# Patient Record
Sex: FEMALE | Race: WHITE | NOT HISPANIC OR LATINO | Employment: OTHER | URBAN - METROPOLITAN AREA
[De-identification: names, ages, dates, MRNs, and addresses within clinical notes are randomized per-mention and may not be internally consistent; named-entity substitution may affect disease eponyms.]

---

## 2017-12-07 ENCOUNTER — APPOINTMENT (OUTPATIENT)
Dept: LAB | Facility: HOSPITAL | Age: 77
End: 2017-12-07

## 2017-12-07 ENCOUNTER — TRANSCRIBE ORDERS (OUTPATIENT)
Dept: LAB | Facility: HOSPITAL | Age: 77
End: 2017-12-07

## 2017-12-07 DIAGNOSIS — A31.2 DISSEMINATED MYCOBACTERIUM AVIUM-INTRACELLULARE COMPLEX (HCC): Primary | ICD-10-CM

## 2017-12-07 LAB
ALBUMIN SERPL-MCNC: 4 G/DL (ref 3.2–4.8)
ALBUMIN/GLOB SERPL: 1.6 G/DL (ref 1.5–2.5)
ALP SERPL-CCNC: 157 U/L (ref 25–100)
ALT SERPL W P-5'-P-CCNC: 180 U/L (ref 7–40)
ANION GAP SERPL CALCULATED.3IONS-SCNC: 2 MMOL/L (ref 3–11)
AST SERPL-CCNC: 159 U/L (ref 0–33)
BASOPHILS # BLD AUTO: 0.06 10*3/MM3 (ref 0–0.2)
BASOPHILS NFR BLD AUTO: 1.2 % (ref 0–1)
BILIRUB SERPL-MCNC: 0.8 MG/DL (ref 0.3–1.2)
BUN BLD-MCNC: 18 MG/DL (ref 9–23)
BUN/CREAT SERPL: 20 (ref 7–25)
CALCIUM SPEC-SCNC: 9.6 MG/DL (ref 8.7–10.4)
CHLORIDE SERPL-SCNC: 102 MMOL/L (ref 99–109)
CO2 SERPL-SCNC: 32 MMOL/L (ref 20–31)
CREAT BLD-MCNC: 0.9 MG/DL (ref 0.6–1.3)
DEPRECATED RDW RBC AUTO: 48.2 FL (ref 37–54)
EOSINOPHIL # BLD AUTO: 0.22 10*3/MM3 (ref 0–0.3)
EOSINOPHIL NFR BLD AUTO: 4.5 % (ref 0–3)
ERYTHROCYTE [DISTWIDTH] IN BLOOD BY AUTOMATED COUNT: 13.3 % (ref 11.3–14.5)
GFR SERPL CREATININE-BSD FRML MDRD: 61 ML/MIN/1.73
GLOBULIN UR ELPH-MCNC: 2.5 GM/DL
GLUCOSE BLD-MCNC: 78 MG/DL (ref 70–100)
HCT VFR BLD AUTO: 39.2 % (ref 34.5–44)
HGB BLD-MCNC: 12.8 G/DL (ref 11.5–15.5)
IMM GRANULOCYTES # BLD: 0.01 10*3/MM3 (ref 0–0.03)
IMM GRANULOCYTES NFR BLD: 0.2 % (ref 0–0.6)
LYMPHOCYTES # BLD AUTO: 0.89 10*3/MM3 (ref 0.6–4.8)
LYMPHOCYTES NFR BLD AUTO: 18 % (ref 24–44)
MCH RBC QN AUTO: 32.2 PG (ref 27–31)
MCHC RBC AUTO-ENTMCNC: 32.7 G/DL (ref 32–36)
MCV RBC AUTO: 98.7 FL (ref 80–99)
MONOCYTES # BLD AUTO: 0.66 10*3/MM3 (ref 0–1)
MONOCYTES NFR BLD AUTO: 13.4 % (ref 0–12)
NEUTROPHILS # BLD AUTO: 3.1 10*3/MM3 (ref 1.5–8.3)
NEUTROPHILS NFR BLD AUTO: 62.7 % (ref 41–71)
PLATELET # BLD AUTO: 154 10*3/MM3 (ref 150–450)
PMV BLD AUTO: 9.5 FL (ref 6–12)
POTASSIUM BLD-SCNC: 4.6 MMOL/L (ref 3.5–5.5)
PROT SERPL-MCNC: 6.5 G/DL (ref 5.7–8.2)
RBC # BLD AUTO: 3.97 10*6/MM3 (ref 3.89–5.14)
SODIUM BLD-SCNC: 136 MMOL/L (ref 132–146)
WBC NRBC COR # BLD: 4.94 10*3/MM3 (ref 3.5–10.8)

## 2017-12-07 PROCEDURE — 85025 COMPLETE CBC W/AUTO DIFF WBC: CPT | Performed by: INTERNAL MEDICINE

## 2017-12-07 PROCEDURE — 80053 COMPREHEN METABOLIC PANEL: CPT | Performed by: INTERNAL MEDICINE

## 2017-12-07 PROCEDURE — 36415 COLL VENOUS BLD VENIPUNCTURE: CPT | Performed by: INTERNAL MEDICINE

## 2017-12-14 ENCOUNTER — TRANSCRIBE ORDERS (OUTPATIENT)
Dept: LAB | Facility: HOSPITAL | Age: 77
End: 2017-12-14

## 2017-12-14 ENCOUNTER — APPOINTMENT (OUTPATIENT)
Dept: LAB | Facility: HOSPITAL | Age: 77
End: 2017-12-14

## 2017-12-14 DIAGNOSIS — A31.2 DISSEMINATED MYCOBACTERIUM AVIUM-INTRACELLULARE COMPLEX (HCC): Primary | ICD-10-CM

## 2017-12-14 LAB
ALBUMIN SERPL-MCNC: 4 G/DL (ref 3.2–4.8)
ALBUMIN/GLOB SERPL: 1.7 G/DL (ref 1.5–2.5)
ALP SERPL-CCNC: 131 U/L (ref 25–100)
ALT SERPL W P-5'-P-CCNC: 58 U/L (ref 7–40)
ANION GAP SERPL CALCULATED.3IONS-SCNC: 3 MMOL/L (ref 3–11)
AST SERPL-CCNC: 28 U/L (ref 0–33)
BILIRUB SERPL-MCNC: 0.3 MG/DL (ref 0.3–1.2)
BUN BLD-MCNC: 20 MG/DL (ref 9–23)
BUN/CREAT SERPL: 20 (ref 7–25)
CALCIUM SPEC-SCNC: 8.9 MG/DL (ref 8.7–10.4)
CHLORIDE SERPL-SCNC: 101 MMOL/L (ref 99–109)
CO2 SERPL-SCNC: 29 MMOL/L (ref 20–31)
CREAT BLD-MCNC: 1 MG/DL (ref 0.6–1.3)
GFR SERPL CREATININE-BSD FRML MDRD: 54 ML/MIN/1.73
GLOBULIN UR ELPH-MCNC: 2.3 GM/DL
GLUCOSE BLD-MCNC: 95 MG/DL (ref 70–100)
POTASSIUM BLD-SCNC: 4.6 MMOL/L (ref 3.5–5.5)
PROT SERPL-MCNC: 6.3 G/DL (ref 5.7–8.2)
SODIUM BLD-SCNC: 133 MMOL/L (ref 132–146)

## 2017-12-14 PROCEDURE — 36415 COLL VENOUS BLD VENIPUNCTURE: CPT | Performed by: INTERNAL MEDICINE

## 2017-12-14 PROCEDURE — 80053 COMPREHEN METABOLIC PANEL: CPT | Performed by: INTERNAL MEDICINE

## 2020-01-23 PROBLEM — Z00.00 ENCOUNTER FOR PREVENTIVE HEALTH EXAMINATION: Status: ACTIVE | Noted: 2020-01-23

## 2020-02-11 ENCOUNTER — APPOINTMENT (OUTPATIENT)
Dept: ENDOCRINOLOGY | Facility: CLINIC | Age: 80
End: 2020-02-11

## 2020-08-04 PROCEDURE — U0003 INFECTIOUS AGENT DETECTION BY NUCLEIC ACID (DNA OR RNA); SEVERE ACUTE RESPIRATORY SYNDROME CORONAVIRUS 2 (SARS-COV-2) (CORONAVIRUS DISEASE [COVID-19]), AMPLIFIED PROBE TECHNIQUE, MAKING USE OF HIGH THROUGHPUT TECHNOLOGIES AS DESCRIBED BY CMS-2020-01-R: HCPCS | Performed by: PHYSICIAN ASSISTANT

## 2020-08-07 ENCOUNTER — TELEPHONE (OUTPATIENT)
Dept: URGENT CARE | Facility: CLINIC | Age: 80
End: 2020-08-07

## 2020-10-26 ENCOUNTER — TRANSCRIBE ORDERS (OUTPATIENT)
Dept: ADMINISTRATIVE | Facility: HOSPITAL | Age: 80
End: 2020-10-26

## 2020-10-26 DIAGNOSIS — H57.10 PAIN IN EYE, UNSPECIFIED LATERALITY: ICD-10-CM

## 2020-10-26 DIAGNOSIS — H53.9 UNSPECIFIED VISUAL DISTURBANCE: Primary | ICD-10-CM

## 2020-11-12 ENCOUNTER — HOSPITAL ENCOUNTER (OUTPATIENT)
Dept: MRI IMAGING | Facility: HOSPITAL | Age: 80
Discharge: HOME | End: 2020-11-12
Admitting: INTERNAL MEDICINE

## 2020-11-12 DIAGNOSIS — H53.9 UNSPECIFIED VISUAL DISTURBANCE: ICD-10-CM

## 2020-11-12 DIAGNOSIS — H57.10 PAIN IN EYE, UNSPECIFIED LATERALITY: ICD-10-CM

## 2020-11-12 PROCEDURE — 82565 ASSAY OF CREATININE: CPT

## 2020-11-12 PROCEDURE — A9577 INJ MULTIHANCE: HCPCS | Performed by: INTERNAL MEDICINE

## 2020-11-12 PROCEDURE — 0 GADOBENATE DIMEGLUMINE 529 MG/ML SOLUTION: Performed by: INTERNAL MEDICINE

## 2020-11-12 PROCEDURE — 70553 MRI BRAIN STEM W/O & W/DYE: CPT

## 2020-11-12 RX ADMIN — GADOBENATE DIMEGLUMINE 8 ML: 529 INJECTION, SOLUTION INTRAVENOUS at 11:29

## 2020-11-20 LAB — CREAT BLDA-MCNC: 0.9 MG/DL (ref 0.6–1.3)

## 2020-11-24 DIAGNOSIS — R00.2 PALPITATIONS: Primary | ICD-10-CM

## 2022-08-01 PROBLEM — M81.0 SENILE OSTEOPOROSIS: Status: ACTIVE | Noted: 2022-08-01

## 2022-08-03 ENCOUNTER — INFUSION (OUTPATIENT)
Dept: ONCOLOGY | Facility: HOSPITAL | Age: 82
End: 2022-08-03

## 2022-08-03 VITALS
RESPIRATION RATE: 20 BRPM | TEMPERATURE: 98.2 F | DIASTOLIC BLOOD PRESSURE: 76 MMHG | SYSTOLIC BLOOD PRESSURE: 148 MMHG | HEART RATE: 80 BPM

## 2022-08-03 DIAGNOSIS — M81.0 SENILE OSTEOPOROSIS: Primary | ICD-10-CM

## 2022-08-03 PROCEDURE — 25010000002 DENOSUMAB 60 MG/ML SOLUTION PREFILLED SYRINGE: Performed by: INTERNAL MEDICINE

## 2022-08-03 PROCEDURE — 96372 THER/PROPH/DIAG INJ SC/IM: CPT

## 2022-08-03 RX ORDER — ASCORBIC ACID 100 MG
TABLET,CHEWABLE ORAL
COMMUNITY

## 2022-08-03 RX ORDER — CHOLECALCIFEROL (VITAMIN D3) 1250 MCG
CAPSULE ORAL
COMMUNITY

## 2022-08-03 RX ADMIN — DENOSUMAB 60 MG: 60 INJECTION SUBCUTANEOUS at 15:11

## 2023-02-13 ENCOUNTER — INFUSION (OUTPATIENT)
Dept: ONCOLOGY | Facility: HOSPITAL | Age: 83
End: 2023-02-13
Payer: MEDICARE

## 2023-02-13 VITALS
TEMPERATURE: 96.5 F | HEART RATE: 72 BPM | DIASTOLIC BLOOD PRESSURE: 69 MMHG | SYSTOLIC BLOOD PRESSURE: 141 MMHG | RESPIRATION RATE: 16 BRPM

## 2023-02-13 DIAGNOSIS — M81.0 SENILE OSTEOPOROSIS: Primary | ICD-10-CM

## 2023-02-13 PROCEDURE — 25010000002 DENOSUMAB 60 MG/ML SOLUTION PREFILLED SYRINGE: Performed by: INTERNAL MEDICINE

## 2023-02-13 PROCEDURE — 96372 THER/PROPH/DIAG INJ SC/IM: CPT

## 2023-02-13 RX ADMIN — DENOSUMAB 60 MG: 60 INJECTION SUBCUTANEOUS at 13:25

## 2023-06-16 ENCOUNTER — APPOINTMENT (OUTPATIENT)
Dept: GENERAL RADIOLOGY | Facility: HOSPITAL | Age: 83
End: 2023-06-16
Payer: MEDICARE

## 2023-06-16 ENCOUNTER — HOSPITAL ENCOUNTER (OUTPATIENT)
Facility: HOSPITAL | Age: 83
Setting detail: OBSERVATION
Discharge: HOME | End: 2023-06-18
Attending: EMERGENCY MEDICINE | Admitting: INTERNAL MEDICINE
Payer: MEDICARE

## 2023-06-16 DIAGNOSIS — I48.91 NEW ONSET ATRIAL FIBRILLATION: Primary | ICD-10-CM

## 2023-06-16 DIAGNOSIS — R06.02 SHORTNESS OF BREATH: ICD-10-CM

## 2023-06-16 DIAGNOSIS — I95.9 TRANSIENT HYPOTENSION: ICD-10-CM

## 2023-06-16 DIAGNOSIS — J98.4 CHRONIC LUNG DISEASE: ICD-10-CM

## 2023-06-16 LAB
ALBUMIN SERPL-MCNC: 4 G/DL (ref 3.5–5.2)
ALBUMIN/GLOB SERPL: 1.4 G/DL
ALP SERPL-CCNC: 81 U/L (ref 39–117)
ALT SERPL W P-5'-P-CCNC: 7 U/L (ref 1–33)
ANION GAP SERPL CALCULATED.3IONS-SCNC: 11 MMOL/L (ref 5–15)
AST SERPL-CCNC: 16 U/L (ref 1–32)
BASOPHILS # BLD AUTO: 0.05 10*3/MM3 (ref 0–0.2)
BASOPHILS NFR BLD AUTO: 0.6 % (ref 0–1.5)
BILIRUB SERPL-MCNC: 0.3 MG/DL (ref 0–1.2)
BUN SERPL-MCNC: 12 MG/DL (ref 8–23)
BUN/CREAT SERPL: 20 (ref 7–25)
CALCIUM SPEC-SCNC: 9.5 MG/DL (ref 8.6–10.5)
CHLORIDE SERPL-SCNC: 95 MMOL/L (ref 98–107)
CO2 SERPL-SCNC: 28 MMOL/L (ref 22–29)
CREAT SERPL-MCNC: 0.6 MG/DL (ref 0.57–1)
DEPRECATED RDW RBC AUTO: 46.5 FL (ref 37–54)
EGFRCR SERPLBLD CKD-EPI 2021: 89.2 ML/MIN/1.73
EOSINOPHIL # BLD AUTO: 0.1 10*3/MM3 (ref 0–0.4)
EOSINOPHIL NFR BLD AUTO: 1.1 % (ref 0.3–6.2)
ERYTHROCYTE [DISTWIDTH] IN BLOOD BY AUTOMATED COUNT: 12.6 % (ref 12.3–15.4)
GLOBULIN UR ELPH-MCNC: 2.8 GM/DL
GLUCOSE SERPL-MCNC: 120 MG/DL (ref 65–99)
HCT VFR BLD AUTO: 38.7 % (ref 34–46.6)
HGB BLD-MCNC: 12.4 G/DL (ref 12–15.9)
HOLD SPECIMEN: NORMAL
HOLD SPECIMEN: NORMAL
IMM GRANULOCYTES # BLD AUTO: 0.02 10*3/MM3 (ref 0–0.05)
IMM GRANULOCYTES NFR BLD AUTO: 0.2 % (ref 0–0.5)
LYMPHOCYTES # BLD AUTO: 1.52 10*3/MM3 (ref 0.7–3.1)
LYMPHOCYTES NFR BLD AUTO: 17 % (ref 19.6–45.3)
MAGNESIUM SERPL-MCNC: 2 MG/DL (ref 1.6–2.4)
MCH RBC QN AUTO: 32 PG (ref 26.6–33)
MCHC RBC AUTO-ENTMCNC: 32 G/DL (ref 31.5–35.7)
MCV RBC AUTO: 99.7 FL (ref 79–97)
MONOCYTES # BLD AUTO: 0.81 10*3/MM3 (ref 0.1–0.9)
MONOCYTES NFR BLD AUTO: 9.1 % (ref 5–12)
NEUTROPHILS NFR BLD AUTO: 6.44 10*3/MM3 (ref 1.7–7)
NEUTROPHILS NFR BLD AUTO: 72 % (ref 42.7–76)
NRBC BLD AUTO-RTO: 0 /100 WBC (ref 0–0.2)
NT-PROBNP SERPL-MCNC: 1832 PG/ML (ref 0–1800)
PLATELET # BLD AUTO: 213 10*3/MM3 (ref 140–450)
PMV BLD AUTO: 9.6 FL (ref 6–12)
POTASSIUM SERPL-SCNC: 4.5 MMOL/L (ref 3.5–5.2)
PROT SERPL-MCNC: 6.8 G/DL (ref 6–8.5)
RBC # BLD AUTO: 3.88 10*6/MM3 (ref 3.77–5.28)
RBC MORPH BLD: NORMAL
SMALL PLATELETS BLD QL SMEAR: ADEQUATE
SODIUM SERPL-SCNC: 134 MMOL/L (ref 136–145)
TROPONIN T SERPL HS-MCNC: 10 NG/L
TSH SERPL DL<=0.05 MIU/L-ACNC: 0.56 UIU/ML (ref 0.27–4.2)
WBC MORPH BLD: NORMAL
WBC NRBC COR # BLD: 8.94 10*3/MM3 (ref 3.4–10.8)
WHOLE BLOOD HOLD COAG: NORMAL
WHOLE BLOOD HOLD SPECIMEN: NORMAL

## 2023-06-16 PROCEDURE — 85007 BL SMEAR W/DIFF WBC COUNT: CPT

## 2023-06-16 PROCEDURE — 96365 THER/PROPH/DIAG IV INF INIT: CPT

## 2023-06-16 PROCEDURE — 93005 ELECTROCARDIOGRAM TRACING: CPT

## 2023-06-16 PROCEDURE — 85025 COMPLETE CBC W/AUTO DIFF WBC: CPT

## 2023-06-16 PROCEDURE — 96372 THER/PROPH/DIAG INJ SC/IM: CPT

## 2023-06-16 PROCEDURE — 71045 X-RAY EXAM CHEST 1 VIEW: CPT

## 2023-06-16 PROCEDURE — 84439 ASSAY OF FREE THYROXINE: CPT | Performed by: INTERNAL MEDICINE

## 2023-06-16 PROCEDURE — 99284 EMERGENCY DEPT VISIT MOD MDM: CPT

## 2023-06-16 PROCEDURE — 93005 ELECTROCARDIOGRAM TRACING: CPT | Performed by: EMERGENCY MEDICINE

## 2023-06-16 PROCEDURE — 25010000002 ENOXAPARIN PER 10 MG: Performed by: EMERGENCY MEDICINE

## 2023-06-16 PROCEDURE — 80053 COMPREHEN METABOLIC PANEL: CPT

## 2023-06-16 PROCEDURE — 84484 ASSAY OF TROPONIN QUANT: CPT

## 2023-06-16 PROCEDURE — 83880 ASSAY OF NATRIURETIC PEPTIDE: CPT

## 2023-06-16 PROCEDURE — 84145 PROCALCITONIN (PCT): CPT | Performed by: INTERNAL MEDICINE

## 2023-06-16 PROCEDURE — 84443 ASSAY THYROID STIM HORMONE: CPT

## 2023-06-16 PROCEDURE — 83735 ASSAY OF MAGNESIUM: CPT

## 2023-06-16 RX ORDER — SODIUM CHLORIDE 0.9 % (FLUSH) 0.9 %
10 SYRINGE (ML) INJECTION AS NEEDED
Status: DISCONTINUED | OUTPATIENT
Start: 2023-06-16 | End: 2023-06-18 | Stop reason: HOSPADM

## 2023-06-16 RX ORDER — ENOXAPARIN SODIUM 100 MG/ML
1 INJECTION SUBCUTANEOUS ONCE
Status: COMPLETED | OUTPATIENT
Start: 2023-06-16 | End: 2023-06-16

## 2023-06-16 RX ADMIN — ENOXAPARIN SODIUM 40 MG: 100 INJECTION SUBCUTANEOUS at 22:42

## 2023-06-16 RX ADMIN — DILTIAZEM HYDROCHLORIDE 5 MG/HR: 5 INJECTION INTRAVENOUS at 22:43

## 2023-06-16 RX ADMIN — SODIUM CHLORIDE 1000 ML: 9 INJECTION, SOLUTION INTRAVENOUS at 23:25

## 2023-06-16 NOTE — Clinical Note
Level of Care: Telemetry [5]   Diagnosis: New onset atrial fibrillation [[id number]]   Admitting Physician: GERBER SANTIAGO [[id number]]   Attending Physician: GERBER SANTIAGO [[id number]]

## 2023-06-17 ENCOUNTER — APPOINTMENT (OUTPATIENT)
Dept: CT IMAGING | Facility: HOSPITAL | Age: 83
End: 2023-06-17
Payer: MEDICARE

## 2023-06-17 ENCOUNTER — APPOINTMENT (OUTPATIENT)
Dept: CARDIOLOGY | Facility: HOSPITAL | Age: 83
End: 2023-06-17
Payer: MEDICARE

## 2023-06-17 PROBLEM — E06.3 HASHIMOTO'S THYROIDITIS: Status: ACTIVE | Noted: 2021-06-09

## 2023-06-17 PROBLEM — H40.1133 PRIMARY OPEN ANGLE GLAUCOMA OF BOTH EYES, SEVERE STAGE: Status: ACTIVE | Noted: 2021-04-10

## 2023-06-17 PROBLEM — I48.91 NEW ONSET ATRIAL FIBRILLATION: Status: ACTIVE | Noted: 2023-06-17

## 2023-06-17 PROBLEM — R64 CACHEXIA: Status: ACTIVE | Noted: 2023-06-17

## 2023-06-17 PROBLEM — Z86.79 H/O ORTHOSTATIC HYPOTENSION: Status: ACTIVE | Noted: 2017-02-06

## 2023-06-17 PROBLEM — J96.01 ACUTE RESPIRATORY FAILURE WITH HYPOXIA: Status: ACTIVE | Noted: 2023-06-17

## 2023-06-17 PROBLEM — A31.0 PULMONARY INFECTION DUE TO MYCOBACTERIUM AVIUM COMPLEX: Status: ACTIVE | Noted: 2018-06-01

## 2023-06-17 PROBLEM — R91.8 MULTIPLE NODULES OF LUNG: Status: ACTIVE | Noted: 2019-01-16

## 2023-06-17 PROBLEM — F41.8 MIXED ANXIETY AND DEPRESSIVE DISORDER: Status: ACTIVE | Noted: 2020-11-25

## 2023-06-17 LAB
GEN 5 2HR TROPONIN T REFLEX: 10 NG/L
HOLD SPECIMEN: NORMAL
PREALB SERPL-MCNC: 10.6 MG/DL (ref 20–40)
PROCALCITONIN SERPL-MCNC: 0.05 NG/ML (ref 0–0.25)
QT INTERVAL: 270 MS
QT INTERVAL: 396 MS
QTC INTERVAL: 376 MS
QTC INTERVAL: 448 MS
T4 FREE SERPL-MCNC: 1.62 NG/DL (ref 0.93–1.7)
TROPONIN T DELTA: -6 NG/L
TROPONIN T SERPL HS-MCNC: 16 NG/L

## 2023-06-17 PROCEDURE — 25510000001 IOPAMIDOL PER 1 ML: Performed by: EMERGENCY MEDICINE

## 2023-06-17 PROCEDURE — 99204 OFFICE O/P NEW MOD 45 MIN: CPT | Performed by: INTERNAL MEDICINE

## 2023-06-17 PROCEDURE — 94799 UNLISTED PULMONARY SVC/PX: CPT

## 2023-06-17 PROCEDURE — G0378 HOSPITAL OBSERVATION PER HR: HCPCS

## 2023-06-17 PROCEDURE — 84484 ASSAY OF TROPONIN QUANT: CPT | Performed by: INTERNAL MEDICINE

## 2023-06-17 PROCEDURE — 94669 MECHANICAL CHEST WALL OSCILL: CPT

## 2023-06-17 PROCEDURE — 93306 TTE W/DOPPLER COMPLETE: CPT

## 2023-06-17 PROCEDURE — 71275 CT ANGIOGRAPHY CHEST: CPT

## 2023-06-17 PROCEDURE — 84134 ASSAY OF PREALBUMIN: CPT | Performed by: INTERNAL MEDICINE

## 2023-06-17 PROCEDURE — 93005 ELECTROCARDIOGRAM TRACING: CPT | Performed by: INTERNAL MEDICINE

## 2023-06-17 PROCEDURE — 97165 OT EVAL LOW COMPLEX 30 MIN: CPT

## 2023-06-17 PROCEDURE — 93306 TTE W/DOPPLER COMPLETE: CPT | Performed by: INTERNAL MEDICINE

## 2023-06-17 PROCEDURE — 94761 N-INVAS EAR/PLS OXIMETRY MLT: CPT

## 2023-06-17 PROCEDURE — 93010 ELECTROCARDIOGRAM REPORT: CPT | Performed by: INTERNAL MEDICINE

## 2023-06-17 PROCEDURE — 94640 AIRWAY INHALATION TREATMENT: CPT

## 2023-06-17 RX ORDER — BISACODYL 10 MG
10 SUPPOSITORY, RECTAL RECTAL DAILY PRN
Status: DISCONTINUED | OUTPATIENT
Start: 2023-06-17 | End: 2023-06-18 | Stop reason: HOSPADM

## 2023-06-17 RX ORDER — FLUDROCORTISONE ACETATE 0.1 MG/1
0.1 TABLET ORAL DAILY
Status: DISCONTINUED | OUTPATIENT
Start: 2023-06-17 | End: 2023-06-17

## 2023-06-17 RX ORDER — MIRTAZAPINE 15 MG/1
15 TABLET, FILM COATED ORAL NIGHTLY
Status: DISCONTINUED | OUTPATIENT
Start: 2023-06-17 | End: 2023-06-18 | Stop reason: HOSPADM

## 2023-06-17 RX ORDER — GALANTAMINE HYDROBROMIDE 4 MG/1
8 TABLET, FILM COATED ORAL 2 TIMES DAILY WITH MEALS
Status: DISCONTINUED | OUTPATIENT
Start: 2023-06-17 | End: 2023-06-18 | Stop reason: HOSPADM

## 2023-06-17 RX ORDER — AMOXICILLIN 250 MG
2 CAPSULE ORAL 2 TIMES DAILY
Status: DISCONTINUED | OUTPATIENT
Start: 2023-06-17 | End: 2023-06-18 | Stop reason: HOSPADM

## 2023-06-17 RX ORDER — LEVOTHYROXINE SODIUM 0.07 MG/1
75 TABLET ORAL DAILY
Status: DISCONTINUED | OUTPATIENT
Start: 2023-06-17 | End: 2023-06-18 | Stop reason: HOSPADM

## 2023-06-17 RX ORDER — BRIMONIDINE TARTRATE 2 MG/ML
1 SOLUTION/ DROPS OPHTHALMIC 2 TIMES DAILY
Status: DISCONTINUED | OUTPATIENT
Start: 2023-06-17 | End: 2023-06-18 | Stop reason: HOSPADM

## 2023-06-17 RX ORDER — FLUDROCORTISONE ACETATE 0.1 MG/1
100 TABLET ORAL DAILY
Status: DISCONTINUED | OUTPATIENT
Start: 2023-06-17 | End: 2023-06-18 | Stop reason: HOSPADM

## 2023-06-17 RX ORDER — TIMOLOL MALEATE 5 MG/ML
1 SOLUTION/ DROPS OPHTHALMIC EVERY 12 HOURS SCHEDULED
Status: DISCONTINUED | OUTPATIENT
Start: 2023-06-17 | End: 2023-06-18 | Stop reason: HOSPADM

## 2023-06-17 RX ORDER — GALANTAMINE HYDROBROMIDE 4 MG/1
8 TABLET, FILM COATED ORAL 2 TIMES DAILY
COMMUNITY

## 2023-06-17 RX ORDER — GALANTAMINE HYDROBROMIDE 4 MG/1
8 TABLET, FILM COATED ORAL 2 TIMES DAILY
Status: DISCONTINUED | OUTPATIENT
Start: 2023-06-17 | End: 2023-06-17

## 2023-06-17 RX ORDER — MIRTAZAPINE 15 MG/1
15 TABLET, FILM COATED ORAL NIGHTLY
COMMUNITY

## 2023-06-17 RX ORDER — SODIUM CHLORIDE 9 MG/ML
40 INJECTION, SOLUTION INTRAVENOUS AS NEEDED
Status: DISCONTINUED | OUTPATIENT
Start: 2023-06-17 | End: 2023-06-18 | Stop reason: HOSPADM

## 2023-06-17 RX ORDER — SODIUM CHLORIDE 0.9 % (FLUSH) 0.9 %
10 SYRINGE (ML) INJECTION EVERY 12 HOURS SCHEDULED
Status: DISCONTINUED | OUTPATIENT
Start: 2023-06-17 | End: 2023-06-18 | Stop reason: HOSPADM

## 2023-06-17 RX ORDER — PAROXETINE 10 MG/1
10 TABLET, FILM COATED ORAL NIGHTLY
COMMUNITY

## 2023-06-17 RX ORDER — ACETAMINOPHEN 325 MG/1
650 TABLET ORAL EVERY 4 HOURS PRN
Status: DISCONTINUED | OUTPATIENT
Start: 2023-06-17 | End: 2023-06-18 | Stop reason: HOSPADM

## 2023-06-17 RX ORDER — FLUDROCORTISONE ACETATE 0.1 MG/1
0.1 TABLET ORAL DAILY
COMMUNITY

## 2023-06-17 RX ORDER — ACETAMINOPHEN 650 MG/1
650 SUPPOSITORY RECTAL EVERY 4 HOURS PRN
Status: DISCONTINUED | OUTPATIENT
Start: 2023-06-17 | End: 2023-06-18 | Stop reason: HOSPADM

## 2023-06-17 RX ORDER — ASCORBIC ACID 500 MG
500 TABLET ORAL DAILY
Status: DISCONTINUED | OUTPATIENT
Start: 2023-06-17 | End: 2023-06-18 | Stop reason: HOSPADM

## 2023-06-17 RX ORDER — LATANOPROST 50 UG/ML
1 SOLUTION/ DROPS OPHTHALMIC NIGHTLY
Status: DISCONTINUED | OUTPATIENT
Start: 2023-06-17 | End: 2023-06-18 | Stop reason: HOSPADM

## 2023-06-17 RX ORDER — DIAZEPAM 2 MG/1
2 TABLET ORAL NIGHTLY PRN
Status: DISCONTINUED | OUTPATIENT
Start: 2023-06-17 | End: 2023-06-17

## 2023-06-17 RX ORDER — BISACODYL 5 MG/1
5 TABLET, DELAYED RELEASE ORAL DAILY PRN
Status: DISCONTINUED | OUTPATIENT
Start: 2023-06-17 | End: 2023-06-18 | Stop reason: HOSPADM

## 2023-06-17 RX ORDER — ONDANSETRON 2 MG/ML
4 INJECTION INTRAMUSCULAR; INTRAVENOUS EVERY 6 HOURS PRN
Status: DISCONTINUED | OUTPATIENT
Start: 2023-06-17 | End: 2023-06-18 | Stop reason: HOSPADM

## 2023-06-17 RX ORDER — ACETAMINOPHEN 160 MG/5ML
650 SOLUTION ORAL EVERY 4 HOURS PRN
Status: DISCONTINUED | OUTPATIENT
Start: 2023-06-17 | End: 2023-06-18 | Stop reason: HOSPADM

## 2023-06-17 RX ORDER — MIRTAZAPINE 15 MG/1
15 TABLET, FILM COATED ORAL NIGHTLY
Status: DISCONTINUED | OUTPATIENT
Start: 2023-06-17 | End: 2023-06-17

## 2023-06-17 RX ORDER — PAROXETINE 10 MG/1
10 TABLET, FILM COATED ORAL NIGHTLY
Status: DISCONTINUED | OUTPATIENT
Start: 2023-06-17 | End: 2023-06-18 | Stop reason: HOSPADM

## 2023-06-17 RX ORDER — PAROXETINE 10 MG/1
10 TABLET, FILM COATED ORAL DAILY
Status: DISCONTINUED | OUTPATIENT
Start: 2023-06-17 | End: 2023-06-17

## 2023-06-17 RX ORDER — SODIUM CHLORIDE 0.9 % (FLUSH) 0.9 %
10 SYRINGE (ML) INJECTION AS NEEDED
Status: DISCONTINUED | OUTPATIENT
Start: 2023-06-17 | End: 2023-06-18 | Stop reason: HOSPADM

## 2023-06-17 RX ORDER — POLYETHYLENE GLYCOL 3350 17 G/17G
17 POWDER, FOR SOLUTION ORAL DAILY PRN
Status: DISCONTINUED | OUTPATIENT
Start: 2023-06-17 | End: 2023-06-18 | Stop reason: HOSPADM

## 2023-06-17 RX ORDER — FLUTICASONE PROPIONATE 50 MCG
2 SPRAY, SUSPENSION (ML) NASAL DAILY
Status: DISCONTINUED | OUTPATIENT
Start: 2023-06-17 | End: 2023-06-18 | Stop reason: HOSPADM

## 2023-06-17 RX ADMIN — Medication 10 ML: at 01:35

## 2023-06-17 RX ADMIN — SODIUM CHLORIDE, POTASSIUM CHLORIDE, SODIUM LACTATE AND CALCIUM CHLORIDE 500 ML: 600; 310; 30; 20 INJECTION, SOLUTION INTRAVENOUS at 14:04

## 2023-06-17 RX ADMIN — Medication 10 ML: at 21:15

## 2023-06-17 RX ADMIN — SENNOSIDES AND DOCUSATE SODIUM 2 TABLET: 50; 8.6 TABLET ORAL at 09:43

## 2023-06-17 RX ADMIN — IPRATROPIUM BROMIDE 0.5 MG: 0.5 SOLUTION RESPIRATORY (INHALATION) at 12:44

## 2023-06-17 RX ADMIN — BRIMONIDINE TARTRATE 1 DROP: 2 SOLUTION/ DROPS OPHTHALMIC at 21:16

## 2023-06-17 RX ADMIN — IPRATROPIUM BROMIDE 0.5 MG: 0.5 SOLUTION RESPIRATORY (INHALATION) at 09:04

## 2023-06-17 RX ADMIN — IOPAMIDOL 80 ML: 755 INJECTION, SOLUTION INTRAVENOUS at 02:15

## 2023-06-17 RX ADMIN — LATANOPROST 1 DROP: 50 SOLUTION OPHTHALMIC at 21:15

## 2023-06-17 RX ADMIN — FLUDROCORTISONE ACETATE 100 MCG: 0.1 TABLET ORAL at 09:49

## 2023-06-17 RX ADMIN — GALANTAMINE 8 MG: 4 TABLET, FILM COATED ORAL at 09:43

## 2023-06-17 RX ADMIN — BRIMONIDINE TARTRATE 1 DROP: 2 SOLUTION/ DROPS OPHTHALMIC at 09:57

## 2023-06-17 RX ADMIN — GALANTAMINE 8 MG: 4 TABLET, FILM COATED ORAL at 18:02

## 2023-06-17 RX ADMIN — TIMOLOL MALEATE 1 DROP: 5 SOLUTION/ DROPS OPHTHALMIC at 21:15

## 2023-06-17 RX ADMIN — MIRTAZAPINE 15 MG: 15 TABLET, FILM COATED ORAL at 21:15

## 2023-06-17 RX ADMIN — IPRATROPIUM BROMIDE 0.5 MG: 0.5 SOLUTION RESPIRATORY (INHALATION) at 20:25

## 2023-06-17 RX ADMIN — PAROXETINE HYDROCHLORIDE 10 MG: 10 TABLET, FILM COATED ORAL at 21:15

## 2023-06-17 RX ADMIN — OXYCODONE HYDROCHLORIDE AND ACETAMINOPHEN 500 MG: 500 TABLET ORAL at 09:43

## 2023-06-17 RX ADMIN — Medication 10 ML: at 09:56

## 2023-06-17 RX ADMIN — LEVOTHYROXINE SODIUM 75 MCG: 75 TABLET ORAL at 05:21

## 2023-06-17 NOTE — H&P
[facility]  HISTORY AND PHYSICAL    Patient Name: Vita Miller  : 1940  MRN: [id number]  Primary Care Physician: Alfonso Steele MD  Date of admission: 2023      Subjective   Subjective     Chief Complaint:  Palpitations    HPI:  Vita Miller is a 83 y.o. female with past medical history of hypothyroidism, COPD with chronic bronchiectasis, history of chronic MAC with intolerance to treatment plan, history of orthostatic hypotension, hypothyroidism, hyperlipidemia, osteoporosis, bilateral glaucoma presents to the ER today with new onset palpitations and tachycardia starting earlier today.    Patient reports that she has been in her normal state of health up until today.  She states that she has had no cough or shortness of air above her baseline.  She reports that she chronically uses chest physiotherapy vest daily for her bronchiectasis.  Denies any fever or chills.  Denies any dysuria.  Patient reports today she began having palpitations and called her doctor, Dr. Steele.  He evaluated her at home and was concerned for atrial fibrillation versus SVT.  Was brought to the ER for further evaluation and found to be in atrial fibrillation with RVR.  Denies any chest pain or pressure.  Denies any other symptoms at this time.      Review of Systems   Gen- No fevers, chills  CV- No chest pain, reports palpitations  Resp-reports chronic cough, dyspnea  GI- No N/V/D, abd pain      Personal History     Past Medical History:   Diagnosis Date    A-fib     Disease of thyroid gland     Glaucoma     Hypotension              Past Surgical History:   Procedure Laterality Date    HIP SURGERY      PATELLA SURGERY      SHOULDER SURGERY         Family History: family history is not on file.     Social History:  reports that she has never smoked. She does not have any smokeless tobacco history on file.  Social History     Social History Narrative    Not on file        Medications:  Available home medication information reviewed.  (Not in a hospital admission)      Allergies   Allergen Reactions    Sulfa Antibiotics Other (See Comments)     Pt unsure of type of reaction        Objective   Objective     Vital Signs:   Temp:  [97.4 °F (36.3 °C)] 97.4 °F (36.3 °C)  Heart Rate:  [] 91  Resp:  [16] 16  BP: ()/(44-84) 103/61  Flow (L/min):  [2-3] 3       Physical Exam   Constitutional: Awake, alert, nontoxic, cachectic  Eyes: PERRLA, sclerae anicteric, no conjunctival injection  HENT: NCAT, mucous membranes moist  Neck: Supple, no thyromegaly, no lymphadenopathy, trachea midline  Respiratory: Rales in the bases bilaterally, nonlabored respirations   Cardiovascular: Borderline tachycardic, irregularly irregular rhythm, positive murmur, palpable pedal pulses bilaterally  Gastrointestinal: Positive bowel sounds, soft, nontender, nondistended  Musculoskeletal: No bilateral ankle edema, no clubbing or cyanosis to extremities  Psychiatric: Appropriate affect, cooperative  Neurologic: Oriented x 3, strength symmetric in all extremities, Cranial Nerves grossly intact to confrontation, speech clear  Skin: No rashes      Result Review:  I have personally reviewed the results from the time of this admission to 6/17/2023 00:57 EDT and agree with these findings:  [x]  Laboratory list / accordion  []  Microbiology  [x]  Radiology  [x]  EKG/Telemetry   []  Cardiology/Vascular   []  Pathology  []  Old records  []  Other:  Most notable findings include: Chest x-ray with pulmonary nodules, no focal infiltrates, findings of chronic interstitial lung disease/bronchiectasis.  EKG with atrial fibrillation with RVR.        LAB RESULTS:      Lab 06/16/23 2217   WBC 8.94   HEMOGLOBIN 12.4   HEMATOCRIT 38.7   PLATELETS 213   NEUTROS ABS 6.44   IMMATURE GRANS (ABS) 0.02   LYMPHS ABS 1.52   MONOS ABS 0.81   EOS ABS 0.10   MCV 99.7*         Lab 06/16/23 2217   SODIUM 134*   POTASSIUM 4.5    CHLORIDE 95*   CO2 28.0   ANION GAP 11.0   BUN 12   CREATININE 0.60   EGFR 89.2   GLUCOSE 120*   CALCIUM 9.5   MAGNESIUM 2.0   TSH 0.561         Lab 06/16/23  2217   TOTAL PROTEIN 6.8   ALBUMIN 4.0   GLOBULIN 2.8   ALT (SGPT) 7   AST (SGOT) 16   BILIRUBIN 0.3   ALK PHOS 81         Lab 06/16/23  2217   PROBNP 1,832.0*   HSTROP T 10*                     Microbiology Results (last 10 days)       ** No results found for the last 240 hours. **            XR Chest 1 View    Result Date: 6/16/2023  XR CHEST 1 VW Date of Exam: 6/16/2023 10:21 PM EDT Indication: Dysrhythmia triage protocol Comparison: None available. Findings: Chronic changes are present including bronchiectasis and underlying emphysema. There is some suggestion of numerous small nodules. There is no effusion or pneumothorax. Normal heart mediastinal contours.     Impression: Impression: Findings of bronchiectasis and probable underlying emphysema with the appearance of small bilateral pulmonary nodules suggested. Consider CT to further evaluate. 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    New onset atrial fibrillation with RVR  -- Check magnesium  -- Echo for new heart murmur  -- Cardiology consult  --cardizem drip. Reports \"really low heart rate\" at baseline.  Defer rate control medicine to cardiology.     Acute hypoxic respiratory failure  COPD  Chronic bronchiectasis  --Denies increased cough or soa. No fever or chills.  --daily percussion  --flutter valve  --CTA pendng    Pulm nodules  --prior hx of same with biopsy  --CTA  --needs f/u with pulm vs pulm nodule clinic at discharge    Cachexia  --suspect multifactorial due to lung disease, chronic infection, poor overall intake  --nutrition consult  --check prealbumin  -- Continue Remeron  --rule out underlying malignancy, suggest heme/onc referral    Hypothyroidism  -- TSH within normal limits but slightly lower than baseline  -- Check free T4    Orthostatic hypotension  --continue fludrocortisone    Mild memory deficits  --continue galantamine    Anxiety/depression  -- Continue Paxil, as needed benzo    Total time spent: 55  Time spent includes time reviewing chart, face-to-face time, counseling patient/family/caregiver, ordering medications/tests/procedures, communicating with other health care professionals, documenting clinical information in the electronic health record, and coordination of care.      DVT prophylaxis:  lovenox      CODE STATUS: full  Code Status and Medical Interventions:   Ordered at: 06/17/23 0052     Code Status (Patient has no pulse and is not breathing):    CPR (Attempt to Resuscitate)     Medical Interventions (Patient has pulse or is breathing):    Full Support       Expected Discharge   Expected Discharge Date: 6/18/2023; Expected Discharge Time:  "     Kevin Shah DO  06/17/23

## 2023-06-17 NOTE — CASE MANAGEMENT/SOCIAL WORK
"Continued Stay Note  [facility]     Patient Name: Vita Miller  MRN: [id number]  Today's Date: 6/17/2023    Admit Date: 6/16/2023    Plan: Ongoing   Discharge Plan     Row Name 06/17/23 0839       Plan    Plan Ongoing    Patient/Family in Agreement with Plan yes    Plan Comments Received a consult to check affordability of Xarelto vs Eliquis. Both have been run through \"Cover My Meds\" and neither requires a prior authorization. If Ms. Miller has not taken either medication ever, she will qualify for a $10 copay for either medication. CM will continue to follow.    Final Discharge Disposition Code 30 - still a patient               Discharge Codes    No documentation.               Expected Discharge Date and Time     Expected Discharge Date Expected Discharge Time    Jun 18, 2023             Saad Rudolph RN    "

## 2023-06-17 NOTE — PROGRESS NOTES
[facility] Services  ADMISSION FOLLOW-UP NOTE          Patient admitted after midnight, H&P by my partner performed earlier on today's date reviewed.  Interim findings, labs, and charting also reviewed.        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       In summary, this iis a 82 yo female w chronic MAC unable to tolerate treatment, chronic bronchiectasis, celiac disease, orthostatic hypotension on fludrocortisone who presents with paroxysmal Afib up to 150's at home. Borderline BP at baseline limiting available medication options.    -Cards seeing, possible fleicanide candidate if ECHO structurally normal  -CHADVASC=3, doac start per cards  -Continue home meds including fludrocortisone  -Low weight has been investigated by PCP many times - 2/2 chronic MAC, bronchiectasis and celiac. No further w/u at this time.   -Hypothyroid meds also recently changed by PCP so labs do not reflect this, no changes now    Expected Discharge poss 6/18  Expected Discharge Date: 6/18/2023; Expected Discharge Time:      Afsaneh Arias MD  06/17/23

## 2023-06-17 NOTE — PLAN OF CARE
Problem: Adult Inpatient Plan of Care  Goal: Absence of Hospital-Acquired Illness or Injury  Intervention: Identify and Manage Fall Risk  Recent Flowsheet Documentation  Taken 6/17/2023 1600 by Elaine Douglas RN  Safety Promotion/Fall Prevention:   safety round/check completed   toileting scheduled   fall prevention program maintained   activity supervised   assistive device/personal items within reach   clutter free environment maintained  Taken 6/17/2023 1400 by Elaine Douglas RN  Safety Promotion/Fall Prevention:   safety round/check completed   toileting scheduled   fall prevention program maintained   activity supervised   assistive device/personal items within reach   elopement precautions  Taken 6/17/2023 1200 by Elaine Douglas RN  Safety Promotion/Fall Prevention:   safety round/check completed   toileting scheduled   fall prevention program maintained   activity supervised   assistive device/personal items within reach   clutter free environment maintained  Taken 6/17/2023 1000 by Elaine Douglas RN  Safety Promotion/Fall Prevention:   safety round/check completed   toileting scheduled   fall prevention program maintained   activity supervised   assistive device/personal items within reach   clutter free environment maintained  Taken 6/17/2023 0804 by Elaine Douglas RN  Safety Promotion/Fall Prevention:   safety round/check completed   toileting scheduled   room organization consistent   fall prevention program maintained   activity supervised   assistive device/personal items within reach   clutter free environment maintained     Problem: Adult Inpatient Plan of Care  Goal: Absence of Hospital-Acquired Illness or Injury  Intervention: Prevent Skin Injury  Recent Flowsheet Documentation  Taken 6/17/2023 1600 by Elaine Douglas RN  Body Position: position changed independently  Taken 6/17/2023 1400 by Elaine Douglas RN  Body Position: position changed independently  Taken 6/17/2023 1200 by Elaine Douglas RN  Body Position: position  changed independently  Taken 6/17/2023 1000 by Elaine Douglas RN  Body Position: position changed independently  Taken 6/17/2023 0804 by Elaine Douglas RN  Body Position: position changed independently  Skin Protection:   adhesive use limited   incontinence pads utilized   pulse oximeter probe site changed   skin sealant/moisture barrier applied   skin-to-device areas padded   transparent dressing maintained   tubing/devices free from skin contact     Problem: Adult Inpatient Plan of Care  Goal: Absence of Hospital-Acquired Illness or Injury  Intervention: Prevent and Manage VTE (Venous Thromboembolism) Risk  Recent Flowsheet Documentation  Taken 6/17/2023 1600 by Elaine Douglas RN  Activity Management: activity encouraged  Taken 6/17/2023 1400 by Elaine Douglas RN  Activity Management: activity encouraged  Taken 6/17/2023 1200 by Elaine Douglas RN  Activity Management: activity encouraged  Taken 6/17/2023 1000 by Elaine Douglas RN  Activity Management: activity encouraged  Taken 6/17/2023 0804 by Elaine Douglas RN  Activity Management: activity encouraged  VTE Prevention/Management:   bilateral   sequential compression devices on     Problem: COPD (Chronic Obstructive Pulmonary Disease) Comorbidity  Goal: Maintenance of COPD Symptom Control  Intervention: Maintain COPD-Symptom Control  Recent Flowsheet Documentation  Taken 6/17/2023 0804 by Elaine Douglas RN  Supportive Measures:   active listening utilized   positive reinforcement provided   relaxation techniques promoted   self-care encouraged   verbalization of feelings encouraged  Medication Review/Management: medications reviewed     Problem: COPD (Chronic Obstructive Pulmonary Disease) Comorbidity  Goal: Maintenance of COPD Symptom Control  Outcome: Ongoing, Progressing  Intervention: Maintain COPD-Symptom Control  Recent Flowsheet Documentation  Taken 6/17/2023 0804 by Elaine Douglas RN  Supportive Measures:   active listening utilized   positive reinforcement provided   relaxation  techniques promoted   self-care encouraged   verbalization of feelings encouraged  Medication Review/Management: medications reviewed     Problem: Adult Inpatient Plan of Care  Goal: Absence of Hospital-Acquired Illness or Injury  Outcome: Ongoing, Progressing  Intervention: Identify and Manage Fall Risk  Recent Flowsheet Documentation  Taken 6/17/2023 1600 by Elaine Douglas RN  Safety Promotion/Fall Prevention:   safety round/check completed   toileting scheduled   fall prevention program maintained   activity supervised   assistive device/personal items within reach   clutter free environment maintained  Taken 6/17/2023 1400 by Elaine Douglas RN  Safety Promotion/Fall Prevention:   safety round/check completed   toileting scheduled   fall prevention program maintained   activity supervised   assistive device/personal items within reach   elopement precautions  Taken 6/17/2023 1200 by Elaine Douglas RN  Safety Promotion/Fall Prevention:   safety round/check completed   toileting scheduled   fall prevention program maintained   activity supervised   assistive device/personal items within reach   clutter free environment maintained  Taken 6/17/2023 1000 by Elaine Douglas RN  Safety Promotion/Fall Prevention:   safety round/check completed   toileting scheduled   fall prevention program maintained   activity supervised   assistive device/personal items within reach   clutter free environment maintained  Taken 6/17/2023 0804 by Elaine Douglas RN  Safety Promotion/Fall Prevention:   safety round/check completed   toileting scheduled   room organization consistent   fall prevention program maintained   activity supervised   assistive device/personal items within reach   clutter free environment maintained  Intervention: Prevent Skin Injury  Recent Flowsheet Documentation  Taken 6/17/2023 1600 by Elaine Douglas RN  Body Position: position changed independently  Taken 6/17/2023 1400 by Elaine Douglas RN  Body Position: position changed  independently  Taken 6/17/2023 1200 by Elaine Douglas RN  Body Position: position changed independently  Taken 6/17/2023 1000 by Elaine Douglas RN  Body Position: position changed independently  Taken 6/17/2023 0804 by Elaine Douglas RN  Body Position: position changed independently  Skin Protection:   adhesive use limited   incontinence pads utilized   pulse oximeter probe site changed   skin sealant/moisture barrier applied   skin-to-device areas padded   transparent dressing maintained   tubing/devices free from skin contact  Intervention: Prevent and Manage VTE (Venous Thromboembolism) Risk  Recent Flowsheet Documentation  Taken 6/17/2023 1600 by Elaine Douglas RN  Activity Management: activity encouraged  Taken 6/17/2023 1400 by Elaine Douglas RN  Activity Management: activity encouraged  Taken 6/17/2023 1200 by Elaine Douglas RN  Activity Management: activity encouraged  Taken 6/17/2023 1000 by Elaine Douglas RN  Activity Management: activity encouraged  Taken 6/17/2023 0804 by Elaine Douglas RN  Activity Management: activity encouraged  VTE Prevention/Management:   bilateral   sequential compression devices on  Intervention: Prevent Infection  Recent Flowsheet Documentation  Taken 6/17/2023 1600 by Elaine Douglas RN  Infection Prevention:   environmental surveillance performed   hand hygiene promoted   personal protective equipment utilized   rest/sleep promoted  Taken 6/17/2023 1400 by Elaine Douglas RN  Infection Prevention:   environmental surveillance performed   hand hygiene promoted   personal protective equipment utilized   rest/sleep promoted   visitors restricted/screened  Taken 6/17/2023 1200 by Elaine Douglas RN  Infection Prevention:   environmental surveillance performed   hand hygiene promoted   personal protective equipment utilized   rest/sleep promoted   single patient room provided  Taken 6/17/2023 1000 by Elaine Douglas RN  Infection Prevention:   environmental surveillance performed   personal protective equipment utilized    hand hygiene promoted   rest/sleep promoted   single patient room provided  Taken 6/17/2023 0804 by Elaine Douglas RN  Infection Prevention:   environmental surveillance performed   hand hygiene promoted   personal protective equipment utilized   rest/sleep promoted   single patient room provided   Goal Outcome Evaluation:

## 2023-06-17 NOTE — ED PROVIDER NOTES
Subjective   History of Present Illness  Patient is an 83-year-old female presenting to the emergency department with her primary care physician secondary to irregular heart rate and palpitations.  Primary care physician advises that she has chronic lung disease and feels that she has new onset atrial fibrillation versus an SVT.  Patient is not currently on anticoagulation.  Patient denies any chest pain.  Patient does have chronic lung disease.  Patient has recently had some changes in medications and dosing by primary care after annual physical exam.    History provided by:  Patient, medical records and caregiver    Review of Systems    Past Medical History:   Diagnosis Date    A-fib     Disease of thyroid gland     Glaucoma     Hypotension        Allergies   Allergen Reactions    Sulfa Antibiotics Other (See Comments)     Pt unsure of type of reaction        Past Surgical History:   Procedure Laterality Date    HIP SURGERY      PATELLA SURGERY      SHOULDER SURGERY         History reviewed. No pertinent family history.    Social History     Socioeconomic History    Marital status:    Tobacco Use    Smoking status: Never           Objective   Physical Exam  Vitals and nursing note reviewed.   Constitutional:       General: She is not in acute distress.     Appearance: Normal appearance. She is not toxic-appearing.   Cardiovascular:      Rate and Rhythm: Tachycardia present. Rhythm irregularly irregular.      Pulses: Normal pulses.           Radial pulses are 2+ on the right side and 2+ on the left side.   Pulmonary:      Effort: Pulmonary effort is normal.      Breath sounds: Normal breath sounds.   Abdominal:      Palpations: Abdomen is soft.      Tenderness: There is no abdominal tenderness.   Skin:     General: Skin is warm and dry.   Neurological:      Mental Status: She is alert and oriented to person, place, and time.   Psychiatric:         Mood and Affect: Mood normal.         Behavior: Behavior  "normal.       Procedures           ED Course  ED Course as of 06/16/23 2350   Fri Jun 16, 2023 2209 I reviewed the case with Dr. Steele, the patient's primary care physician.  Patient with new onset atrial fibrillation.  He also reports the patient has a significant chronic lung history.  He provided the patient's most recent physical exam and lab work at the bedside. [RS]   2218 [id number] Score for Atrial Fibrillation Stroke Risk - MDCalc  Calculated on Jun 16 2023 10:18 PM  3 points -> Stroke risk was 3.2% per year in >90,000 patients (the [place] Atrial Fibrillation Cohort Study) and 4.6% risk of stroke/TIA/systemic embolism. One recommendation suggests a 0 score for men or 1 score for women (no clinical risk factors) is \"low\" risk and may not require anticoagulation; a 1 score for men or 2 score for women is \"low-moderate\" risk and should consider antiplatelet or anticoagulation; and a score =2 for men or =3 for women is \"moderate-high\" risk and should otherwise be an anticoagulation candidate. [RS]   2225 XR Chest 1 View  Personally reviewed the single view the chest.  On my interpretation there is significant chronic lung disease noted but no focal lobar infiltrate.  See report for radiology for details. [RS]   2350 Patient had episode of hypotension associated with A-fib with slow ventricular response.  We stopped the Cardizem.  Give the patient fluids.  Blood pressure improved.  We will plan admission for further evaluation management.  Hospitalist messaged for admission. [RS]      ED Course User Index  [RS] Chago Glass MD                                           Medical Decision Making  Problems Addressed:  Chronic lung disease: chronic illness or injury  New onset atrial fibrillation: complicated acute illness or injury  Shortness of breath: complicated acute illness or injury  Transient hypotension: complicated acute illness or injury    Amount and/or Complexity of Data Reviewed  Independent " Historian: caregiver  External Data Reviewed: labs and notes.  Radiology:  Decision-making details documented in ED Course.  ECG/medicine tests: ordered.    Risk  Prescription drug management.  Decision regarding hospitalization.        Final diagnoses:   New onset atrial fibrillation   Shortness of breath   Chronic lung disease   Transient hypotension       ED Disposition  ED Disposition       ED Disposition   Decision to Admit    Condition   --    Comment   --               No follow-up provider specified.       Medication List      No changes were made to your prescriptions during this visit.            Chago Glass MD  06/16/23 2278

## 2023-06-17 NOTE — PLAN OF CARE
Problem: Adult Inpatient Plan of Care  Goal: Plan of Care Review  Outcome: Ongoing, Progressing  Flowsheets (Taken 6/17/2023 0459)  Progress: no change     Problem: Adult Inpatient Plan of Care  Goal: Absence of Hospital-Acquired Illness or Injury  Intervention: Identify and Manage Fall Risk  Recent Flowsheet Documentation  Taken 6/17/2023 0400 by Leisa Griffin RN  Safety Promotion/Fall Prevention:   activity supervised   assistive device/personal items within reach   safety round/check completed  Taken 6/17/2023 0200 by Leisa Griffin RN  Safety Promotion/Fall Prevention:   activity supervised   assistive device/personal items within reach   safety round/check completed     Problem: Adult Inpatient Plan of Care  Goal: Absence of Hospital-Acquired Illness or Injury  Intervention: Prevent Skin Injury  Recent Flowsheet Documentation  Taken 6/17/2023 0400 by Leisa Griffin RN  Body Position: position changed independently  Taken 6/17/2023 0200 by Leisa Griffin RN  Body Position: position changed independently     Problem: Adult Inpatient Plan of Care  Goal: Absence of Hospital-Acquired Illness or Injury  Intervention: Prevent and Manage VTE (Venous Thromboembolism) Risk  Recent Flowsheet Documentation  Taken 6/17/2023 0400 by Leisa Griffin RN  Activity Management: activity encouraged  Taken 6/17/2023 0200 by Leisa Griffin RN  Activity Management: activity encouraged     Problem: Adult Inpatient Plan of Care  Goal: Readiness for Transition of Care  Intervention: Mutually Develop Transition Plan  Recent Flowsheet Documentation  Taken 6/17/2023 0119 by Leisa Griffin RN  Equipment Currently Used at Home: none  Taken 6/17/2023 0117 by Leisa Griffin RN  Transportation Anticipated: family or friend will provide  Patient/Family Anticipated Services at Transition:   Patient/Family Anticipates Transition to: home   Goal Outcome Evaluation:           Progress: no change

## 2023-06-17 NOTE — PLAN OF CARE
Goal Outcome Evaluation:  Plan of Care Reviewed With: patient        Progress: improving  Outcome Evaluation: OT eval completed, Pt demonstrates deficits in balance and functional activity tolerance compared to baseline ADL and IADL completion, monitored BP and tolerated functional tasks in standing with SBA no AD, recom IPOT DC home with assist

## 2023-06-17 NOTE — THERAPY EVALUATION
Patient Name: Vita Miller  : 1940    MRN: [id number]                              Today's Date: 2023       Admit Date: 2023    Visit Dx:     ICD-10-CM ICD-9-CM   1. New onset atrial fibrillation  I48.91 427.31   2. Shortness of breath  R06.02 786.05   3. Chronic lung disease  J98.4 518.89   4. 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    Hypothyroidism    Acute respiratory failure with hypoxia    Cachexia     Past Medical History:   Diagnosis Date    A-fib     Disease of thyroid gland     Glaucoma     Hypotension      Past Surgical History:   Procedure Laterality Date    HIP SURGERY      PATELLA SURGERY      SHOULDER SURGERY        General Information       Row Name 23 1103          OT Time and Intention    Document Type evaluation  -KF     Mode of Treatment occupational therapy  -KF       Row Name 23 1103          General Information    Patient Profile Reviewed yes  -KF     Prior Level of Function independent:;transfer;bed mobility;ADL's;all household mobility;driving;mod assist:;home management  without DME, cooks, however has assist with cleaning from hired ; drives but not at night  -KF     Existing Precautions/Restrictions fall;orthostatic hypotension  monitor BP  -KF     Barriers to Rehab medically complex  -KF       Row Name 23 1103          Occupational Profile    Environmental Supports and Barriers (Occupational Profile) [place] shower with traction strips and grab bars in place  -KF       Row Name 23 1103          Living Environment     People in Home alone  -       Row Name 06/17/23 1103          Home Main Entrance    Number of Stairs, Main Entrance none  -       Row Name 06/17/23 1103          Stairs Within Home, Primary    Number of Stairs, Within Home, Primary none  -[facility] Name 06/17/23 1103          Cognition    Orientation Status (Cognition) oriented x 4  -       Row Name 06/17/23 1103          Safety Issues, Functional Mobility    Safety Issues Affecting Function (Mobility) insight into deficits/self-awareness;awareness of need for assistance  -     Impairments Affecting Function (Mobility) balance;endurance/activity tolerance  -               User Key  (r) = Recorded By, (t) = Taken By, (c) = Cosigned By      Initials Name Provider Type    KF Nithya Mcmillan, OT Occupational Therapist                     Mobility/ADL's       Row Name 06/17/23 1107          Bed Mobility    Bed Mobility scooting/bridging;supine-sit;sit-supine  -     Scooting/Bridging [place] (Bed Mobility) independent  -     Supine-Sit [place] (Bed Mobility) independent  -     Sit-Supine [place] (Bed Mobility) independent  -KF     Bed Mobility, Safety Issues other (see comments)  monitor BP  -     Comment, (Bed Mobility) BP upon sitting 94/54 from 97/64 no dizziness  -[facility] Name 06/17/23 1107          Transfers    Transfers sit-stand transfer;toilet transfer;stand-sit transfer  -     Comment, (Transfers) no LOB throughout or dizziness however BP monitored throughout due to initial in standing at 82/53  -[facility] Name 06/17/23 1107          Sit-Stand Transfer    Sit-Stand [place] (Transfers) standby assist  -[facility] Name 06/17/23 1107          Stand-Sit Transfer    Stand-Sit [place] (Transfers) standby assist  -[facility] Name 06/17/23 1107          Toilet Transfer    Type (Toilet Transfer) stand pivot/stand step  -     [place] Level (Toilet Transfer) standby assist  -     Assistive Device  (Toilet Transfer) commode  -KF       Row Name 06/17/23 1107          Functional Mobility    Functional Mobility- Ind. Level standby assist  -KF     Functional Mobility- Device --  no AD  -KF     Functional Mobility-Distance (Feet) 200  -KF     Functional Mobility- Safety Issues step length decreased  -KF     Functional Mobility- Comment no LOB throughout and tolerated well  -KF       Row Name 06/17/23 1107          Activities of Daily Living    BADL Assessment/Intervention toileting;grooming;lower body dressing  -[facility] Name 06/17/23 1107          Toileting Assessment/Training    [place] Level (Toileting) adjust/manage clothing;perform perineal hygiene;standby assist  -KF     Assistive Devices (Toileting) commode  -KF     Position (Toileting) unsupported sitting;unsupported standing  -[facility] Name 06/17/23 1107          Grooming Assessment/Training    [place] Level (Grooming) oral care regimen;hair care, combing/brushing;wash face, hands;supervision  -KF     Position (Grooming) sink side;unsupported standing  -[facility] Name 06/17/23 1107          Lower Body Dressing Assessment/Training    [place] Level (Lower Body Dressing) don;socks;set up  -     Position (Lower Body Dressing) edge of bed sitting  -               User Key  (r) = Recorded By, (t) = Taken By, (c) = Cosigned By      Initials Name Provider Type    KF Nithya Mcmillan, OT Occupational Therapist                   Obj/Interventions       Row Name 06/17/23 1113          Sensory Assessment (Somatosensory)    Sensory Assessment (Somatosensory) UE sensation intact  -[facility] Name 06/17/23 1113          Vision Assessment/Intervention    Visual Impairment/Limitations WFL;peripheral vision impaired left  chronic  -[facility] Name 06/17/23 1113          Range of Motion Comprehensive    General Range of Motion bilateral upper extremity ROM WFL  -[facility] Name 06/17/23 1113          Strength Comprehensive (MMT)     General Manual Muscle Testing (MMT) Assessment upper extremity strength deficits identified  -KF     Comment, General Manual Muscle Testing (MMT) Assessment BUE grossly 4/5  -KF       Row Name 06/17/23 1113          Balance    Balance Assessment sitting static balance;sitting dynamic balance;standing static balance;standing dynamic balance  -KF     Static Sitting Balance independent  -KF     Dynamic Sitting Balance independent  -KF     Position, Sitting Balance unsupported;sitting edge of bed  -KF     Static Standing Balance standby assist  -KF     Dynamic Standing Balance contact guard  -KF     Position/Device Used, Standing Balance unsupported  -KF     Balance Interventions standing;minimal challenge;weight shifting activity;occupation based/functional task;UE activity with balance activity  -KF     Comment, Balance no overall LOB without AD  -KF               User Key  (r) = Recorded By, (t) = Taken By, (c) = Cosigned By      Initials Name Provider Type    KF Nithya Mcmillan, OT Occupational Therapist                   Goals/Plan       Row Name 06/17/23 1125          Transfer Goal 1 (OT)    Activity/Assistive Device (Transfer Goal 1, OT) toilet  -KF     [place] Level/Cues Needed (Transfer Goal 1, OT) supervision required  -KF     Time Frame (Transfer Goal 1, OT) long term goal (LTG);10 days  -KF     Progress/Outcome (Transfer Goal 1, OT) goal ongoing;new goal  -KF       Row Name 06/17/23 1125          Problem Specific Goal 1 (OT)    Problem Specific Goal 1 (OT) Tolerated 10 minutes standing to simulate IADL and ADL tasks in standing with good tolerance and balance throughout  -KF     Time Frame (Problem Specific Goal 1, OT) long term goal (LTG);10 days  -KF     Progress/Outcome (Problem Specific Goal 1, OT) goal ongoing;new goal  -KF       Row Name 06/17/23 1125          Therapy Assessment/Plan (OT)    Planned Therapy Interventions (OT) activity tolerance training;adaptive equipment training;BADL  retraining;strengthening exercise;occupation/activity based interventions;transfer/mobility retraining;functional balance retraining;IADL retraining;ROM/therapeutic exercise;patient/caregiver education/training  -KF               User Key  (r) = Recorded By, (t) = Taken By, (c) = Cosigned By      Initials Name Provider Type    KF Nithya Mcmillan, OT Occupational Therapist                   Clinical Impression       Row Name 06/17/23 1116          Pain Assessment    Pretreatment Pain Rating 0/10 - no pain  -KF     Posttreatment Pain Rating 0/10 - no pain  -KF     Pain Location generalized  -KF     Pre/Posttreatment Pain Comment tolerated  -KF     Pain Intervention(s) Ambulation/increased activity;Repositioned  -KF       Row Name 06/17/23 1116          Plan of Care Review    Plan of Care Reviewed With patient  -KF     Progress improving  -KF     Outcome Evaluation OT eval completed, Pt demonstrates deficits in balance and functional activity tolerance compared to baseline ADL and IADL completion, monitored BP and tolerated functional tasks in standing with SBA no AD, recom IPOT DC home with assist  -[facility] Name 06/17/23 1116          Therapy Assessment/Plan (OT)    Rehab Potential (OT) good, to achieve stated therapy goals  -KF     Criteria for Skilled Therapeutic Interventions Met (OT) yes;meets criteria;skilled treatment is necessary  -KF     Therapy Frequency (OT) daily  -KF       Row Name 06/17/23 1116          Therapy Plan Review/Discharge Plan (OT)    Equipment Needs Upon Discharge (OT) --  TBD  -KF     Anticipated Discharge Disposition (OT) home with assist  -KF       Row Name 06/17/23 1116          Vital Signs    Pre Systolic BP Rehab 97  -KF     Pre Treatment Diastolic BP 64  -KF     Intra Systolic BP Rehab 94  82/53 initially in standing then improved with activity  -KF     Intra Treatment Diastolic BP 54  -KF     Post Systolic BP Rehab 99  -KF     Post Treatment Diastolic BP 65  -KF     O2 Delivery  Pre Treatment room air  -KF     Pre Patient Position Supine  -KF     Intra Patient Position Standing  -KF     Post Patient Position Supine  -KF       Row Name 06/17/23 1116          Positioning and Restraints    Pre-Treatment Position in bed  -KF     Post Treatment Position bed  -KF     In Bed notified nsg;supine;fowlers;call light within reach;encouraged to call for assist;exit alarm on;legs elevated  -KF               User Key  (r) = Recorded By, (t) = Taken By, (c) = Cosigned By      Initials Name Provider Type    Nithya Bonner OT Occupational Therapist                   Outcome Measures       Row Name 06/17/23 1127          How much help from another is currently needed...    Putting on and taking off regular lower body clothing? 4  -KF     Bathing (including washing, rinsing, and drying) 3  -KF     Toileting (which includes using toilet bed pan or urinal) 3  -KF     Putting on and taking off regular upper body clothing 4  -KF     Taking care of personal grooming (such as brushing teeth) 4  -KF     Eating meals 4  -KF     AM-PAC 6 Clicks Score (OT) 22  -KF       Row Name 06/17/23 1127          Functional Assessment    Outcome Measure Options AM-PAC 6 Clicks Daily Activity (OT)  -KF               User Key  (r) = Recorded By, (t) = Taken By, (c) = Cosigned By      Initials Name Provider Type    Nithya Bonner OT Occupational Therapist                    Occupational Therapy Education       Title: PT OT SLP Therapies (Done)       Topic: Occupational Therapy (Done)       Point: ADL training (Done)       Description:   Instruct learner(s) on proper safety adaptation and remediation techniques during self care or transfers.   Instruct in proper use of assistive devices.                  Learning Progress Summary             Patient Acceptance, E,TB,D, NR,VU by KEVIN at 6/17/2023 1128                         Point: Home exercise program (Done)       Description:   Instruct learner(s) on appropriate  technique for monitoring, assisting and/or progressing therapeutic exercises/activities.                  Learning Progress Summary             Patient Acceptance, E,TB,D, NR,VU by  at 6/17/2023 1128                         Point: Precautions (Done)       Description:   Instruct learner(s) on prescribed precautions during self-care and functional transfers.                  Learning Progress Summary             Patient Acceptance, E,TB,D, NR,VU by  at 6/17/2023 1128                         Point: Body mechanics (Done)       Description:   Instruct learner(s) on proper positioning and spine alignment during self-care, functional mobility activities and/or exercises.                  Learning Progress Summary             Patient Acceptance, E,TB,D, NR,VU by  at 6/17/2023 1128                                         User Key       Initials Effective Dates Name Provider Type Discipline     06/16/21 -  Nithya Mcmillan OT Occupational Therapist OT                  OT Recommendation and Plan  Planned Therapy Interventions (OT): activity tolerance training, adaptive equipment training, BADL retraining, strengthening exercise, occupation/activity based interventions, transfer/mobility retraining, functional balance retraining, IADL retraining, ROM/therapeutic exercise, patient/caregiver education/training  Therapy Frequency (OT): daily  Plan of Care Review  Plan of Care Reviewed With: patient  Progress: improving  Outcome Evaluation: OT eval completed, Pt demonstrates deficits in balance and functional activity tolerance compared to baseline ADL and IADL completion, monitored BP and tolerated functional tasks in standing with SBA no AD, recom IPOT DC home with assist     Time Calculation:    Time Calculation- OT       Row Name 06/17/23 1000             Time Calculation- OT    OT Start Time 1000  -KF      OT Received On 06/17/23  -      OT Goal Re-Cert Due Date 06/27/23  -         Untimed Charges    OT  Eval/Re-eval Minutes 48  -KF         Total Minutes    Untimed Charges Total Minutes 48  -KF       Total Minutes 48  -KF                User Key  (r) = Recorded By, (t) = Taken By, (c) = Cosigned By      Initials Name Provider Type    Nithya Bonner OT Occupational Therapist                  Therapy Charges for Today       Code Description Service Date Service Provider Modifiers Qty    [id number] HC OT EVAL LOW COMPLEXITY 4 6/17/2023 Nithya Mcmillan OT GO 1                 Nithya Mcmillan OT  6/17/2023

## 2023-06-17 NOTE — CONSULTS
Vita Miller  [id number]  1940   LOS: 0 days   Patient Care Team:  Alfonso Steele MD as PCP - General (Internal Medicine)    ID: 83-year-old  white female from [place], [place] admitted from [facility] ED.    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  Remote operations:  A.  Hip surgery  B.  Patella surgery  C.  Shoulder surgery    Allergies   Allergen Reactions    Sulfa Antibiotics Other (See Comments)     Pt unsure of type of reaction      Medications Prior to Admission   Medication Sig Dispense Refill Last Dose    Ascorbic Acid (Vitamin C) 100 MG chewable tablet Vitamin C   6/16/2023    brimonidine-timolol (COMBIGAN) 0.2-0.5 % ophthalmic solution Every 12 (Twelve) Hours.   6/16/2023    Cholecalciferol (Vitamin D3) 1.25 MG (37824 UT) capsule Vitamin D3   6/16/2023    levothyroxine (SYNTHROID, LEVOTHROID) 75 MCG tablet Take 1 tablet by mouth Daily.   6/16/2023    fludrocortisone 0.1 MG tablet Take 1 tablet by mouth Daily.       galantamine (RAZADYNE) 4 MG tablet Take 2 tablets by mouth 2 (Two) Times a Day.       mirtazapine (REMERON) 15 MG tablet Take 1 tablet by mouth Every Night.       PARoxetine (PAXIL) 10 MG tablet Take 1 tablet by mouth Every Night.        Scheduled Meds:ascorbic acid, 500 mg, Oral, Daily  brimonidine, 1 drop, Both Eyes, BID   And  timolol, 1 drop, Both Eyes, Q12H  fludrocortisone, 100 mcg, Oral, Daily  fluticasone, 2 spray, Each Nare, Daily  galantamine, 8 mg, Oral, BID With Meals  ipratropium, 0.5 mg, Nebulization, 4x Daily -  RT  latanoprost, 1 drop, Both Eyes, Nightly  levothyroxine, 75 mcg, Oral, Daily  mirtazapine, 15 mg, Oral, Nightly  PARoxetine, 10 mg, Oral, Nightly  senna-docusate sodium, 2 tablet, Oral, BID  sodium chloride, 10 mL, Intravenous, Q12H      Continuous Infusions:dilTIAZem, 5-15 mg/hr, Last Rate: Stopped (06/16/23 5693)      PRN Meds:.  acetaminophen **OR** acetaminophen **OR** acetaminophen    senna-docusate sodium **AND** polyethylene glycol **AND** bisacodyl **AND** bisacodyl    ondansetron    sodium chloride    sodium chloride    sodium chloride       History of Present Illness:      This older middle-aged female was admitted for evaluation and treatment of apparent onset of atrial fibrillation with rapid ventricular spots yesterday.  She denies prior knowledge of cardiac illness.  She has significant pulmonary dysfunction and disability for several years.  She denies any recent exacerbation of her chronic exertional dyspnea and fatigue as well as intermittent sputum production.  There has specifically been no episodes of fever, chills, pleurisy, marked purulent sputum production, hemoptysis, anginal type chest discomfort, orthopnea, or PND.  There is no prior history of myocardial infarction or stroke.  There is no prior history of TIA or claudication.  Since admission to hospital IV diltiazem the patient has spontaneously cardioverted and is scheduled to undergo echocardiogram.  She strongly desires to be discharged home if at all possible.  Currently asymptomatic.    Cardiac risk factors: advanced age (older than 55 for men, 65 for women), dyslipidemia, and sedentary lifestyle.    Social History     Socioeconomic History    Marital status:    Tobacco Use    Smoking status: Never   Vaping Use    Vaping Use: Never used   Substance and Sexual Activity    Alcohol use: Never    Drug use: Never    Sexual activity: Defer     History reviewed. No pertinent family history.    Review of Systems  10 point review of  "systems was completed, positives outlined in the HPI, and otherwise all other systems are negative.      Objective:       Physical Exam  /74 (BP Location: Left arm, Patient Position: Lying)   Pulse 99   Temp 97.9 °F (36.6 °C) (Oral)   Resp 17   Ht 172.7 cm (68\")   Wt 46.4 kg (102 lb 3.2 oz)   SpO2 99%   BMI 15.54 kg/m²       06/17/23  0115 06/17/23  0300   Weight: 46.7 kg (103 lb) 46.4 kg (102 lb 3.2 oz)     Body mass index is 15.54 kg/m².    Intake/Output Summary (Last 24 hours) at 6/17/2023 1046  Last data filed at 6/17/2023 0802  Gross per 24 hour   Intake 240 ml   Output --   Net 240 ml       General Appearance:  Alert, cooperative, no distress, appears stated age   Head:  Normocephalic, without obvious abnormality, atraumatic   Eyes:  PERRL, conjunctivae/corneas clear, EOM's intact, fundi benign, both eyes   Throat: Lips, mucosa, and tongue normal; teeth and gums normal   Neck: Supple, symmetrical, trachea midline, no adenopathy, thyroid: not enlarged, symmetric, no tenderness/mass/nodules, no carotid bruit or JVD   Lungs:   Diffuse diminished breath sounds with scattered rhonchi, crackles, and wheezes bilaterally, respirations unlabored   Heart:  Regular rate and rhythm, S1, S2 normal, grade 1/6 systolic murmur, no rub or gallop   Abdomen:   Soft, nontender, no masses, no organomegaly, bowel sounds audible x4   Extremities: No edema, normal range of motion   Pulses: 2+ and symmetric   Skin: Skin color, texture, turgor normal, no rashes or lesions   Neurologic: Normal; no focal changes noted and gait not tested     Cardiographics:    EKG:    Atrial fibrillation  Cannot rule out Anterior infarct , age undetermined  Abnormal ECG  When compared with ECG of 16-JUN-2023 22:10, (Unconfirmed)  Vent. rate has decreased BY  40 BPM  Questionable change in QRS axis  T wave inversion no longer evident in Inferior leads    Imaging:    Chest x-ray:    Findings:    Chronic changes are present including " bronchiectasis and underlying emphysema. There is some suggestion of numerous small nodules. There is no effusion or pneumothorax. Normal heart mediastinal contours.     IMPRESSION:    Findings of bronchiectasis and probable underlying emphysema with the appearance of small bilateral pulmonary nodules suggested. Consider CT to further evaluate.    CHEST CTA:    FINDINGS:     Lungs: Innumerable pulmonary nodules scattered throughout bilateral lungs as well as peripheral tree-in-bud-like opacities. Largest nodule is located within the upper portion of the left lower lobe and measures approximately 0.8 x 0.8 cm.Biapical   scarring. Bilateral scattered bronchiectasis. Scattered mucous plugging predominantly within the left lower lobe.     Pleura: No evidence of acute abnormality.      Mediastinum and Maria Guadalupe: No evidence of acute abnormality.     Pulmonary Arteries: No filling defects identified..      Cardiovascular: Normal.      Upper Abdomen: Normal.      Chest Wall: Normal.      Musculoskeletal: No evidence of acute abnormality.         IMPRESSIONS:     1.  No evidence of pulmonary embolus.    2.  Bilateral pulmonary nodules. Correlate with prior imaging and refer to Fleischner guidelines.  3.  Bilateral centrilobular opacities, bronchiectasis, mucous plugging. Chronicity is favored but superimposed acute infection is not excluded.    Lab Review   Results from last 7 days   Lab Units 06/16/23  2217   SODIUM mmol/L 134*   POTASSIUM mmol/L 4.5   CHLORIDE mmol/L 95*   CO2 mmol/L 28.0   BUN mg/dL 12   CREATININE mg/dL 0.60   GLUCOSE mg/dL 120*   CALCIUM mg/dL 9.5     Results from last 7 days   Lab Units 06/16/23  2217   WBC 10*3/mm3 8.94   HEMOGLOBIN g/dL 12.4   HEMATOCRIT % 38.7   PLATELETS 10*3/mm3 213     Results from last 7 days   Lab Units 06/17/23  0500 06/17/23  0235 06/16/23  2217   HSTROP T ng/L 10* 16* 10*     NO URINALYSIS  proBNP: 1832.0  ALBUMIN: 4.0  LFTs: WNL  TSH: WNL  FT4: WNL  PROCALCITONIN: 0.05  NO  GWEN.     Assessment:      Atrial fibrillation moderately symptomatic has resolved.  I suspect the patient may have underlying sick sinus syndrome.  No electrocardiogram has been performed and her [place] interval on telemetry appears to be acceptable.  Marginal systolic blood pressure and suboptimal candidate for diltiazem CD as well as beta-blocker drug therapy and certainly would defer amiodarone.  If no marked structural abnormality on echocardiogram would consider trial of flecainide 50 mg BID to suppress episodes of symptomatic atrial fibrillation and follow-up as outpatient with [facility] to obtain 2-week event monitor.     Plan:     1.  Review echocardiogram when performed if no significant segmental left ventricular wall motion abnormality would initiate flecainide 50 mg BID as well as apixaban 2.5 mg BID  2.  Defer MPS/LHC  3.  [facility] follow-up 1-2 weeks with 2-week event monitor and subsequent [facility] follow-up  4.  Electrocardiogram

## 2023-06-18 VITALS
HEIGHT: 68 IN | SYSTOLIC BLOOD PRESSURE: 149 MMHG | RESPIRATION RATE: 16 BRPM | WEIGHT: 102.2 LBS | DIASTOLIC BLOOD PRESSURE: 86 MMHG | OXYGEN SATURATION: 94 % | HEART RATE: 77 BPM | BODY MASS INDEX: 15.49 KG/M2 | TEMPERATURE: 97.4 F

## 2023-06-18 PROBLEM — E43 SEVERE MALNUTRITION: Status: ACTIVE | Noted: 2023-06-18

## 2023-06-18 LAB
ANION GAP SERPL CALCULATED.3IONS-SCNC: 7 MMOL/L (ref 5–15)
BASOPHILS # BLD AUTO: 0.04 10*3/MM3 (ref 0–0.2)
BASOPHILS NFR BLD AUTO: 0.7 % (ref 0–1.5)
BH CV ECHO MEAS - AI P1/2T: 581.1 MSEC
BH CV ECHO MEAS - AO MAX PG: 6.4 MMHG
BH CV ECHO MEAS - AO MEAN PG: 4 MMHG
BH CV ECHO MEAS - AO ROOT DIAM: 2.4 CM
BH CV ECHO MEAS - AO V2 MAX: 126 CM/SEC
BH CV ECHO MEAS - AO V2 VTI: 28.8 CM
BH CV ECHO MEAS - AVA(I,D): 2.29 CM2
BH CV ECHO MEAS - EDV(CUBED): 54.9 ML
BH CV ECHO MEAS - EDV(MOD-SP2): 45.5 ML
BH CV ECHO MEAS - EDV(MOD-SP4): 40.8 ML
BH CV ECHO MEAS - EF(MOD-BP): 62.6 %
BH CV ECHO MEAS - EF(MOD-SP2): 65.1 %
BH CV ECHO MEAS - EF(MOD-SP4): 62 %
BH CV ECHO MEAS - ESV(CUBED): 12.2 ML
BH CV ECHO MEAS - ESV(MOD-SP2): 15.9 ML
BH CV ECHO MEAS - ESV(MOD-SP4): 15.5 ML
BH CV ECHO MEAS - FS: 39.5 %
BH CV ECHO MEAS - IVS/LVPW: 1 CM
BH CV ECHO MEAS - IVSD: 0.9 CM
BH CV ECHO MEAS - LA DIMENSION: 2.2 CM
BH CV ECHO MEAS - LAT PEAK E' VEL: 7 CM/SEC
BH CV ECHO MEAS - LV MASS(C)D: 101.1 GRAMS
BH CV ECHO MEAS - LV MAX PG: 3.4 MMHG
BH CV ECHO MEAS - LV MEAN PG: 2 MMHG
BH CV ECHO MEAS - LV V1 MAX: 91.6 CM/SEC
BH CV ECHO MEAS - LV V1 VTI: 21 CM
BH CV ECHO MEAS - LVIDD: 3.8 CM
BH CV ECHO MEAS - LVIDS: 2.3 CM
BH CV ECHO MEAS - LVOT AREA: 3.1 CM2
BH CV ECHO MEAS - LVOT DIAM: 2 CM
BH CV ECHO MEAS - LVPWD: 0.9 CM
BH CV ECHO MEAS - MED PEAK E' VEL: 7.6 CM/SEC
BH CV ECHO MEAS - MV A MAX VEL: 79.1 CM/SEC
BH CV ECHO MEAS - MV DEC SLOPE: 378 CM/SEC2
BH CV ECHO MEAS - MV DEC TIME: 0.28 MSEC
BH CV ECHO MEAS - MV E MAX VEL: 84.9 CM/SEC
BH CV ECHO MEAS - MV E/A: 1.07
BH CV ECHO MEAS - MV MAX PG: 5 MMHG
BH CV ECHO MEAS - MV MEAN PG: 2 MMHG
BH CV ECHO MEAS - MV P1/2T: 82.9 MSEC
BH CV ECHO MEAS - MV V2 VTI: 39.4 CM
BH CV ECHO MEAS - MVA(P1/2T): 2.7 CM2
BH CV ECHO MEAS - MVA(VTI): 1.67 CM2
BH CV ECHO MEAS - PA ACC TIME: 0.14 SEC
BH CV ECHO MEAS - RAP SYSTOLE: 3 MMHG
BH CV ECHO MEAS - RVSP: 19.6 MMHG
BH CV ECHO MEAS - SV(LVOT): 66 ML
BH CV ECHO MEAS - SV(MOD-SP2): 29.6 ML
BH CV ECHO MEAS - SV(MOD-SP4): 25.3 ML
BH CV ECHO MEAS - TAPSE (>1.6): 1.94 CM
BH CV ECHO MEAS - TR MAX PG: 16.6 MMHG
BH CV ECHO MEAS - TR MAX VEL: 204 CM/SEC
BH CV ECHO MEASUREMENTS AVERAGE E/E' RATIO: 11.63
BH CV XLRA - RV BASE: 4.1 CM
BH CV XLRA - RV LENGTH: 6.1 CM
BH CV XLRA - RV MID: 3.5 CM
BH CV XLRA - TDI S': 14.4 CM/SEC
BUN SERPL-MCNC: 16 MG/DL (ref 8–23)
BUN/CREAT SERPL: 21.9 (ref 7–25)
CALCIUM SPEC-SCNC: 8.8 MG/DL (ref 8.6–10.5)
CHLORIDE SERPL-SCNC: 99 MMOL/L (ref 98–107)
CO2 SERPL-SCNC: 31 MMOL/L (ref 22–29)
CREAT SERPL-MCNC: 0.73 MG/DL (ref 0.57–1)
DEPRECATED RDW RBC AUTO: 46.3 FL (ref 37–54)
EGFRCR SERPLBLD CKD-EPI 2021: 81.7 ML/MIN/1.73
EOSINOPHIL # BLD AUTO: 0.16 10*3/MM3 (ref 0–0.4)
EOSINOPHIL NFR BLD AUTO: 2.8 % (ref 0.3–6.2)
ERYTHROCYTE [DISTWIDTH] IN BLOOD BY AUTOMATED COUNT: 12.5 % (ref 12.3–15.4)
GLUCOSE SERPL-MCNC: 79 MG/DL (ref 65–99)
HCT VFR BLD AUTO: 33 % (ref 34–46.6)
HGB BLD-MCNC: 10.9 G/DL (ref 12–15.9)
IMM GRANULOCYTES # BLD AUTO: 0.01 10*3/MM3 (ref 0–0.05)
IMM GRANULOCYTES NFR BLD AUTO: 0.2 % (ref 0–0.5)
LV EF 2D ECHO EST: 60 %
LYMPHOCYTES # BLD AUTO: 1.67 10*3/MM3 (ref 0.7–3.1)
LYMPHOCYTES NFR BLD AUTO: 28.8 % (ref 19.6–45.3)
MCH RBC QN AUTO: 32.9 PG (ref 26.6–33)
MCHC RBC AUTO-ENTMCNC: 33 G/DL (ref 31.5–35.7)
MCV RBC AUTO: 99.7 FL (ref 79–97)
MONOCYTES # BLD AUTO: 0.55 10*3/MM3 (ref 0.1–0.9)
MONOCYTES NFR BLD AUTO: 9.5 % (ref 5–12)
NEUTROPHILS NFR BLD AUTO: 3.37 10*3/MM3 (ref 1.7–7)
NEUTROPHILS NFR BLD AUTO: 58 % (ref 42.7–76)
NRBC BLD AUTO-RTO: 0 /100 WBC (ref 0–0.2)
PLATELET # BLD AUTO: 156 10*3/MM3 (ref 140–450)
PMV BLD AUTO: 9.5 FL (ref 6–12)
POTASSIUM SERPL-SCNC: 3.9 MMOL/L (ref 3.5–5.2)
QT INTERVAL: 418 MS
QTC INTERVAL: 431 MS
RBC # BLD AUTO: 3.31 10*6/MM3 (ref 3.77–5.28)
SODIUM SERPL-SCNC: 137 MMOL/L (ref 136–145)
WBC NRBC COR # BLD: 5.8 10*3/MM3 (ref 3.4–10.8)

## 2023-06-18 PROCEDURE — G0378 HOSPITAL OBSERVATION PER HR: HCPCS

## 2023-06-18 PROCEDURE — 85025 COMPLETE CBC W/AUTO DIFF WBC: CPT | Performed by: INTERNAL MEDICINE

## 2023-06-18 PROCEDURE — 94761 N-INVAS EAR/PLS OXIMETRY MLT: CPT

## 2023-06-18 PROCEDURE — 80048 BASIC METABOLIC PNL TOTAL CA: CPT | Performed by: INTERNAL MEDICINE

## 2023-06-18 PROCEDURE — 99232 SBSQ HOSP IP/OBS MODERATE 35: CPT | Performed by: INTERNAL MEDICINE

## 2023-06-18 PROCEDURE — 94664 DEMO&/EVAL PT USE INHALER: CPT

## 2023-06-18 PROCEDURE — 94799 UNLISTED PULMONARY SVC/PX: CPT

## 2023-06-18 PROCEDURE — 94669 MECHANICAL CHEST WALL OSCILL: CPT

## 2023-06-18 RX ORDER — FLECAINIDE ACETATE 50 MG/1
50 TABLET ORAL EVERY 12 HOURS SCHEDULED
Status: DISCONTINUED | OUTPATIENT
Start: 2023-06-18 | End: 2023-06-18 | Stop reason: HOSPADM

## 2023-06-18 RX ORDER — FLECAINIDE ACETATE 50 MG/1
50 TABLET ORAL EVERY 12 HOURS SCHEDULED
Qty: 60 TABLET | Refills: 0 | Status: SHIPPED | OUTPATIENT
Start: 2023-06-18 | End: 2023-07-18

## 2023-06-18 RX ADMIN — Medication 10 ML: at 09:23

## 2023-06-18 RX ADMIN — BRIMONIDINE TARTRATE 1 DROP: 2 SOLUTION/ DROPS OPHTHALMIC at 09:21

## 2023-06-18 RX ADMIN — IPRATROPIUM BROMIDE 0.5 MG: 0.5 SOLUTION RESPIRATORY (INHALATION) at 11:34

## 2023-06-18 RX ADMIN — IPRATROPIUM BROMIDE 0.5 MG: 0.5 SOLUTION RESPIRATORY (INHALATION) at 08:16

## 2023-06-18 RX ADMIN — GALANTAMINE 8 MG: 4 TABLET, FILM COATED ORAL at 09:20

## 2023-06-18 RX ADMIN — FLUDROCORTISONE ACETATE 100 MCG: 0.1 TABLET ORAL at 09:19

## 2023-06-18 RX ADMIN — LEVOTHYROXINE SODIUM 75 MCG: 75 TABLET ORAL at 06:24

## 2023-06-18 RX ADMIN — APIXABAN 2.5 MG: 2.5 TABLET, FILM COATED ORAL at 09:20

## 2023-06-18 RX ADMIN — OXYCODONE HYDROCHLORIDE AND ACETAMINOPHEN 500 MG: 500 TABLET ORAL at 09:20

## 2023-06-18 RX ADMIN — FLUTICASONE PROPIONATE 2 SPRAY: 50 SPRAY, METERED NASAL at 09:19

## 2023-06-18 RX ADMIN — FLECAINIDE ACETATE 50 MG: 50 TABLET ORAL at 09:20

## 2023-06-18 NOTE — DISCHARGE SUMMARY
[facility] Medicine Services  DISCHARGE SUMMARY    Patient Name: Vita Miller  : 1940  MRN: [id number]    Date of Admission: 2023 10:00 PM  Date of Discharge:    Primary Care Physician: Alfonso Steele MD    Consults       Date and Time Order Name Status Description    2023 12:17 AM Inpatient Cardiology Consult Completed             Hospital Course     Presenting Problem: new onset Afib    Active Hospital Problems    Diagnosis  POA    **New onset atrial fibrillation [I48.91]  Yes    Severe Malnutrition (HCC) [E43]  Yes    Acute respiratory failure with hypoxia [J96.01]  Unknown    Cachexia [R64]  Unknown    Senile osteoporosis [M81.0]  Yes    Hashimoto's thyroiditis [E06.3]  Yes    Primary open angle glaucoma of both eyes, severe stage [H40.1133]  Yes    Mixed anxiety and depressive disorder [F41.8]  Yes    Multiple nodules of lung [R91.8]  Yes    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          Hospital Course:  Vita Miller is a 83 y.o. female w chronic MAC unable to tolerate treatment, chronic bronchiectasis, celiac disease, orthostatic hypotension on fludrocortisone who presents with paroxysmal Afib up to 150's at home. Borderline BP and HR at baseline limiting available medication options.   Converted with minimal medication intervention. ECHO within normal limits. Decision by cardiology to start flecainide for Afib prevention + apixaban for stroke prophylaxis. Patient counseled on both medications. Will f/u in Cardiology Afib clinic in 1 week with repeat ECG and event monitor placement - provided information for clinic given  hours at  discharge.    Discharge Follow Up Recommendations for outpatient labs/diagnostics:  F/u cardiology Afib clinic 1 week w repeat ECG    Day of Discharge     HPI:   Feels well, anxious for home if able    Review of Systems  Gen- No fevers, chills  CV- No chest pain, palpitations  Resp- No cough, dyspnea  GI- No N/V/D, abd pain      Vital Signs:   Temp:  [97.1 °F (36.2 °C)-98.4 °F (36.9 °C)] 97.4 °F (36.3 °C)  Heart Rate:  [53-85] 76  Resp:  [16] 16  BP: (110-163)/(62-86) 149/86  Flow (L/min):  [2] 2      Physical Exam:  Constitutional: Very thin female sitting up in bed, well appearing  HENT: NCAT, mucous membranes moist  Respiratory: Clear to auscultation bilaterally, respiratory effort normal   Cardiovascular: RRR, no murmurs, rubs, or gallops  Gastrointestinal: Soft, nontender, nondistended  Musculoskeletal: Muscle tone decreased throughout, no joint effusions appreciated  Psychiatric: Appropriate affect, cooperative  Neurologic: Alert and oriented, facial movements symmetric and spontaneous movement of all 4 extremities grossly equal bilaterally, speech clear  Skin: No rashes    Pertinent  and/or Most Recent Results     LAB RESULTS:      Lab 06/18/23  0417 06/16/23 2217   WBC 5.80 8.94   HEMOGLOBIN 10.9* 12.4   HEMATOCRIT 33.0* 38.7   PLATELETS 156 213   NEUTROS ABS 3.37 6.44   IMMATURE GRANS (ABS) 0.01 0.02   LYMPHS ABS 1.67 1.52   MONOS ABS 0.55 0.81   EOS ABS 0.16 0.10   MCV 99.7* 99.7*   PROCALCITONIN  --  0.05         Lab 06/18/23  0417 06/16/23 2217   SODIUM 137 134*   POTASSIUM 3.9 4.5   CHLORIDE 99 95*   CO2 31.0* 28.0   ANION GAP 7.0 11.0   BUN 16 12   CREATININE 0.73 0.60   EGFR 81.7 89.2   GLUCOSE 79 120*   CALCIUM 8.8 9.5   MAGNESIUM  --  2.0   TSH  --  0.561         Lab 06/16/23 2217   TOTAL PROTEIN 6.8   ALBUMIN 4.0   GLOBULIN 2.8   ALT (SGPT) 7   AST (SGOT) 16   BILIRUBIN 0.3   ALK PHOS 81         Lab 06/17/23  0500 06/17/23  0235 06/16/23  6027   PROBNP  --   --  1,832.0*   HSTROP T 10* 16* 10*                  Brief Urine Lab Results       None          Microbiology Results (last 10 days)       ** No results found for the last 240 hours. **            Adult Transthoracic Echo Complete W/ Cont if Necessary Per Protocol    Result Date: 6/18/2023    Left ventricular systolic function is normal. Estimated left ventricular EF = 60%   Left ventricular diastolic function was normal.   Mild aortic insufficiency.   Trace to mild mitral and tricuspid regurgitation.   Calculated right ventricular systolic pressure from tricuspid regurgitation is 20 mmHg.   There is a trivial pericardial effusion.     CT Angiogram Chest    Result Date: 6/17/2023  EXAMINATION:  CTA CHEST WITH IV CONTRAST, CT PULMONARY ANGIOGRAM DATE OF EXAM: 6/17/2023 2:07 AM HISTORY: Hypoxia; history of pulmonary nodules TECHNIQUE: CTA examination of the chest was performed following the administration of IV contrast. Sagittal, coronal and 3-D reformatted images were provided. CT dose lowering techniques were used, to include: automated exposure control, adjustment for patient size, and or use of iterative reconstruction. COMPARISON: None. FINDINGS: Lungs: Innumerable pulmonary nodules scattered throughout bilateral lungs as well as peripheral tree-in-bud-like opacities. Largest nodule is located within the upper portion of the left lower lobe and measures approximately 0.8 x 0.8 cm.Biapical scarring. Bilateral scattered bronchiectasis. Scattered mucous plugging predominantly within the left lower lobe. Pleura: No evidence of acute abnormality. Mediastinum and Maria Guadalupe: No evidence of acute abnormality. Pulmonary Arteries: No filling defects identified.. Cardiovascular: Normal. Upper Abdomen: Normal. Chest Wall: Normal. Musculoskeletal: No evidence of acute abnormality.     1.  No evidence of pulmonary embolus.  2.  Bilateral pulmonary nodules. Correlate with prior imaging and refer to Fleischner guidelines. 3.  Bilateral centrilobular opacities,  bronchiectasis, mucous plugging. Chronicity is favored but superimposed acute infection is not excluded. Electronically signed by:  Bran Rogers D.O.  6/17/2023 12:45 AM Mountain Time    XR Chest 1 View    Result Date: 6/16/2023  XR CHEST 1 VW Date of Exam: 6/16/2023 10:21 PM EDT Indication: Dysrhythmia triage protocol Comparison: None available. Findings: Chronic changes are present including bronchiectasis and underlying emphysema. There is some suggestion of numerous small nodules. There is no effusion or pneumothorax. Normal heart mediastinal contours.     Impression: Findings of bronchiectasis and probable underlying emphysema with the appearance of small bilateral pulmonary nodules suggested. Consider CT to further evaluate. Electronically Signed: Miguel Angel Pham  6/16/2023 11:10 PM EDT  Workstation ID: [id number]             Results for orders placed during the hospital encounter of 06/16/23    Adult Transthoracic Echo Complete W/ Cont if Necessary Per Protocol    Interpretation Summary    Left ventricular systolic function is normal. Estimated left ventricular EF = 60%    Left ventricular diastolic function was normal.    Mild aortic insufficiency.    Trace to mild mitral and tricuspid regurgitation.    Calculated right ventricular systolic pressure from tricuspid regurgitation is 20 mmHg.    There is a trivial pericardial effusion.      Plan for Follow-up of Pending Labs/Results: none    Discharge Details        Discharge Medications        New Medications        Instructions Start Date   apixaban 2.5 MG tablet tablet  Commonly known as: ELIQUIS   2.5 mg, Oral, Every 12 Hours Scheduled      flecainide 50 MG tablet  Commonly known as: TAMBOCOR   50 mg, Oral, Every 12 Hours Scheduled             Continue These Medications        Instructions Start Date   brimonidine-timolol 0.2-0.5 % ophthalmic solution  Commonly known as: COMBIGAN   Every 12 Hours      fludrocortisone 0.1 MG tablet   0.1 mg, Oral,  Daily      galantamine 4 MG tablet  Commonly known as: RAZADYNE   8 mg, Oral, 2 Times Daily      levothyroxine 75 MCG tablet  Commonly known as: SYNTHROID, LEVOTHROID   75 mcg, Oral, Daily      mirtazapine 15 MG tablet  Commonly known as: REMERON   15 mg, Oral, Nightly      PARoxetine 10 MG tablet  Commonly known as: PAXIL   10 mg, Oral, Nightly      Vitamin C 100 MG chewable tablet   Vitamin C      Vitamin D3 1.25 MG (92074 UT) capsule   Vitamin D3               Allergies   Allergen Reactions    Sulfa Antibiotics Other (See Comments)     Pt unsure of type of reaction          Discharge Disposition:  Home or Self Care    Diet:  Hospital:  Diet Order   Procedures    Diet: Gastrointestinal Diets; Gluten-Sensitive; Texture: Regular Texture (IDDSI 7); Fluid Consistency: Thin (IDDSI 0)       Activity:      Restrictions or Other Recommendations:  N/a       CODE STATUS:    Code Status and Medical Interventions:   Ordered at: 06/17/23 0052     Code Status (Patient has no pulse and is not breathing):    CPR (Attempt to Resuscitate)     Medical Interventions (Patient has pulse or is breathing):    Full Support       Future Appointments   Date Time Provider Department Center   8/14/2023  1:30 PM  [facility]  [facility]       Additional Instructions for the Follow-ups that You Need to Schedule       Discharge Follow-up with Specified Provider: Afib clinic end of week w repeat ECG - provide patient w # to call if unable to schedule   As directed      To: Afib clinic end of week w repeat ECG - provide patient w # to call if unable to schedule                       Afsaneh Arias MD  06/18/23      Time Spent on Discharge:  I spent 25 minutes on this discharge activity which included: face-to-face encounter with the patient, reviewing the data in the system, coordination of the care with the nursing staff as well as consultants, documentation, and entering orders.

## 2023-06-18 NOTE — PROGRESS NOTES
Vita BALLESTEROS Angela  [id number]  1940   LOS: 0 days   Patient Care Team:  Alfonso Steele MD as PCP - General (Internal Medicine)    Chief Complaint:  PAF    Subjective     Comfortable this morning and asymptomatic.  She ate the majority of her breakfast.  She denies increased tachypnea/dyspnea, anginal type chest discomfort, cough, sputum production, pleurisy, or hemoptysis.  No headache or focal motor-sensory changes.  Eager for discharge home.  She is the window of Dr. Gaurav Miller, a well-known cardiologist in [facility].    Objective     Vital Sign Min/Max for last 24 hours  Temp  Min: 97.1 °F (36.2 °C)  Max: 98.4 °F (36.9 °C)   BP  Min: 89/54  Max: 163/74   Pulse  Min: 53  Max: 118   Resp  Min: 16  Max: 18   SpO2  Min: 90 %  Max: 99 %               06/17/23  0115 06/17/23  0300   Weight: 46.7 kg (103 lb) 46.4 kg (102 lb 3.2 oz)         Intake/Output Summary (Last 24 hours) at 6/18/2023 0844  Last data filed at 6/18/2023 0700  Gross per 24 hour   Intake 860 ml   Output 0 ml   Net 860 ml       Physical Exam:     General Appearance:    Alert, cooperative, in no acute distress   Lungs:   Scattered rhonchi and wheezes without crackles or dullness,respirations regular, even and unlabored continued room air oximetry 96%)    Heart:    Regular and normal rate, normal S1 and S2, grade 1/6 systolic murmur, no gallop, no rub, no click   Abdomen:  Extremities:   Soft, nontender, bowel sounds audible x4    No edema, normal range of motion   Pulses:   Pulses palpable and equal bilaterally      Results Review:   Results from last 7 days   Lab Units 06/18/23  0417 06/16/23 2217   SODIUM mmol/L 137 134*   POTASSIUM mmol/L 3.9 4.5   CHLORIDE mmol/L 99 95*   CO2 mmol/L 31.0* 28.0   BUN mg/dL 16 12   CREATININE mg/dL 0.73 0.60   GLUCOSE mg/dL 79 120*   CALCIUM mg/dL 8.8 9.5     Results from last 7 days   Lab Units 06/18/23  0417 06/16/23 2217   WBC 10*3/mm3 5.80 8.94   HEMOGLOBIN g/dL 10.9* 12.4   HEMATOCRIT % 33.0*  38.7   PLATELETS 10*3/mm3 156 213     Results from last 7 days   Lab Units 06/17/23  0500 06/17/23  0235 06/16/23  2217   HSTROP T ng/L 10* 16* 10*     NO NEW CXR.    EKG:    Normal sinus rhythm  Possible Left atrial enlargement  Minimal voltage criteria for LVH, may be normal variant ( Sokolow-Kwon )  Nonspecific ST abnormality  Abnormal ECG  When compared with ECG of 16-JUN-2023 23:49, (Unconfirmed)  Sinus rhythm has replaced Atrial fibrillation    ECHO:    Left ventricular systolic function is normal. Estimated left ventricular EF = 60%   Left ventricular diastolic function was normal.   Mild aortic insufficiency.   Trace to mild mitral and tricuspid regurgitation.   Calculated right ventricular systolic pressure from tricuspid regurgitation is 20 mmHg.   There is a trivial pericardial effusion.    Medication Review: REVIEWED; NO DRIPS.    Assessment & Plan     Persistent sinus rhythm.  No recurrent atrial tachyarrhythmias.  Postconversion electrocardiogram acceptable as is an echocardiogram.  Would allow home on the following discharge cardiac medications and recommended follow-up:    Flecainide 50 mg BID  Apixaban 2.5 mg BID  Fludrocortisone 100 mcg daily  [facility] follow-up with electrocardiogram later this week and 2-week event monitor placement with [facility] follow-up as needed.      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

## 2023-06-18 NOTE — PLAN OF CARE
Problem: Adult Inpatient Plan of Care  Goal: Plan of Care Review  Outcome: Ongoing, Progressing  Flowsheets (Taken 6/18/2023 0625)  Progress: no change  Plan of Care Reviewed With: patient  Goal: Patient-Specific Goal (Individualized)  Outcome: Ongoing, Progressing  Goal: Absence of Hospital-Acquired Illness or Injury  Outcome: Ongoing, Progressing  Intervention: Identify and Manage Fall Risk  Recent Flowsheet Documentation  Taken 6/18/2023 0400 by Rita Tompkins RN  Safety Promotion/Fall Prevention:   activity supervised   assistive device/personal items within reach   clutter free environment maintained   fall prevention program maintained   lighting adjusted   nonskid shoes/slippers when out of bed   safety round/check completed  Taken 6/18/2023 0215 by Rita Tompkins RN  Safety Promotion/Fall Prevention:   safety round/check completed   activity supervised   assistive device/personal items within reach   clutter free environment maintained   fall prevention program maintained   nonskid shoes/slippers when out of bed  Taken 6/18/2023 0015 by Rita Tompkins RN  Safety Promotion/Fall Prevention:   clutter free environment maintained   fall prevention program maintained   mobility aid in reach   nonskid shoes/slippers when out of bed   safety round/check completed  Taken 6/17/2023 2210 by Rita Tompkins RN  Safety Promotion/Fall Prevention:   activity supervised   assistive device/personal items within reach   clutter free environment maintained   fall prevention program maintained   mobility aid in reach   nonskid shoes/slippers when out of bed   safety round/check completed  Taken 6/17/2023 1945 by Rita Tompkins RN  Safety Promotion/Fall Prevention:   activity supervised   clutter free environment maintained   assistive device/personal items within reach   fall prevention program maintained   lighting adjusted   nonskid shoes/slippers when out of bed   safety round/check completed    toileting scheduled  Intervention: Prevent Skin Injury  Recent Flowsheet Documentation  Taken 6/18/2023 0400 by Rita Tompkins RN  Body Position:   position changed independently   weight shifting  Taken 6/18/2023 0215 by Rita Tompkins RN  Body Position:   position changed independently   weight shifting  Taken 6/18/2023 0015 by Rita Tompkins RN  Body Position:   position changed independently   weight shifting  Taken 6/17/2023 2210 by Rita Tompkins RN  Body Position:   position changed independently   weight shifting  Taken 6/17/2023 1945 by Rita Tompkins RN  Body Position:   position changed independently   weight shifting  Intervention: Prevent and Manage VTE (Venous Thromboembolism) Risk  Recent Flowsheet Documentation  Taken 6/18/2023 0015 by Rita Tompkins RN  Activity Management: activity encouraged  Taken 6/17/2023 2210 by Rita Tompkins RN  Activity Management: activity encouraged  Taken 6/17/2023 1945 by Rita Tompkins RN  Activity Management: activity encouraged  Intervention: Prevent Infection  Recent Flowsheet Documentation  Taken 6/18/2023 0015 by Rita Tompkins RN  Infection Prevention:   environmental surveillance performed   equipment surfaces disinfected   hand hygiene promoted   personal protective equipment utilized   rest/sleep promoted   single patient room provided  Taken 6/17/2023 2300 by Rita Tompkins RN  Infection Prevention:   environmental surveillance performed   equipment surfaces disinfected   hand hygiene promoted   personal protective equipment utilized   rest/sleep promoted   single patient room provided  Goal: Optimal Comfort and Wellbeing  Outcome: Ongoing, Progressing  Intervention: Provide Person-Centered Care  Recent Flowsheet Documentation  Taken 6/17/2023 1945 by Rita Tompkins RN  Trust Relationship/Rapport:   care explained   empathic listening provided   choices provided   emotional support provided  Goal: Readiness for  Transition of Care  Outcome: Ongoing, Progressing   Goal Outcome Evaluation:  Plan of Care Reviewed With: patient        Progress: no change

## 2023-06-18 NOTE — PROGRESS NOTES
Malnutrition Severity Assessment    Patient Name:  Vita Miller  YOB: 1940  MRN: [id number]  Admit Date:  6/16/2023    Patient meets criteria for : Severe Malnutrition (Pt currently meets criteria for Severe Malnutrition in the context of Chronic Illness based on severe muscle wasting and severe fat loss.)    Malnutrition Severity Assessment  Malnutrition Type: Chronic Disease - Related Malnutrition  Malnutrition Type (last 8 hours)       Malnutrition Severity Assessment       Row Name 06/18/23 1139       Malnutrition Severity Assessment    Malnutrition Type Chronic Disease - Related Malnutrition      Row Name 06/18/23 1139       Muscle Loss    Loss of Muscle Mass Findings Severe    [place] Region Moderate - slight depression    Clavicle Bone Region Severe - protruding prominent bone    Acromion Bone Region Severe - squared shoulders, bones, and acromion process protrusion prominent    Patellar Region Severe - prominent bone, square looking, very little muscle definition    Anterior Thigh Region Severe - line/depression along thigh, obviously thin    Posterior Calf Region Severe - thin with very little definition/firmness      Row Name 06/18/23 1139       Fat Loss    Subcutaneous Fat Loss Findings Severe    Orbital Region  Severe - pronounced hollowness/depression, dark circles, loose saggy skin    Upper Arm Region Severe - mostly skin, very little space between folds, fingers touch      Row Name 06/18/23 1139       Criteria Met (Must meet criteria for severity in at least 2 of these categories: M Wasting, Fat Loss, Fluid, Secondary Signs, Wt. Status, Intake)    Patient meets criteria for  Severe Malnutrition  Pt currently meets criteria for Severe Malnutrition in the context of Chronic Illness based on severe muscle wasting and severe fat loss.                    Electronically signed by:  Winter Smith RD  06/18/23 11:41 EDT

## 2023-06-18 NOTE — CONSULTS
"                    Clinical Nutrition       Patient Name: Vita Miller  YOB: 1940  MRN: [id number]  Date of Encounter: 06/18/23 11:21 EDT  Admission date: 6/16/2023        Pt currently meets criteria for severe malnutrition in the context of chronic illness based on severe muscle wasting and severe fat loss. Detailed MSA note available in EMR.      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 ~103 lbs UBW for a long time now  Reviewed EMR wt hx: ~100 lbs since 8/2020. 125 lbs back in 2013.    Weight change: Weight maintenance x 3 years     Reported/Observed/Food/Nutrition Related - Comments   Patient reports she has had no recent changes to appetite, PO intake, or weight lately. Reports UBW slightly fluctuates and has been generally ~103 lbs for a long time now. Weighed 140 lbs before being dx with MAC. RD asked patient if " MD ever expresses concern about her weight and encourages her to take in more nutrition for weight gain. Patient reports no concerns are expressed d/t weight loss being expected with MAC. RD encouraged patient to continue what she is doing at home in order to maintain her weight. Recommended considering ONS if weight gain desired in future.    Pt reports her baseline PO intake consists of the following: coffee + cereal or eggs or bread or pancake at breakfast; cheese, chips (for salt intake), and either a salad or sandwich at lunch; meat/fish + 2 vegetables or a salad at dinner. Drinks coffee,   Sprite, unsweetened tea, and water. Does not drink any ONS at home. Reports in addition to her gluten-free diet she avoids all dairy products - reports she had an allergy tests years ago and dairy was one of the foods it reported she was allergic to. When asked about allergic reactions to dairy, patient unsure. Reports when she removed dairy from her diet her GI issues improved. RD asked if she began her gluten-free diet at the same time as removing dairy from her diet - patient reports yes. RD wondering if dairy protein allergy exists and if GI issues were solely a result of gluten. RD unable to offer any ONS as those available here at [facility] all contain dairy protein. Encouraged patient to look for non-dairy protein ONS that typically contain pea protein as an alternative (if interested in future).    Denies difficulty chewing/swallowing. Unsure how long she has been taking Remeron and what the reason is for taking it - reports she takes it nightly. Hopeful for discharge home today.      Nutrition Focused Physical Exam     Date:   Patient meets criteria for malnutrition diagnosis, see MSA note.      Current Nutrition Prescription     Diet: Gastrointestinal Diets; Gluten-Sensitive; Texture: Regular Texture (IDDSI 7); Fluid Consistency: Thin (IDDSI 0)    Average Intake from [employer]% x 3 meals    Nutrition Diagnosis      Problem Malnutrition (severe in the context of chronic illness)   Etiology Chronic MAC, COPD   Signs/Symptoms Severe fat loss, severe muscle wasting   Status:    Actions     Follow treatment progress, Care plan reviewed, Encourage intake    Monitor Per Protocol      Winter Smith RD  Time Spent: 45 min

## 2023-06-22 ENCOUNTER — TELEPHONE (OUTPATIENT)
Dept: CARDIOLOGY | Facility: HOSPITAL | Age: 83
End: 2023-06-22

## 2023-06-22 NOTE — TELEPHONE ENCOUNTER
"    Caller: Vita Miller \"Gencaridad\"     Relationship: [unfilled]     Best call back number: [phone]    What is your medical concern? PATIENT CALLED IN WANTING TO SPEAK TO KADI SHRESTHA ABOUT AFIB MONITOR THAT WAS PLACED. PATIENT STATES SHE'S HAVING TROUBLE WITH THE BLACK THING THAT WAS PUT ON TOP. WOULD LIKE TO SPEAK TO KADI ABOUT IT. PLEASE CALL BACK TO ADVISE, THANK YOU.        "

## 2023-07-14 ENCOUNTER — HOSPITAL ENCOUNTER (INPATIENT)
Facility: HOSPITAL | Age: 83
LOS: 17 days | Discharge: NURSING, REHAB OR LONG-TERM CARE | DRG: 190 | End: 2023-08-01
Attending: EMERGENCY MEDICINE | Admitting: INTERNAL MEDICINE
Payer: MEDICARE

## 2023-07-14 ENCOUNTER — APPOINTMENT (OUTPATIENT)
Dept: GENERAL RADIOLOGY | Facility: HOSPITAL | Age: 83
DRG: 190 | End: 2023-07-14
Payer: MEDICARE

## 2023-07-14 DIAGNOSIS — J18.9 PNEUMONIA OF BOTH UPPER LOBES DUE TO INFECTIOUS ORGANISM: ICD-10-CM

## 2023-07-14 DIAGNOSIS — M81.0 SENILE OSTEOPOROSIS: ICD-10-CM

## 2023-07-14 DIAGNOSIS — J18.9 PNEUMONIA OF BOTH LUNGS DUE TO INFECTIOUS ORGANISM, UNSPECIFIED PART OF LUNG: ICD-10-CM

## 2023-07-14 DIAGNOSIS — I48.91 ATRIAL FIBRILLATION WITH RVR: Primary | ICD-10-CM

## 2023-07-14 DIAGNOSIS — R91.8 MULTIPLE NODULES OF LUNG: ICD-10-CM

## 2023-07-14 DIAGNOSIS — A31.0 PULMONARY INFECTION DUE TO MYCOBACTERIUM AVIUM COMPLEX: ICD-10-CM

## 2023-07-14 DIAGNOSIS — J96.01 ACUTE RESPIRATORY FAILURE WITH HYPOXIA: ICD-10-CM

## 2023-07-14 DIAGNOSIS — H40.1133 PRIMARY OPEN ANGLE GLAUCOMA OF BOTH EYES, SEVERE STAGE: ICD-10-CM

## 2023-07-14 DIAGNOSIS — J47.9 ACQUIRED BRONCHIECTASIS: ICD-10-CM

## 2023-07-14 DIAGNOSIS — E43 SEVERE MALNUTRITION: ICD-10-CM

## 2023-07-14 DIAGNOSIS — Z86.79 H/O ORTHOSTATIC HYPOTENSION: ICD-10-CM

## 2023-07-14 DIAGNOSIS — J44.1 COPD WITH ACUTE EXACERBATION: ICD-10-CM

## 2023-07-14 LAB
ALBUMIN SERPL-MCNC: 3.5 G/DL (ref 3.5–5.2)
ALBUMIN/GLOB SERPL: 1.1 G/DL
ALP SERPL-CCNC: 90 U/L (ref 39–117)
ALT SERPL W P-5'-P-CCNC: 24 U/L (ref 1–33)
ANION GAP SERPL CALCULATED.3IONS-SCNC: 12 MMOL/L (ref 5–15)
AST SERPL-CCNC: 23 U/L (ref 1–32)
BASOPHILS # BLD AUTO: 0.06 10*3/MM3 (ref 0–0.2)
BASOPHILS NFR BLD AUTO: 0.5 % (ref 0–1.5)
BILIRUB SERPL-MCNC: 0.4 MG/DL (ref 0–1.2)
BUN SERPL-MCNC: 19 MG/DL (ref 8–23)
BUN/CREAT SERPL: 24.1 (ref 7–25)
CALCIUM SPEC-SCNC: 9.1 MG/DL (ref 8.6–10.5)
CHLORIDE SERPL-SCNC: 87 MMOL/L (ref 98–107)
CO2 SERPL-SCNC: 30 MMOL/L (ref 22–29)
CREAT SERPL-MCNC: 0.79 MG/DL (ref 0.57–1)
DEPRECATED RDW RBC AUTO: 45.6 FL (ref 37–54)
EGFRCR SERPLBLD CKD-EPI 2021: 74.3 ML/MIN/1.73
EOSINOPHIL # BLD AUTO: 0.2 10*3/MM3 (ref 0–0.4)
EOSINOPHIL NFR BLD AUTO: 1.5 % (ref 0.3–6.2)
ERYTHROCYTE [DISTWIDTH] IN BLOOD BY AUTOMATED COUNT: 12.7 % (ref 12.3–15.4)
GLOBULIN UR ELPH-MCNC: 3.3 GM/DL
GLUCOSE SERPL-MCNC: 125 MG/DL (ref 65–99)
HCT VFR BLD AUTO: 43 % (ref 34–46.6)
HGB BLD-MCNC: 14.3 G/DL (ref 12–15.9)
HOLD SPECIMEN: NORMAL
HOLD SPECIMEN: NORMAL
IMM GRANULOCYTES # BLD AUTO: 0.09 10*3/MM3 (ref 0–0.05)
IMM GRANULOCYTES NFR BLD AUTO: 0.7 % (ref 0–0.5)
LYMPHOCYTES # BLD AUTO: 0.95 10*3/MM3 (ref 0.7–3.1)
LYMPHOCYTES NFR BLD AUTO: 7.3 % (ref 19.6–45.3)
MAGNESIUM SERPL-MCNC: 1.9 MG/DL (ref 1.6–2.4)
MCH RBC QN AUTO: 32.5 PG (ref 26.6–33)
MCHC RBC AUTO-ENTMCNC: 33.3 G/DL (ref 31.5–35.7)
MCV RBC AUTO: 97.7 FL (ref 79–97)
MONOCYTES # BLD AUTO: 0.99 10*3/MM3 (ref 0.1–0.9)
MONOCYTES NFR BLD AUTO: 7.6 % (ref 5–12)
NEUTROPHILS NFR BLD AUTO: 10.73 10*3/MM3 (ref 1.7–7)
NEUTROPHILS NFR BLD AUTO: 82.4 % (ref 42.7–76)
NRBC BLD AUTO-RTO: 0 /100 WBC (ref 0–0.2)
NT-PROBNP SERPL-MCNC: 6301 PG/ML (ref 0–1800)
PLATELET # BLD AUTO: 353 10*3/MM3 (ref 140–450)
PMV BLD AUTO: 8.5 FL (ref 6–12)
POTASSIUM SERPL-SCNC: 4.5 MMOL/L (ref 3.5–5.2)
PROT SERPL-MCNC: 6.8 G/DL (ref 6–8.5)
QT INTERVAL: 310 MS
QTC INTERVAL: 423 MS
RBC # BLD AUTO: 4.4 10*6/MM3 (ref 3.77–5.28)
SODIUM SERPL-SCNC: 129 MMOL/L (ref 136–145)
TROPONIN T SERPL HS-MCNC: 16 NG/L
TSH SERPL DL<=0.05 MIU/L-ACNC: 13.16 UIU/ML (ref 0.27–4.2)
WBC NRBC COR # BLD: 13.02 10*3/MM3 (ref 3.4–10.8)
WHOLE BLOOD HOLD COAG: NORMAL
WHOLE BLOOD HOLD SPECIMEN: NORMAL

## 2023-07-14 PROCEDURE — 84484 ASSAY OF TROPONIN QUANT: CPT | Performed by: EMERGENCY MEDICINE

## 2023-07-14 PROCEDURE — 85025 COMPLETE CBC W/AUTO DIFF WBC: CPT | Performed by: EMERGENCY MEDICINE

## 2023-07-14 PROCEDURE — 83880 ASSAY OF NATRIURETIC PEPTIDE: CPT | Performed by: EMERGENCY MEDICINE

## 2023-07-14 PROCEDURE — 85652 RBC SED RATE AUTOMATED: CPT | Performed by: INTERNAL MEDICINE

## 2023-07-14 PROCEDURE — 84443 ASSAY THYROID STIM HORMONE: CPT | Performed by: EMERGENCY MEDICINE

## 2023-07-14 PROCEDURE — 84145 PROCALCITONIN (PCT): CPT | Performed by: INTERNAL MEDICINE

## 2023-07-14 PROCEDURE — 86140 C-REACTIVE PROTEIN: CPT | Performed by: INTERNAL MEDICINE

## 2023-07-14 PROCEDURE — 99285 EMERGENCY DEPT VISIT HI MDM: CPT

## 2023-07-14 PROCEDURE — 82533 TOTAL CORTISOL: CPT | Performed by: INTERNAL MEDICINE

## 2023-07-14 PROCEDURE — 83735 ASSAY OF MAGNESIUM: CPT | Performed by: EMERGENCY MEDICINE

## 2023-07-14 PROCEDURE — 83930 ASSAY OF BLOOD OSMOLALITY: CPT | Performed by: INTERNAL MEDICINE

## 2023-07-14 PROCEDURE — 93005 ELECTROCARDIOGRAM TRACING: CPT

## 2023-07-14 PROCEDURE — 71045 X-RAY EXAM CHEST 1 VIEW: CPT

## 2023-07-14 PROCEDURE — 80053 COMPREHEN METABOLIC PANEL: CPT | Performed by: EMERGENCY MEDICINE

## 2023-07-14 PROCEDURE — 36415 COLL VENOUS BLD VENIPUNCTURE: CPT

## 2023-07-14 PROCEDURE — 93005 ELECTROCARDIOGRAM TRACING: CPT | Performed by: EMERGENCY MEDICINE

## 2023-07-14 RX ORDER — SODIUM CHLORIDE 0.9 % (FLUSH) 0.9 %
10 SYRINGE (ML) INJECTION AS NEEDED
Status: DISCONTINUED | OUTPATIENT
Start: 2023-07-14 | End: 2023-07-29

## 2023-07-14 RX ORDER — ESMOLOL HYDROCHLORIDE 10 MG/ML
50-300 INJECTION, SOLUTION INTRAVENOUS
Status: DISCONTINUED | OUTPATIENT
Start: 2023-07-14 | End: 2023-07-16

## 2023-07-14 NOTE — Clinical Note
Level of Care: Telemetry [5]   Diagnosis: Acute respiratory failure with hypoxia [327072]   Admitting Physician: GERBER SANTIAGO [[id number]]   Attending Physician: GERBER SANTIAGO [[id number]]   Certification: I Certify That Inpatient Hospital Services Are Medically Necessary For Greater Than 2 Midnights

## 2023-07-15 ENCOUNTER — APPOINTMENT (OUTPATIENT)
Dept: CT IMAGING | Facility: HOSPITAL | Age: 83
DRG: 190 | End: 2023-07-15
Payer: MEDICARE

## 2023-07-15 PROBLEM — J18.9 PNEUMONIA OF BOTH LUNGS DUE TO INFECTIOUS ORGANISM: Status: ACTIVE | Noted: 2023-07-15

## 2023-07-15 PROBLEM — E87.1 HYPONATREMIA: Status: ACTIVE | Noted: 2023-07-15

## 2023-07-15 LAB
B PARAPERT DNA SPEC QL NAA+PROBE: NOT DETECTED
B PERT DNA SPEC QL NAA+PROBE: NOT DETECTED
C PNEUM DNA NPH QL NAA+NON-PROBE: NOT DETECTED
CORTIS AM PEAK SERPL-MCNC: 28.76 MCG/DL
CRP SERPL-MCNC: 14.66 MG/DL (ref 0–0.5)
D-LACTATE SERPL-SCNC: 1 MMOL/L (ref 0.5–2)
ERYTHROCYTE [SEDIMENTATION RATE] IN BLOOD: 99 MM/HR (ref 0–30)
FLUAV SUBTYP SPEC NAA+PROBE: NOT DETECTED
FLUBV RNA ISLT QL NAA+PROBE: NOT DETECTED
HADV DNA SPEC NAA+PROBE: NOT DETECTED
HCOV 229E RNA SPEC QL NAA+PROBE: NOT DETECTED
HCOV HKU1 RNA SPEC QL NAA+PROBE: NOT DETECTED
HCOV NL63 RNA SPEC QL NAA+PROBE: NOT DETECTED
HCOV OC43 RNA SPEC QL NAA+PROBE: NOT DETECTED
HMPV RNA NPH QL NAA+NON-PROBE: NOT DETECTED
HOLD SPECIMEN: NORMAL
HPIV1 RNA ISLT QL NAA+PROBE: NOT DETECTED
HPIV2 RNA SPEC QL NAA+PROBE: NOT DETECTED
HPIV3 RNA NPH QL NAA+PROBE: NOT DETECTED
HPIV4 P GENE NPH QL NAA+PROBE: NOT DETECTED
L PNEUMO1 AG UR QL IA: NEGATIVE
M PNEUMO IGG SER IA-ACNC: NOT DETECTED
MRSA DNA SPEC QL NAA+PROBE: NEGATIVE
OSMOLALITY SERPL: 272 MOSM/KG (ref 275–295)
OSMOLALITY UR: 354 MOSM/KG (ref 300–1100)
PROCALCITONIN SERPL-MCNC: 0.13 NG/ML (ref 0–0.25)
RHINOVIRUS RNA SPEC NAA+PROBE: NOT DETECTED
RSV RNA NPH QL NAA+NON-PROBE: NOT DETECTED
S PNEUM AG SPEC QL LA: NEGATIVE
SARS-COV-2 RNA NPH QL NAA+NON-PROBE: NOT DETECTED
SODIUM UR-SCNC: 30 MMOL/L

## 2023-07-15 PROCEDURE — 0202U NFCT DS 22 TRGT SARS-COV-2: CPT | Performed by: INTERNAL MEDICINE

## 2023-07-15 PROCEDURE — 86160 COMPLEMENT ANTIGEN: CPT | Performed by: INTERNAL MEDICINE

## 2023-07-15 PROCEDURE — 25010000002 FUROSEMIDE PER 20 MG: Performed by: INTERNAL MEDICINE

## 2023-07-15 PROCEDURE — P9047 ALBUMIN (HUMAN), 25%, 50ML: HCPCS | Performed by: PHYSICIAN ASSISTANT

## 2023-07-15 PROCEDURE — 94761 N-INVAS EAR/PLS OXIMETRY MLT: CPT

## 2023-07-15 PROCEDURE — 93005 ELECTROCARDIOGRAM TRACING: CPT | Performed by: PHYSICIAN ASSISTANT

## 2023-07-15 PROCEDURE — 25010000002 FUROSEMIDE PER 20 MG: Performed by: STUDENT IN AN ORGANIZED HEALTH CARE EDUCATION/TRAINING PROGRAM

## 2023-07-15 PROCEDURE — 25010000002 ESMOLOL 2500 MG/250ML SOLUTION: Performed by: EMERGENCY MEDICINE

## 2023-07-15 PROCEDURE — 87641 MR-STAPH DNA AMP PROBE: CPT | Performed by: INTERNAL MEDICINE

## 2023-07-15 PROCEDURE — 86235 NUCLEAR ANTIGEN ANTIBODY: CPT | Performed by: INTERNAL MEDICINE

## 2023-07-15 PROCEDURE — 93010 ELECTROCARDIOGRAM REPORT: CPT | Performed by: STUDENT IN AN ORGANIZED HEALTH CARE EDUCATION/TRAINING PROGRAM

## 2023-07-15 PROCEDURE — 86037 ANCA TITER EACH ANTIBODY: CPT | Performed by: INTERNAL MEDICINE

## 2023-07-15 PROCEDURE — 99221 1ST HOSP IP/OBS SF/LOW 40: CPT | Performed by: INTERNAL MEDICINE

## 2023-07-15 PROCEDURE — 25010000002 PIPERACILLIN SOD-TAZOBACTAM PER 1 G: Performed by: EMERGENCY MEDICINE

## 2023-07-15 PROCEDURE — 84300 ASSAY OF URINE SODIUM: CPT | Performed by: INTERNAL MEDICINE

## 2023-07-15 PROCEDURE — 25010000002 PIPERACILLIN SOD-TAZOBACTAM PER 1 G: Performed by: INTERNAL MEDICINE

## 2023-07-15 PROCEDURE — 25010000002 METHYLPREDNISOLONE PER 125 MG: Performed by: INTERNAL MEDICINE

## 2023-07-15 PROCEDURE — 94799 UNLISTED PULMONARY SVC/PX: CPT

## 2023-07-15 PROCEDURE — 94664 DEMO&/EVAL PT USE INHALER: CPT

## 2023-07-15 PROCEDURE — 97116 GAIT TRAINING THERAPY: CPT

## 2023-07-15 PROCEDURE — 25010000002 ALBUMIN HUMAN 25% PER 50 ML: Performed by: PHYSICIAN ASSISTANT

## 2023-07-15 PROCEDURE — 87040 BLOOD CULTURE FOR BACTERIA: CPT | Performed by: INTERNAL MEDICINE

## 2023-07-15 PROCEDURE — 94640 AIRWAY INHALATION TREATMENT: CPT

## 2023-07-15 PROCEDURE — 25010000002 VANCOMYCIN PER 500 MG: Performed by: INTERNAL MEDICINE

## 2023-07-15 PROCEDURE — 25010000002 AZITHROMYCIN PER 500 MG: Performed by: INTERNAL MEDICINE

## 2023-07-15 PROCEDURE — 99223 1ST HOSP IP/OBS HIGH 75: CPT | Performed by: INTERNAL MEDICINE

## 2023-07-15 PROCEDURE — 83605 ASSAY OF LACTIC ACID: CPT | Performed by: EMERGENCY MEDICINE

## 2023-07-15 PROCEDURE — 71275 CT ANGIOGRAPHY CHEST: CPT

## 2023-07-15 PROCEDURE — 83516 IMMUNOASSAY NONANTIBODY: CPT | Performed by: INTERNAL MEDICINE

## 2023-07-15 PROCEDURE — 99222 1ST HOSP IP/OBS MODERATE 55: CPT | Performed by: INTERNAL MEDICINE

## 2023-07-15 PROCEDURE — 84588 ASSAY OF VASOPRESSIN: CPT | Performed by: INTERNAL MEDICINE

## 2023-07-15 PROCEDURE — 86038 ANTINUCLEAR ANTIBODIES: CPT | Performed by: INTERNAL MEDICINE

## 2023-07-15 PROCEDURE — 86225 DNA ANTIBODY NATIVE: CPT | Performed by: INTERNAL MEDICINE

## 2023-07-15 PROCEDURE — 97162 PT EVAL MOD COMPLEX 30 MIN: CPT

## 2023-07-15 PROCEDURE — 83935 ASSAY OF URINE OSMOLALITY: CPT | Performed by: INTERNAL MEDICINE

## 2023-07-15 PROCEDURE — 87449 NOS EACH ORGANISM AG IA: CPT | Performed by: INTERNAL MEDICINE

## 2023-07-15 PROCEDURE — 25510000001 IOPAMIDOL PER 1 ML: Performed by: EMERGENCY MEDICINE

## 2023-07-15 PROCEDURE — 97110 THERAPEUTIC EXERCISES: CPT

## 2023-07-15 PROCEDURE — 97530 THERAPEUTIC ACTIVITIES: CPT

## 2023-07-15 RX ORDER — ACETAMINOPHEN 325 MG/1
650 TABLET ORAL EVERY 4 HOURS PRN
Status: DISCONTINUED | OUTPATIENT
Start: 2023-07-15 | End: 2023-08-01 | Stop reason: HOSPADM

## 2023-07-15 RX ORDER — AMOXICILLIN 250 MG
2 CAPSULE ORAL 2 TIMES DAILY
Status: DISCONTINUED | OUTPATIENT
Start: 2023-07-15 | End: 2023-08-01 | Stop reason: HOSPADM

## 2023-07-15 RX ORDER — FLECAINIDE ACETATE 50 MG/1
50 TABLET ORAL EVERY 12 HOURS SCHEDULED
Status: DISCONTINUED | OUTPATIENT
Start: 2023-07-15 | End: 2023-07-15

## 2023-07-15 RX ORDER — SODIUM CHLORIDE 0.9 % (FLUSH) 0.9 %
10 SYRINGE (ML) INJECTION AS NEEDED
Status: DISCONTINUED | OUTPATIENT
Start: 2023-07-15 | End: 2023-07-29

## 2023-07-15 RX ORDER — BRIMONIDINE TARTRATE 2 MG/ML
1 SOLUTION/ DROPS OPHTHALMIC EVERY 12 HOURS SCHEDULED
Status: DISCONTINUED | OUTPATIENT
Start: 2023-07-15 | End: 2023-08-01 | Stop reason: HOSPADM

## 2023-07-15 RX ORDER — ASCORBIC ACID 500 MG
250 TABLET ORAL DAILY
Status: DISCONTINUED | OUTPATIENT
Start: 2023-07-15 | End: 2023-08-01 | Stop reason: HOSPADM

## 2023-07-15 RX ORDER — MIRTAZAPINE 15 MG/1
15 TABLET, FILM COATED ORAL NIGHTLY
Status: DISCONTINUED | OUTPATIENT
Start: 2023-07-15 | End: 2023-08-01 | Stop reason: HOSPADM

## 2023-07-15 RX ORDER — VANCOMYCIN HYDROCHLORIDE 1 G/200ML
20 INJECTION, SOLUTION INTRAVENOUS
Status: DISCONTINUED | OUTPATIENT
Start: 2023-07-16 | End: 2023-07-15

## 2023-07-15 RX ORDER — PAROXETINE 10 MG/1
10 TABLET, FILM COATED ORAL NIGHTLY
Status: DISCONTINUED | OUTPATIENT
Start: 2023-07-15 | End: 2023-08-01 | Stop reason: HOSPADM

## 2023-07-15 RX ORDER — CHOLECALCIFEROL (VITAMIN D3) 125 MCG
5 CAPSULE ORAL NIGHTLY PRN
Status: DISCONTINUED | OUTPATIENT
Start: 2023-07-15 | End: 2023-07-30

## 2023-07-15 RX ORDER — ACETAMINOPHEN 160 MG/5ML
650 SOLUTION ORAL EVERY 4 HOURS PRN
Status: DISCONTINUED | OUTPATIENT
Start: 2023-07-15 | End: 2023-08-01 | Stop reason: HOSPADM

## 2023-07-15 RX ORDER — POLYETHYLENE GLYCOL 3350 17 G/17G
17 POWDER, FOR SOLUTION ORAL DAILY PRN
Status: DISCONTINUED | OUTPATIENT
Start: 2023-07-15 | End: 2023-08-01 | Stop reason: HOSPADM

## 2023-07-15 RX ORDER — TIMOLOL MALEATE 5 MG/ML
1 SOLUTION/ DROPS OPHTHALMIC EVERY 12 HOURS SCHEDULED
Status: DISCONTINUED | OUTPATIENT
Start: 2023-07-15 | End: 2023-08-01 | Stop reason: HOSPADM

## 2023-07-15 RX ORDER — ONDANSETRON 2 MG/ML
4 INJECTION INTRAMUSCULAR; INTRAVENOUS EVERY 6 HOURS PRN
Status: DISCONTINUED | OUTPATIENT
Start: 2023-07-15 | End: 2023-08-01 | Stop reason: HOSPADM

## 2023-07-15 RX ORDER — GALANTAMINE HYDROBROMIDE 4 MG/1
8 TABLET, FILM COATED ORAL 2 TIMES DAILY WITH MEALS
Status: DISCONTINUED | OUTPATIENT
Start: 2023-07-15 | End: 2023-08-01 | Stop reason: HOSPADM

## 2023-07-15 RX ORDER — SODIUM CHLORIDE 0.9 % (FLUSH) 0.9 %
10 SYRINGE (ML) INJECTION EVERY 12 HOURS SCHEDULED
Status: DISCONTINUED | OUTPATIENT
Start: 2023-07-15 | End: 2023-07-29

## 2023-07-15 RX ORDER — GUAIFENESIN 600 MG/1
600 TABLET, EXTENDED RELEASE ORAL EVERY 12 HOURS SCHEDULED
Status: DISPENSED | OUTPATIENT
Start: 2023-07-15 | End: 2023-07-20

## 2023-07-15 RX ORDER — BISACODYL 5 MG/1
5 TABLET, DELAYED RELEASE ORAL DAILY PRN
Status: DISCONTINUED | OUTPATIENT
Start: 2023-07-15 | End: 2023-08-01 | Stop reason: HOSPADM

## 2023-07-15 RX ORDER — FUROSEMIDE 10 MG/ML
40 INJECTION INTRAMUSCULAR; INTRAVENOUS EVERY 12 HOURS
Status: DISCONTINUED | OUTPATIENT
Start: 2023-07-15 | End: 2023-07-16

## 2023-07-15 RX ORDER — METOPROLOL TARTRATE 5 MG/5ML
2.5 INJECTION INTRAVENOUS
Status: DISCONTINUED | OUTPATIENT
Start: 2023-07-15 | End: 2023-08-01 | Stop reason: HOSPADM

## 2023-07-15 RX ORDER — BISOPROLOL FUMARATE 5 MG/1
2.5 TABLET, FILM COATED ORAL
Status: DISCONTINUED | OUTPATIENT
Start: 2023-07-15 | End: 2023-07-16

## 2023-07-15 RX ORDER — PANTOPRAZOLE SODIUM 40 MG/1
40 TABLET, DELAYED RELEASE ORAL
Status: DISCONTINUED | OUTPATIENT
Start: 2023-07-15 | End: 2023-08-01 | Stop reason: HOSPADM

## 2023-07-15 RX ORDER — IPRATROPIUM BROMIDE AND ALBUTEROL SULFATE 2.5; .5 MG/3ML; MG/3ML
3 SOLUTION RESPIRATORY (INHALATION)
Status: DISCONTINUED | OUTPATIENT
Start: 2023-07-15 | End: 2023-08-01 | Stop reason: HOSPADM

## 2023-07-15 RX ORDER — VANCOMYCIN HYDROCHLORIDE 1 G/200ML
20 INJECTION, SOLUTION INTRAVENOUS ONCE
Status: COMPLETED | OUTPATIENT
Start: 2023-07-15 | End: 2023-07-15

## 2023-07-15 RX ORDER — LEVOTHYROXINE SODIUM 0.07 MG/1
75 TABLET ORAL DAILY
Status: DISCONTINUED | OUTPATIENT
Start: 2023-07-15 | End: 2023-07-15

## 2023-07-15 RX ORDER — FUROSEMIDE 10 MG/ML
40 INJECTION INTRAMUSCULAR; INTRAVENOUS ONCE
Status: COMPLETED | OUTPATIENT
Start: 2023-07-15 | End: 2023-07-15

## 2023-07-15 RX ORDER — METHYLPREDNISOLONE SODIUM SUCCINATE 125 MG/2ML
60 INJECTION, POWDER, LYOPHILIZED, FOR SOLUTION INTRAMUSCULAR; INTRAVENOUS EVERY 12 HOURS
Status: DISCONTINUED | OUTPATIENT
Start: 2023-07-15 | End: 2023-07-15

## 2023-07-15 RX ORDER — SODIUM CHLORIDE 9 MG/ML
40 INJECTION, SOLUTION INTRAVENOUS AS NEEDED
Status: DISCONTINUED | OUTPATIENT
Start: 2023-07-15 | End: 2023-07-29

## 2023-07-15 RX ORDER — METHYLPREDNISOLONE SODIUM SUCCINATE 125 MG/2ML
125 INJECTION, POWDER, LYOPHILIZED, FOR SOLUTION INTRAMUSCULAR; INTRAVENOUS ONCE
Status: COMPLETED | OUTPATIENT
Start: 2023-07-15 | End: 2023-07-15

## 2023-07-15 RX ORDER — LEVOTHYROXINE SODIUM 88 UG/1
88 TABLET ORAL DAILY
Status: DISCONTINUED | OUTPATIENT
Start: 2023-07-16 | End: 2023-08-01 | Stop reason: HOSPADM

## 2023-07-15 RX ORDER — BISACODYL 10 MG
10 SUPPOSITORY, RECTAL RECTAL DAILY PRN
Status: DISCONTINUED | OUTPATIENT
Start: 2023-07-15 | End: 2023-08-01 | Stop reason: HOSPADM

## 2023-07-15 RX ORDER — FLUDROCORTISONE ACETATE 0.1 MG/1
0.1 TABLET ORAL DAILY
Status: DISCONTINUED | OUTPATIENT
Start: 2023-07-15 | End: 2023-07-21

## 2023-07-15 RX ORDER — ACETAMINOPHEN 650 MG/1
650 SUPPOSITORY RECTAL EVERY 4 HOURS PRN
Status: DISCONTINUED | OUTPATIENT
Start: 2023-07-15 | End: 2023-08-01 | Stop reason: HOSPADM

## 2023-07-15 RX ORDER — ALBUMIN (HUMAN) 12.5 G/50ML
25 SOLUTION INTRAVENOUS ONCE
Status: COMPLETED | OUTPATIENT
Start: 2023-07-15 | End: 2023-07-15

## 2023-07-15 RX ADMIN — VANCOMYCIN HYDROCHLORIDE 1000 MG: 1 INJECTION, SOLUTION INTRAVENOUS at 07:08

## 2023-07-15 RX ADMIN — METHYLPREDNISOLONE SODIUM SUCCINATE 125 MG: 125 INJECTION, POWDER, FOR SOLUTION INTRAMUSCULAR; INTRAVENOUS at 04:01

## 2023-07-15 RX ADMIN — TIMOLOL MALEATE 1 DROP: 5 SOLUTION/ DROPS OPHTHALMIC at 08:07

## 2023-07-15 RX ADMIN — Medication 250 MG: at 08:01

## 2023-07-15 RX ADMIN — GALANTAMINE 8 MG: 4 TABLET, FILM COATED ORAL at 08:07

## 2023-07-15 RX ADMIN — GALANTAMINE 8 MG: 4 TABLET, FILM COATED ORAL at 17:13

## 2023-07-15 RX ADMIN — PANTOPRAZOLE SODIUM 40 MG: 40 TABLET, DELAYED RELEASE ORAL at 05:55

## 2023-07-15 RX ADMIN — ESMOLOL HYDROCHLORIDE IN SODIUM CHLORIDE 50 MCG/KG/MIN: 10 INJECTION INTRAVENOUS at 00:25

## 2023-07-15 RX ADMIN — IPRATROPIUM BROMIDE AND ALBUTEROL SULFATE 3 ML: 2.5; .5 SOLUTION RESPIRATORY (INHALATION) at 15:54

## 2023-07-15 RX ADMIN — SODIUM CHLORIDE 250 ML: 9 INJECTION, SOLUTION INTRAVENOUS at 20:23

## 2023-07-15 RX ADMIN — APIXABAN 2.5 MG: 2.5 TABLET, FILM COATED ORAL at 08:01

## 2023-07-15 RX ADMIN — IPRATROPIUM BROMIDE AND ALBUTEROL SULFATE 3 ML: 2.5; .5 SOLUTION RESPIRATORY (INHALATION) at 07:20

## 2023-07-15 RX ADMIN — LEVOTHYROXINE SODIUM 75 MCG: 0.07 TABLET ORAL at 05:56

## 2023-07-15 RX ADMIN — BISOPROLOL FUMARATE 2.5 MG: 5 TABLET, FILM COATED ORAL at 16:13

## 2023-07-15 RX ADMIN — Medication 10 ML: at 20:29

## 2023-07-15 RX ADMIN — GUAIFENESIN 600 MG: 600 TABLET, EXTENDED RELEASE ORAL at 08:01

## 2023-07-15 RX ADMIN — IPRATROPIUM BROMIDE AND ALBUTEROL SULFATE 3 ML: 2.5; .5 SOLUTION RESPIRATORY (INHALATION) at 11:51

## 2023-07-15 RX ADMIN — GUAIFENESIN 600 MG: 600 TABLET, EXTENDED RELEASE ORAL at 20:29

## 2023-07-15 RX ADMIN — PIPERACILLIN SODIUM AND TAZOBACTAM SODIUM 3.38 G: 3; .375 INJECTION, SOLUTION INTRAVENOUS at 04:01

## 2023-07-15 RX ADMIN — BRIMONIDINE TARTRATE 1 DROP: 2 SOLUTION/ DROPS OPHTHALMIC at 20:29

## 2023-07-15 RX ADMIN — PIPERACILLIN SODIUM AND TAZOBACTAM SODIUM 3.38 G: 3; .375 INJECTION, SOLUTION INTRAVENOUS at 10:34

## 2023-07-15 RX ADMIN — ALBUMIN (HUMAN) 25 G: 0.25 INJECTION, SOLUTION INTRAVENOUS at 20:23

## 2023-07-15 RX ADMIN — BRIMONIDINE TARTRATE 1 DROP: 2 SOLUTION/ DROPS OPHTHALMIC at 08:07

## 2023-07-15 RX ADMIN — FUROSEMIDE 40 MG: 10 INJECTION, SOLUTION INTRAMUSCULAR; INTRAVENOUS at 14:18

## 2023-07-15 RX ADMIN — FUROSEMIDE 40 MG: 10 INJECTION, SOLUTION INTRAMUSCULAR; INTRAVENOUS at 04:01

## 2023-07-15 RX ADMIN — FLUDROCORTISONE ACETATE 0.1 MG: 0.1 TABLET ORAL at 08:07

## 2023-07-15 RX ADMIN — IPRATROPIUM BROMIDE AND ALBUTEROL SULFATE 3 ML: 2.5; .5 SOLUTION RESPIRATORY (INHALATION) at 19:36

## 2023-07-15 RX ADMIN — FLECAINIDE ACETATE 50 MG: 50 TABLET ORAL at 08:01

## 2023-07-15 RX ADMIN — AZITHROMYCIN 500 MG: 500 INJECTION, POWDER, LYOPHILIZED, FOR SOLUTION INTRAVENOUS at 05:50

## 2023-07-15 RX ADMIN — IOPAMIDOL 75 ML: 755 INJECTION, SOLUTION INTRAVENOUS at 00:12

## 2023-07-15 RX ADMIN — PAROXETINE HYDROCHLORIDE 10 MG: 10 TABLET, FILM COATED ORAL at 20:29

## 2023-07-15 RX ADMIN — Medication 10 ML: at 08:01

## 2023-07-15 RX ADMIN — MIRTAZAPINE 15 MG: 15 TABLET, FILM COATED ORAL at 20:29

## 2023-07-15 RX ADMIN — TIMOLOL MALEATE 1 DROP: 5 SOLUTION/ DROPS OPHTHALMIC at 20:30

## 2023-07-15 RX ADMIN — PIPERACILLIN SODIUM AND TAZOBACTAM SODIUM 3.38 G: 3; .375 INJECTION, SOLUTION INTRAVENOUS at 17:13

## 2023-07-15 RX ADMIN — APIXABAN 2.5 MG: 2.5 TABLET, FILM COATED ORAL at 20:28

## 2023-07-15 NOTE — CONSULTS
[facility], Inpatient Consult  Date of Hospital Visit: 07/15/23  Encounter Provider: Juanita Arias PA-C    Place of Service: [facility]  Patient Name: Vita Miller  :1940  MRN: [id number]     Primary Care Provider: Alfonso Steele MD    Chief complaint: A-fib      PROBLEM LIST:    Atrial fibrillation of uncertain duration/etiology ([id number] score 4; 4% annual stroke risk) with initial rapid response and subsequent spontaneous cardioversion on IV diltiazem, 2023  Recurrent A-fib 7/15  Chronic pulmonary dysfunction with probable emphysema/bronchiectasis/MAC  Intermittent orthostatic hypotension- on fludrocortisone  Chronic hypothyroidism/replacement therapy  Dyslipidemia  Osteoporosis  Chronic open-angle glaucoma, O.U.  Remote operations:  A.  Hip surgery  B.  Patella surgery  C.  Shoulder surgery    History of Present Illness:  Patient is an 83-year-old history of atrial fibrillation, recently diagnosed a month ago, COPD, history of orthostatic hypotension last seen in the hospital by Dr. Mack about a month ago and then subsequently in the heart valve clinic right after discharge.  Upon her last admission patient was admitted and converted back to normal sinus rhythm after IV diltiazem.  She was initiated on flecainide and Eliquis and had a Holter monitor placed that has not resulted yet.    Patient presented last evening with palpitations and shortness of breath that started while she was eating dinner.  Upon arrival patient was noted to be in A-fib with RVR and started on an esmolol drip.  Patient was also noted to have a proBNP of over 6000 which is more elevated than her last admission and was given 40 of IV Lasix.  Patient also had symptoms of acute respiratory failure with hypoxia in the setting of her chronic bronchiectasis.  Patient tells me that she does not really recall last evening feeling more short of breath that she did notice yesterday  that she had increased lower extremity edema.  Patient now feels back to normal and has no chest discomfort or increased shortness of breath from her baseline.  Patient does tell me she has never been on oxygen at home.    Patient does tell me she has an appointment with electrophysiologist on Friday at the [facility].    I reviewed patient's past medical history, surgical history, family history and social history.    Current Outpatient Medications   Medication Instructions    apixaban (ELIQUIS) 2.5 mg, Oral, Every 12 Hours Scheduled    Ascorbic Acid (Vitamin C) 100 MG chewable tablet Vitamin C    brimonidine-timolol (COMBIGAN) 0.2-0.5 % ophthalmic solution Every 12 Hours    Cholecalciferol (Vitamin D3) 1.25 MG (84568 UT) capsule Vitamin D3    flecainide (TAMBOCOR) 50 mg, Oral, Every 12 Hours Scheduled    fludrocortisone 0.1 mg, Oral, Daily    galantamine (RAZADYNE) 8 mg, Oral, 2 Times Daily    levothyroxine (SYNTHROID, LEVOTHROID) 75 mcg, Oral, Daily    mirtazapine (REMERON) 15 mg, Oral, Nightly    PARoxetine (PAXIL) 10 mg, Oral, Nightly          Scheduled Meds:apixaban, 2.5 mg, Oral, Q12H  ascorbic acid, 250 mg, Oral, Daily  azithromycin, 500 mg, Intravenous, Q24H  brimonidine, 1 drop, Both Eyes, Q12H   And  timolol, 1 drop, Both Eyes, Q12H  flecainide, 50 mg, Oral, Q12H  fludrocortisone, 0.1 mg, Oral, Daily  galantamine, 8 mg, Oral, BID With Meals  guaiFENesin, 600 mg, Oral, Q12H  ipratropium-albuterol, 3 mL, Nebulization, Q4H - RT  levothyroxine, 75 mcg, Oral, Daily  methylPREDNISolone sodium succinate, 60 mg, Intravenous, Q12H  mirtazapine, 15 mg, Oral, Nightly  pantoprazole, 40 mg, Oral, Q AM  PARoxetine, 10 mg, Oral, Nightly  piperacillin-tazobactam, 3.375 g, Intravenous, Q8H  senna-docusate sodium, 2 tablet, Oral, BID  sodium chloride, 10 mL, Intravenous, Q12H  vancomycin, 20 mg/kg, Intravenous, Once  [START ON 7/16/2023] vancomycin, 20 mg/kg, Intravenous, Q24H      Continuous Infusions:esmolol,  " mcg/kg/min, Last Rate: Stopped (07/15/23 0754)  Pharmacy to dose vancomycin,           Review of Systems   Pertinent positives and negatives noted in the HPI       Objective    Objective:     Vitals:    07/15/23 0526 07/15/23 0720 07/15/23 0752 07/15/23 0801   BP:   94/53 111/71   BP Location:   Right arm    Patient Position:   Sitting    Pulse: 109 63  68   Resp: 18 16 16    Temp: 97.9 øF (36.6 øC)      TempSrc: Oral  (S) Axillary    SpO2: 97% 100%     Weight:       Height:           Body mass index is 15.51 kg/mý.  Flowsheet Rows      Flowsheet Row First Filed Value   Admission Height 172.7 cm (68\") Documented at 07/14/2023 2224   Admission Weight 46.3 kg (102 lb) Documented at 07/14/2023 2224            Intake/Output Summary (Last 24 hours) at 7/15/2023 0827  Last data filed at 7/15/2023 0500  Gross per 24 hour   Intake --   Output 100 ml   Net -100 ml       Vitals reviewed.   Constitutional:       Appearance: Not in distress. Frail. Chronically ill-appearing.      Comments: Nasal cannula in place   HENT:    Mouth/Throat:      Pharynx: Oropharynx is clear.   Neck:      Vascular: No carotid bruit or JVD.   Pulmonary:      Effort: Pulmonary effort is normal.      Comments: Crackles in bases as well as diffuse bilateral rhonchi.  Cardiovascular:      Normal rate. Regular rhythm.      Murmurs: There is no murmur.      No rub.   Pulses:     Intact distal pulses.   Edema:     Peripheral edema absent.   Abdominal:      General: There is no distension.      Palpations: Abdomen is soft.      Tenderness: There is no abdominal tenderness.   Musculoskeletal:         General: No deformity. Skin:     General: Skin is warm and dry.   Neurological:      General: No focal deficit present.      Mental Status: Alert and oriented to person, place and time.         Lab Review:                CBC:      Lab 07/14/23  2241   WBC 13.02*   HEMOGLOBIN 14.3   HEMATOCRIT 43.0   PLATELETS 353   NEUTROS ABS 10.73*   IMMATURE GRANS (ABS) " 0.09*   LYMPHS ABS 0.95   MONOS ABS 0.99*   EOS ABS 0.20   MCV 97.7*     CMP:        Lab 07/14/23 2241   SODIUM 129*   POTASSIUM 4.5   CHLORIDE 87*   CO2 30.0*   ANION GAP 12.0   BUN 19   CREATININE 0.79   EGFR 74.3   GLUCOSE 125*   CALCIUM 9.1   MAGNESIUM 1.9   TOTAL PROTEIN 6.8   ALBUMIN 3.5   GLOBULIN 3.3   ALT (SGPT) 24   AST (SGOT) 23   BILIRUBIN 0.4   ALK PHOS 90     Results from last 7 days   Lab Units 07/14/23  2241   MAGNESIUM mg/dL 1.9     No results found for: HGBA1C  Estimated Creatinine Clearance: 39.4 mL/min (by C-G formula based on SCr of 0.79 mg/dL).  No results found for: DDIMER        Lab 07/14/23 2241   PROBNP 6,301.0*   HSTROP T 16*       No results found for: CHOL, CHLPL, TRIG, HDL, LDL, LDLDIRECT      XR Chest 1 View    Result Date: 7/14/2023  1.  Increasing bilateral upper lung opacities, edema versus pneumonia. 2.  Small bilateral pleural effusions. 3.  Redemonstration of airways disease and interstitial and nodular opacities scattered throughout the lungs. Electronically signed by:  Dedra Alcantara M.D.  7/14/2023 9:30 PM Mountain Time    CT Angiogram Chest    Result Date: 7/15/2023  1.  Favor contrast limitation rather than singular small pulmonary embolism in the distal aspect of the left lower lobe posterior subsegmental pulmonary artery. 2.  Constellation of findings in the lungs suggestive of advanced inflammatory/infectious process such as fungal/mycobacterial process, eosinophilic pneumonia, or possibly autoimmune process. Overall appearance has markedly worsened over the past 30 days  to include a small left pleural effusion. Bilateral pulmonary nodules are unchanged and likely related. Recommend pulmonology consultation. Thank you for the referral of this patient. This exam was interpreted by an American Board of Radiology certified radiologist with subspecialty training. If there are any questions regarding this exam please feel free to contact a radiologist directly at   [phone]. Slot: 70 Electronically signed by:  Eh Mc M.D.  2023 11:32 PM Mountain Time      Results for orders placed during the hospital encounter of 23    Adult Transthoracic Echo Complete W/ Cont if Necessary Per Protocol    Interpretation Summary    Left ventricular systolic function is normal. Estimated left ventricular EF = 60%    Left ventricular diastolic function was normal.    Mild aortic insufficiency.    Trace to mild mitral and tricuspid regurgitation.    Calculated right ventricular systolic pressure from tricuspid regurgitation is 20 mmHg.    There is a trivial pericardial effusion.       EK2023 A-fib with RVR heart rate 112 when compared to previous EKG on 2023 patient is no longer in sinus bradycardia.    Result Review:  I have personally reviewed the results from the time of admission and agree with these findings:  [x]  Laboratory  [x]  Radiology  [x]  EKG/Telemetry   []  Pathology  []  Old records  []  Other:           Assessment:   PAF, [id number] = 4, on outpatient flecainide therapy  Acute HFpEF, in the setting of A-fib with RVR  Orthostatic hypotension  Acute respiratory failure with hypoxia, chronic bronchiectasis  Hyponatremia      Plan:   Patient now in normal sinus rhythm, will continue flecainide therapy.  Patient no longer on esmolol drip.  Can give metoprolol to tartrate IV as needed for heart rate greater than 120.  Patient likely going in and out of atrial fibrillation due to her respiratory status.  Continue Eliquis 2.5 mg twice daily for stroke prophylaxis.  Reviewed patient's slightly elevated troponin, no signs of ACS and no need for ischemic evaluation at this time.  Will not order any further diuresis at this time due to hyponatremia, follow closely regarding patient's respiratory status.  Continue Florinef for patient's orthostatic hypotension.  We will continue to follow as needed over the weekend given patient has converted back  to normal sinus rhythm.    Electronically signed by Juanita Arias PA-C, 07/15/23, 9:34 AM EDT.        STAFF CARDIOLOGIST:      SUMMARY:    80 years old with severe lung problem admitted with heart failure with preserved ejection fraction      PERTINENT:    Atrial fibrillation  Severe lung disease  BNP 6000      IMPRESSION:    Heart failure with preserved ejection fraction  Severe lung disease      RECOMMENDATIONS:    We will give low-dose of bisoprolol  We will diurese her and see how she does  We will hold off on flecainide but I do not believe that might be causing her adverse lung problem as this just been started about 2 weeks ago.  Appreciate pulmonologist input        I have seen and examined the patient, reviewed the above note, necessary changes were made and I agree with the final note.   Luis Huggins MD

## 2023-07-15 NOTE — PROGRESS NOTES
Clinical Nutrition     Reason for Visit: Identified at risk by screening criteria, Malnutrition Severity Assessment, Physician consult      Patient Name: Vita Miller  YOB: 1940  MRN: [id number]  Date of Encounter: 07/15/23 18:38 EDT  Admission date: 7/14/2023      Nutrition Assessment   Admission Diagnosis:  Acute respiratory failure with hypoxia [J96.01]      Problem List:    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    PSH:  She  has a past surgical history that includes Patella surgery; Hip surgery; and Shoulder surgery.      Applicable Nutrition Concerns:   Skin:wnl  GI:last bm 7-15      Reported/Observed/Food/Nutrition Related History:     Pt sitting up in chair, on nasal cannula, eating dinner, reports fair appetite, states her UBW is ~ 102lb's, has not lost any weight recently, reports she did used to weigh `~140lb's prior to her diagnosis of MAC, this has been several years ago, she had allergy testing done by her doctor, and she has been told to avoid gluten and dairy, when I asked her to clarify her dairy allergy she said she was lactose intolerant, she is willing to try oral supplements that are lactose free      Labs    Labs Reviewed: Yes     Results from last 7 days   Lab Units 07/14/23  2241   GLUCOSE mg/dL 125*   BUN mg/dL 19   CREATININE mg/dL 0.79   SODIUM mmol/L  "129*   CHLORIDE mmol/L 87*   POTASSIUM mmol/L 4.5   MAGNESIUM mg/dL 1.9   ALT (SGPT) U/L 24       Results from last 7 days   Lab Units 07/14/23  2241   ALBUMIN g/dL 3.5   CRP mg/dL 14.66*           No results found for: HGBA1C      Results from last 7 days   Lab Units 07/14/23  2241   PROBNP pg/mL 6,301.0*         Medications    Medications Reviewed: Yes      Intake/Ouptut 24 hrs (0701 - 0700)   I&O's Reviewed: Yes     Intake & Output (last day)         07/14 0701  07/15 0700 07/15 0701  07/16 0700    P.O.  360    IV Piggyback  50    Total Intake(mL/kg)  410 (8.9)    Urine (mL/kg/hr) 100     Total Output 100     Net -100 +410          Urine Unmeasured Occurrence  1 x    Stool Unmeasured Occurrence  1 x              Anthropometrics     Flowsheet Rows      Flowsheet Row First Filed Value   Admission Height 172.7 cm (68\") Documented at 07/14/2023 2224   Admission Weight 46.3 kg (102 lb) Documented at 07/14/2023 2224            Height: Height: 172.7 cm (68\")  Last Filed Weight: Weight: 46.3 kg (102 lb) (07/14/23 2224)  Method: Weight Method: Stated  BMI: BMI (Calculated): 15.5  BMI classification: Underweight:<18.5kg/m2  IBW:  140lb    UBW: 102lb  Weight change: no per pt     Weight      Weight (kg) Weight (lbs) Weight Method   6/16/2023 43.999 kg  97 lb  Stated    6/17/2023 46.72 kg  103 lb  Bed scale     46.358 kg  102 lb 3.2 oz  Standing scale    6/21/2023 48.762 kg  107 lb 8 oz     7/14/2023 46.267 kg  102 lb  Stated          Nutrition Focused Physical Exam     Date: 715-22    Patient meets criteria for malnutrition diagnosis, see MSA note.    Current Nutrition Prescription     PO: Cardiac Diet  Oral Nutrition Supplement:   Intake: Insufficient data 100% of 1 meal      Nutrition Diagnosis   Date: 7-15-23 Updated:   Problem Malnutrition    Etiology Per Clinical Status: MAC   Signs/Symptoms Severe Muscle wasting and fat loss   Status:     Goal:   General: Nutrition support treatment  PO: Establish PO  EN/PN: " N/A    Nutrition Intervention      Follow treatment progress, Advised available snacks, Interview for preferences, Menu adjusted, Supplement provided    Gluten Free -Lactose modified - Cardiac Diet    Boost Breeze 3x/day    Pt meets criteria for severe chronic malnutrition with severe muscle wasting, fat loss      Monitoring/Evaluation:   Per protocol      Renay Cummings RD  Time Spent: 45min

## 2023-07-15 NOTE — THERAPY EVALUATION
Patient Name: Vita Miller  : 1940    MRN: [id number]                              Today's Date: 7/15/2023       Admit Date: 2023    Visit Dx:     ICD-10-CM ICD-9-CM   1. Atrial fibrillation with RVR  I48.91 427.31   2. Acute respiratory failure with hypoxia  J96.01 518.81   3. 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    Hypothyroidism    Acute respiratory failure with hypoxia    Cachexia    Severe Malnutrition (HCC)    Pneumonia of both lungs due to infectious organism    Hyponatremia     Past Medical History:   Diagnosis Date    A-fib     Disease of thyroid gland     Glaucoma     Hypotension      Past Surgical History:   Procedure Laterality Date    HIP SURGERY      PATELLA SURGERY      SHOULDER SURGERY        General Information       Row Name 07/15/23 1517          Physical Therapy Time and Intention    Document Type evaluation  -DM     Mode of Treatment physical therapy  -DM       Row Name 07/15/23 1517          General Information    Patient Profile Reviewed yes  -DM     Prior Level of Function independent:;all household mobility;gait;transfer;bed mobility;ADL's;driving;cooking;mod assist:;home management;dependent:;cleaning  indep gt w/o AD; Has a walking stick but hasn't used recently;only drives during day; DTR helps w/ HM; Hired help for cleaning  -DM     Existing Precautions/Restrictions other (see comments);oxygen therapy device and L/min;fall  B PNA; HYPOX; AF; pit.edema; malnut; lung nodules;resp hosp [facility] ;home o2  -DM     Barriers to Rehab  previous functional deficit;visual deficit;ineffective coping  Diffic. seeing objects (doesn't drive@night); A/D  -DM       Row Name 07/15/23 1517          Living Environment    People in Home alone  DTR checks on  -DM       Row Name 07/15/23 1517          Home Main Entrance    Number of Stairs, Main Entrance none  -DM       Row Name 07/15/23 1517          Stairs Within Home, Primary    Stairs, Within Home, Primary 1-st.townhome/apt; W.I. show. w/ gr. bar  -DM     Number of Stairs, Within Home, Primary none  -DM       Row Name 07/15/23 1517          Cognition    Orientation Status (Cognition) oriented x 3  -DM       Row Name 07/15/23 1517          Safety Issues, Functional Mobility    Safety Issues Affecting Function (Mobility) awareness of need for assistance;insight into deficits/self-awareness;positioning of assistive device;safety precaution awareness;safety precautions follow-through/compliance;sequencing abilities  -DM     Impairments Affecting Function (Mobility) balance;endurance/activity tolerance;postural/trunk control;shortness of breath;strength;visual/perceptual  -DM               User Key  (r) = Recorded By, (t) = Taken By, (c) = Cosigned By      Initials Name Provider Type    DM Merced Patel, PT Physical Therapist                   Mobility       Row Name 07/15/23 1517          Bed Mobility    Bed Mobility rolling left;scooting/bridging;supine-sit  -DM     Rolling Left [place] (Bed Mobility) modified independence  -DM     Scooting/Bridging [place] (Bed Mobility) modified independence  -DM     Supine-Sit [place] (Bed Mobility) verbal cues;contact guard  CGA for line mgmt & to achieve full upright  -DM     Assistive Device (Bed Mobility) bed rails;head of bed elevated  -DM     Comment, (Bed Mobility) issued ch.al. d/t pt's dtr leaving, as well as respirex, mesh panty/pad (pure wick); pt on diuretic & stating she  needed BSC to void (brought);respirex 1000 CC  -DM       Row Name  07/15/23 1517          Transfers    Comment, (Transfers) decl. AD init;once standing, incont urine on floor; brought BSC closer, & transf onto it (unable to see handle bars, therefore PT assisted to reach for each),then tdsocks repl, legs cleansed, floor cleaned, & voided again intermitt.over 7 min.period; mesh panty/pad placed & pt stood for hygiene (held IV pump for supp.; had LOB post, & began stepping backward, scooting BSC backward; req. min A to regain balance);  -DM       Row Name 07/15/23 1517          Sit-Stand Transfer    Sit-Stand [place] (Transfers) verbal cues;minimum assist (75% patient effort)  CGA w/ UE supp on L bedrail/R EOB; for 2nd gt trial w/ Loaner SPC, req min A to practice holding AD in R hand while pushing up from chair armrest  -DM     Assistive Device (Sit-Stand Transfers) cane, straight  -DM     Comment, (Sit-Stand Transfer) diffic seeing/reaching for B Chair armrests (PT had to assist w/ each trial)  -DM       Row Name 07/15/23 1517          Gait/Stairs (Locomotion)    [place] Level (Gait) verbal cues;minimum assist (75% patient effort)  min A for RUE supp (rested R hand on PT 's forearm & L Hand on IV pump while PT propelled);90 ft w/ 4 stand.rests ( & desat 95% on 2L); 5 min sit rest in chair;trial w/ SPC R hand (L hand on IV pump;Joseph to propel)for 2nd gt x 90 ft w/5 stand.rests  -DM     Assistive Device (Gait) cane, straight  -DM     Distance in Feet (Gait) 180 ( 90 x 2)  -DM     Deviations/Abnormal Patterns (Gait) bilateral deviations;base of support, narrow;leanna decreased;stride length decreased;weight shifting decreased  decr step length  -DM     Bilateral Gait Deviations forward flexed posture;heel strike decreased  -DM     Comment, (Gait/Stairs) Cues for incr step length & ROE, trunk ext/focusing ahead, maintaining midline, & PLB exer; O2 sat recovered to 99% after 5 min. upon 2nd sit rest;RT. in for rx; also did respirex w/ pt  -DM               User  Key  (r) = Recorded By, (t) = Taken By, (c) = Cosigned By      Initials Name Provider Type    DM Merced Patel, PT Physical Therapist                   Obj/Interventions       Row Name 07/15/23 1517          Range of Motion Comprehensive    General Range of Motion lower extremity range of motion deficits identified  -DM     Comment, General Range of Motion B hips mclain. 15-20%  -DM       Row Name 07/15/23 1517          Strength Comprehensive (MMT)    General Manual Muscle Testing (MMT) Assessment lower extremity strength deficits identified  -DM     Comment, General Manual Muscle Testing (MMT) Assessment BLE grossly 3- to 4/5  -DM       Row Name 07/15/23 1517          Motor Skills    Therapeutic Exercise shoulder;hip;knee;ankle  instructed in PLB exer & UE's(OT not seeing today)  -DM       Row Name 07/15/23 1517          Shoulder (Therapeutic Exercise)    Shoulder (Therapeutic Exercise) AROM (active range of motion)  -DM     Shoulder AROM (Therapeutic Exercise) bilateral;flexion;extension;aBduction;aDduction;horizontal aBduction/aDduction;sitting;10 repetitions;other (see comments);scapular elevation  biceps curls x 20; Deep inspirations w/ sh.exer  -DM       Row Name 07/15/23 1517          Hip (Therapeutic Exercise)    Hip (Therapeutic Exercise) AROM (active range of motion);PROM (passive range of motion);isometric exercises  -DM     Hip AROM (Therapeutic Exercise) bilateral;flexion;extension;aBduction;aDduction;external rotation;internal rotation;sitting;10 repetitions;other (see comments)  bridging x 3  -DM     Hip Isometrics (Therapeutic Exercise) bilateral;gluteal sets;supine;10 repetitions;other (see comments)  Abdom sets  -DM       Row Name 07/15/23 1517          Knee (Therapeutic Exercise)    Knee (Therapeutic Exercise) AROM (active range of motion);isometric exercises  -DM     Knee AROM (Therapeutic Exercise) bilateral;flexion;extension;SAQ (short arc quad);LAQ (long arc quad);heel slides;sitting;10  repetitions  -DM     Knee Isometrics (Therapeutic Exercise) bilateral;hamstring sets;quad sets;sitting;10 repetitions  -DM       Row Name 07/15/23 1517          Ankle (Therapeutic Exercise)    Ankle (Therapeutic Exercise) AROM (active range of motion)  -DM     Ankle AROM (Therapeutic Exercise) bilateral;dorsiflexion;plantarflexion;sitting;10 repetitions;other (see comments);2 sets  AC  -DM       Row Name 07/15/23 1517          Balance    Balance Assessment sitting static balance;sitting dynamic balance;standing static balance;standing dynamic balance  -DM     Static Sitting Balance independent  -DM     Dynamic Sitting Balance supervision  -DM     Position, Sitting Balance unsupported;sitting in chair;sitting edge of bed;other (see comments)  BSC  -DM     Static Standing Balance verbal cues;contact guard  -DM     Dynamic Standing Balance verbal cues;minimal assist  -DM     Position/Device Used, Standing Balance supported;cane, straight  -DM     Balance Interventions sitting;standing;static;dynamic;weight shifting activity  -DM               User Key  (r) = Recorded By, (t) = Taken By, (c) = Cosigned By      Initials Name Provider Type    DM Merced Patel, PT Physical Therapist                   Goals/Plan       Row Name 07/15/23 1517          Bed Mobility Goal 1 (PT)    Activity/Assistive Device (Bed Mobility Goal 1, PT) bed mobility activities, all  -DM     Butts Level/Cues Needed (Bed Mobility Goal 1, PT) independent  -DM     Time Frame (Bed Mobility Goal 1, PT) long term goal (LTG);1 week  -DM       Row Name 07/15/23 1517          Transfer Goal 1 (PT)    Activity/Assistive Device (Transfer Goal 1, PT) sit-to-stand/stand-to-sit;bed-to-chair/chair-to-bed;cane, straight  -DM     [place] Level/Cues Needed (Transfer Goal 1, PT) standby assist  -DM     Time Frame (Transfer Goal 1, PT) long term goal (LTG);1 week  -DM       Row Name 07/15/23 1517          Gait Training Goal 1 (PT)    Activity/Assistive  Device (Gait Training Goal 1, PT) gait (walking locomotion);cane, straight  stable VS; o2 SAT > 92%  -DM     [place]ris Level (Gait Training Goal 1, PT) contact guard required  -DM     Distance (Gait Training Goal 1, PT) 250  -DM     Time Frame (Gait Training Goal 1, PT) long term goal (LTG);1 week  -DM       Row Name 07/15/23 1517          Patient Education Goal (PT)    Activity (Patient Education Goal, PT) HEP exer  -DM     [place]ris/Cues/Accuracy (Memory Goal 2, PT) demonstrates adequately;verbalizes understanding  -DM     Time Frame (Patient Education Goal, PT) long term goal (LTG);1 week  -DM       Row Name 07/15/23 1517          Therapy Assessment/Plan (PT)    Planned Therapy Interventions (PT) balance training;bed mobility training;gait training;home exercise program;patient/family education;postural re-education;strengthening;transfer training  -DM               User Key  (r) = Recorded By, (t) = Taken By, (c) = Cosigned By      Initials Name Provider Type    DM Merced Patel, PT Physical Therapist                   Clinical Impression       Row Name 07/15/23 1517          Pain    Pain Intervention(s) Repositioned;Elevated;Rest  -DM     Additional Documentation Pain Scale: FACES Pre/Post-Treatment (Group)  -DM       Row Name 07/15/23 1517          Pain Scale: FACES Pre/Post-Treatment    Pain: FACES Scale, Pretreatment 2-->hurts little bit  -DM     Posttreatment Pain Rating 2-->hurts little bit  -DM     Pain Location - Side/Orientation Bilateral  -DM     Pain Location - foot;ankle  -DM     Pre/Posttreatment Pain Comment swollen, w/ pittl edema  -DM       Row Name 07/15/23 1517          Plan of Care Review    Plan of Care Reviewed With patient;daughter  -DM     Progress improving  -DM     Outcome Evaluation PT eval completed. Presents w/ B PNA/hypox., AF, katie.edema BLE, malnut, decr LE strength/endurance & impaired funct mobil. Incont urine upon standing, & transf onto BSC w/ min A for lengthy  toileting/hygiene session,then amb 90 ft x 2 w/ 5 min sit rest btwn (decl. AD for 1st trial w/ min A & BUE supp w/ hands resting on IV pump, but agreed to SPC for 2nd trial w/ min A to propel IV pump w/ L hand resting on pump). HR to 134, min incr SOB w/ desat to 95% on 2L, c/o B ankle/foot pain d/t katie edema, & mod. fatigue w/ 2nd gt. Recommend SNF d/t pt living alone & fall risk, as well as visual deficits.  -DM       Row Name 07/15/23 1517          Therapy Assessment/Plan (PT)    Patient/Family Therapy Goals Statement (PT) improved funct mobil  -DM     Rehab Potential (PT) good, to achieve stated therapy goals  -DM     Criteria for Skilled Interventions Met (PT) yes;meets criteria;skilled treatment is necessary  -DM     Therapy Frequency (PT) daily  -DM       Row Name 07/15/23 1517          Vital Signs    Pre Systolic BP Rehab 113  -DM     Pre Treatment Diastolic BP 66  -DM     Post Systolic BP Rehab 128  -DM     Post Treatment Diastolic BP 82  -DM     Pretreatment Heart Rate (beats/min) 68  -DM     Intratreatment Heart Rate (beats/min) 134  -DM     Posttreatment Heart Rate (beats/min) 122  -DM     Pre SpO2 (%) 100  -DM     O2 Delivery Pre Treatment nasal cannula  -DM     Intra SpO2 (%) 95  -DM     O2 Delivery Intra Treatment nasal cannula  -DM     Post SpO2 (%) 99  -DM     O2 Delivery Post Treatment nasal cannula  -DM     Pre Patient Position Supine  -DM     Intra Patient Position Standing  -DM     Post Patient Position Sitting  -DM     Rest Breaks  9  -DM       Row Name 07/15/23 1517          Positioning and Restraints    Pre-Treatment Position in bed  -DM     Post Treatment Position chair  -DM     In Chair notified nsg;reclined;call light within reach;encouraged to call for assist;exit alarm on;legs elevated;with other staff  pure wick reconn.;returned to place chair alarm, o2 ext, & disinfect floor when Pt incont urine  -DM               User Key  (r) = Recorded By, (t) = Taken By, (c) = Cosigned By       Initials Name Provider Type    DM Merced Patel, PT Physical Therapist                   Outcome Measures       Row Name 07/15/23 1517          How much help from another person do you currently need...    Turning from your back to your side while in flat bed without using bedrails? 4  -DM     Moving from lying on back to sitting on the side of a flat bed without bedrails? 4  -DM     Moving to and from a bed to a chair (including a wheelchair)? 3  -DM     Standing up from a chair using your arms (e.g., wheelchair, bedside chair)? 3  -DM     Climbing 3-5 steps with a railing? 2  -DM     To walk in hospital room? 3  -DM     AM-PAC 6 Clicks Score (PT) 19  -DM     Highest level of mobility 6 --> Walked 10 steps or more  -DM       Row Name 07/15/23 1517          Functional Assessment    Outcome Measure Options AM-PAC 6 Clicks Basic Mobility (PT)  -DM               User Key  (r) = Recorded By, (t) = Taken By, (c) = Cosigned By      Initials Name Provider Type    DM Merced Patel, PT Physical Therapist                                 Physical Therapy Education       Title: PT OT SLP Therapies (In Progress)       Topic: Physical Therapy (In Progress)       Point: Mobility training (In Progress)       Learning Progress Summary             Patient Eager, E,D, NR by DM at 7/15/2023 1642                         Point: Home exercise program (In Progress)       Learning Progress Summary             Patient Eager, E,D, NR by DM at 7/15/2023 1642                         Point: Body mechanics (In Progress)       Learning Progress Summary             Patient Eager, E,D, NR by DM at 7/15/2023 1642                         Point: Precautions (In Progress)       Learning Progress Summary             Patient Eager, E,D, NR by DM at 7/15/2023 1642                                         User Key       Initials Effective Dates Name Provider Type Discipline    DM 02/03/23 -  Merced Patel, PT Physical Therapist PT                   PT Recommendation and Plan  Planned Therapy Interventions (PT): balance training, bed mobility training, gait training, home exercise program, patient/family education, postural re-education, strengthening, transfer training  Plan of Care Reviewed With: patient, daughter  Progress: improving  Outcome Evaluation: PT eval completed. Presents w/ B PNA/hypox., AF, katie.edema BLE, malnut, decr LE strength/endurance & impaired funct mobil. Incont urine upon standing, & transf onto BSC w/ min A for lengthy toileting/hygiene session,then amb 90 ft x 2 w/ 5 min sit rest btwn (decl. AD for 1st trial w/ min A & BUE supp w/ hands resting on IV pump, but agreed to SPC for 2nd trial w/ min A to propel IV pump w/ L hand resting on pump). HR to 134, min incr SOB w/ desat to 95% on 2L, c/o B ankle/foot pain d/t katie edema, & mod. fatigue w/ 2nd gt. Recommend SNF d/t pt living alone & fall risk, as well as visual deficits.     Time Calculation:    PT Charges       Row Name 07/15/23 1643             Time Calculation    Start Time 1517  -DM      PT Received On 07/15/23  -DM      PT Goal Re-Cert Due Date 07/25/23  -DM         Time Calculation- PT    Total Timed Code Minutes- PT 86 minute(s)  -DM         Timed Charges    28887 - PT Therapeutic Exercise Minutes 15  -DM      07038 - Gait Training Minutes  16  -DM      14118 - PT Therapeutic Activity Minutes 10  -DM         Untimed Charges    PT Eval/Re-eval Minutes 45  -DM         Total Minutes    Timed Charges Total Minutes 41  -DM      Untimed Charges Total Minutes 45  -DM       Total Minutes 86  -DM                User Key  (r) = Recorded By, (t) = Taken By, (c) = Cosigned By      Initials Name Provider Type    DM Merced Patel, PT Physical Therapist                  Therapy Charges for Today       Code Description Service Date Service Provider Modifiers Qty    21878346712 HC PT THER PROC EA 15 MIN 7/15/2023 Merced Patel, PT GP 1    50318786900 HC GAIT TRAINING EA 15 MIN  7/15/2023 Merced Patel, PT GP 1    21167059518 HC PT THERAPEUTIC ACT EA 15 MIN 7/15/2023 Merced Patel, PT GP 1    13685117367 HC PT EVAL MOD COMPLEXITY 3 7/15/2023 Merced Patel, PT GP 1            PT G-Codes  Outcome Measure Options: AM-PAC 6 Clicks Basic Mobility (PT)  AM-PAC 6 Clicks Score (PT): 19  PT Discharge Summary  Anticipated Discharge Disposition (PT): skilled nursing facility    Merced Patel, PT  7/15/2023

## 2023-07-15 NOTE — CONSULTS
Referring Provider: Kevin Shah DO  Reason for Consultation:     Acute on chronic hypoxic respiratory failure  Bilateral groundglass infiltrate  MAC with chronic nodular changes  Bronchiectasis  Atrial fibrillation with a rapid ventricular response  Hypothyroid      Patient Care Team:  Alfonso Steele MD as PCP - General (Internal Medicine)      Subjective .     History of present illness:      83-year-old woman, non-smoker, followed by Dr. Burgos in the [facility] for Mycobacterium AVM complex infection, bronchiectasis.  She does have a trivial history of smoking for 1 or 2 years quitting 60 years ago.  She does not have a clinical history of emphysema.  She has been followed at the [facility] and treated for Mycobacterium AVM complex.  She received Biaxin, Rifater Butrans and nebulized amikacin completing therapy in June 2020.  She had bronchoscopy in January 2019 confirming MAC.  She then had an enlarging nodule in the right lower lobe in September 2020 and needle biopsy confirmed MAC.  She had been intolerant to several treatments I do not have the specifics.  Ultimately patient and family decided no further treatment for her MAC and simply observation.  She does use DuoNeb and a percussion vest for her bronchiectasis.  At a recent follow-up they were also recommending supplemental oxygen.  Breo was discontinued secondary to her open angle glaucoma which has required laser surgery x2.  She was hospitalized here June 16 through June 18 with new onset rapid atrial fibrillation.  She was placed on flecainide and Eliquis.  She was scheduled to see Dr. Martin, [facility] as a new patient.  However, last night she was eating dinner and developed palpitations with associated shortness of air.  She has not had a preceding fever or chest pain.  She called her primary care physician who advised her to come to the hospital.  On arrival to the hospital she was noted to be in rapid atrial  fibrillation.  Saturation did decrease to 81%.  CTA of the chest revealed nodular changes as well as some patchy groundglass infiltrates.  She was placed on esmolol drip with improved heart rate control.  Because of her lung infiltrates she was placed on Zosyn, vancomycin.  There was a question that this might be a vasculitis and she was also started on steroids.  Patient tells me that she has chronic daily sputum production that is more in the afternoons.  Sputum is usually yellow to white in color.  She has not noticed an increase in amount.  She denies hemoptysis.  She denies fever chills night sweats.  She has been gradually losing weight over time.  She has no pleurisy.  They just delivered oxygen to her house yesterday.  She denies nausea vomiting or diarrhea.  She has never had a heart attack.  She has had progressive lower extremity edema since she was discharged in June with the new atrial fibrillation.   She has a known history of open angle glaucoma.  She was seen by an eye physician in [place] and underwent shots to both eyes.  Her left eye shunt is not functioning normally and she is being evaluated by Dr. Bowman at the [facility].    Review of Systems  Review of Systems   Constitutional:  Positive for activity change. Negative for chills and fever.   HENT:  Positive for hearing loss. Negative for congestion.    Eyes:  Positive for visual disturbance.   Respiratory:  Positive for cough and shortness of breath. Negative for wheezing.    Cardiovascular:  Positive for palpitations and leg swelling. Negative for chest pain.   Gastrointestinal:  Negative for abdominal pain, nausea and vomiting.   Endocrine: Negative.    Genitourinary:  Positive for frequency.   Musculoskeletal:  Positive for arthralgias.   Skin:  Negative for rash.   Neurological:  Negative for headaches.   Hematological:  Bruises/bleeds easily.   Psychiatric/Behavioral:          Mild memory impairment     Current  "Medications  apixaban, 2.5 mg, Oral, Q12H  ascorbic acid, 250 mg, Oral, Daily  azithromycin, 500 mg, Intravenous, Q24H  brimonidine, 1 drop, Both Eyes, Q12H   And  timolol, 1 drop, Both Eyes, Q12H  flecainide, 50 mg, Oral, Q12H  fludrocortisone, 0.1 mg, Oral, Daily  galantamine, 8 mg, Oral, BID With Meals  guaiFENesin, 600 mg, Oral, Q12H  ipratropium-albuterol, 3 mL, Nebulization, Q4H - RT  levothyroxine, 75 mcg, Oral, Daily  methylPREDNISolone sodium succinate, 60 mg, Intravenous, Q12H  mirtazapine, 15 mg, Oral, Nightly  pantoprazole, 40 mg, Oral, Q AM  PARoxetine, 10 mg, Oral, Nightly  piperacillin-tazobactam, 3.375 g, Intravenous, Q8H  senna-docusate sodium, 2 tablet, Oral, BID  sodium chloride, 10 mL, Intravenous, Q12H        History  Past Medical History:   Diagnosis Date    A-fib     Disease of thyroid gland     Glaucoma     Hypotension    , Osteoporosis  Neurosensory hearing loss  Chronic interstitial cystitis  Dyslipidemia  Open angle glaucoma  Mycobacterium AVM complex    Cataract extraction  Colonoscopy 2017  Cystoscopy 2020  Laser surgery for glaucoma x2    Past Surgical History:   Procedure Laterality Date    HIP SURGERY      PATELLA SURGERY      SHOULDER SURGERY     , History reviewed. No pertinent family history.,   Social History     Tobacco Use    Smoking status: Never    Smokeless tobacco: Never   Vaping Use    Vaping Use: Never used   Substance Use Topics    Alcohol use: Never    Drug use: Never    and Allergies:  Sulfa antibiotics    Objective     Vital Signs   Blood pressure 113/66, pulse 68, temperature 97.9 øF (36.6 øC), temperature source Oral, resp. rate 18, height 172.7 cm (68\"), weight 46.3 kg (102 lb), SpO2 100 %.    Physical Exam:          General Appearance: Underweight elderly woman sitting up in bed eating lunch in no acute distress                                 HEENT: Temporal wasting.  Pupils are small, conjunctiva pink.  Oral mucosa is moist                                    " Neck: Trachea midline, no palpable thyroid                                   Chest: Symmetric chest expansion.  Fine inspiratory crackles lateral and posterior lower lobes bilaterally.  No wheeze and scattered end expiratory rhonchi with forced expiration                                    Heart: Regular rhythm, S1, S2 auscultated, no murmur, currently in sinus rhythm                             Abdomen: Flat, bowel sounds present, nontender                              Extremities:    1-2+ ankle edema.  [place]berden nodules in the DIP joints particularly of her right hand.  No joint inflammation of her MIP joints                                      Skin: Warm and dry without rash                         Lymphatics:   No cervical adenopathy                       Neurological:   Alert, oriented, speech fluent, general decreased musculature                          Psychiatric: Normal affect    Results Review:  Lab Results (last 24 hours)       Procedure Component Value Units Date/Time    MRSA Screen, PCR (Inpatient) - Swab, Nares [[id number]]  (Normal) Collected: 07/15/23 0310    Specimen: Swab from Nares Updated: 07/15/23 0846     MRSA PCR Negative    Narrative:      The negative predictive value of this diagnostic test is high and should only be used to consider de-escalating anti-MRSA therapy. A positive result may indicate colonization with MRSA and must be correlated clinically.  MRSA Negative    Osmolality, Urine - Urine, Clean Catch [[id number]]  (Normal) Collected: 07/15/23 0341    Specimen: Urine, Clean Catch Updated: 07/15/23 0745     Osmolality, Urine 354 mOsm/kg     Sodium, Urine, Random - Urine, Clean Catch [[id number]] Collected: 07/15/23 0341    Specimen: Urine, Clean Catch Updated: 07/15/23 0743     Sodium, Urine 30 mmol/L     Narrative:      Reference intervals for random urine have not been established.  Clinical usage is dependent upon physician's interpretation in combination with other laboratory  tests.       Blood Culture - Blood, Arm, Left [[id number]] Collected: 07/15/23 0340    Specimen: Blood from Arm, Left Updated: 07/15/23 0728    Blood Culture - Blood, Arm, Right [[id number]] Collected: 07/15/23 0340    Specimen: Blood from Arm, Right Updated: 07/15/23 0728    Sedimentation Rate [[id number]]  (Abnormal) Collected: 07/14/23 2241    Specimen: Blood Updated: 07/15/23 0533     Sed Rate 99 mm/hr     Lactic Acid, Plasma [[id number]]  (Normal) Collected: 07/15/23 0340    Specimen: Blood Updated: 07/15/23 0449     Lactate 1.0 mmol/L     ADH [[id number]] Collected: 07/15/23 0408    Specimen: Blood Updated: 07/15/23 0412    COVID PRE-OP / PRE-PROCEDURE SCREENING ORDER (NO ISOLATION) - Swab, Nasopharynx [[id number]]  (Normal) Collected: 07/15/23 0310    Specimen: Swab from Nasopharynx Updated: 07/15/23 0410    Narrative:      The following orders were created for panel order COVID PRE-OP / PRE-PROCEDURE SCREENING ORDER (NO ISOLATION) - Swab, Nasopharynx.  Procedure                               Abnormality         Status                     ---------                               -----------         ------                     Respiratory Panel PCR w/...[[id number]]  Normal              Final result                 Please view results for these tests on the individual orders.    Respiratory Panel PCR w/COVID-19(SARS-CoV-2) JENNA/KELSI/NORMAN/PAD/COR/MAD/ARSEN In-House, NP Swab in UTM/VTM, 3-4 HR TAT - Swab, Nasopharynx [[id number]]  (Normal) Collected: 07/15/23 0310    Specimen: Swab from Nasopharynx Updated: 07/15/23 0410     ADENOVIRUS, PCR Not Detected     Coronavirus 229E Not Detected     Coronavirus HKU1 Not Detected     Coronavirus NL63 Not Detected     Coronavirus OC43 Not Detected     COVID19 Not Detected     Human Metapneumovirus Not Detected     Human Rhinovirus/Enterovirus Not Detected     Influenza A PCR Not Detected     Influenza B PCR Not Detected     Parainfluenza Virus 1 Not Detected     Parainfluenza Virus 2 Not  "Detected     Parainfluenza Virus 3 Not Detected     Parainfluenza Virus 4 Not Detected     RSV, PCR Not Detected     Bordetella pertussis pcr Not Detected     Bordetella parapertussis PCR Not Detected     Chlamydophila pneumoniae PCR Not Detected     Mycoplasma pneumo by PCR Not Detected    Narrative:      In the setting of a positive respiratory panel with a viral infection PLUS a negative procalcitonin without other underlying concern for bacterial infection, consider observing off antibiotics or discontinuation of antibiotics and continue supportive care. If the respiratory panel is positive for atypical bacterial infection (Bordetella pertussis, Chlamydophila pneumoniae, or Mycoplasma pneumoniae), consider antibiotic de-escalation to target atypical bacterial infection.    Legionella Antigen, Urine - Urine, Urine, Clean Catch [[id number]] Collected: 07/15/23 0341    Specimen: Urine, Clean Catch Updated: 07/15/23 0359    S. Pneumo Ag Urine or CSF - Urine, Urine, Clean Catch [[id number]] Collected: 07/15/23 0341    Specimen: Urine, Clean Catch Updated: 07/15/23 0359    Procalcitonin [[id number]]  (Normal) Collected: 07/14/23 2241    Specimen: Blood Updated: 07/15/23 0307     Procalcitonin 0.13 ng/mL     Narrative:      As a Marker for Sepsis (Non-Neonates):    1. <0.5 ng/mL represents a low risk of severe sepsis and/or septic shock.  2. >2 ng/mL represents a high risk of severe sepsis and/or septic shock.    As a Marker for Lower Respiratory Tract Infections that require antibiotic therapy:    PCT on Admission    Antibiotic Therapy       6-12 Hrs later    >0.5                Strongly Recommended  >0.25 - <0.5        Recommended   0.1 - 0.25          Discouraged              Remeasure/reassess PCT  <0.1                Strongly Discouraged     Remeasure/reassess PCT    As 28 day mortality risk marker: \"Change in Procalcitonin Result\" (>80% or <=80%) if Day 0 (or Day 1) and Day 4 values are available. Refer to " http://www.[facility]-pct-calculator.com    Change in PCT <=80%  A decrease of PCT levels below or equal to 80% defines a positive change in PCT test result representing a higher risk for 28-day all-cause mortality of patients diagnosed with severe sepsis for septic shock.    Change in PCT >80%  A decrease of PCT levels of more than 80% defines a negative change in PCT result representing a lower risk for 28-day all-cause mortality of patients diagnosed with severe sepsis or septic shock.       Osmolality, Serum [[id number]]  (Abnormal) Collected: 07/14/23 2241    Specimen: Blood Updated: 07/15/23 0254     Osmolality 272 mOsm/kg     [place] Draw [[id number]] Collected: 07/14/23 2241    Specimen: Blood Updated: 07/15/23 0246    Narrative:      The following orders were created for panel order [place] Draw.  Procedure                               Abnormality         Status                     ---------                               -----------         ------                     Green Top (Gel)[[id number]]                                  Final result               Lavender Top[[id number]]                                     Final result               Gold Top - SST[[id number]]                                   Final result               Gray Top[[id number]]                                         Final result               Light Blue Top[[id number]]                                   Final result                 Please view results for these tests on the individual orders.    Gray Top [[id number]] Collected: 07/14/23 2241    Specimen: Blood Updated: 07/15/23 0246     Extra Tube Hold for add-ons.     Comment: Auto resulted.       Cortisol - AM [[id number]] Collected: 07/14/23 2241    Specimen: Blood Updated: 07/15/23 0229     Cortisol - AM 28.76 mcg/dL     Narrative:      Cortisol Reference Ranges:    Cortisol 6AM - 10AM Range: 6.02-18.40 mcg/dl  Cortisol 4PM - 8PM Range: 2.68-10.50 mcg/dl        C-reactive Protein [[id number]]   (Abnormal) Collected: 07/14/23 2241    Specimen: Blood Updated: 07/15/23 0209     C-Reactive Protein 14.66 mg/dL     Lavender Top [[id number]] Collected: 07/14/23 2241    Specimen: Blood Updated: 07/14/23 2346     Extra Tube hold for add-on     Comment: Auto resulted       Light Blue Top [[id number]] Collected: 07/14/23 2241    Specimen: Blood Updated: 07/14/23 2346     Extra Tube Hold for add-ons.     Comment: Auto resulted       Green Top (Gel) [[id number]] Collected: 07/14/23 2241    Specimen: Blood Updated: 07/14/23 2346     Extra Tube Hold for add-ons.     Comment: Auto resulted.       Gold Top - SST [[id number]] Collected: 07/14/23 2241    Specimen: Blood Updated: 07/14/23 2346     Extra Tube Hold for add-ons.     Comment: Auto resulted.       Comprehensive Metabolic Panel [[id number]]  (Abnormal) Collected: 07/14/23 2241    Specimen: Blood Updated: 07/14/23 2321     Glucose 125 mg/dL      BUN 19 mg/dL      Creatinine 0.79 mg/dL      Sodium 129 mmol/L      Potassium 4.5 mmol/L      Comment: Slight hemolysis detected by analyzer. Results may be affected.        Chloride 87 mmol/L      CO2 30.0 mmol/L      Calcium 9.1 mg/dL      Total Protein 6.8 g/dL      Albumin 3.5 g/dL      ALT (SGPT) 24 U/L      AST (SGOT) 23 U/L      Alkaline Phosphatase 90 U/L      Total Bilirubin 0.4 mg/dL      Globulin 3.3 gm/dL      Comment: Calculated Result        A/G Ratio 1.1 g/dL      BUN/Creatinine Ratio 24.1     Anion Gap 12.0 mmol/L      eGFR 74.3 mL/min/1.73     Narrative:      GFR Normal >60  Chronic Kidney Disease <60  Kidney Failure <15    The GFR formula is only valid for adults with stable renal function between ages 18 and 70.    Magnesium [[id number]]  (Normal) Collected: 07/14/23 2241    Specimen: Blood Updated: 07/14/23 2321     Magnesium 1.9 mg/dL     TSH [[id number]]  (Abnormal) Collected: 07/14/23 2241    Specimen: Blood Updated: 07/14/23 2321     TSH 13.160 uIU/mL     Narrative:      Due to abnormal TSH results, suggest  ordering Free T4.    Single High Sensitivity Troponin T [[id number]]  (Abnormal) Collected: 07/14/23 2241    Specimen: Blood Updated: 07/14/23 2321     HS Troponin T 16 ng/L     Narrative:      High Sensitive Troponin T Reference Range:  <10.0 ng/L- Negative Female for AMI  <15.0 ng/L- Negative Male for AMI  >=10 - Abnormal Female indicating possible myocardial injury.  >=15 - Abnormal Male indicating possible myocardial injury.   Clinicians would have to utilize clinical acumen, EKG, Troponin, and serial changes to determine if it is an Acute Myocardial Infarction or myocardial injury due to an underlying chronic condition.         BNP [[id number]]  (Abnormal) Collected: 07/14/23 2241    Specimen: Blood Updated: 07/14/23 2321     proBNP 6,301.0 pg/mL     Narrative:      Among patients with dyspnea, NT-proBNP is highly sensitive for the detection of acute congestive heart failure. In addition NT-proBNP of <300 pg/ml effectively rules out acute congestive heart failure with 99% negative predictive value.    Results may be falsely decreased if patient taking Biotin.      CBC & Differential [[id number]]  (Abnormal) Collected: 07/14/23 2241    Specimen: Blood Updated: 07/14/23 2251    Narrative:      The following orders were created for panel order CBC & Differential.  Procedure                               Abnormality         Status                     ---------                               -----------         ------                     CBC Auto Differential[[id number]]        Abnormal            Final result                 Please view results for these tests on the individual orders.    CBC Auto Differential [[id number]]  (Abnormal) Collected: 07/14/23 2241    Specimen: Blood Updated: 07/14/23 2251     WBC 13.02 10*3/mm3      RBC 4.40 10*6/mm3      Hemoglobin 14.3 g/dL      Hematocrit 43.0 %      MCV 97.7 fL      MCH 32.5 pg      MCHC 33.3 g/dL      RDW 12.7 %      RDW-SD 45.6 fl      MPV 8.5 fL      Platelets 353  10*3/mm3      Neutrophil % 82.4 %      Lymphocyte % 7.3 %      Monocyte % 7.6 %      Eosinophil % 1.5 %      Basophil % 0.5 %      Immature Grans % 0.7 %      Neutrophils, Absolute 10.73 10*3/mm3      Lymphocytes, Absolute 0.95 10*3/mm3      Monocytes, Absolute 0.99 10*3/mm3      Eosinophils, Absolute 0.20 10*3/mm3      Basophils, Absolute 0.06 10*3/mm3      Immature Grans, Absolute 0.09 10*3/mm3      nRBC 0.0 /100 WBC           Imaging Results (Last 24 Hours)       Procedure Component Value Units Date/Time    CT Angiogram Chest [[id number]] Collected: 07/14/23 2324     Updated: 07/15/23 0133    Narrative:      EXAMINATION:  CTA CHEST WITH IV CONTRAST, CT PULMONARY ANGIOGRAM    DATE OF EXAM: 7/15/2023 12:08 AM    HISTORY: Suspected pulmonary embolism    TECHNIQUE: CTA examination of the chest was performed following the intravenous administration of iodinated contrast. Sagittal, coronal and 3-D reformatted images were provided. CT dose lowering techniques were used, to include: automated exposure   control, adjustment for patient size, and or use of iterative reconstruction.    COMPARISON: 6/17/2023    FINDINGS:    Lungs: Patchy mosaic prominent groundglass opacities seen symmetrically throughout the bilateral lungs. Airway debris is seen in the bilateral lower lobes. Focal bronchiectasis in the right middle lobe is present. Prominent peribronchial cuffing is seen   in the left lower lobe. Small left pleural effusion is present. 12 mm pulmonary nodule in the left lower lobe seen on series 4 image 50. Other scattered smaller solid nodules are seen throughout the bilateral lungs.    Pleura: As above    Mediastinum and Maria Guadalupe: Normal.    Pulmonary Arteries: Posterior subsegment of the left lower lobe pulmonary artery shows diminished opacification as it courses distally, as seen on series 4 images 64-73. Otherwise, the pulmonary arteries are normal.    Cardiovascular: Normal.  No coronary artery calcifications.    Upper  Abdomen: Normal.     Chest Wall: Normal.     Musculoskeletal: Normal.         Impression:        1.  Favor contrast limitation rather than singular small pulmonary embolism in the distal aspect of the left lower lobe posterior subsegmental pulmonary artery.  2.  Constellation of findings in the lungs suggestive of advanced inflammatory/infectious process such as fungal/mycobacterial process, eosinophilic pneumonia, or possibly autoimmune process. Overall appearance has markedly worsened over the past 30 days   to include a small left pleural effusion. Bilateral pulmonary nodules are unchanged and likely related. Recommend pulmonology consultation.      Thank you for the referral of this patient. This exam was interpreted by an American Board of Radiology certified radiologist with subspecialty training. If there are any questions regarding this exam please feel free to contact a radiologist directly at   [phone].      Slot: 70    Electronically signed by:  Eh Mc M.D.    7/14/2023 11:32 PM Mountain Time    XR Chest 1 View [[id number]] Collected: 07/14/23 2128     Updated: 07/14/23 2331    Narrative:      EXAMINATION: XR CHEST 1 VW    DATE: 7/14/2023 11:00 PM    INDICATION:  Dysrhythmia;    COMPARISON:  June 16, 2023    FINDINGS:      Moderate patchy interstitial appearing opacities throughout both lungs are again demonstrated. There is increasing more confluent opacity in the upper lungs.    Background peribronchial thickening or bronchiectasis again demonstrated    Small bilateral pleural effusions. No pneumothorax.    Cardiomediastinal silhouette  unchanged    No acute osseous abnormality.          Impression:        1.  Increasing bilateral upper lung opacities, edema versus pneumonia.  2.  Small bilateral pleural effusions.  3.  Redemonstration of airways disease and interstitial and nodular opacities scattered throughout the lungs.          Electronically signed by:  Dedra Alcantara M.D.     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 Burgos.  She last was treated for MAC with Biaxin, Rifabutin, nebulized amikacin, completing therapy in June 2020.  She apparently had a lot of difficulty with therapy and despite recurrence in September 2020 on a right lower lobe nodule biopsy decision was made not to pursue treatment but to simply monitor.  She does use a percussion vest and nebulized bronchodilators for her bronchiectasis.  She does not have a significant history of tobacco use or emphysema.  Unfortunately, she developed new onset atrial fibrillation in June requiring brief hospitalization.  She was placed on flecainide, Eliquis.  Echocardiogram revealed an EF of 60%, mild aortic valve insufficiency.  She is now rehospitalized with recurrent atrial fibrillation, rapid ventricular response.  She was symptomatically short of air and hypoxic with the event.  She did not have fever or hemoptysis.  Groundglass opacities were not present on CTA of the chest in June 2023.  Flecainide can be associated with a drug-induced pneumonitis.  Her respiratory PCR panel was negative for any viral or atypical pneumonia.  White count was mildly elevated at 13 with 82% polys but no left shift.  Sed rate is markedly elevated at 99 but procalcitonin was  normal at 0.13.  Clinically she does not appear to have an acute bacterial infection.  In addition her PCR panel is negative making viral infection unlikely. She certainly could have a drug-induced pneumonitis.  Need to entertain connective tissue disorders or vasculitis in the differential.  The abrupt onset of symptoms associated with her rapid atrial fibrillation also supports some atypical pulmonary edema as the cause of her shortness of air but not necessarily the cause of her elevated sedimentation rate. She also has new ankle edema.  Certainly her edema could be from hypoxia.  She has converted back to sinus rhythm.  Her TSH is mildly elevated at 7.  Her Synthroid dose was recently decreased as well.    I recommend stopping her flecainide, or perhaps short interval CT scan after diuretics and if unimproved then stopping flecainide  Rheumatoid factor, lupus panel, ANCA panel  Stop steroids for now secondary to her glaucoma  Continue nebulized bronchodilators  Continue percussion vest  Empiric antibiotics until blood cultures are negative  TSH is mildly elevated and I personally would increase her Synthroid  Increase diuretics and repeat CT scan.  If these groundglass areas have rapidly cleared then they are most likely edema    I discussed the patients findings and my recommendations with Dr. Perry, patient    Letha Delgado MD  07/15/23  12:16 EDT    Time: 50min    Please note that portions of this note were completed with a voice recognition program.

## 2023-07-15 NOTE — H&P
[facility] Medicine Services  HISTORY AND PHYSICAL    Patient Name: Vita Miller  : 1940  MRN: [id number]  Primary Care Physician: Alfonso Steele MD  Date of admission: 2023      Subjective   Subjective     Chief Complaint:  Shortness failure, cough, palpitation    HPI:  Vita Miller is a 83 y.o. female with past medical history of hypothyroidism, COPD with chronic bronchiectasis, history of chronic MAC with unknown intolerance to prior treatment plan, history of orthostatic hypotension, hyperlipidemia, osteoporosis, bilateral glaucoma, mild memory deficit, who was recently admitted to our facility approximately 1 month ago for new onset atrial fibrillation with RVR who returns to the ER tonight with complaints of palpitations and shortness of breath.    History is somewhat limited as patient is slightly confused and does not remember much about her symptoms over the last several days.  According to the ER provider, patient who was eating dinner tonight when she felt like her heart was racing.  She became increasingly short of breath and flushed.  She talked with her primary care physician who advised her to go to the ER.  She does report that she has had some cough and occasionally produces some phlegm.  This is common with her bronchiectasis/underlying lung disease/MAC.  She reports continued increased shortness of breath from her baseline.  Denies any known fever or chills.  She denies any chest pain or pressure.  Denies any other symptoms at this time.      Review of Systems   Gen- No fevers, chills  CV- No chest pain, reports palpitations  Resp-reports cough, dyspnea  GI- No N/V/D, abd pain      Personal History     Past Medical History:   Diagnosis Date    A-fib     Disease of thyroid gland     Glaucoma     Hypotension              Past Surgical History:   Procedure Laterality Date    HIP SURGERY      PATELLA SURGERY      SHOULDER SURGERY         Family  History: family history is not on file.     Social History:  reports that she has never smoked. She has never used smokeless tobacco. She reports that she does not drink alcohol and does not use drugs.  Social History     Social History Narrative    Not on file       Medications:  Available home medication information reviewed.  (Not in a hospital admission)      Allergies   Allergen Reactions    Sulfa Antibiotics Other (See Comments)     Pt unsure of type of reaction        Objective   Objective     Vital Signs:   Temp:  [98.7 øF (37.1 øC)] 98.7 øF (37.1 øC)  Heart Rate:  [105-108] 106  Resp:  [18] 18  BP: (104-144)/(74-98) 104/74  Flow (L/min):  [4] 4       Physical Exam   Constitutional: Awake, slightly somnolent, confused  Eyes: PERRLA, sclerae anicteric, no conjunctival injection  HENT: NCAT, mucous membranes moist  Neck: Supple, no thyromegaly, no lymphadenopathy, trachea midline  Respiratory: Rhonchi/crackles bilaterally, mildly labored respirations   Cardiovascular: Tachycardic, irregularly irregular rhythm, no murmurs, rubs, or gallops, palpable pedal pulses bilaterally  Gastrointestinal: Positive bowel sounds, soft, nontender, nondistended  Musculoskeletal: Slight bilateral ankle edema, no clubbing or cyanosis to extremities  Psychiatric: Appropriate affect, cooperative  Neurologic: Oriented to person and the fact she is in the hospital.  She is confused as to what day it is.  She does not remember much about the last several days., strength symmetric in all extremities, Cranial Nerves grossly intact to confrontation, speech clear  Skin: No rashes      Result Review:  I have personally reviewed the results from the time of this admission to 7/15/2023 02:15 EDT and agree with these findings:  [x]  Laboratory list / accordion  []  Microbiology  [x]  Radiology  [x]  EKG/Telemetry   []  Cardiology/Vascular   []  Pathology  []  Old records  []  Other:  Most notable findings include: WBC 13, proBNP significantly  elevated from baseline at 6000 301, mildly elevated troponin, sodium of 129, chloride 87, TSH slightly elevated at 13, CT with significant worsening infiltrates in comparison to 30 days ago.        LAB RESULTS:      Lab 07/14/23  2241   WBC 13.02*   HEMOGLOBIN 14.3   HEMATOCRIT 43.0   PLATELETS 353   NEUTROS ABS 10.73*   IMMATURE GRANS (ABS) 0.09*   LYMPHS ABS 0.95   MONOS ABS 0.99*   EOS ABS 0.20   MCV 97.7*   CRP 14.66*         Lab 07/14/23  2241   SODIUM 129*   POTASSIUM 4.5   CHLORIDE 87*   CO2 30.0*   ANION GAP 12.0   BUN 19   CREATININE 0.79   EGFR 74.3   GLUCOSE 125*   CALCIUM 9.1   MAGNESIUM 1.9   TSH 13.160*         Lab 07/14/23 2241   TOTAL PROTEIN 6.8   ALBUMIN 3.5   GLOBULIN 3.3   ALT (SGPT) 24   AST (SGOT) 23   BILIRUBIN 0.4   ALK PHOS 90         Lab 07/14/23 2241   PROBNP 6,301.0*   HSTROP T 16*                     Microbiology Results (last 10 days)       ** No results found for the last 240 hours. **            XR Chest 1 View    Result Date: 7/14/2023  EXAMINATION: XR CHEST 1 VW DATE: 7/14/2023 11:00 PM INDICATION:  Dysrhythmia; COMPARISON:  June 16, 2023 FINDINGS: Moderate patchy interstitial appearing opacities throughout both lungs are again demonstrated. There is increasing more confluent opacity in the upper lungs. Background peribronchial thickening or bronchiectasis again demonstrated Small bilateral pleural effusions. No pneumothorax. Cardiomediastinal silhouette  unchanged No acute osseous abnormality.     Impression: 1.  Increasing bilateral upper lung opacities, edema versus pneumonia. 2.  Small bilateral pleural effusions. 3.  Redemonstration of airways disease and interstitial and nodular opacities scattered throughout the lungs. Electronically signed by:  Dedra Alcantara M.D.  7/14/2023 9:30 PM Mountain Time    CT Angiogram Chest    Result Date: 7/15/2023  EXAMINATION:  CTA CHEST WITH IV CONTRAST, CT PULMONARY ANGIOGRAM DATE OF EXAM: 7/15/2023 12:08 AM HISTORY: Suspected pulmonary  embolism TECHNIQUE: CTA examination of the chest was performed following the intravenous administration of iodinated contrast. Sagittal, coronal and 3-D reformatted images were provided. CT dose lowering techniques were used, to include: automated exposure control, adjustment for patient size, and or use of iterative reconstruction. COMPARISON: 6/17/2023 FINDINGS: Lungs: Patchy mosaic prominent groundglass opacities seen symmetrically throughout the bilateral lungs. Airway debris is seen in the bilateral lower lobes. Focal bronchiectasis in the right middle lobe is present. Prominent peribronchial cuffing is seen in the left lower lobe. Small left pleural effusion is present. 12 mm pulmonary nodule in the left lower lobe seen on series 4 image 50. Other scattered smaller solid nodules are seen throughout the bilateral lungs. Pleura: As above Mediastinum and Maria Guadalupe: Normal. Pulmonary Arteries: Posterior subsegment of the left lower lobe pulmonary artery shows diminished opacification as it courses distally, as seen on series 4 images 64-73. Otherwise, the pulmonary arteries are normal. Cardiovascular: Normal.  No coronary artery calcifications. Upper Abdomen: Normal. Chest Wall: Normal. Musculoskeletal: Normal.     Impression: 1.  Favor contrast limitation rather than singular small pulmonary embolism in the distal aspect of the left lower lobe posterior subsegmental pulmonary artery. 2.  Constellation of findings in the lungs suggestive of advanced inflammatory/infectious process such as fungal/mycobacterial process, eosinophilic pneumonia, or possibly autoimmune process. Overall appearance has markedly worsened over the past 30 days  to include a small left pleural effusion. Bilateral pulmonary nodules are unchanged and likely related. Recommend pulmonology consultation. Thank you for the referral of this patient. This exam was interpreted by an American Board of Radiology certified radiologist with subspecialty  training. If there are any questions regarding this exam please feel free to contact a radiologist directly at  [phone]. Slot: [phone] Electronically signed by:  Eh Mc M.D.  7/14/2023 11:32 PM Mountain Time     Results for orders placed during the hospital encounter of 06/16/23    Adult Transthoracic Echo Complete W/ Cont if Necessary Per Protocol    Interpretation Summary    Left ventricular systolic function is normal. Estimated left ventricular EF = 60%    Left ventricular diastolic function was normal.    Mild aortic insufficiency.    Trace to mild mitral and tricuspid regurgitation.    Calculated right ventricular systolic pressure from tricuspid regurgitation is 20 mmHg.    There is a trivial pericardial effusion.      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]  Yes    H/O orthostatic hypotension [Z86.79]  Not Applicable    Acquired bronchiectasis [J47.9]  Yes    Hyperlipidemia [E78.5]  Yes    Hypothyroidism [E03.9]  Yes    COPD with acute exacerbation [J44.1]  Yes       Patient is a 83-year-old female with past medical history of hypothyroidism, COPD with chronic bronchiectasis, history of chronic MAC with unknown intolerance to prior treatment plan, history of orthostatic hypotension, hyperlipidemia, osteoporosis, bilateral glaucoma, mild memory deficit, who was recently admitted to our facility approximately 1  month ago for new onset atrial fibrillation with RVR who returns to the ER tonight with complaints of palpitations and shortness of breath.    Acute respiratory failure with hypoxia  Bilateral pneumonia/infiltrates  History of MAC, intolerant to treatment  COPD with acute exacerbation, chronic bronchiectasis  -- Vancomycin, Zosyn, azithromycin  -- Pulmonology consult  --Appearance of lungs bleed secondary to MAC. Reportedly was unable to tolerate tx, but unclear why. Need to talk with her daughter in am to see what the issue was.  Concerning for possible inflammatory/autoimmune as well.  Less likely fungal infection. Treat with antibiotics given recent admission  -- High-dose steroids, Solu-Medrol 125 given now, continue Solu-Medrol 60 twice daily  -- Duo nebs  -- Flutter valve, mucinex  --cultures, pcr, strep, legionella    Atrial fibrillation with rvr  --esmolol drip  --eliquis  --flecainide  --cards eval  --bnp elevated, give lasix x1    Hyponatremia  --studies    Orthostatic hypotension  --continue fludricortisone    Hypothyroidism  --slightly elevated tsh but unreliable in acute illness  --continue levo    Other chronic conditions: HLD, malnutrition, anxiety/depression, memory impairment  --continue home meds  --nutrition  --pt/ot      Total time spent: 75  Time spent includes time reviewing chart, face-to-face time, counseling patient/family/caregiver, ordering medications/tests/procedures, communicating with other health care professionals, documenting clinical information in the electronic health record, and coordination of care.      DVT prophylaxis:  eliquis      CODE STATUS: full code  Code Status and Medical Interventions:   Ordered at: 07/15/23 0214     Code Status (Patient has no pulse and is not breathing):    CPR (Attempt to Resuscitate)     Medical Interventions (Patient has pulse or is breathing):    Full Support       Expected Discharge   Expected Discharge Date: 7/19/2023; Expected Discharge  Time:      Kevin Shah DO  07/15/23

## 2023-07-15 NOTE — PROGRESS NOTES
[facility] Medicine Services  ADMISSION FOLLOW-UP NOTE          Patient admitted after midnight, H&P by my partner performed earlier on today's date reviewed.  Interim findings, labs, and charting also reviewed.        The [facility] Hospital Problem List has been managed and updated to include any new diagnoses:  Active Hospital Problems    Diagnosis  POA    **Acute respiratory failure with hypoxia [J96.01]  Yes    Pneumonia of both lungs due to infectious organism [J18.9]  Unknown    Hyponatremia [E87.1]  Unknown    Severe Malnutrition (HCC) [E43]  Yes    Atrial fibrillation with RVR [I48.91]  Yes    Cachexia [R64]  Yes    Senile osteoporosis [M81.0]  Yes    Hashimoto's thyroiditis [E06.3]  Yes    Primary open angle glaucoma of both eyes, severe stage [H40.1133]  Yes    Mixed anxiety and depressive disorder [F41.8]  Yes    Multiple nodules of lung [R91.8]  Yes    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         ADDITIONAL PLAN:  - detailed assessment and plan from admission reviewed  - patient seen and examined  84yo with history of hypothyroidism, COPD with chronic bronchiectasis, history of chronic MAC with unknown intolerance to prior treatment plan, history of orthostatic hypotension, hyperlipidemia, osteoporosis, bilateral glaucoma, mild memory deficit, who was recently admitted to our facility approximately 1 month ago for new onset atrial fibrillation with RVR who returns to the ER tonight with complaints of palpitations and shortness of breath.    This patient's problems and plans were partially entered by my partner and updated as appropriate by me 07/15/23.     Acute respiratory failure  with hypoxia  Bilateral pneumonia/infiltrates  History of MAC, intolerant to treatment  COPD with acute exacerbation, chronic bronchiectasis  -- Vancomycin, Zosyn, azithromycin  -- Pulmonology consult  --Stop steroids  --ANCA, RF, ESR, SLE labs pending  --Continue Zosyn, azith for now; stop vanc given negative MRSA screen  -- Duo nebs  -- Flutter valve, mucinex  --cultures, pcr negative, strep, legionella  --Lasix 40mg IV BID for now  --Discussed CT findings with pulm on 7/15; will trial diuresis and repeat CT chest w/o contrast on 7/16     Atrial fibrillation with rvr  --s/p esmolol drip  --eliquis  --flecainide --> risk of pneumonitis with this medication; reached out to cards to discuss possibility of changing this medication  --cards eval  --LVEF 60%, normal diastolic dysfunction on echo from 6/17/2023     Hyponatremia  --Na 129 on admission; suspect related to volume overload     Orthostatic hypotension  --continue fludricortisone     Hypothyroidism  --slightly elevated tsh  --continue synthroid, dose adjusted on 7/16; repeat tsh in 4-6 weeks; careful adjustment given just had episode of RVR     Other chronic conditions: HLD, malnutrition, anxiety/depression, memory impairment  --continue home meds  --nutrition  --pt/ot    Expected Discharge   Expected Discharge Date: 7/19/2023; Expected Discharge Time:      Catherine Perry MD  07/15/23

## 2023-07-15 NOTE — ED PROVIDER NOTES
Subjective   History of Present Illness  Is a 83-year-old female presented to the emergency department with some palpitations and shortness of breath.  The patient is longstanding history of A-fib as well as COPD.  Over the last few weeks has been having some increasing difficulties.  The patient was normally in sinus rhythm, however she has been in A-fib.  Has been following up with her PCP and pulmonologist, however she continues to be in A-fib with rapid ventricular rate.  The patient is currently on novel anticoagulation as well as flecainide.  Patient states that she was eating dinner and she felt like her heart was racing.  She became very flushed and short of breath.  She contacted her primary physician which instructed her to come to the emergency department.  Patient is endorsing some palpitations at this time.  She does feel mildly short of breath.  She denies any fevers or chills.  No headache or change in vision.  No focal weakness.  No chest pain.  No abdominal pain or vomiting    History provided by:  Patient   used: No      Review of Systems   Constitutional:  Negative for chills and fever.   HENT:  Negative for congestion, ear pain and sore throat.    Eyes:  Negative for visual disturbance.   Respiratory:  Positive for shortness of breath.    Cardiovascular:  Positive for palpitations. Negative for chest pain.   Gastrointestinal:  Negative for abdominal pain.   Genitourinary:  Negative for difficulty urinating.   Musculoskeletal:  Negative for arthralgias.   Skin:  Negative for rash.   Neurological:  Negative for dizziness, weakness and numbness.   Psychiatric/Behavioral:  Negative for agitation.      Past Medical History:   Diagnosis Date    A-fib     Disease of thyroid gland     Glaucoma     Hypotension        Allergies   Allergen Reactions    Sulfa Antibiotics Other (See Comments)     Pt unsure of type of reaction        Past Surgical History:   Procedure Laterality Date    HIP  SURGERY      PATELLA SURGERY      SHOULDER SURGERY         History reviewed. No pertinent family history.    Social History     Socioeconomic History    Marital status:    Tobacco Use    Smoking status: Never    Smokeless tobacco: Never   Vaping Use    Vaping Use: Never used   Substance and Sexual Activity    Alcohol use: Never    Drug use: Never    Sexual activity: Defer           Objective   Physical Exam  Vitals and nursing note reviewed.   Constitutional:       General: She is not in acute distress.     Appearance: She is not ill-appearing or toxic-appearing.   HENT:      Mouth/Throat:      Pharynx: No posterior oropharyngeal erythema.   Eyes:      Conjunctiva/sclera: Conjunctivae normal.      Pupils: Pupils are equal, round, and reactive to light.   Cardiovascular:      Rate and Rhythm: Tachycardia present. Rhythm irregular.   Pulmonary:      Effort: Pulmonary effort is normal. No respiratory distress.   Abdominal:      General: Abdomen is flat. There is no distension.      Palpations: There is no mass.      Tenderness: There is no abdominal tenderness. There is no guarding or rebound.   Musculoskeletal:         General: No deformity. Normal range of motion.   Skin:     General: Skin is warm.      Findings: No rash.   Neurological:      General: No focal deficit present.      Mental Status: She is alert and oriented to person, place, and time.      Motor: No weakness.       ECG 12 Lead      Date/Time: 7/14/2023 11:28 PM  Performed by: Willi Plaza MD  Authorized by: Willi Plaza MD   Interpreted by physician  Comparison: compared with previous ECG   Comparison to previous ECG: New atrial fibrillation with rapid ventricular rate  Rhythm: atrial fibrillation  Rate: tachycardic  BPM: 112  QRS axis: normal  ST Segments: ST segments normal  Comments: EKG was directly visualized by myself, interpretations as documented in hospital course.  Atrial fibrillation with rapid ventricular rate              ED Course  ED Course as of 07/15/23 0148   Fri Jul 14, 2023   2224 SpO2(!): 81 %  Patient's repeat vitals, telemetry tracing, and pulse oximetry tracing were directly viewed and interpreted by myself.  Patient tachycardic and hypoxic [JK]   Sat Jul 15, 2023   0145 BP: 144/98 [JK]   0145 Heart Rate: 105 [JK]   0145 Resp: 18 [JK]   0145 TSH(!) [JK]   0145 BNP(!) [JK]   0145 CBC & Differential(!) [JK]   0145 Magnesium [JK]   0145 Single High Sensitivity Troponin T(!) [JK]   0145 Comprehensive Metabolic Panel(!)  Laboratory studies were reviewed and interpreted directly by myself.  TSH was elevated at 13, BNP elevated 6300, CBC shows a mild leukocytosis with a white blood cell count of 13, magnesium normal, troponin marginally elevated at 16, CMP showed some hyponatremia with a sodium of 129 [JK]   0146 XR Chest 1 View [JK]   0146 CT Angiogram Chest  Imaging was directly visualized by myself, per my interpretations, chest x-ray showed some consolidation in the bilateral upper lobes.  CT angiogram of the chest showed no pulmonary emboli, however some significant dilatation possibly inflammatory or infectious. [JK]   0146 Patient apparently has been in atrial fibrillation with rapid ventricular rate.  She was placed on esmolol drip and has been tolerating well.  She is currently maintaining her heart rates under 100.  Patient does have significant findings on CT angiogram concerning for possible pneumonia or inflammatory process.  She will need to be evaluated by pulmonology.  Patient was started on broad-spectrum antibiotics.  Continued on esmolol drip.  Patient admitted to the hospital. [JK]   0147 Based on the patient's presentation, history and diffuse work-up in the emergency department, the patient is deemed appropriate for admission to the hospital for further evaluation and treatment.  This was discussed with the patient at bedside.  They are in agreement with the current medical management.    Admitting  physician: Dr. Shah    Discussion was had with admitting physician regarding the laboratory and imaging findings.  We did discuss current therapeutics in the emergency department and progression of the patient.  Working diagnosis was conveyed to the admitting physician, as well as current status and prognosis for the patient.  They are in agreement with these findings and have accepted admission.    Shared decision making:   After full review of the patient's clinical presentation, review of any work-up including but not limited to laboratory studies and radiology obtained, I had a discussion with the patient.  Treatment options were discussed as well as the risks, benefits and consequences.  I discussed all findings with the patient and family members if available.  During the discussion, treatment goals were understood by all as well as any misconceptions which were addressed with the patient.  Ample time was given for any questions they may have had.  They are in agreement with the treatment plan as well as final disposition. [JK]      ED Course User Index  [JK] Willi Plaza MD                  [id number] Score: 6                          Medical Decision Making  This is a 83-year-old female with a history of A-fib presented to the emergency department with some shortness of breath palpitations.  The patient was hypoxic on initial presentation with oxygen saturation of 80% on room air.  She was also found to be tachycardic.  Initial EKG showed atrial fibrillation with rapid ventricular rate.  Sound like the patient has been struggling with this for the last few weeks.  She is on flecainide as well as Eliquis, however her rates remain high.  Patient was immediately transferred to been placed on continuous telemetry monitoring as well as supplemental oxygen.  IV access was established.  Given her recent history and prolonged nature of A-fib with RVR, I do not believe electrical cardioversion is most  appropriate.  Patient was started on esmolol drip.  Work-up initiated.      Differential diagnosis: A-fib with RVR, pulmonary embolism, ACS, CAD, electrolyte abnormality, pneumonia, CHF      Amount and/or Complexity of Data Reviewed  External Data Reviewed: labs, radiology, ECG and notes.     Details: External laboratories, imaging as well as notes were reviewed personally by myself.    Date of previous record: 7/13/2023    Source of note: Primary care physician    Summary: Patient was seen and evaluated for COPD as well as chronic A-fib.  It appears that she has been in rapid for the last few weeks.  Records reviewed.  Labs: ordered. Decision-making details documented in ED Course.  Radiology: ordered and independent interpretation performed. Decision-making details documented in ED Course.  ECG/medicine tests: ordered and independent interpretation performed. Decision-making details documented in ED Course.    Risk  Prescription drug management.    Critical Care  Total time providing critical care: 40 minutes (I personally spent a total of 40 minutes of critical care time with the patient.  Patient's presentation with unstable vital signs required emergent intervention, including, but not limited to, establishing IV access, continuous pulse oximeter and telemetry monitoring, frequent monitoring and reevaluations, management the patient's airway, cardiovascular system, discussion with other consultants as needed, which bear directly on the management the patient.  This does not include time spent on separately reported billable procedures.)      Final diagnoses:   Atrial fibrillation with RVR   Acute respiratory failure with hypoxia   Pneumonia of both upper lobes due to infectious organism       ED Disposition  ED Disposition       ED Disposition   Decision to Admit    Condition   --    Comment   --               No follow-up provider specified.       Medication List      No changes were made to your prescriptions  during this visit.            Willi Plaza MD  07/15/23 0148

## 2023-07-15 NOTE — PLAN OF CARE
Goal Outcome Evaluation:                   Problem: Adult Inpatient Plan of Care  Goal: Optimal Comfort and Wellbeing  Outcome: Ongoing, Progressing  Intervention: Provide Person-Centered Care  Recent Flowsheet Documentation  Taken 7/15/2023 0800 by Phu Boone RN  Trust Relationship/Rapport:   care explained   choices provided   emotional support provided   empathic listening provided   questions answered   questions encouraged   reassurance provided   thoughts/feelings acknowledged     Problem: COPD (Chronic Obstructive Pulmonary Disease) Comorbidity  Goal: Maintenance of COPD Symptom Control  Outcome: Ongoing, Progressing  Intervention: Maintain COPD-Symptom Control  Recent Flowsheet Documentation  Taken 7/15/2023 1800 by Phu Boone RN  Medication Review/Management: medications reviewed  Taken 7/15/2023 1600 by Phu Boone RN  Medication Review/Management: medications reviewed  Taken 7/15/2023 1400 by Phu Boone RN  Medication Review/Management: medications reviewed  Taken 7/15/2023 1200 by Phu Boone RN  Medication Review/Management: medications reviewed  Taken 7/15/2023 1000 by Phu Boone RN  Medication Review/Management: medications reviewed  Taken 7/15/2023 0800 by Phu Boone RN  Medication Review/Management: medications reviewed     Problem: Skin Injury Risk Increased  Goal: Skin Health and Integrity  Outcome: Ongoing, Progressing  Intervention: Optimize Skin Protection  Recent Flowsheet Documentation  Taken 7/15/2023 1800 by Phu Boone RN  Head of Bed (HOB) Positioning: HOB at 60 degrees  Taken 7/15/2023 1600 by Phu Boone RN  Head of Bed (HOB) Positioning: HOB at 60-90 degrees  Taken 7/15/2023 1400 by Phu Boone RN  Head of Bed (HOB) Positioning: HOB at 60-90 degrees  Taken 7/15/2023 1200 by Phu Boone RN  Pressure Reduction Techniques: frequent weight shift encouraged  Head of Bed (HOB) Positioning: HOB at 60-90 degrees  Pressure Reduction Devices:  pressure-redistributing mattress utilized  Skin Protection:   adhesive use limited   incontinence pads utilized   tubing/devices free from skin contact  Taken 7/15/2023 1100 by Phu Boone RN  Head of Bed (HOB) Positioning: HOB at 30-45 degrees  Taken 7/15/2023 1000 by Phu Boone RN  Pressure Reduction Techniques: frequent weight shift encouraged  Head of Bed (HOB) Positioning: HOB at 60-90 degrees  Pressure Reduction Devices: pressure-redistributing mattress utilized  Skin Protection:   adhesive use limited   incontinence pads utilized   tubing/devices free from skin contact   protective footwear used  Taken 7/15/2023 0800 by Phu Boone RN  Pressure Reduction Techniques: frequent weight shift encouraged  Head of Bed (HOB) Positioning: HOB at 30-45 degrees  Pressure Reduction Devices: pressure-redistributing mattress utilized  Skin Protection:   adhesive use limited   incontinence pads utilized   tubing/devices free from skin contact   pulse oximeter probe site changed

## 2023-07-15 NOTE — PROGRESS NOTES
"Pharmacy Consult-Vancomycin Dosing  Vita Miller is a  83 y.o. female receiving vancomycin therapy.     Indication: pneumonia  Consulting Provider: hospitalist  ID Consult: No    Goal AUC: 400 - 600 mg/L*hr    Current Antimicrobial Therapy  Anti-Infectives (From admission, onward)      Ordered     Dose/Rate Route Frequency Start Stop    07/15/23 0147  piperacillin-tazobactam (ZOSYN) 3.375 g in iso-osmotic dextrose 50 ml (premix)        Ordering Provider: Kevin Shah DO    3.375 g  over 4 Hours Intravenous Every 8 Hours 07/15/23 1000 07/20/23 0959    07/15/23 0147  doxycycline (VIBRAMYCIN) 100 mg in sodium chloride 0.9 % 100 mL IVPB-VTB        Ordering Provider: Kevin Shah DO    100 mg Intravenous Every 12 Hours Scheduled 07/15/23 0300 07/20/23 0559    07/15/23 0143  vancomycin (VANCOCIN) 1000 mg/200 mL dextrose 5% IVPB        Ordering Provider: Willi Plaza MD    20 mg/kg x 46.3 kg  over 90 Minutes Intravenous Once 07/15/23 0159      07/15/23 0143  piperacillin-tazobactam (ZOSYN) 3.375 g in iso-osmotic dextrose 50 ml (premix)        Ordering Provider: Willi Plaza MD    3.375 g Intravenous Once 07/15/23 0159      07/15/23 0147  Pharmacy to dose vancomycin        Ordering Provider: Kevin Shah DO     Does not apply Continuous PRN 07/15/23 0146 07/22/23 0145            Allergies  Allergies as of 07/14/2023 - Reviewed 07/14/2023   Allergen Reaction Noted    Sulfa antibiotics Other (See Comments) 08/04/2020       Labs  Results from last 7 days   Lab Units 07/14/23  2241   BUN mg/dL 19   CREATININE mg/dL 0.79     Results from last 7 days   Lab Units 07/14/23  2241   WBC 10*3/mm3 13.02*       Evaluation of Dosing  Last Dose Received in the ED/Outside Facility:               N/A  Is Patient on Dialysis or Renal Replacement:               No    Ht - 172.7 cm (68\")  Wt - 46.3 kg (102 lb)    Estimated Creatinine Clearance: 39.4 mL/min (by C-G formula based on SCr of 0.79 mg/dL).  Intake & " Output (last 3 days)       None            Microbiology and Radiology  Microbiology Results (last 10 days)       ** No results found for the last 240 hours. **          Reported Vancomycin Levels                 [facility] AUC Calculation:  Current AUC:   -- mg/L*hr    Predicted Steady State AUC on Current Dose: -- mg/L*hr  _________________________________  Predicted Steady State AUC on New Dose:   484 mg/L*hr    Assessment/Plan:  1. Vancomycin 1000 mg IV q 24 hours    2. Vancomycin trough is scheduled 7/17 at 06:00 prior to the 3rd dose. Predicted Steady State AUC at current dose: 484 mg/L*hr.      Rakesh Byrd, PharmD, BCPS  7/15/2023  02:03 EDT

## 2023-07-15 NOTE — PLAN OF CARE
Goal Outcome Evaluation:  Plan of Care Reviewed With: patient, daughter        Progress: improving  Outcome Evaluation: PT eval completed. Presents w/ B PNA/hypox., AF, katie.edema BLE, malnut, decr LE strength/endurance & impaired funct mobil. Incont urine upon standing, & transf onto BSC w/ min A for lengthy toileting/hygiene session,then amb 90 ft x 2 w/ 5 min sit rest btwn (decl. AD for 1st trial w/ min A & BUE supp w/ hands resting on IV pump, but agreed to SPC for 2nd trial w/ min A to propel IV pump w/ L hand resting on pump). HR to 134, min incr SOB w/ desat to 95% on 2L, c/o B ankle/foot pain d/t katie edema, & mod. fatigue w/ 2nd gt. Recommend SNF d/t pt living alone & fall risk, as well as visual deficits.

## 2023-07-16 ENCOUNTER — APPOINTMENT (OUTPATIENT)
Dept: CT IMAGING | Facility: HOSPITAL | Age: 83
DRG: 190 | End: 2023-07-16
Payer: MEDICARE

## 2023-07-16 LAB
ALBUMIN SERPL-MCNC: 3.6 G/DL (ref 3.5–5.2)
ALBUMIN/GLOB SERPL: 1.5 G/DL
ALP SERPL-CCNC: 53 U/L (ref 39–117)
ALT SERPL W P-5'-P-CCNC: 14 U/L (ref 1–33)
ANION GAP SERPL CALCULATED.3IONS-SCNC: 9 MMOL/L (ref 5–15)
ARTERIAL PATENCY WRIST A: POSITIVE
ARTERIAL PATENCY WRIST A: POSITIVE
AST SERPL-CCNC: 16 U/L (ref 1–32)
ATMOSPHERIC PRESS: ABNORMAL MM[HG]
ATMOSPHERIC PRESS: ABNORMAL MM[HG]
BASE EXCESS BLDA CALC-SCNC: 7.2 MMOL/L (ref 0–2)
BASE EXCESS BLDA CALC-SCNC: 8.7 MMOL/L (ref 0–2)
BASOPHILS # BLD AUTO: 0.01 10*3/MM3 (ref 0–0.2)
BASOPHILS NFR BLD AUTO: 0.1 % (ref 0–1.5)
BDY SITE: ABNORMAL
BDY SITE: ABNORMAL
BILIRUB SERPL-MCNC: 0.2 MG/DL (ref 0–1.2)
BILIRUB UR QL STRIP: NEGATIVE
BODY TEMPERATURE: 37 C
BODY TEMPERATURE: 37 C
BUN SERPL-MCNC: 24 MG/DL (ref 8–23)
BUN/CREAT SERPL: 22 (ref 7–25)
CALCIUM SPEC-SCNC: 8 MG/DL (ref 8.6–10.5)
CHLORIDE SERPL-SCNC: 87 MMOL/L (ref 98–107)
CHROMATIN AB SERPL-ACNC: <10 IU/ML (ref 0–14)
CLARITY UR: CLEAR
CO2 BLDA-SCNC: 37.3 MMOL/L (ref 22–33)
CO2 BLDA-SCNC: 37.4 MMOL/L (ref 22–33)
CO2 SERPL-SCNC: 33 MMOL/L (ref 22–29)
COHGB MFR BLD: 1.3 % (ref 0–2)
COHGB MFR BLD: 1.4 % (ref 0–2)
COLOR UR: YELLOW
CREAT SERPL-MCNC: 1.09 MG/DL (ref 0.57–1)
D-LACTATE SERPL-SCNC: 0.9 MMOL/L (ref 0.5–2)
DEPRECATED RDW RBC AUTO: 45.8 FL (ref 37–54)
EGFRCR SERPLBLD CKD-EPI 2021: 50.5 ML/MIN/1.73
EOSINOPHIL # BLD AUTO: 0.01 10*3/MM3 (ref 0–0.4)
EOSINOPHIL NFR BLD AUTO: 0.1 % (ref 0.3–6.2)
EPAP: 0
EPAP: 6
ERYTHROCYTE [DISTWIDTH] IN BLOOD BY AUTOMATED COUNT: 12.8 % (ref 12.3–15.4)
ERYTHROCYTE [SEDIMENTATION RATE] IN BLOOD: 32 MM/HR (ref 0–30)
GLOBULIN UR ELPH-MCNC: 2.4 GM/DL
GLUCOSE BLDC GLUCOMTR-MCNC: 133 MG/DL (ref 70–130)
GLUCOSE SERPL-MCNC: 117 MG/DL (ref 65–99)
GLUCOSE UR STRIP-MCNC: NEGATIVE MG/DL
HCO3 BLDA-SCNC: 35.2 MMOL/L (ref 20–26)
HCO3 BLDA-SCNC: 35.6 MMOL/L (ref 20–26)
HCT VFR BLD AUTO: 30.6 % (ref 34–46.6)
HCT VFR BLD CALC: 33 % (ref 38–51)
HCT VFR BLD CALC: 34.6 % (ref 38–51)
HGB BLD-MCNC: 10 G/DL (ref 12–15.9)
HGB BLDA-MCNC: 10.8 G/DL (ref 14–18)
HGB BLDA-MCNC: 11.3 G/DL (ref 14–18)
HGB UR QL STRIP.AUTO: NEGATIVE
IMM GRANULOCYTES # BLD AUTO: 0.07 10*3/MM3 (ref 0–0.05)
IMM GRANULOCYTES NFR BLD AUTO: 0.5 % (ref 0–0.5)
INHALED O2 CONCENTRATION: 24 %
INHALED O2 CONCENTRATION: 30 %
IPAP: 0
IPAP: 22
KETONES UR QL STRIP: NEGATIVE
LEUKOCYTE ESTERASE UR QL STRIP.AUTO: NEGATIVE
LYMPHOCYTES # BLD AUTO: 0.71 10*3/MM3 (ref 0.7–3.1)
LYMPHOCYTES NFR BLD AUTO: 5.6 % (ref 19.6–45.3)
Lab: ABNORMAL
MAGNESIUM SERPL-MCNC: 1.9 MG/DL (ref 1.6–2.4)
MCH RBC QN AUTO: 31.9 PG (ref 26.6–33)
MCHC RBC AUTO-ENTMCNC: 32.7 G/DL (ref 31.5–35.7)
MCV RBC AUTO: 97.8 FL (ref 79–97)
METHGB BLD QL: 0.3 % (ref 0–1.5)
METHGB BLD QL: 0.5 % (ref 0–1.5)
MODALITY: ABNORMAL
MODALITY: ABNORMAL
MONOCYTES # BLD AUTO: 0.64 10*3/MM3 (ref 0.1–0.9)
MONOCYTES NFR BLD AUTO: 5 % (ref 5–12)
NEUTROPHILS NFR BLD AUTO: 11.29 10*3/MM3 (ref 1.7–7)
NEUTROPHILS NFR BLD AUTO: 88.7 % (ref 42.7–76)
NITRITE UR QL STRIP: NEGATIVE
NOTE: ABNORMAL
NOTE: ABNORMAL
NOTIFIED BY: ABNORMAL
NOTIFIED WHO: ABNORMAL
NRBC BLD AUTO-RTO: 0 /100 WBC (ref 0–0.2)
OXYHGB MFR BLDV: 92.1 % (ref 94–99)
OXYHGB MFR BLDV: 97.5 % (ref 94–99)
PAW @ PEAK INSP FLOW SETTING VENT: 0 CMH2O
PAW @ PEAK INSP FLOW SETTING VENT: 0 CMH2O
PCO2 BLDA: 60.2 MM HG (ref 35–45)
PCO2 BLDA: 67.7 MM HG (ref 35–45)
PCO2 TEMP ADJ BLD: 60.2 MM HG (ref 35–45)
PCO2 TEMP ADJ BLD: 67.7 MM HG (ref 35–45)
PH BLDA: 7.33 PH UNITS (ref 7.35–7.45)
PH BLDA: 7.38 PH UNITS (ref 7.35–7.45)
PH UR STRIP.AUTO: 5.5 [PH] (ref 5–8)
PH, TEMP CORRECTED: 7.33 PH UNITS
PH, TEMP CORRECTED: 7.38 PH UNITS
PLATELET # BLD AUTO: 273 10*3/MM3 (ref 140–450)
PMV BLD AUTO: 8.7 FL (ref 6–12)
PO2 BLDA: 117 MM HG (ref 83–108)
PO2 BLDA: 71.6 MM HG (ref 83–108)
PO2 TEMP ADJ BLD: 117 MM HG (ref 83–108)
PO2 TEMP ADJ BLD: 71.6 MM HG (ref 83–108)
POTASSIUM SERPL-SCNC: 4 MMOL/L (ref 3.5–5.2)
PROT SERPL-MCNC: 6 G/DL (ref 6–8.5)
PROT UR QL STRIP: NEGATIVE
QT INTERVAL: 328 MS
QTC INTERVAL: 427 MS
RBC # BLD AUTO: 3.13 10*6/MM3 (ref 3.77–5.28)
SODIUM SERPL-SCNC: 129 MMOL/L (ref 136–145)
SP GR UR STRIP: 1.02 (ref 1–1.03)
TOTAL RATE: 0 BREATHS/MINUTE
TOTAL RATE: 20 BREATHS/MINUTE
UROBILINOGEN UR QL STRIP: NORMAL
WBC NRBC COR # BLD: 12.73 10*3/MM3 (ref 3.4–10.8)

## 2023-07-16 PROCEDURE — 94660 CPAP INITIATION&MGMT: CPT

## 2023-07-16 PROCEDURE — 99232 SBSQ HOSP IP/OBS MODERATE 35: CPT | Performed by: STUDENT IN AN ORGANIZED HEALTH CARE EDUCATION/TRAINING PROGRAM

## 2023-07-16 PROCEDURE — 71250 CT THORAX DX C-: CPT

## 2023-07-16 PROCEDURE — 86431 RHEUMATOID FACTOR QUANT: CPT | Performed by: INTERNAL MEDICINE

## 2023-07-16 PROCEDURE — 94761 N-INVAS EAR/PLS OXIMETRY MLT: CPT

## 2023-07-16 PROCEDURE — 82375 ASSAY CARBOXYHB QUANT: CPT

## 2023-07-16 PROCEDURE — 25010000002 AZITHROMYCIN PER 500 MG: Performed by: INTERNAL MEDICINE

## 2023-07-16 PROCEDURE — 82805 BLOOD GASES W/O2 SATURATION: CPT

## 2023-07-16 PROCEDURE — 94799 UNLISTED PULMONARY SVC/PX: CPT

## 2023-07-16 PROCEDURE — P9047 ALBUMIN (HUMAN), 25%, 50ML: HCPCS | Performed by: PHYSICIAN ASSISTANT

## 2023-07-16 PROCEDURE — P9612 CATHETERIZE FOR URINE SPEC: HCPCS

## 2023-07-16 PROCEDURE — 70450 CT HEAD/BRAIN W/O DYE: CPT

## 2023-07-16 PROCEDURE — 36600 WITHDRAWAL OF ARTERIAL BLOOD: CPT

## 2023-07-16 PROCEDURE — 80053 COMPREHEN METABOLIC PANEL: CPT | Performed by: PHYSICIAN ASSISTANT

## 2023-07-16 PROCEDURE — 83605 ASSAY OF LACTIC ACID: CPT | Performed by: PHYSICIAN ASSISTANT

## 2023-07-16 PROCEDURE — 94664 DEMO&/EVAL PT USE INHALER: CPT

## 2023-07-16 PROCEDURE — 85025 COMPLETE CBC W/AUTO DIFF WBC: CPT | Performed by: PHYSICIAN ASSISTANT

## 2023-07-16 PROCEDURE — 93010 ELECTROCARDIOGRAM REPORT: CPT | Performed by: STUDENT IN AN ORGANIZED HEALTH CARE EDUCATION/TRAINING PROGRAM

## 2023-07-16 PROCEDURE — 97165 OT EVAL LOW COMPLEX 30 MIN: CPT

## 2023-07-16 PROCEDURE — 81003 URINALYSIS AUTO W/O SCOPE: CPT | Performed by: PHYSICIAN ASSISTANT

## 2023-07-16 PROCEDURE — 85652 RBC SED RATE AUTOMATED: CPT | Performed by: INTERNAL MEDICINE

## 2023-07-16 PROCEDURE — 83050 HGB METHEMOGLOBIN QUAN: CPT

## 2023-07-16 PROCEDURE — 82948 REAGENT STRIP/BLOOD GLUCOSE: CPT

## 2023-07-16 PROCEDURE — 99232 SBSQ HOSP IP/OBS MODERATE 35: CPT | Performed by: INTERNAL MEDICINE

## 2023-07-16 PROCEDURE — 25010000002 ALBUMIN HUMAN 25% PER 50 ML: Performed by: STUDENT IN AN ORGANIZED HEALTH CARE EDUCATION/TRAINING PROGRAM

## 2023-07-16 PROCEDURE — 25010000002 PIPERACILLIN SOD-TAZOBACTAM PER 1 G: Performed by: INTERNAL MEDICINE

## 2023-07-16 PROCEDURE — 25010000002 ALBUMIN HUMAN 25% PER 50 ML: Performed by: PHYSICIAN ASSISTANT

## 2023-07-16 PROCEDURE — 83735 ASSAY OF MAGNESIUM: CPT

## 2023-07-16 PROCEDURE — P9047 ALBUMIN (HUMAN), 25%, 50ML: HCPCS | Performed by: STUDENT IN AN ORGANIZED HEALTH CARE EDUCATION/TRAINING PROGRAM

## 2023-07-16 PROCEDURE — 93005 ELECTROCARDIOGRAM TRACING: CPT

## 2023-07-16 RX ORDER — ALBUMIN (HUMAN) 12.5 G/50ML
25 SOLUTION INTRAVENOUS ONCE
Status: COMPLETED | OUTPATIENT
Start: 2023-07-16 | End: 2023-07-16

## 2023-07-16 RX ORDER — DIGOXIN 125 MCG
125 TABLET ORAL EVERY OTHER DAY
Status: DISCONTINUED | OUTPATIENT
Start: 2023-07-16 | End: 2023-07-25

## 2023-07-16 RX ORDER — MIDODRINE HYDROCHLORIDE 5 MG/1
5 TABLET ORAL
Status: DISCONTINUED | OUTPATIENT
Start: 2023-07-16 | End: 2023-07-21

## 2023-07-16 RX ORDER — ACETAZOLAMIDE 250 MG/1
125 TABLET ORAL DAILY
Status: DISCONTINUED | OUTPATIENT
Start: 2023-07-16 | End: 2023-07-21

## 2023-07-16 RX ADMIN — MIDODRINE HYDROCHLORIDE 5 MG: 5 TABLET ORAL at 17:02

## 2023-07-16 RX ADMIN — PIPERACILLIN SODIUM AND TAZOBACTAM SODIUM 3.38 G: 3; .375 INJECTION, SOLUTION INTRAVENOUS at 09:43

## 2023-07-16 RX ADMIN — IPRATROPIUM BROMIDE AND ALBUTEROL SULFATE 3 ML: 2.5; .5 SOLUTION RESPIRATORY (INHALATION) at 07:13

## 2023-07-16 RX ADMIN — PIPERACILLIN SODIUM AND TAZOBACTAM SODIUM 3.38 G: 3; .375 INJECTION, SOLUTION INTRAVENOUS at 17:03

## 2023-07-16 RX ADMIN — MIDODRINE HYDROCHLORIDE 5 MG: 5 TABLET ORAL at 09:43

## 2023-07-16 RX ADMIN — GUAIFENESIN 600 MG: 600 TABLET, EXTENDED RELEASE ORAL at 09:28

## 2023-07-16 RX ADMIN — APIXABAN 2.5 MG: 2.5 TABLET, FILM COATED ORAL at 21:06

## 2023-07-16 RX ADMIN — DIGOXIN 125 MCG: 125 TABLET ORAL at 16:12

## 2023-07-16 RX ADMIN — GALANTAMINE 8 MG: 4 TABLET, FILM COATED ORAL at 09:28

## 2023-07-16 RX ADMIN — Medication 10 ML: at 21:06

## 2023-07-16 RX ADMIN — PAROXETINE HYDROCHLORIDE 10 MG: 10 TABLET, FILM COATED ORAL at 21:23

## 2023-07-16 RX ADMIN — AZITHROMYCIN 500 MG: 500 INJECTION, POWDER, LYOPHILIZED, FOR SOLUTION INTRAVENOUS at 05:13

## 2023-07-16 RX ADMIN — IPRATROPIUM BROMIDE AND ALBUTEROL SULFATE 3 ML: 2.5; .5 SOLUTION RESPIRATORY (INHALATION) at 15:48

## 2023-07-16 RX ADMIN — FLUDROCORTISONE ACETATE 0.1 MG: 0.1 TABLET ORAL at 08:31

## 2023-07-16 RX ADMIN — APIXABAN 2.5 MG: 2.5 TABLET, FILM COATED ORAL at 09:27

## 2023-07-16 RX ADMIN — IPRATROPIUM BROMIDE AND ALBUTEROL SULFATE 3 ML: 2.5; .5 SOLUTION RESPIRATORY (INHALATION) at 23:04

## 2023-07-16 RX ADMIN — GUAIFENESIN 600 MG: 600 TABLET, EXTENDED RELEASE ORAL at 21:23

## 2023-07-16 RX ADMIN — BRIMONIDINE TARTRATE 1 DROP: 2 SOLUTION/ DROPS OPHTHALMIC at 08:33

## 2023-07-16 RX ADMIN — IPRATROPIUM BROMIDE AND ALBUTEROL SULFATE 3 ML: 2.5; .5 SOLUTION RESPIRATORY (INHALATION) at 03:44

## 2023-07-16 RX ADMIN — TIMOLOL MALEATE 1 DROP: 5 SOLUTION/ DROPS OPHTHALMIC at 08:33

## 2023-07-16 RX ADMIN — Medication 250 MG: at 09:27

## 2023-07-16 RX ADMIN — IPRATROPIUM BROMIDE AND ALBUTEROL SULFATE 3 ML: 2.5; .5 SOLUTION RESPIRATORY (INHALATION) at 12:15

## 2023-07-16 RX ADMIN — BRIMONIDINE TARTRATE 1 DROP: 2 SOLUTION/ DROPS OPHTHALMIC at 21:06

## 2023-07-16 RX ADMIN — ALBUMIN HUMAN 25 G: 0.25 SOLUTION INTRAVENOUS at 02:21

## 2023-07-16 RX ADMIN — PIPERACILLIN SODIUM AND TAZOBACTAM SODIUM 3.38 G: 3; .375 INJECTION, SOLUTION INTRAVENOUS at 01:56

## 2023-07-16 RX ADMIN — Medication 10 ML: at 08:33

## 2023-07-16 RX ADMIN — IPRATROPIUM BROMIDE AND ALBUTEROL SULFATE 3 ML: 2.5; .5 SOLUTION RESPIRATORY (INHALATION) at 19:19

## 2023-07-16 RX ADMIN — MIRTAZAPINE 15 MG: 15 TABLET, FILM COATED ORAL at 21:23

## 2023-07-16 RX ADMIN — TIMOLOL MALEATE 1 DROP: 5 SOLUTION/ DROPS OPHTHALMIC at 21:06

## 2023-07-16 RX ADMIN — SENNOSIDES AND DOCUSATE SODIUM 2 TABLET: 50; 8.6 TABLET ORAL at 09:28

## 2023-07-16 RX ADMIN — ALBUMIN (HUMAN) 25 G: 0.25 INJECTION, SOLUTION INTRAVENOUS at 09:50

## 2023-07-16 RX ADMIN — GALANTAMINE 8 MG: 4 TABLET, FILM COATED ORAL at 17:02

## 2023-07-16 NOTE — PLAN OF CARE
Goal Outcome Evaluation:  Plan of Care Reviewed With: patient       Alert and oriented at start of shift with intermittent confusion, then confused since 0000, pt tried to jump out of bed and didn't know where she was. BP was soft at start of shift, paged Naomy RUBI and gave albumin and a 250 mL bolus. Then around when this RN went to go give a dose of scheduled lasix, bp was soft again. Another dose of albumin given. Good urine output. No BM. Turned throughout shift.     More confusion around 0200, vitals taken. Spoke to Naomy, blood sugar taken, ABGs, CT of head. UA. Now will place on BiPAP.    She was in afib most of the shift in the 100s-110s and since the BiPAP has been on she has been HR in the 60s.    Still on BiPAP.       Progress: declining now improving         Problem: Adult Inpatient Plan of Care  Goal: Plan of Care Review  Outcome: Ongoing, Progressing  Flowsheets (Taken 7/16/2023 0232)  Progress: declining  Plan of Care Reviewed With: patient     Problem: Adult Inpatient Plan of Care  Goal: Plan of Care Review  Outcome: Ongoing, Progressing  Flowsheets (Taken 7/16/2023 0232)  Progress: declining  Plan of Care Reviewed With: patient  Goal: Patient-Specific Goal (Individualized)  Outcome: Ongoing, Progressing  Goal: Absence of Hospital-Acquired Illness or Injury  Outcome: Ongoing, Progressing  Intervention: Identify and Manage Fall Risk  Recent Flowsheet Documentation  Taken 7/16/2023 0200 by Heydi Calderón, RN  Safety Promotion/Fall Prevention:   safety round/check completed   room organization consistent  Taken 7/16/2023 0000 by Heydi Calderón, RN  Safety Promotion/Fall Prevention:   assistive device/personal items within reach   clutter free environment maintained   activity supervised  Taken 7/15/2023 2200 by Heydi Calderón, RN  Safety Promotion/Fall Prevention:   activity supervised   assistive device/personal items within reach  Taken 7/15/2023 2000 by Heydi Calderón, RN  Safety  Promotion/Fall Prevention: room organization consistent  Intervention: Prevent Skin Injury  Recent Flowsheet Documentation  Taken 7/16/2023 0200 by Heydi Calderón RN  Body Position: weight shifting  Skin Protection:   adhesive use limited   skin-to-skin areas padded   skin-to-device areas padded   tubing/devices free from skin contact  Taken 7/16/2023 0032 by Heydi Calderón RN  Body Position:   turned   side-lying  Taken 7/16/2023 0000 by Heydi Calderón RN  Body Position: weight shifting  Skin Protection:   adhesive use limited   skin-to-device areas padded   skin-to-skin areas padded   tubing/devices free from skin contact  Taken 7/15/2023 2200 by Heydi Calderón RN  Body Position: weight shifting  Skin Protection:   adhesive use limited   skin-to-device areas padded   skin-to-skin areas padded   tubing/devices free from skin contact  Taken 7/15/2023 2143 by Heydi Calderón RN  Body Position:   turned   supine, legs elevated  Taken 7/15/2023 2000 by Heydi Calderón RN  Body Position: legs elevated  Skin Protection:   adhesive use limited   skin-to-skin areas padded   skin-to-device areas padded   tubing/devices free from skin contact  Intervention: Prevent and Manage VTE (Venous Thromboembolism) Risk  Recent Flowsheet Documentation  Taken 7/16/2023 0200 by Heydi Calderón RN  Activity Management: activity encouraged  Taken 7/16/2023 0032 by Heydi Calderón RN  Activity Management: up to bedside commode  Taken 7/16/2023 0000 by Heydi Calderón RN  Activity Management: activity encouraged  Taken 7/15/2023 2200 by Heydi Calderón RN  Activity Management: activity encouraged  Taken 7/15/2023 2143 by Heydi Calderón RN  Activity Management: back to bed  Taken 7/15/2023 2000 by Heydi Calderón RN  Activity Management: up in chair  VTE Prevention/Management: (po eliquis) other (see comments)  Intervention: Prevent Infection  Recent Flowsheet Documentation  Taken 7/16/2023 0200 by  Heydi Calderón, RN  Infection Prevention: rest/sleep promoted  Taken 7/15/2023 2000 by Heydi Calderón, RN  Infection Prevention: rest/sleep promoted  Goal: Optimal Comfort and Wellbeing  Outcome: Ongoing, Progressing  Intervention: Provide Person-Centered Care  Recent Flowsheet Documentation  Taken 7/15/2023 2000 by Heydi Calderón, RN  Trust Relationship/Rapport: care explained  Goal: Readiness for Transition of Care  Outcome: Ongoing, Progressing

## 2023-07-16 NOTE — PROGRESS NOTES
[facility] Medicine Services  PROGRESS NOTE    Patient Name: Vita Miller  : 1940  MRN: [id number]    Date of Admission: 2023  Primary Care Physician: Alfonso Steele MD    Subjective   Subjective     CC:  Follow-up PNA    HPI:  Overnight, patient was confused.  ABG obtained 7.3  and was placed on BiPAP by the night team.  Repeat ABG 7.3 . CT head with chronic findings.  She was also hypotensive overnight with systolic in the 80s and given albumin x2 and 1 dose of Lasix early.  UA was negative, lactic 0.9.    At time of evaluation, patient was satting 99% on 1 L nasal cannula with no evidence of respiratory distress.  She was feeding herself breakfast.  Hypotensive with systolic blood pressure in the 70s this morning, heart rate was in the 90s at the time.  Mild cough.  No nausea or vomiting.  No diarrhea.  No urinary symptoms.    ROS:  Gen- No fevers, chills  CV- No chest pain, palpitations  Resp-as above  GI- No N/V/D, abd pain     Objective   Objective     Vital Signs:   Temp:  [95.9 øF (35.5 øC)-98.4 øF (36.9 øC)] 97.4 øF (36.3 øC)  Heart Rate:  [] 99  Resp:  [16-22] 22  BP: ()/(53-83) 103/74  Flow (L/min):  [1-4] 2.5     Physical Exam:  Constitutional: No acute distress, awake, alert  HENT: NCAT, mucous membranes moist  Respiratory: Bibasilar crackles, some rhonchi, no rales, no wheezing, respiratory rate 16  Cardiovascular: IRIR, no murmurs, rubs, or gallops  Gastrointestinal: Positive bowel sounds, soft, nontender, nondistended  Musculoskeletal: No bilateral ankle edema  Psychiatric: Appropriate affect, cooperative  Neurologic: Oriented to self, place, year, speech clear  Skin: No rashes    Results Reviewed:  LAB RESULTS:      Lab 23  0332 07/15/23  0340 23  2241   WBC 12.73*  --  13.02*   HEMOGLOBIN 10.0*  --  14.3   HEMATOCRIT 30.6*  --  43.0   PLATELETS 273  --  353   NEUTROS ABS 11.29*  --  10.73*   IMMATURE GRANS (ABS)  0.07*  --  0.09*   LYMPHS ABS 0.71  --  0.95   MONOS ABS 0.64  --  0.99*   EOS ABS 0.01  --  0.20   MCV 97.8*  --  97.7*   SED RATE 32*  --  99*   CRP  --   --  14.66*   PROCALCITONIN  --   --  0.13   LACTATE 0.9 1.0  --          Lab 07/16/23  0332 07/14/23  2241   SODIUM 129* 129*   POTASSIUM 4.0 4.5   CHLORIDE 87* 87*   CO2 33.0* 30.0*   ANION GAP 9.0 12.0   BUN 24* 19   CREATININE 1.09* 0.79   EGFR 50.5* 74.3   GLUCOSE 117* 125*   CALCIUM 8.0* 9.1   MAGNESIUM 1.9 1.9   TSH  --  13.160*         Lab 07/16/23  0332 07/14/23  2241   TOTAL PROTEIN 6.0 6.8   ALBUMIN 3.6 3.5   GLOBULIN 2.4 3.3   ALT (SGPT) 14 24   AST (SGOT) 16 23   BILIRUBIN 0.2 0.4   ALK PHOS 53 90         Lab 07/14/23  2241   PROBNP 6,301.0*   HSTROP T 16*                 Lab 07/16/23  0518 07/16/23  0256   PH, ARTERIAL 7.380 7.325*   PCO2, ARTERIAL 60.2* 67.7*   PO2 .0* 71.6*   FIO2 30 24   HCO3 ART 35.6* 35.2*   BASE EXCESS ART 8.7* 7.2*   CARBOXYHEMOGLOBIN 1.3 1.4     Brief Urine Lab Results  (Last result in the past 365 days)        Color   Clarity   Blood   Leuk Est   Nitrite   Protein   CREAT   Urine HCG        07/16/23 0347 Yellow   Clear   Negative   Negative   Negative   Negative                   Microbiology Results Abnormal       Procedure Component Value - Date/Time    Blood Culture - Blood, Arm, Left [[id number]]  (Normal) Collected: 07/15/23 0340    Lab Status: Preliminary result Specimen: Blood from Arm, Left Updated: 07/16/23 0731     Blood Culture No growth at 24 hours    Blood Culture - Blood, Arm, Right [[id number]]  (Normal) Collected: 07/15/23 0340    Lab Status: Preliminary result Specimen: Blood from Arm, Right Updated: 07/16/23 0731     Blood Culture No growth at 24 hours    S. Pneumo Ag Urine or CSF - Urine, Urine, Clean Catch [[id number]]  (Normal) Collected: 07/15/23 0341    Lab Status: Final result Specimen: Urine, Clean Catch Updated: 07/15/23 1356     Strep Pneumo Ag Negative    Legionella Antigen, Urine - Urine,  Urine, Clean Catch [[id number]]  (Normal) Collected: 07/15/23 0341    Lab Status: Final result Specimen: Urine, Clean Catch Updated: 07/15/23 1356     LEGIONELLA ANTIGEN, URINE Negative    MRSA Screen, PCR (Inpatient) - Swab, Nares [[id number]]  (Normal) Collected: 07/15/23 0310    Lab Status: Final result Specimen: Swab from Nares Updated: 07/15/23 0846     MRSA PCR Negative    Narrative:      The negative predictive value of this diagnostic test is high and should only be used to consider de-escalating anti-MRSA therapy. A positive result may indicate colonization with MRSA and must be correlated clinically.  MRSA Negative    COVID PRE-OP / PRE-PROCEDURE SCREENING ORDER (NO ISOLATION) - Swab, Nasopharynx [[id number]]  (Normal) Collected: 07/15/23 0310    Lab Status: Final result Specimen: Swab from Nasopharynx Updated: 07/15/23 0410    Narrative:      The following orders were created for panel order COVID PRE-OP / PRE-PROCEDURE SCREENING ORDER (NO ISOLATION) - Swab, Nasopharynx.  Procedure                               Abnormality         Status                     ---------                               -----------         ------                     Respiratory Panel PCR w/...[[id number]]  Normal              Final result                 Please view results for these tests on the individual orders.    Respiratory Panel PCR w/COVID-19(SARS-CoV-2) JENNA/KELSI/NORMAN/PAD/COR/MAD/ARSEN In-House, NP Swab in [facility], 3-4 HR TAT - Swab, Nasopharynx [[id number]]  (Normal) Collected: 07/15/23 0310    Lab Status: Final result Specimen: Swab from Nasopharynx Updated: 07/15/23 0410     ADENOVIRUS, PCR Not Detected     Coronavirus 229E Not Detected     Coronavirus HKU1 Not Detected     Coronavirus NL63 Not Detected     Coronavirus OC43 Not Detected     COVID19 Not Detected     Human Metapneumovirus Not Detected     Human Rhinovirus/Enterovirus Not Detected     Influenza A PCR Not Detected     Influenza B PCR Not Detected     Parainfluenza  Virus 1 Not Detected     Parainfluenza Virus 2 Not Detected     Parainfluenza Virus 3 Not Detected     Parainfluenza Virus 4 Not Detected     RSV, PCR Not Detected     Bordetella pertussis pcr Not Detected     Bordetella parapertussis PCR Not Detected     Chlamydophila pneumoniae PCR Not Detected     Mycoplasma pneumo by PCR Not Detected    Narrative:      In the setting of a positive respiratory panel with a viral infection PLUS a negative procalcitonin without other underlying concern for bacterial infection, consider observing off antibiotics or discontinuation of antibiotics and continue supportive care. If the respiratory panel is positive for atypical bacterial infection (Bordetella pertussis, Chlamydophila pneumoniae, or Mycoplasma pneumoniae), consider antibiotic de-escalation to target atypical bacterial infection.            CT Head Without Contrast    Result Date: 7/16/2023  EXAMINATION: CT HEAD WITHOUT CONTRAST DATE: 7/16/2023 3:02 AM INDICATION: Delirium COMPARISON: MR brain 10/27/2020 TECHNIQUE: Noncontrast imaging obtained from the vertex to the skull base.  CT dose lowering techniques were used, to include: automated exposure control, adjustment for patient size, and or use of iterative reconstruction.? FINDINGS: Soft Tissues: No significant soft tissue abnormality. Skull: No underlying skull fracture or radiopaque foreign body. Sinuses: Paranasal sinuses are clear. Mastoids: Mastoid air cells are clear. Globes and Orbits: Globes and orbits are intact. Brain: No acute hemorrhage.  No midline shift, masses, or mass effect.  No evidence of acute infarct by noncontrast CT. Ventricles and Cisterns: Ventricular size and configuration is within normal limits. Basal cisterns are patent. No abnormal extra-axial fluid collection. Senescent Changes: Mild volume loss and chronic microvascular ischemic changes. Arteriosclerosis.     Impression: 1. No acute intracranial abnormality. 2. Mild volume loss and  chronic microvascular ischemic changes. Electronically signed by:  Magen Sparks M.D.  7/16/2023 2:42 AM Mountain Time    XR Chest 1 View    Result Date: 7/14/2023  EXAMINATION: XR CHEST 1 VW DATE: 7/14/2023 11:00 PM INDICATION:  Dysrhythmia; COMPARISON:  June 16, 2023 FINDINGS: Moderate patchy interstitial appearing opacities throughout both lungs are again demonstrated. There is increasing more confluent opacity in the upper lungs. Background peribronchial thickening or bronchiectasis again demonstrated Small bilateral pleural effusions. No pneumothorax. Cardiomediastinal silhouette  unchanged No acute osseous abnormality.     Impression: 1.  Increasing bilateral upper lung opacities, edema versus pneumonia. 2.  Small bilateral pleural effusions. 3.  Redemonstration of airways disease and interstitial and nodular opacities scattered throughout the lungs. Electronically signed by:  Dedra Alcantara M.D.  7/14/2023 9:30 PM Mountain Time    CT Angiogram Chest    Result Date: 7/15/2023  EXAMINATION:  CTA CHEST WITH IV CONTRAST, CT PULMONARY ANGIOGRAM DATE OF EXAM: 7/15/2023 12:08 AM HISTORY: Suspected pulmonary embolism TECHNIQUE: CTA examination of the chest was performed following the intravenous administration of iodinated contrast. Sagittal, coronal and 3-D reformatted images were provided. CT dose lowering techniques were used, to include: automated exposure control, adjustment for patient size, and or use of iterative reconstruction. COMPARISON: 6/17/2023 FINDINGS: Lungs: Patchy mosaic prominent groundglass opacities seen symmetrically throughout the bilateral lungs. Airway debris is seen in the bilateral lower lobes. Focal bronchiectasis in the right middle lobe is present. Prominent peribronchial cuffing is seen in the left lower lobe. Small left pleural effusion is present. 12 mm pulmonary nodule in the left lower lobe seen on series 4 image 50. Other scattered smaller solid nodules are seen throughout the  bilateral lungs. Pleura: As above Mediastinum and Maria Guadalupe: Normal. Pulmonary Arteries: Posterior subsegment of the left lower lobe pulmonary artery shows diminished opacification as it courses distally, as seen on series 4 images 64-73. Otherwise, the pulmonary arteries are normal. Cardiovascular: Normal.  No coronary artery calcifications. Upper Abdomen: Normal. Chest Wall: Normal. Musculoskeletal: Normal.     Impression: 1.  Favor contrast limitation rather than singular small pulmonary embolism in the distal aspect of the left lower lobe posterior subsegmental pulmonary artery. 2.  Constellation of findings in the lungs suggestive of advanced inflammatory/infectious process such as fungal/mycobacterial process, eosinophilic pneumonia, or possibly autoimmune process. Overall appearance has markedly worsened over the past 30 days  to include a small left pleural effusion. Bilateral pulmonary nodules are unchanged and likely related. Recommend pulmonology consultation. Thank you for the referral of this patient. This exam was interpreted by an [employer] Board of Rad[employer] certified radiologist with subspecialty training. If there are any questions regarding this exam please feel free to contact a radiologist directly at  [phone]. Slot: [phone] Electronically signed by:  Eh Mc M.D.  7/14/2023 11:32 PM Mountain Time     Results for orders placed during the hospital encounter of 06/16/23    Adult Transthoracic Echo Complete W/ Cont if Necessary Per Protocol    Interpretation Summary    Left ventricular systolic function is normal. Estimated left ventricular EF = 60%    Left ventricular diastolic function was normal.    Mild aortic insufficiency.    Trace to mild mitral and tricuspid regurgitation.    Calculated right ventricular systolic pressure from tricuspid regurgitation is 20 mmHg.    There is a trivial pericardial effusion.      Current medications:  Scheduled Meds:apixaban, 2.5 mg, Oral,  Q12H  ascorbic acid, 250 mg, Oral, Daily  azithromycin, 500 mg, Intravenous, Q24H  bisoprolol, 2.5 mg, Oral, Q24H  brimonidine, 1 drop, Both Eyes, Q12H   And  timolol, 1 drop, Both Eyes, Q12H  fludrocortisone, 0.1 mg, Oral, Daily  furosemide, 40 mg, Intravenous, Q12H  galantamine, 8 mg, Oral, BID With Meals  guaiFENesin, 600 mg, Oral, Q12H  ipratropium-albuterol, 3 mL, Nebulization, Q4H - RT  levothyroxine, 88 mcg, Oral, Daily  mirtazapine, 15 mg, Oral, Nightly  pantoprazole, 40 mg, Oral, Q AM  PARoxetine, 10 mg, Oral, Nightly  piperacillin-tazobactam, 3.375 g, Intravenous, Q8H  senna-docusate sodium, 2 tablet, Oral, BID  sodium chloride, 10 mL, Intravenous, Q12H      Continuous Infusions:esmolol,  mcg/kg/min, Last Rate: Stopped (07/15/23 0754)      PRN Meds:.  acetaminophen **OR** acetaminophen **OR** acetaminophen    senna-docusate sodium **AND** polyethylene glycol **AND** bisacodyl **AND** bisacodyl    melatonin    metoprolol tartrate    ondansetron    sodium chloride    sodium chloride    sodium chloride    Assessment & Plan   Assessment & Plan     Active Hospital Problems    Diagnosis  POA    **Acute respiratory failure with hypoxia [J96.01]  Yes    Pneumonia of both lungs due to infectious organism [J18.9]  Unknown    Hyponatremia [E87.1]  Unknown    Severe Malnutrition (HCC) [E43]  Yes    Atrial fibrillation with RVR [I48.91]  Yes    Cachexia [R64]  Yes    Senile osteoporosis [M81.0]  Yes    Hashimoto's thyroiditis [E06.3]  Yes    Primary open angle glaucoma of both eyes, severe stage [H40.1133]  Yes    Mixed anxiety and depressive disorder [F41.8]  Yes    Multiple nodules of lung [R91.8]  Yes    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        Brief Hospital Course to date:  Vita Miller is a 83 y.o. female with history of hypothyroidism, COPD with chronic bronchiectasis, history of chronic MAC with unknown intolerance to prior treatment plan, history of orthostatic hypotension, hyperlipidemia, osteoporosis, bilateral glaucoma, mild memory deficit, who was recently admitted to our facility approximately 1 month ago for new onset atrial fibrillation with RVR who returns to the ER tonight with complaints of palpitations and shortness of breath.     This patient's problems and plans were partially entered by my partner and updated as appropriate by me 07/16/23.     Acute respiratory failure with hypoxia  Bilateral pneumonia/infiltrates  History of MAC, intolerant to treatment  COPD with acute exacerbation, chronic bronchiectasis  -- Vancomycin, Zosyn, azithromycin  -- Pulmonology consult  --Off steroids  --ANCA, RF, ESR, SLE labs pending  --Continue Zosyn, azith for now; stopped vanc given negative MRSA screen  -- Duo nebs  -- Flutter valve, mucinex  --cultures, pcr negative, strep negative, legionella negative  -- Holding on further diuresis given hypotension with systolic in the 70s after diuresis  --Discussed CT findings with pulm on 7/15; trialed diuresis and repeat CT chest w/o contrast on 7/16 completed     Atrial fibrillation with rvr  --s/p esmolol drip  --eliquis  --flecainide --> bisoprolol ---> changed to digoxin per cards  --cards recommended midodrine  --cards eval  --LVEF 60%, normal diastolic dysfunction on echo from 6/17/2023    Altered mental status, resolved  --Suspect component of hospital delirium, also had some CO2 retention on ABG from overnight that improved with Bipap  --Resolved at time of evaluation this morning     Hyponatremia  --Na 129 on admission; suspect related to volume overload     Orthostatic hypotension  --continue fludricortisone     Hypothyroidism  --slightly elevated  tsh  --continue synthroid, dose adjusted on ; repeat tsh in 4-6 weeks; careful adjustment given just had episode of RVR     Other chronic conditions: HLD, malnutrition, anxiety/depression, memory impairment  --continue home meds  --nutrition  --pt/ot    Social  --updated the patient's daughter on plan of care per her request and per nursing's request to update her. Verified patient name and  first. After a complete update, she then told me that I had to update the patient's son as he is the primary person to update. He was called.   --discussed with the family that only one family member will be updated. They elected the son.   --Dr. Steele updated as well.      Expected Discharge Location and Transportation: Home  Expected Discharge   Expected Discharge Date: 2023; Expected Discharge Time:      DVT prophylaxis:  Medical and mechanical DVT prophylaxis orders are present.     AM-PAC 6 Clicks Score (PT): 18 (07/15/23 2000)    CODE STATUS:   Code Status and Medical Interventions:   Ordered at: 07/15/23 0214     Code Status (Patient has no pulse and is not breathing):    CPR (Attempt to Resuscitate)     Medical Interventions (Patient has pulse or is breathing):    Full Support       Catherine Perry MD  23

## 2023-07-16 NOTE — PROGRESS NOTES
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  Remote operations:  A.  Hip surgery  B.  Patella surgery  C.  Shoulder surgery    CHIEF COMPLAINTS:  PAF    Subjective   Overnight patient had altered mental status, possibly delirium.  This morning patient was resting comfortably.  Denies any pain or shortness of breath.      Objective     Blood pressure 99/66, pulse 113, temperature 97.7 øF (36.5 øC), temperature source Oral, resp. rate 18, height 172.7 cm (68\"), weight 46.3 kg (102 lb), SpO2 99 %.     Intake/Output Summary (Last 24 hours) at 7/16/2023 0829  Last data filed at 7/16/2023 0347  Gross per 24 hour   Intake 250 ml   Output 700 ml   Net -450 ml       Vitals reviewed.   Constitutional:       Appearance: Not in distress. Frail. Chronically ill-appearing.   HENT:    Mouth/Throat:      Pharynx: Oropharynx is clear.   Neck:      Vascular: No carotid bruit or JVD.   Pulmonary:      Effort: Pulmonary effort is normal.      Comments: Diffuse rhonchi and crackles bilaterally.  Cardiovascular:      Tachycardia present. Irregularly irregular rhythm.      Murmurs: There is no murmur.      No rub.   Pulses:     Intact distal pulses.   Edema:     Peripheral edema absent.   Abdominal:      General: There is no distension.      Palpations: Abdomen is soft.      Tenderness: There is no abdominal tenderness.   Musculoskeletal:         General: No deformity. Skin:     General: Skin is warm and dry.   Neurological:      General: No focal " deficit present.      Mental Status: Alert and oriented to person, place and time.          RESULTS REVIEW:    I reviewed the patient's new clinical results.      MEDICATIONS:    apixaban, 2.5 mg, Oral, Q12H  ascorbic acid, 250 mg, Oral, Daily  azithromycin, 500 mg, Intravenous, Q24H  bisoprolol, 2.5 mg, Oral, Q24H  brimonidine, 1 drop, Both Eyes, Q12H   And  timolol, 1 drop, Both Eyes, Q12H  fludrocortisone, 0.1 mg, Oral, Daily  furosemide, 40 mg, Intravenous, Q12H  galantamine, 8 mg, Oral, BID With Meals  guaiFENesin, 600 mg, Oral, Q12H  ipratropium-albuterol, 3 mL, Nebulization, Q4H - RT  levothyroxine, 88 mcg, Oral, Daily  mirtazapine, 15 mg, Oral, Nightly  pantoprazole, 40 mg, Oral, Q AM  PARoxetine, 10 mg, Oral, Nightly  piperacillin-tazobactam, 3.375 g, Intravenous, Q8H  senna-docusate sodium, 2 tablet, Oral, BID  sodium chloride, 10 mL, Intravenous, Q12H          Results from last 7 days   Lab Units 07/16/23  0332   WBC 10*3/mm3 12.73*   HEMOGLOBIN g/dL 10.0*   HEMATOCRIT % 30.6*   PLATELETS 10*3/mm3 273     Results from last 7 days   Lab Units 07/16/23  0332   SODIUM mmol/L 129*   POTASSIUM mmol/L 4.0   CHLORIDE mmol/L 87*   CO2 mmol/L 33.0*   BUN mg/dL 24*   CREATININE mg/dL 1.09*   CALCIUM mg/dL 8.0*   BILIRUBIN mg/dL 0.2   ALK PHOS U/L 53   ALT (SGPT) U/L 14   AST (SGOT) U/L 16   GLUCOSE mg/dL 117*         Lab Results   Component Value Date    TROPONINT 16 (H) 07/14/2023     Results from last 7 days   Lab Units 07/14/23  2241   TSH uIU/mL 13.160*             No results found for: IRON, FERRITIN, LABIRON, TIBC   Magnesium   Date Value Ref Range Status   07/16/2023 1.9 1.6 - 2.4 mg/dL Final      CT Head Without Contrast    Result Date: 7/16/2023  1. No acute intracranial abnormality. 2. Mild volume loss and chronic microvascular ischemic changes. Electronically signed by:  Magen Sparks M.D.  7/16/2023 2:42 AM Mountain Time    XR Chest 1 View    Result Date: 7/14/2023  1.  Increasing bilateral upper lung  opacities, edema versus pneumonia. 2.  Small bilateral pleural effusions. 3.  Redemonstration of airways disease and interstitial and nodular opacities scattered throughout the lungs. Electronically signed by:  Dedra Alcantara M.D.  7/14/2023 9:30 PM Mountain Time    CT Angiogram Chest    Result Date: 7/15/2023  1.  Favor contrast limitation rather than singular small pulmonary embolism in the distal aspect of the left lower lobe posterior subsegmental pulmonary artery. 2.  Constellation of findings in the lungs suggestive of advanced inflammatory/infectious process such as fungal/mycobacterial process, eosinophilic pneumonia, or possibly autoimmune process. Overall appearance has markedly worsened over the past 30 days  to include a small left pleural effusion. Bilateral pulmonary nodules are unchanged and likely related. Recommend pulmonology consultation. Thank you for the referral of this patient. This exam was interpreted by an American Board of Radiology certified radiologist with subspecialty training. If there are any questions regarding this exam please feel free to contact a radiologist directly at  [phone]. Slot: [phone] Electronically signed by:  Eh Mc M.D.  7/14/2023 11:32 PM Mountain Time       Tele: Atrial Fib with Rapid Rate    Results for orders placed during the hospital encounter of 06/16/23    Adult Transthoracic Echo Complete W/ Cont if Necessary Per Protocol    Interpretation Summary    Left ventricular systolic function is normal. Estimated left ventricular EF = 60%    Left ventricular diastolic function was normal.    Mild aortic insufficiency.    Trace to mild mitral and tricuspid regurgitation.    Calculated right ventricular systolic pressure from tricuspid regurgitation is 20 mmHg.    There is a trivial pericardial effusion.      Assessment:   PAF, [id number] = 4, on outpatient flecainide therapy  Acute HFpEF, in the setting of A-fib with RVR  Orthostatic  hypotension  Acute respiratory failure with hypoxia, chronic bronchiectasis  Hyponatremia       Plan:   Pulmonology believes flecainide may be causing some pneumonitis and this was stopped yesterday.  She was started on bisoprolol 2.5 mg yesterday.  This morning it will be held due to patient's blood pressure.  We will add midodrine 5 mg 3 times a day to help with blood pressure and see if later in the day she can tolerate beta-blockade.  Heart rate currently in the 90s-100s.  Spoke with the hospitalist.  We will repeat CT scan to see if patient has signs more consistent with pneumonitis versus fluid overload.  Consider Lasix if blood pressure improves later this day.  Continue Eliquis 2.5 mg twice daily.  Goal is to discharge patient before EP evaluation on Friday at the [facility]  Dr. Mack will assume care of this patient tomorrow.    Electronically signed by Juanita Arias PA-C, 07/16/23, 9:51 AM EDT.         STAFF CARDIOLOGIST:      SUMMARY:    83 years old very sweet lady admitted with atrial fibrillation and heart failure with preserved ejection fraction      PERTINENT:    Severe lung disease  BNP 6000  Sodium 129      IMPRESSION:    Heart failure with preserved ejection fraction  Chronic pulmonary problems with MAC/bronchiectasis      RECOMMENDATIONS:    At this point we cannot try to control the heart rate without dropping her blood pressure  We will use Diamox for diuresis to see how her sodium does  I will add low-dose digoxin to control the rate        I have seen and examined the patient, reviewed the above note, necessary changes were made and I agree with the final note.   Luis Huggins MD

## 2023-07-16 NOTE — PLAN OF CARE
Goal Outcome Evaluation:      Pt on 1L NC. Pt became more alert and oriented as the shift went on. Afib on the monitor from 90's to 120's. BP very soft all shift so had to hold some meds d/t BP parameters. IV albumin given x1. Daughter at bedside. Voiding well. Ambulating well to bathroom. Pt very pleasant and cooperative. CT chest done. Safety maintained.

## 2023-07-16 NOTE — THERAPY EVALUATION
Patient Name: Vita Miller  : 1940    MRN: [id number]                              Today's Date: 2023       Admit Date: 2023    Visit Dx:     ICD-10-CM ICD-9-CM   1. Atrial fibrillation with RVR  I48.91 427.31   2. Acute respiratory failure with hypoxia  J96.01 518.81   3. 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    Hypothyroidism    Acute respiratory failure with hypoxia    Cachexia    Severe Malnutrition (HCC)    Pneumonia of both lungs due to infectious organism    Hyponatremia     Past Medical History:   Diagnosis Date    A-fib     Disease of thyroid gland     Glaucoma     Hypotension      Past Surgical History:   Procedure Laterality Date    HIP SURGERY      PATELLA SURGERY      SHOULDER SURGERY        General Information       Row Name 23 1530          OT Time and Intention    Document Type evaluation  -MR     Mode of Treatment occupational therapy  -MR       Row Name 23 1530          General Information    Patient Profile Reviewed yes  -MR     Prior Level of Function independent:;all household mobility;gait;transfer;bed mobility;ADL's;driving;cooking;mod assist:;home management;dependent:;cleaning  PTA pt was I w/ ADLs, denies use of AD. Pt reporting keeping a walking stick in her car but doesn't use it. Denies any recent falls. Has assist for cleaning. Walk in shower w/ seat.  -MR     Existing Precautions/Restrictions other (see comments);oxygen therapy device and L/min;fall  -MR     Barriers to Rehab medically complex;previous  functional deficit  -MR       Row Name 07/16/23 1530          Living Environment    People in Home alone  -MR       Row Name 07/16/23 1530          Home Main Entrance    Number of Stairs, Main Entrance none  -MR       Row Name 07/16/23 1530          Stairs Within Home, Primary    Stairs, Within Home, Primary Pt reporting living in [address] [facility]. Pt reporting she can reside on the main floor w/ all needs met.  -MR     Number of Stairs, Within Home, Primary twelve  -MR     Stair Railings, Within Home, Primary railings safe and in good condition  -MR       Row Name 07/16/23 1530          Cognition    Orientation Status (Cognition) oriented x 4  -MR       Row Name 07/16/23 1530          Safety Issues, Functional Mobility    Safety Issues Affecting Function (Mobility) awareness of need for assistance;insight into deficits/self-awareness;positioning of assistive device;safety precaution awareness;safety precautions follow-through/compliance;sequencing abilities  -MR     Impairments Affecting Function (Mobility) balance;endurance/activity tolerance;postural/trunk control;shortness of breath;strength;visual/perceptual  -MR               User Key  (r) = Recorded By, (t) = Taken By, (c) = Cosigned By      Initials Name Provider Type    MR Renay Skaggs, OT Occupational Therapist                     Mobility/ADL's       Row Name 07/16/23 1532          Bed Mobility    Bed Mobility supine-sit;sit-supine  -MR     Supine-Sit [place] (Bed Mobility) standby assist  -MR     Sit-Supine [place] (Bed Mobility) standby assist  -MR     Assistive Device (Bed Mobility) bed rails;head of bed elevated  -MR     Comment, (Bed Mobility) Good effort noted w/ bed mobility. Pt denied dizziness w/ positional changes.  -MR       Row Name 07/16/23 1532          Transfers    Transfers sit-stand transfer  -MR       Row Name 07/16/23 1532          Sit-Stand Transfer    Sit-Stand [place] (Transfers) contact guard;verbal cues  -MR        Row Name 07/16/23 1532          Functional Mobility    Functional Mobility- Ind. Level contact guard assist;verbal cues required;nonverbal cues required (demo/gesture)  -MR     Functional Mobility- Device other (see comments)  BUE support on IV pole  -MR       Row Name 07/16/23 1532          Activities of Daily Living    BADL Assessment/Intervention lower body dressing;grooming  -MR       Row Name 07/16/23 1532          Lower Body Dressing Assessment/Training    [place] Level (Lower Body Dressing) other (see comments);minimum assist (75% patient effort)  iKan positioning socks  -MR     Position (Lower Body Dressing) supine  -MR       Row Name 07/16/23 1532          Grooming Assessment/Training    [place] Level (Grooming) wash face, hands;set up  -MR     Position (Grooming) edge of bed sitting  -MR               User Key  (r) = Recorded By, (t) = Taken By, (c) = Cosigned By      Initials Name Provider Type    Renay Lott, OT Occupational Therapist                   Obj/Interventions       Row Name 07/16/23 1536          Sensory Assessment (Somatosensory)    Sensory Assessment (Somatosensory) bilateral UE  -MR     Bilateral UE Sensory Assessment general sensation;intact;light touch awareness;impaired  -MR     Sensory Subjective Reports numbness;tingling  -MR     Sensory Assessment Pt endorsing BUE numbness and tingling.  -MR       Row Name 07/16/23 1536          Vision Assessment/Intervention    Visual Impairment/Limitations visual/perceptual impairments present  -MR     Vision Assessment Comment Pt endorsing baseline visual deficts w/ L eye being worse than R.  -MR       Row Name 07/16/23 1536          Range of Motion Comprehensive    General Range of Motion bilateral upper extremity ROM WFL  -MR       Row Name 07/16/23 1536          Strength Comprehensive (MMT)    Comment, General Manual Muscle Testing (MMT) Assessment BUE grossly 3+/5 in all functional planes.  -MR       Row Name 07/16/23 1536           Balance    Balance Assessment sitting static balance;sitting dynamic balance;standing static balance;standing dynamic balance  -MR     Static Sitting Balance independent  -MR     Dynamic Sitting Balance supervision  -MR     Position, Sitting Balance unsupported;sitting edge of bed  -MR     Static Standing Balance contact guard  -MR     Dynamic Standing Balance contact guard  -MR     Position/Device Used, Standing Balance supported;other (see comments)  IV pole  -MR     Balance Interventions sitting;standing;sit to stand;supported;static;dynamic;occupation based/functional task  -MR     Comment, Balance Pt slightly unsteady w/ mobility requiring BUe support on IV pole.  -MR               User Key  (r) = Recorded By, (t) = Taken By, (c) = Cosigned By      Initials Name Provider Type    MR Renay Skaggs, OT Occupational Therapist                   Goals/Plan       Row Name 07/16/23 1542          Transfer Goal 1 (OT)    Activity/Assistive Device (Transfer Goal 1, OT) sit-to-stand/stand-to-sit;toilet;walker, rolling  -MR     [place] Level/Cues Needed (Transfer Goal 1, OT) contact guard required;tactile cues required;verbal cues required  -MR     Time Frame (Transfer Goal 1, OT) long term goal (LTG);by discharge  -MR     Progress/Outcome (Transfer Goal 1, OT) new goal  -MR       Row Name 07/16/23 1542          Dressing Goal 1 (OT)    Activity/Device (Dressing Goal 1, OT) lower body dressing  -MR     [place]/Cues Needed (Dressing Goal 1, OT) minimum assist (75% or more patient effort);tactile cues required;verbal cues required  -MR     Time Frame (Dressing Goal 1, OT) long term goal (LTG);by discharge  -MR     Progress/Outcome (Dressing Goal 1, OT) new goal  -MR       Row Name 07/16/23 1542          Grooming Goal 1 (OT)    Activity/Device (Grooming Goal 1, OT) grooming skills, all;other (see comments)  standing unsupported at sink side  -MR     [place] (Grooming Goal 1, OT) contact guard required  -MR      Time Frame (Grooming Goal 1, OT) long term goal (LTG);by discharge  -MR     Progress/Outcome (Grooming Goal 1, OT) new goal  -MR       Row Name 07/16/23 1542          Therapy Assessment/Plan (OT)    Planned Therapy Interventions (OT) activity tolerance training;adaptive equipment training;BADL retraining;functional balance retraining;IADL retraining;occupation/activity based interventions;patient/caregiver education/training;transfer/mobility retraining;strengthening exercise;ROM/therapeutic exercise  -MR               User Key  (r) = Recorded By, (t) = Taken By, (c) = Cosigned By      Initials Name Provider Type    Renay Lott, OT Occupational Therapist                   Clinical Impression       Row Name 07/16/23 1539          Pain Assessment    Pretreatment Pain Rating 0/10 - no pain  -MR     Posttreatment Pain Rating 0/10 - no pain  -MR     Pain Intervention(s) Ambulation/increased activity;Repositioned  -MR       Row Name 07/16/23 1539          Plan of Care Review    Plan of Care Reviewed With patient  -MR     Progress no change  Initial Eval  -MR     Outcome Evaluation Pt presenting below her funcitonal baseline w/ mobility, balance, endurance and transfers limiting independence w/ ADL based tasks. Pt requiring assist for transfers, HH distances of mobility and LB dressing. IP OT warranted to address deficits. Recommend SNF at d/c for best functional outcome.  -MR       Row Name 07/16/23 153          Therapy Assessment/Plan (OT)    Patient/Family Therapy Goal Statement (OT) Return to PLOF  -MR     Rehab Potential (OT) good, to achieve stated therapy goals  -MR     Criteria for Skilled Therapeutic Interventions Met (OT) yes;meets criteria;skilled treatment is necessary  -MR     Therapy Frequency (OT) daily  -MR       Row Name 07/16/23 1539          Therapy Plan Review/Discharge Plan (OT)    Anticipated Discharge Disposition (OT) skilled nursing facility  -MR       Row Name 07/16/23 1539          Vital  Signs    Pre Systolic BP Rehab 93  -MR     Pre Treatment Diastolic BP 66  -MR     Intra Systolic BP Rehab 95  -MR     Intra Treatment Diastolic BP 68  -MR     Post Systolic BP Rehab 102  -MR     Post Treatment Diastolic BP 73  -MR     Pretreatment Heart Rate (beats/min) 97  -MR     Intratreatment Heart Rate (beats/min) 106  -MR     Posttreatment Heart Rate (beats/min) 104  -MR     Pre SpO2 (%) 86  -MR     O2 Delivery Pre Treatment room air  -MR     Intra SpO2 (%) 93  -MR     O2 Delivery Intra Treatment nasal cannula  -MR     Post SpO2 (%) 94  -MR     O2 Delivery Post Treatment nasal cannula  -MR     Pre Patient Position Supine  -MR     Intra Patient Position Standing  -MR     Post Patient Position Supine  -MR       Row Name 07/16/23 1539          Positioning and Restraints    Pre-Treatment Position in bed  -MR     Post Treatment Position bed  -MR     In Bed notified nsg;call light within reach;encouraged to call for assist;exit alarm on;fowlers;side rails up x2;with family/caregiver  -MR               User Key  (r) = Recorded By, (t) = Taken By, (c) = Cosigned By      Initials Name Provider Type    Renay Lott, OT Occupational Therapist                   Outcome Measures    No documentation.                   Occupational Therapy Education       Title: PT OT SLP Therapies (In Progress)       Topic: Occupational Therapy (In Progress)       Point: ADL training (Done)       Description:   Instruct learner(s) on proper safety adaptation and remediation techniques during self care or transfers.   Instruct in proper use of assistive devices.                  Learning Progress Summary             Patient Acceptance, E, VU,NR by MR at 7/16/2023 1544   Family Acceptance, E, VU,NR by MR at 7/16/2023 1544                         Point: Home exercise program (Not Started)       Description:   Instruct learner(s) on appropriate technique for monitoring, assisting and/or progressing therapeutic exercises/activities.                   Learner Progress:  Not documented in this visit.              Point: Precautions (Done)       Description:   Instruct learner(s) on prescribed precautions during self-care and functional transfers.                  Learning Progress Summary             Patient Acceptance, E, VU,NR by MR at 7/16/2023 1544   Family Acceptance, E, VU,NR by MR at 7/16/2023 1544                         Point: Body mechanics (Done)       Description:   Instruct learner(s) on proper positioning and spine alignment during self-care, functional mobility activities and/or exercises.                  Learning Progress Summary             Patient Acceptance, E, VU,NR by MR at 7/16/2023 1544   Family Acceptance, E, VU,NR by MR at 7/16/2023 1544                                         User Key       Initials Effective Dates Name Provider Type Discipline    MR 09/22/22 -  Renay Skaggs OT Occupational Therapist OT                  OT Recommendation and Plan  Planned Therapy Interventions (OT): activity tolerance training, adaptive equipment training, BADL retraining, functional balance retraining, IADL retraining, occupation/activity based interventions, patient/caregiver education/training, transfer/mobility retraining, strengthening exercise, ROM/therapeutic exercise  Therapy Frequency (OT): daily  Plan of Care Review  Plan of Care Reviewed With: patient  Progress: no change (Initial Eval)  Outcome Evaluation: Pt presenting below her funcitonal baseline w/ mobility, balance, endurance and transfers limiting independence w/ ADL based tasks. Pt requiring assist for transfers, HH distances of mobility and LB dressing. IP OT warranted to address deficits. Recommend SNF at d/c for best functional outcome.     Time Calculation:    Time Calculation- OT       Row Name 07/16/23 1544             Time Calculation- OT    OT Start Time 1326  -MR      OT Received On 07/16/23  -MR      OT Goal Re-Cert Due Date 07/26/23  -MR         Untimed Charges     OT Eval/Re-eval Minutes 46  -MR         Total Minutes    Untimed Charges Total Minutes 46  -MR       Total Minutes 46  -MR                User Key  (r) = Recorded By, (t) = Taken By, (c) = Cosigned By      Initials Name Provider Type    Renay Lott OT Occupational Therapist                  Therapy Charges for Today       Code Description Service Date Service Provider Modifiers Qty    [id number]  OT EVAL LOW COMPLEXITY 4 7/16/2023 Renay Skaggs OT GO 1                 Renay Skaggs OT  7/16/2023

## 2023-07-16 NOTE — SIGNIFICANT NOTE
Confused overnight. Got ABG, repeated labs, checked UA lactic etc    CT pending    - ABG back, placing on BiPAP. Ordered repeat ABG for 0600

## 2023-07-16 NOTE — PLAN OF CARE
Goal Outcome Evaluation:  Plan of Care Reviewed With: patient        Progress: no change (Initial Eval)  Outcome Evaluation: Pt presenting below her funcitonal baseline w/ mobility, balance, endurance and transfers limiting independence w/ ADL based tasks. Pt requiring assist for transfers, HH distances of mobility and LB dressing. IP OT warranted to address deficits. Recommend SNF at d/c for best functional outcome.      Anticipated Discharge Disposition (OT): skilled nursing facility

## 2023-07-17 ENCOUNTER — APPOINTMENT (OUTPATIENT)
Dept: GENERAL RADIOLOGY | Facility: HOSPITAL | Age: 83
DRG: 190 | End: 2023-07-17
Payer: MEDICARE

## 2023-07-17 LAB
ANA SER QL: NEGATIVE
ANION GAP SERPL CALCULATED.3IONS-SCNC: 5 MMOL/L (ref 5–15)
BASOPHILS # BLD AUTO: 0.05 10*3/MM3 (ref 0–0.2)
BASOPHILS NFR BLD AUTO: 0.5 % (ref 0–1.5)
BILIRUB UR QL STRIP: NEGATIVE
BUN SERPL-MCNC: 18 MG/DL (ref 8–23)
BUN/CREAT SERPL: 24.7 (ref 7–25)
C3 SERPL-MCNC: 117 MG/DL (ref 82–167)
C4 SERPL-MCNC: 13 MG/DL (ref 12–38)
CALCIUM SPEC-SCNC: 8.2 MG/DL (ref 8.6–10.5)
CHLORIDE SERPL-SCNC: 92 MMOL/L (ref 98–107)
CHROMATIN AB SERPL-ACNC: <0.2 AI (ref 0–0.9)
CLARITY UR: CLEAR
CO2 SERPL-SCNC: 37 MMOL/L (ref 22–29)
COLOR UR: YELLOW
CREAT SERPL-MCNC: 0.73 MG/DL (ref 0.57–1)
DEPRECATED RDW RBC AUTO: 48.5 FL (ref 37–54)
DSDNA AB SER-ACNC: <1 IU/ML (ref 0–9)
EGFRCR SERPLBLD CKD-EPI 2021: 81.7 ML/MIN/1.73
EOSINOPHIL # BLD AUTO: 0.23 10*3/MM3 (ref 0–0.4)
EOSINOPHIL NFR BLD AUTO: 2.5 % (ref 0.3–6.2)
ERYTHROCYTE [DISTWIDTH] IN BLOOD BY AUTOMATED COUNT: 13 % (ref 12.3–15.4)
GLUCOSE SERPL-MCNC: 89 MG/DL (ref 65–99)
GLUCOSE UR STRIP-MCNC: NEGATIVE MG/DL
HCT VFR BLD AUTO: 33.3 % (ref 34–46.6)
HGB BLD-MCNC: 10.5 G/DL (ref 12–15.9)
HGB UR QL STRIP.AUTO: NEGATIVE
IMM GRANULOCYTES # BLD AUTO: 0.07 10*3/MM3 (ref 0–0.05)
IMM GRANULOCYTES NFR BLD AUTO: 0.8 % (ref 0–0.5)
KETONES UR QL STRIP: NEGATIVE
LEUKOCYTE ESTERASE UR QL STRIP.AUTO: NEGATIVE
LYMPHOCYTES # BLD AUTO: 1.66 10*3/MM3 (ref 0.7–3.1)
LYMPHOCYTES NFR BLD AUTO: 17.8 % (ref 19.6–45.3)
MCH RBC QN AUTO: 31.7 PG (ref 26.6–33)
MCHC RBC AUTO-ENTMCNC: 31.5 G/DL (ref 31.5–35.7)
MCV RBC AUTO: 100.6 FL (ref 79–97)
MONOCYTES # BLD AUTO: 0.61 10*3/MM3 (ref 0.1–0.9)
MONOCYTES NFR BLD AUTO: 6.6 % (ref 5–12)
NEUTROPHILS NFR BLD AUTO: 6.68 10*3/MM3 (ref 1.7–7)
NEUTROPHILS NFR BLD AUTO: 71.8 % (ref 42.7–76)
NITRITE UR QL STRIP: NEGATIVE
NRBC BLD AUTO-RTO: 0 /100 WBC (ref 0–0.2)
PH UR STRIP.AUTO: 7.5 [PH] (ref 5–8)
PLATELET # BLD AUTO: 287 10*3/MM3 (ref 140–450)
PMV BLD AUTO: 8.4 FL (ref 6–12)
POTASSIUM SERPL-SCNC: 4.2 MMOL/L (ref 3.5–5.2)
POTASSIUM SERPL-SCNC: 4.5 MMOL/L (ref 3.5–5.2)
PROT UR QL STRIP: NEGATIVE
QT INTERVAL: 408 MS
QTC INTERVAL: 420 MS
RBC # BLD AUTO: 3.31 10*6/MM3 (ref 3.77–5.28)
SODIUM SERPL-SCNC: 134 MMOL/L (ref 136–145)
SP GR UR STRIP: 1.01 (ref 1–1.03)
UROBILINOGEN UR QL STRIP: NORMAL
WBC NRBC COR # BLD: 9.3 10*3/MM3 (ref 3.4–10.8)

## 2023-07-17 PROCEDURE — 80048 BASIC METABOLIC PNL TOTAL CA: CPT | Performed by: INTERNAL MEDICINE

## 2023-07-17 PROCEDURE — 99233 SBSQ HOSP IP/OBS HIGH 50: CPT | Performed by: INTERNAL MEDICINE

## 2023-07-17 PROCEDURE — 85025 COMPLETE CBC W/AUTO DIFF WBC: CPT | Performed by: INTERNAL MEDICINE

## 2023-07-17 PROCEDURE — 99232 SBSQ HOSP IP/OBS MODERATE 35: CPT | Performed by: INTERNAL MEDICINE

## 2023-07-17 PROCEDURE — 84132 ASSAY OF SERUM POTASSIUM: CPT

## 2023-07-17 PROCEDURE — 71045 X-RAY EXAM CHEST 1 VIEW: CPT

## 2023-07-17 PROCEDURE — 25010000002 PIPERACILLIN SOD-TAZOBACTAM PER 1 G: Performed by: INTERNAL MEDICINE

## 2023-07-17 PROCEDURE — 94799 UNLISTED PULMONARY SVC/PX: CPT

## 2023-07-17 PROCEDURE — 81003 URINALYSIS AUTO W/O SCOPE: CPT | Performed by: INTERNAL MEDICINE

## 2023-07-17 PROCEDURE — 94664 DEMO&/EVAL PT USE INHALER: CPT

## 2023-07-17 PROCEDURE — 25010000002 AZITHROMYCIN PER 500 MG: Performed by: INTERNAL MEDICINE

## 2023-07-17 RX ORDER — BUMETANIDE 0.25 MG/ML
0.5 INJECTION INTRAMUSCULAR; INTRAVENOUS
Status: DISCONTINUED | OUTPATIENT
Start: 2023-07-17 | End: 2023-07-24

## 2023-07-17 RX ADMIN — MIDODRINE HYDROCHLORIDE 5 MG: 5 TABLET ORAL at 11:25

## 2023-07-17 RX ADMIN — AZITHROMYCIN 500 MG: 500 INJECTION, POWDER, LYOPHILIZED, FOR SOLUTION INTRAVENOUS at 06:07

## 2023-07-17 RX ADMIN — MIDODRINE HYDROCHLORIDE 5 MG: 5 TABLET ORAL at 09:02

## 2023-07-17 RX ADMIN — IPRATROPIUM BROMIDE AND ALBUTEROL SULFATE 3 ML: 2.5; .5 SOLUTION RESPIRATORY (INHALATION) at 22:54

## 2023-07-17 RX ADMIN — BRIMONIDINE TARTRATE 1 DROP: 2 SOLUTION/ DROPS OPHTHALMIC at 09:04

## 2023-07-17 RX ADMIN — TIMOLOL MALEATE 1 DROP: 5 SOLUTION/ DROPS OPHTHALMIC at 21:17

## 2023-07-17 RX ADMIN — MIRTAZAPINE 15 MG: 15 TABLET, FILM COATED ORAL at 21:16

## 2023-07-17 RX ADMIN — PIPERACILLIN SODIUM AND TAZOBACTAM SODIUM 3.38 G: 3; .375 INJECTION, SOLUTION INTRAVENOUS at 17:02

## 2023-07-17 RX ADMIN — MIDODRINE HYDROCHLORIDE 5 MG: 5 TABLET ORAL at 16:30

## 2023-07-17 RX ADMIN — Medication 5 MG: at 21:16

## 2023-07-17 RX ADMIN — BUMETANIDE 0.5 MG: 0.25 INJECTION, SOLUTION INTRAMUSCULAR; INTRAVENOUS at 09:02

## 2023-07-17 RX ADMIN — FLUDROCORTISONE ACETATE 0.1 MG: 0.1 TABLET ORAL at 09:20

## 2023-07-17 RX ADMIN — IPRATROPIUM BROMIDE AND ALBUTEROL SULFATE 3 ML: 2.5; .5 SOLUTION RESPIRATORY (INHALATION) at 19:09

## 2023-07-17 RX ADMIN — PAROXETINE HYDROCHLORIDE 10 MG: 10 TABLET, FILM COATED ORAL at 21:16

## 2023-07-17 RX ADMIN — APIXABAN 2.5 MG: 2.5 TABLET, FILM COATED ORAL at 21:16

## 2023-07-17 RX ADMIN — Medication 10 ML: at 21:17

## 2023-07-17 RX ADMIN — METOPROLOL TARTRATE 2.5 MG: 5 INJECTION INTRAVENOUS at 10:48

## 2023-07-17 RX ADMIN — GALANTAMINE 8 MG: 4 TABLET, FILM COATED ORAL at 17:02

## 2023-07-17 RX ADMIN — APIXABAN 2.5 MG: 2.5 TABLET, FILM COATED ORAL at 11:26

## 2023-07-17 RX ADMIN — SENNOSIDES AND DOCUSATE SODIUM 2 TABLET: 50; 8.6 TABLET ORAL at 21:16

## 2023-07-17 RX ADMIN — IPRATROPIUM BROMIDE AND ALBUTEROL SULFATE 3 ML: 2.5; .5 SOLUTION RESPIRATORY (INHALATION) at 07:06

## 2023-07-17 RX ADMIN — PIPERACILLIN SODIUM AND TAZOBACTAM SODIUM 3.38 G: 3; .375 INJECTION, SOLUTION INTRAVENOUS at 01:52

## 2023-07-17 RX ADMIN — ACETAZOLAMIDE 125 MG: 250 TABLET ORAL at 09:04

## 2023-07-17 RX ADMIN — SODIUM CHLORIDE, POTASSIUM CHLORIDE, SODIUM LACTATE AND CALCIUM CHLORIDE 500 ML: 600; 310; 30; 20 INJECTION, SOLUTION INTRAVENOUS at 00:13

## 2023-07-17 RX ADMIN — TIMOLOL MALEATE 1 DROP: 5 SOLUTION/ DROPS OPHTHALMIC at 09:04

## 2023-07-17 RX ADMIN — GUAIFENESIN 600 MG: 600 TABLET, EXTENDED RELEASE ORAL at 21:16

## 2023-07-17 RX ADMIN — Medication 10 ML: at 10:49

## 2023-07-17 RX ADMIN — PIPERACILLIN SODIUM AND TAZOBACTAM SODIUM 3.38 G: 3; .375 INJECTION, SOLUTION INTRAVENOUS at 09:06

## 2023-07-17 RX ADMIN — IPRATROPIUM BROMIDE AND ALBUTEROL SULFATE 3 ML: 2.5; .5 SOLUTION RESPIRATORY (INHALATION) at 16:15

## 2023-07-17 RX ADMIN — GALANTAMINE 8 MG: 4 TABLET, FILM COATED ORAL at 09:05

## 2023-07-17 RX ADMIN — BRIMONIDINE TARTRATE 1 DROP: 2 SOLUTION/ DROPS OPHTHALMIC at 21:17

## 2023-07-17 RX ADMIN — DILTIAZEM HYDROCHLORIDE 5 MG/HR: 5 INJECTION INTRAVENOUS at 16:07

## 2023-07-17 NOTE — PLAN OF CARE
Goal Outcome Evaluation:      Pts mentation got better as the day went on. A&O to self and lethargic at the beg of the shift and now alert, conversing with staff, and A&Ox3. Voiding well. BP's soft all day. Pt has been in A[place] in the 130's all shift-cardiologist and hospitalist aware. Prn IV metop given with no changes. Cardizem drip started this evening @ 5mg which has not decreased HR, only decreased BP. Drip stopped this evening d/t BP trending down. Pt asymptomatic and otherwise VSS. Plans to be NPO at [facility] and [facility]/cardioversion tomorrow-family aware.

## 2023-07-17 NOTE — CASE MANAGEMENT/SOCIAL WORK
Discharge Planning Assessment  [facility]     Patient Name: Vita Miller  MRN: [id number]  Today's Date: 7/17/2023    Admit Date: 7/14/2023    Plan: home with home health vs SNF   Discharge Needs Assessment       Row Name 07/17/23 1231       Living Environment    People in Home alone    Current Living Arrangements home    Potentially Unsafe Housing Conditions none    Primary Care Provided by self    Provides Primary Care For no one    Able to Return to Prior Arrangements yes       Resource/Environmental Concerns    Resource/Environmental Concerns none       Food Insecurity    Within the past 12 months, you worried that your food would run out before you got the money to buy more. Never true    Within the past 12 months, the food you bought just didn't last and you didn't have money to get more. Never true       Transition Planning    Patient/Family Anticipates Transition to home    Patient/Family Anticipated Services at Transition other (see comments)  home health and caregivers    Transportation Anticipated family or friend will provide       Discharge Needs Assessment    Readmission Within the Last 30 Days no previous admission in last 30 days    Equipment Currently Used at Home oxygen    Concerns to be Addressed discharge planning    Equipment Needed After Discharge walker, rolling                   Discharge Plan       Row Name 07/17/23 1234       Plan    Plan home with home health vs SNF    Patient/Family in Agreement with Plan yes    Plan Comments Met with Ms. Miller and her son at the bedside to initiate discharge plan. PT/OT are  recommending SNF at discharge. Patient and son are currently refusing rehab and stated they want home health at discharge. Son reported that the family is currently working on finding a care giver agency for Ms. Miller. Ms. Miller lives alone in a home in [place] [facility]. She and son reported that she was independent with ADLs prior to this admission. Ms. Miller reported she  recently got home O2 but could not remember from where. She reported her only other DME is a straight cane. Patient's son reported he thinks Ms. Miller would benefit from a rolling walker.  placed order with Magen from [employer].  Walker will be delivered to patient room.  Patient denied any current outpatient services.  will continue to follow plan of care and assist with discharge needs as indicated.    Final Discharge Disposition Code 06 - home with home health care                  Continued Care and Services - Admitted Since 7/14/2023    Coordination has not been started for this encounter.       Expected Discharge Date and Time       Expected Discharge Date Expected Discharge Time    Jul 19, 2023            Demographic Summary       Row Name 07/17/23 1230       General Information    Admission Type inpatient    Arrived From home    Referral Source admission list    Reason for Consult discharge planning    Preferred Language English       Contact Information    Permission Granted to Share Info With     Contact Information Obtained for     Contact Information Comments Arun Miller         Son         [phone]                   Functional Status       Row Name 07/17/23 1231       Functional Status    Usual Activity Tolerance good    Current Activity Tolerance --  see PT note       Assessment of Health Literacy    How often do you have someone help you read hospital materials? Sometimes    How often do you have problems learning about your medical condition because of difficulty understanding written information? Sometimes    How often do you have a problem understanding what is told to you about your medical condition? Sometimes    How confident are you filling out medical forms by yourself? Somewhat    Health Literacy Fair       Functional Status, IADL    Medications independent    Meal Preparation independent    Housekeeping independent    Laundry independent     Shopping independent       Mental Status    General Appearance WDL WDL       Mental Status Summary    Recent Changes in Mental Status/Cognitive Functioning no changes                   Psychosocial    No documentation.                  Abuse/Neglect    No documentation.                  Legal    No documentation.                  Substance Abuse    No documentation.                  Patient Forms    No documentation.                     Loly Moreira RN

## 2023-07-17 NOTE — PLAN OF CARE
Goal Outcome Evaluation:                 Pt answered most of orientation questions in the evening, but very confused at night and oriented to only self. Anxious intermittently but slept well most of time.

## 2023-07-17 NOTE — PROGRESS NOTES
[facility]  PROGRESS NOTE    Patient Name: Vita Miller  : 1940  MRN: [id number]    Date of Admission: 2023  Primary Care Physician: Alfonso Steele MD    Subjective   Subjective     CC:  Follow-up PNA    HPI:  Per nursing did not tolerate bipap, was drowsy/confused.  Patient with no complaints but confused.      ROS:  Gen- No fevers, chills  CV- No chest pain, palpitations  Resp-as above  GI- No N/V/D, abd pain     Objective   Objective     Vital Signs:   Temp:  [97.8 øF (36.6 øC)-98.5 øF (36.9 øC)] 98.5 øF (36.9 øC)  Heart Rate:  [] 135  Resp:  [15-22] 16  BP: ()/(53-87) 116/68  Flow (L/min):  [1-2] 2     Physical Exam:  Constitutional: No acute distress, awake, alert  HENT: NCAT, mucous membranes moist  Respiratory: bibasilar crackles  Cardiovascular: IRIR, aflutter 132, no murmurs, rubs, or gallops  Gastrointestinal: Positive bowel sounds, soft, nontender, nondistended  Musculoskeletal: No bilateral ankle edema  Psychiatric: Appropriate affect, cooperative  Neurologic: Oriented to self, place, year, speech clear  Skin: No rashes    Results Reviewed:  LAB RESULTS:      Lab 23  0402 23  0332 07/15/23  0340 23  2241   WBC 9.30 12.73*  --  13.02*   HEMOGLOBIN 10.5* 10.0*  --  14.3   HEMATOCRIT 33.3* 30.6*  --  43.0   PLATELETS 287 273  --  353   NEUTROS ABS 6.68 11.29*  --  10.73*   IMMATURE GRANS (ABS) 0.07* 0.07*  --  0.09*   LYMPHS ABS 1.66 0.71  --  0.95   MONOS ABS 0.61 0.64  --  0.99*   EOS ABS 0.23 0.01  --  0.20   .6* 97.8*  --  97.7*   SED RATE  --  32*  --  99*   CRP  --   --   --  14.66*   PROCALCITONIN  --   --   --  0.13   LACTATE  --  0.9 1.0  --          Lab 23  0402 23  0332 23  2241   SODIUM 134* 129* 129*   POTASSIUM 4.2 4.0 4.5   CHLORIDE 92* 87* 87*   CO2 37.0* 33.0* 30.0*   ANION GAP 5.0 9.0 12.0   BUN 18 24* 19   CREATININE 0.73 1.09* 0.79   EGFR 81.7 50.5* 74.3   GLUCOSE 89  117* 125*   CALCIUM 8.2* 8.0* 9.1   MAGNESIUM  --  1.9 1.9   TSH  --   --  13.160*         Lab 07/16/23  0332 07/14/23  2241   TOTAL PROTEIN 6.0 6.8   ALBUMIN 3.6 3.5   GLOBULIN 2.4 3.3   ALT (SGPT) 14 24   AST (SGOT) 16 23   BILIRUBIN 0.2 0.4   ALK PHOS 53 90         Lab 07/14/23  2241   PROBNP 6,301.0*   HSTROP T 16*                 Lab 07/16/23  0518 07/16/23  0256   PH, ARTERIAL 7.380 7.325*   PCO2, ARTERIAL 60.2* 67.7*   PO2 .0* 71.6*   FIO2 30 24   HCO3 ART 35.6* 35.2*   BASE EXCESS ART 8.7* 7.2*   CARBOXYHEMOGLOBIN 1.3 1.4     Brief Urine Lab Results  (Last result in the past 365 days)        Color   Clarity   Blood   Leuk Est   Nitrite   Protein   CREAT   Urine HCG        07/16/23 0347 Yellow   Clear   Negative   Negative   Negative   Negative                   Microbiology Results Abnormal       Procedure Component Value - Date/Time    Blood Culture - Blood, Arm, Left [[id number]]  (Normal) Collected: 07/15/23 0340    Lab Status: Preliminary result Specimen: Blood from Arm, Left Updated: 07/17/23 0730     Blood Culture No growth at 2 days    Blood Culture - Blood, Arm, Right [[id number]]  (Normal) Collected: 07/15/23 0340    Lab Status: Preliminary result Specimen: Blood from Arm, Right Updated: 07/17/23 0730     Blood Culture No growth at 2 days    S. Pneumo Ag Urine or CSF - Urine, Urine, Clean Catch [[id number]]  (Normal) Collected: 07/15/23 0341    Lab Status: Final result Specimen: Urine, Clean Catch Updated: 07/15/23 1356     Strep Pneumo Ag Negative    Legionella Antigen, Urine - Urine, Urine, Clean Catch [[id number]]  (Normal) Collected: 07/15/23 0341    Lab Status: Final result Specimen: Urine, Clean Catch Updated: 07/15/23 1356     LEGIONELLA ANTIGEN, URINE Negative    MRSA Screen, PCR (Inpatient) - Swab, Nares [[id number]]  (Normal) Collected: 07/15/23 0310    Lab Status: Final result Specimen: Swab from Nares Updated: 07/15/23 0846     MRSA PCR Negative    Narrative:      The negative  predictive value of this diagnostic test is high and should only be used to consider de-escalating anti-MRSA therapy. A positive result may indicate colonization with MRSA and must be correlated clinically.  MRSA Negative    COVID PRE-OP / PRE-PROCEDURE SCREENING ORDER (NO ISOLATION) - Swab, Nasopharynx [[id number]]  (Normal) Collected: 07/15/23 0310    Lab Status: Final result Specimen: Swab from Nasopharynx Updated: 07/15/23 0410    Narrative:      The following orders were created for panel order COVID PRE-OP / PRE-PROCEDURE SCREENING ORDER (NO ISOLATION) - Swab, Nasopharynx.  Procedure                               Abnormality         Status                     ---------                               -----------         ------                     Respiratory Panel PCR w/...[[id number]]  Normal              Final result                 Please view results for these tests on the individual orders.    Respiratory Panel PCR w/COVID-19(SARS-CoV-2) JENNA/KELSI/NORMAN/PAD/COR/MAD/ARSEN In-House, NP Swab in UTM/VTM, 3-4 HR TAT - Swab, Nasopharynx [[id number]]  (Normal) Collected: 07/15/23 0310    Lab Status: Final result Specimen: Swab from Nasopharynx Updated: 07/15/23 0410     ADENOVIRUS, PCR Not Detected     Coronavirus 229E Not Detected     Coronavirus HKU1 Not Detected     Coronavirus NL63 Not Detected     Coronavirus OC43 Not Detected     COVID19 Not Detected     Human Metapneumovirus Not Detected     Human Rhinovirus/Enterovirus Not Detected     Influenza A PCR Not Detected     Influenza B PCR Not Detected     Parainfluenza Virus 1 Not Detected     Parainfluenza Virus 2 Not Detected     Parainfluenza Virus 3 Not Detected     Parainfluenza Virus 4 Not Detected     RSV, PCR Not Detected     Bordetella pertussis pcr Not Detected     Bordetella parapertussis PCR Not Detected     Chlamydophila pneumoniae PCR Not Detected     Mycoplasma pneumo by PCR Not Detected    Narrative:      In the setting of a positive respiratory panel  with a viral infection PLUS a negative procalcitonin without other underlying concern for bacterial infection, consider observing off antibiotics or discontinuation of antibiotics and continue supportive care. If the respiratory panel is positive for atypical bacterial infection (Bordetella pertussis, Chlamydophila pneumoniae, or Mycoplasma pneumoniae), consider antibiotic de-escalation to target atypical bacterial infection.            CT Head Without Contrast    Result Date: 7/16/2023  EXAMINATION: CT HEAD WITHOUT CONTRAST DATE: 7/16/2023 3:02 AM INDICATION: Delirium COMPARISON: MR brain 10/27/2020 TECHNIQUE: Noncontrast imaging obtained from the vertex to the skull base.  CT dose lowering techniques were used, to include: automated exposure control, adjustment for patient size, and or use of iterative reconstruction.? FINDINGS: Soft Tissues: No significant soft tissue abnormality. Skull: No underlying skull fracture or radiopaque foreign body. Sinuses: Paranasal sinuses are clear. Mastoids: Mastoid air cells are clear. Globes and Orbits: Globes and orbits are intact. Brain: No acute hemorrhage.  No midline shift, masses, or mass effect.  No evidence of acute infarct by noncontrast CT. Ventricles and Cisterns: Ventricular size and configuration is within normal limits. Basal cisterns are patent. No abnormal extra-axial fluid collection. Senescent Changes: Mild volume loss and chronic microvascular ischemic changes. Arteriosclerosis.     Impression: 1. No acute intracranial abnormality. 2. Mild volume loss and chronic microvascular ischemic changes. Electronically signed by:  Magen Sparks M.D.  7/16/2023 2:42 AM Mountain Time    CT Chest Without Contrast Diagnostic    Result Date: 7/16/2023  CT CHEST WO CONTRAST DIAGNOSTIC Date of Exam: 7/16/2023 11:28 AM EDT Indication: follow-up PNA vs pulmonary edema. Comparison: CT angiogram chest 7/15/2023 Technique: Axial CT images were obtained of the chest without  contrast administration.  Reconstructed coronal and sagittal images were also obtained. Automated exposure control and iterative construction methods were used. Findings: Hilum and Mediastinum: No pathologically enlarged lymph nodes. Cardiomegaly. Mild to moderate coronary artery atherosclerotic disease. No pericardial effusion. Unremarkable thoracic aorta and pulmonary arteries. Lung Parenchyma and Pleura: No moderate size left small size right pleural effusion. Geographic groundglass and septal thickening central predominant similar to previous exam. Bronchial wall thickening. Bronchiectasis diffuse but worse in the lingula and  right middle lobe and lower lobes. Consolidation in the left lower lobe less pronounced consolidation in the right lower lobe. Noncalcified nodule left lower lobe image #56 measures 1.2 cm. This is unchanged since 6/17/2023. There are smaller noncalcified nodules. These are not significantly changed. One of the largest in the right lower lobe on image #53 measures 9 mm. Upper Abdomen: Unremarkable. Soft tissues: Diffuse anasarca. Osseous structures: No aggressive focal lytic or sclerotic osseous lesions. Osteopenia.     Impression: 1.There groundglass opacity central predominant with septal thickening new from June study. 2.There is bronchiectasis throughout the lungs most pronounced in the lower lungs with consolidation left greater right lower lobe, moderate size left small size right pleural effusion. 3.There are scattered noncalcified pulmonary nodules which appear stable since 6/17/2023. Short-term follow-up CT chest and/or PET/CT recommended for better evaluation. The groundglass findings are likely due to superimposed infection. Organizing pneumonia or pulmonary edema or pulmonary hemorrhage thought to be less likely. Electronically Signed: Stephanie Casper  7/16/2023 11:45 AM EDT  Workstation ID: [id number]    XR Chest 1 View    Result Date: 7/17/2023  XR CHEST 1 VW Date of Exam: 7/17/2023  6:07 AM EDT Indication: pleural effusion Comparison: CT chest 7/16/2023, chest x-ray 7/14/2023 Findings: Stable cardiomediastinal silhouette within normal limits. Similar patchy bilateral parenchymal and interstitial opacities compatible with multifocal infection; this appears grossly unchanged compared to prior chest x-ray from 7/14/2023. There are small bilateral pleural effusions, unchanged. No pneumothorax.     Impression: Impression: Similar appearance of multifocal pneumonia and small bilateral pleural effusions. Electronically Signed: Chago Holcomb  7/17/2023 8:40 AM EDT  Workstation ID: [id number]     Results for orders placed during the hospital encounter of 06/16/23    Adult Transthoracic Echo Complete W/ Cont if Necessary Per Protocol    Interpretation Summary    Left ventricular systolic function is normal. Estimated left ventricular EF = 60%    Left ventricular diastolic function was normal.    Mild aortic insufficiency.    Trace to mild mitral and tricuspid regurgitation.    Calculated right ventricular systolic pressure from tricuspid regurgitation is 20 mmHg.    There is a trivial pericardial effusion.      Current medications:  Scheduled Meds:acetaZOLAMIDE, 125 mg, Oral, Daily  apixaban, 2.5 mg, Oral, Q12H  ascorbic acid, 250 mg, Oral, Daily  azithromycin, 500 mg, Intravenous, Q24H  brimonidine, 1 drop, Both Eyes, Q12H   And  timolol, 1 drop, Both Eyes, Q12H  bumetanide, 0.5 mg, Intravenous, BID  digoxin, 125 mcg, Oral, Every Other Day  fludrocortisone, 0.1 mg, Oral, Daily  galantamine, 8 mg, Oral, BID With Meals  guaiFENesin, 600 mg, Oral, Q12H  ipratropium-albuterol, 3 mL, Nebulization, Q4H - RT  levothyroxine, 88 mcg, Oral, Daily  midodrine, 5 mg, Oral, TID AC  mirtazapine, 15 mg, Oral, Nightly  pantoprazole, 40 mg, Oral, Q AM  PARoxetine, 10 mg, Oral, Nightly  piperacillin-tazobactam, 3.375 g, Intravenous, Q8H  senna-docusate sodium, 2 tablet, Oral, BID  sodium chloride, 10 mL, Intravenous,  Q12H      Continuous Infusions:     PRN Meds:.  acetaminophen **OR** acetaminophen **OR** acetaminophen    senna-docusate sodium **AND** polyethylene glycol **AND** bisacodyl **AND** bisacodyl    Magnesium Standard Dose Replacement - Follow Nurse / BPA Driven Protocol    melatonin    metoprolol tartrate    ondansetron    Potassium Replacement - Follow Nurse / BPA Driven Protocol    sodium chloride    sodium chloride    sodium chloride    Assessment & Plan   Assessment & Plan     Active Hospital Problems    Diagnosis  POA    **Acute respiratory failure with hypoxia [J96.01]  Yes    Pneumonia of both lungs due to infectious organism [J18.9]  Unknown    Hyponatremia [E87.1]  Unknown    Severe Malnutrition (HCC) [E43]  Yes    Atrial fibrillation with RVR [I48.91]  Yes    Cachexia [R64]  Yes    Senile osteoporosis [M81.0]  Yes    Hashimoto's thyroiditis [E06.3]  Yes    Primary open angle glaucoma of both eyes, severe stage [H40.1133]  Yes    Mixed anxiety and depressive disorder [F41.8]  Yes    Multiple nodules of lung [R91.8]  Yes    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        Brief Hospital Course to date:  Vita Miller is a 83 y.o. female with history of hypothyroidism, COPD with chronic bronchiectasis, history of chronic MAC with unknown intolerance to prior treatment plan, history of orthostatic hypotension, hyperlipidemia, osteoporosis, bilateral glaucoma, mild memory deficit, who was recently admitted to our facility approximately 1 month ago for new onset atrial fibrillation with RVR who returns to the ER tonight with complaints of palpitations and shortness of breath.     This patient's problems and plans were  partially entered by my partner and updated as appropriate by me 07/17/23.     Acute respiratory failure with hypoxia  Bilateral pneumonia/infiltrates  History of MAC, intolerant to treatment  COPD with acute exacerbation, chronic bronchiectasis  -- Vancomycin, Zosyn, azithromycin  -- Pulmonology following  --Off steroids  --ANCA, RF, ESR, SLE labs pending  --Continue Zosyn, azith for now; stopped vanc given negative MRSA screen  -- Duo nebs  -- Flutter valve, mucinex  --cultures, pcr negative, strep negative, legionella negative  -- diuresis per cards today with low dose bumex  --CT chest showed new GGO, bronchiectasis, scattered nodules.  Needs f/u CT or PET scan     Atrial fibrillation with rvr no in aflutter  --s/p esmolol drip  --eliquis  --flecainide --> bisoprolol ---> changed to digoxin per cards  --cards recommended midodrine  --cards following.  Now in aflutter at rate 130's.  S/p IV metoprolol with no effect.  S/p dig yesterday, ordered Qod.  Flecainide d/t concerns of possibility of pneumonitis so can't give amiodarone either per cards.  Called and d/w Dr. Mack who recs trying diltiazem gtt at rate of 5/hr with no bolus.  He plans possible DCCV in AM and discussed this with Dr. Hook with pulm.  Will keep NPO after MN.  I Called and d/w son/POA over the phone who plans to d/w Dr. Steele.  --LVEF 60%, normal diastolic dysfunction on echo from 6/17/2023    Altered mental status  --Suspect component of hospital delirium, also had some CO2 retention on ABG from overnight that improved with Bipap  --confused again this AM.  ABG looks compensated today with chronic hypercarbia.  Sodium now much better.  Will check u/a, B12, RPR     Hyponatremia  --Na 129 on admission; suspect related to volume overload  --improved     Orthostatic hypotension  --continue fludricortisone and midodrine     Hypothyroidism  --slightly elevated tsh  --continue synthroid, dose adjusted on 7/16; repeat tsh in 4-6 weeks; careful  adjustment given RVR     Other chronic conditions: HLD, malnutrition, anxiety/depression, memory impairment  --continue home meds  --nutrition  --pt/ot    Social  ----partner discussed with the family that only one family member will be updated. They elected the son.  I called and updated Arun Miller  (son and POA) over the phone today and he would like to talk with Dr. Steele before deciding about DCCV.         Expected Discharge Location and Transportation: Home  Expected Discharge   Expected Discharge Date: 7/19/2023; Expected Discharge Time:      DVT prophylaxis:  Medical and mechanical DVT prophylaxis orders are present.     AM-PAC 6 Clicks Score (PT): 18 (07/15/23 2000)    CODE STATUS:   Code Status and Medical Interventions:   Ordered at: 07/15/23 0214     Code Status (Patient has no pulse and is not breathing):    CPR (Attempt to Resuscitate)     Medical Interventions (Patient has pulse or is breathing):    Full Support       Neftaly Belcher MD  07/17/23

## 2023-07-17 NOTE — PROGRESS NOTES
Intensive Care Follow-up     Hospital:  LOS: 2 days   Ms. Vita Miller, 83 y.o. female is followed for:   Acute respiratory failure with hypoxia            History of present illness:   83-year-old female with history of Mycobacterium avium complex and bronchiectasis.  Patient previously has been treated for MAC.  She apparently has been intolerant of a lot of those medications and apparently has been not getting any treatment at this point.  She uses DuoNebs and percussion vest for her bronchiectasis.  Patient apparently was placed on supplemental oxygen recently as well.  Patient apparently was on Breo previously But was taken off due to concern about open-angle glaucoma which has required surgery x2. Patient was hospitalized on June 16 with new onset atrial fibrillation.  She was placed on flecainide and Eliquis at that time.  Patient readmitted with complains of palpitations and worsening shortness of breath.  Patient underwent work-up with CT scan of the chest which showed significant groundglass opacities bilaterally which were not present on a CT scan a month ago.  She was also started on esmolol and digoxin for rate control.  She was initially started on steroids with concern for vasculitis but that was stopped.  Patient apparently denied any new onset of fever, chest pain.  She does have chronic sputum production-year-old for which she uses nebulizer and percussion vest.  Flecainide has been put on hold as well.  She was also found to be hypercapnic so has been on and off BiPAP support.  Repeat blood gases showed improvement.    Subjective   Interval History:  Patient unable to provide much history today.  She is quite confused and delirious.  She is not sure of her whereabouts currently.  She kept on asking where she is at and if she is alone and where her family is.  She is wearing anywhere from room air to 2 L nasal cannula oxygen currently.                 The patient's past medical, surgical and  "social history were reviewed and updated in Epic as appropriate.       Objective     Infusions:     Medications:  acetaZOLAMIDE, 125 mg, Oral, Daily  apixaban, 2.5 mg, Oral, Q12H  ascorbic acid, 250 mg, Oral, Daily  azithromycin, 500 mg, Intravenous, Q24H  brimonidine, 1 drop, Both Eyes, Q12H   And  timolol, 1 drop, Both Eyes, Q12H  bumetanide, 0.5 mg, Intravenous, BID  digoxin, 125 mcg, Oral, Every Other Day  fludrocortisone, 0.1 mg, Oral, Daily  galantamine, 8 mg, Oral, BID With Meals  guaiFENesin, 600 mg, Oral, Q12H  ipratropium-albuterol, 3 mL, Nebulization, Q4H - RT  levothyroxine, 88 mcg, Oral, Daily  midodrine, 5 mg, Oral, TID AC  mirtazapine, 15 mg, Oral, Nightly  pantoprazole, 40 mg, Oral, Q AM  PARoxetine, 10 mg, Oral, Nightly  piperacillin-tazobactam, 3.375 g, Intravenous, Q8H  senna-docusate sodium, 2 tablet, Oral, BID  sodium chloride, 10 mL, Intravenous, Q12H        Vital Sign Min/Max for last 24 hours  Temp  Min: 97.6 øF (36.4 øC)  Max: 98.5 øF (36.9 øC)   BP  Min: 71/53  Max: 117/87   Pulse  Min: 80  Max: 134   Resp  Min: 15  Max: 22   SpO2  Min: 90 %  Max: 100 %   Flow (L/min)  Min: 1  Max: 2       Input/Output for last 24 hour shift  07/16 0701 - 07/17 0700  In: 1040 [P.O.:840]  Out: 1050 [Urine:1050]      Objective:  Vital signs: (most recent): Blood pressure 116/68, pulse (!) 133, temperature 98.5 øF (36.9 øC), temperature source Oral, resp. rate 16, height 172.7 cm (68\"), weight 46.3 kg (102 lb), SpO2 97 %.          General Appearance: Awake, in no acute distress. Confused and delirious.    Lungs:   B/L Breath sounds present with decreased breath sounds on bases, no wheezing heard, basilar crackles.   Heart: S1 and S2 present, A flutter with RVR  Extremities:  no cyanosis or clubbing,  no edema.  Neurologic:  Moving all four extremities. Confused.       Results from last 7 days   Lab Units 07/17/23  0402 07/16/23  0332 07/14/23  2241   WBC 10*3/mm3 9.30 12.73* 13.02*   HEMOGLOBIN g/dL 10.5* " 10.0* 14.3   PLATELETS 10*3/mm3 287 273 353     Results from last 7 days   Lab Units 07/17/23  0402 07/16/23  0332 07/14/23  2241   SODIUM mmol/L 134* 129* 129*   POTASSIUM mmol/L 4.2 4.0 4.5   CO2 mmol/L 37.0* 33.0* 30.0*   BUN mg/dL 18 24* 19   CREATININE mg/dL 0.73 1.09* 0.79   MAGNESIUM mg/dL  --  1.9 1.9   GLUCOSE mg/dL 89 117* 125*     Estimated Creatinine Clearance: 42.7 mL/min (by C-G formula based on SCr of 0.73 mg/dL).    Results from last 7 days   Lab Units 07/16/23  0518   PH, ARTERIAL pH units 7.380   PCO2, ARTERIAL mm Hg 60.2*   PO2 ART mm Hg 117.0*       Images:   Chest x-ray reviewed personally and showed bilateral multifocal pneumonia with small bilateral pleural effusions.  Left upper lobe opacity seems little less dense on chest x-ray today    I reviewed the patient's results and images.     CT scan of the chest reviewed and showed bilateral multifocal groundglass opacities with lower lobe predominant bronchiectasis and small left pleural effusion.    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  Patient presented with complaints of worsening shortness of breath, palpitations and atrial flutter with rapid ventricular rate.  Continue further management per cardiology.  2.  Patient has significant groundglass opacities which are new compared to before.  Work-up for vasculitis and rheumatology conditions have been sent.  We will follow-up on the result  of those.  Wondering if it could be all pulmonary edema pattern from rapid ventricular rate and decompensation secondary to that. ProBNP is elevated.  Continue diuresis in the meantime..  Nebulizer therapy.  Continue pulmonary toilet.  3.  It could still be exacerbation of her MAC disease.  However clinically she is not presenting with any symptoms of fevers, weight loss though it has been difficult to obtain history from her today.  Will monitor for now.  4.  NIV support as needed.  5.  Continue current broad-spectrum antibiotics for possible pneumonia.  Follow cultures.  6.  Patient is currently encephalopathic and delirious.  Continue further management per hospitalist team.    We will continue to follow along from pulmonary standpoint.    Ventura Hook MD, [facility]  Pulmonary, Critical care and Sleep Medicine

## 2023-07-17 NOTE — PROGRESS NOTES
Vita Miller  [id number]  1940   LOS: 2 days   Patient Care Team:  Alfonso Steele MD as PCP - General (Internal Medicine)    Chief Complaint:  PAF / SEVERE LUNG DZ WITH CHRONIC HYPOXIC HYPERCARBIC RESPIRATORY FAILURE / HFpEF / CONFUSION / HYPONATREMIA    Subjective     Comfortable, alert, and conversant this morning but remains confused and disoriented.  No cough, sputum production, pleurisy, fever, or chills.  Suboptimal appetite.    Objective     Vital Sign Min/Max for last 24 hours  Temp  Min: 97.6 øF (36.4 øC)  Max: 98.1 øF (36.7 øC)   BP  Min: 71/53  Max: 117/87   Pulse  Min: 80  Max: 131   Resp  Min: 15  Max: 22   SpO2  Min: 90 %  Max: 100 %               07/14/23 2224   Weight: 46.3 kg (102 lb)         Intake/Output Summary (Last 24 hours) at 7/17/2023 0740  Last data filed at 7/16/2023 1707  Gross per 24 hour   Intake 1040 ml   Output 1050 ml   Net -10 ml       Physical Exam:     General Appearance:    Alert, cooperative, in no acute distress   Lungs:   Diminished breath sounds with scattered rhonchi and wheezes with bibasilar crackles,respirations regular, even and unlabored    Heart:    Regular and rapid rate, normal S1 and S2, grade 1/6 systolic murmur, no gallop, no rub, no click   Abdomen:  Extremities:   Soft, nontender, bowel sounds audible x4    No edema, normal range of motion   Pulses:   Pulses palpable and equal bilaterally      Results Review:   Results from last 7 days   Lab Units 07/17/23  0402 07/16/23  0332 07/14/23 2241   SODIUM mmol/L 134* 129* 129*   POTASSIUM mmol/L 4.2 4.0 4.5   CHLORIDE mmol/L 92* 87* 87*   CO2 mmol/L 37.0* 33.0* 30.0*   BUN mg/dL 18 24* 19   CREATININE mg/dL 0.73 1.09* 0.79   GLUCOSE mg/dL 89 117* 125*   CALCIUM mg/dL 8.2* 8.0* 9.1     Results from last 7 days   Lab Units 07/17/23  0402 07/16/23  0332 07/14/23 2241   WBC 10*3/mm3 9.30 12.73* 13.02*   HEMOGLOBIN g/dL 10.5* 10.0* 14.3   HEMATOCRIT % 33.3* 30.6* 43.0   PLATELETS 10*3/mm3 287 822 790      Results from last 7 days   Lab Units 07/14/23  2241   HSTROP T ng/L 16*     ABGs (FiO2 0.30): pH 7.38, PCO2 60, PO2 117 (98% saturation)    CHEST CT WO CONTRAST:    Findings:    Hilum and Mediastinum: No pathologically enlarged lymph nodes. Cardiomegaly. Mild to moderate coronary artery atherosclerotic disease. No pericardial effusion. Unremarkable thoracic aorta and pulmonary arteries.     Lung Parenchyma and Pleura: No moderate size left small size right pleural effusion. Geographic groundglass and septal thickening central predominant similar to previous exam. Bronchial wall thickening. Bronchiectasis diffuse but worse in the lingula and right middle lobe and lower lobes. Consolidation in the left lower lobe less pronounced consolidation in the right lower lobe. Noncalcified nodule left lower lobe image #56 measures 1.2 cm. This is unchanged since 6/17/2023. There are smaller noncalcified nodules. These are not significantly changed. One of the largest in the right lower lobe on image #53 measures 9 mm.     Upper Abdomen: Unremarkable.     Soft tissues: Diffuse anasarca.     Osseous structures: No aggressive focal lytic or sclerotic osseous lesions. Osteopenia.     IMPRESSIONS:    1.There groundglass opacity central predominant with septal thickening new from June study.  2.There is bronchiectasis throughout the lungs most pronounced in the lower lungs with consolidation left greater right lower lobe, moderate size left small size right pleural effusion.  3.There are scattered noncalcified pulmonary nodules which appear stable since 6/17/2023.     Short-term follow-up CT chest and/or PET/CT recommended for better evaluation. The groundglass findings are likely due to superimposed infection. Organizing pneumonia or pulmonary edema or pulmonary hemorrhage thought to be less likely.    NO EKG.    CXR: Hyperinflation with diffuse interstitial and alveolar infiltrates with consolidation right upper lobe and left  lateral base without discernible pneumothorax; formal official report pending.    Medication Review: REVIEWED; NO DRIPS.    Assessment & Plan     Definite component of right sided heart failure related to significant pulmonary dysfunction/disease.  Will initiate IV Bumex 0.5 mg BID and follow GFR closely.  Currently the patient is in atrial flutter with 2 1 AV block.  Would defer any formal antiarrhythmic therapy at this time.  Prognosis remains guarded.  Scheduled to see [facility] electrophysiology on 21 July 2023.    Discussed with patient and son in room.      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    Hypothyroidism    Cachexia    Severe Malnutrition (HCC)    Pneumonia of both lungs due to infectious organism    Hyponatremia    07/17/23  07:40 EDT

## 2023-07-17 NOTE — PLAN OF CARE
Miscellaneous continue to have atrial flutter with 2-1 AV block that is refractory to AV node blocking.  Options for formal antiarrhythmic therapy are quite limited currently.  Discussed at length with Pete Belcher and [employer] and we will attempt high risk DAYNA plus or minus ECV in a.m.

## 2023-07-18 LAB
ALBUMIN SERPL-MCNC: 2.9 G/DL (ref 3.5–5.2)
ALBUMIN/GLOB SERPL: 1.3 G/DL
ALP SERPL-CCNC: 47 U/L (ref 39–117)
ALT SERPL W P-5'-P-CCNC: 12 U/L (ref 1–33)
ANION GAP SERPL CALCULATED.3IONS-SCNC: 7 MMOL/L (ref 5–15)
ARTERIAL PATENCY WRIST A: ABNORMAL
ARTERIAL PATENCY WRIST A: ABNORMAL
AST SERPL-CCNC: 15 U/L (ref 1–32)
ATMOSPHERIC PRESS: ABNORMAL MM[HG]
ATMOSPHERIC PRESS: ABNORMAL MM[HG]
BASE EXCESS BLDA CALC-SCNC: 7.8 MMOL/L (ref 0–2)
BASE EXCESS BLDA CALC-SCNC: 8.4 MMOL/L (ref 0–2)
BASOPHILS # BLD AUTO: 0.05 10*3/MM3 (ref 0–0.2)
BASOPHILS NFR BLD AUTO: 0.5 % (ref 0–1.5)
BDY SITE: ABNORMAL
BDY SITE: ABNORMAL
BILIRUB SERPL-MCNC: 0.2 MG/DL (ref 0–1.2)
BODY TEMPERATURE: 37 C
BODY TEMPERATURE: 37 C
BUN SERPL-MCNC: 21 MG/DL (ref 8–23)
BUN/CREAT SERPL: 25.6 (ref 7–25)
C-ANCA TITR SER IF: ABNORMAL TITER
CALCIUM SPEC-SCNC: 8.6 MG/DL (ref 8.6–10.5)
CHLORIDE SERPL-SCNC: 92 MMOL/L (ref 98–107)
CO2 BLDA-SCNC: 38 MMOL/L (ref 22–33)
CO2 BLDA-SCNC: 39.4 MMOL/L (ref 22–33)
CO2 SERPL-SCNC: 33 MMOL/L (ref 22–29)
COHGB MFR BLD: 1 % (ref 0–2)
COHGB MFR BLD: 1.1 % (ref 0–2)
CREAT SERPL-MCNC: 0.82 MG/DL (ref 0.57–1)
DEPRECATED RDW RBC AUTO: 51.8 FL (ref 37–54)
EGFRCR SERPLBLD CKD-EPI 2021: 71.1 ML/MIN/1.73
EOSINOPHIL # BLD AUTO: 0.28 10*3/MM3 (ref 0–0.4)
EOSINOPHIL NFR BLD AUTO: 3 % (ref 0.3–6.2)
EPAP: 0
EPAP: 0
ERYTHROCYTE [DISTWIDTH] IN BLOOD BY AUTOMATED COUNT: 13.2 % (ref 12.3–15.4)
GLOBULIN UR ELPH-MCNC: 2.3 GM/DL
GLUCOSE SERPL-MCNC: 78 MG/DL (ref 65–99)
HCO3 BLDA-SCNC: 36 MMOL/L (ref 20–26)
HCO3 BLDA-SCNC: 37.1 MMOL/L (ref 20–26)
HCT VFR BLD AUTO: 38.5 % (ref 34–46.6)
HCT VFR BLD CALC: 38.6 % (ref 38–51)
HCT VFR BLD CALC: 40.1 % (ref 38–51)
HGB BLD-MCNC: 12.3 G/DL (ref 12–15.9)
HGB BLDA-MCNC: 12.6 G/DL (ref 14–18)
HGB BLDA-MCNC: 13.1 G/DL (ref 14–18)
IMM GRANULOCYTES # BLD AUTO: 0.07 10*3/MM3 (ref 0–0.05)
IMM GRANULOCYTES NFR BLD AUTO: 0.8 % (ref 0–0.5)
INHALED O2 CONCENTRATION: 28 %
INHALED O2 CONCENTRATION: 30 %
IPAP: 0
IPAP: 0
LYMPHOCYTES # BLD AUTO: 1.71 10*3/MM3 (ref 0.7–3.1)
LYMPHOCYTES NFR BLD AUTO: 18.5 % (ref 19.6–45.3)
Lab: ABNORMAL
MAGNESIUM SERPL-MCNC: 2.5 MG/DL (ref 1.6–2.4)
MCH RBC QN AUTO: 33.3 PG (ref 26.6–33)
MCHC RBC AUTO-ENTMCNC: 31.9 G/DL (ref 31.5–35.7)
MCV RBC AUTO: 104.3 FL (ref 79–97)
METHGB BLD QL: 0 % (ref 0–1.5)
METHGB BLD QL: 0.4 % (ref 0–1.5)
MODALITY: ABNORMAL
MODALITY: ABNORMAL
MONOCYTES # BLD AUTO: 0.63 10*3/MM3 (ref 0.1–0.9)
MONOCYTES NFR BLD AUTO: 6.8 % (ref 5–12)
MYELOPEROXIDASE AB SER IA-ACNC: <0.2 UNITS (ref 0–0.9)
NEUTROPHILS NFR BLD AUTO: 6.51 10*3/MM3 (ref 1.7–7)
NEUTROPHILS NFR BLD AUTO: 70.4 % (ref 42.7–76)
NOTE: ABNORMAL
NOTE: ABNORMAL
NOTIFIED BY: ABNORMAL
NOTIFIED WHO: ABNORMAL
OXYHGB MFR BLDV: 96.6 % (ref 94–99)
OXYHGB MFR BLDV: 97.4 % (ref 94–99)
P-ANCA ATYPICAL TITR SER IF: ABNORMAL TITER
P-ANCA TITR SER IF: ABNORMAL TITER
PAW @ PEAK INSP FLOW SETTING VENT: 0 CMH2O
PAW @ PEAK INSP FLOW SETTING VENT: 0 CMH2O
PCO2 BLDA: 63.6 MM HG (ref 35–45)
PCO2 BLDA: 76.1 MM HG (ref 35–45)
PCO2 TEMP ADJ BLD: 63.6 MM HG (ref 35–45)
PCO2 TEMP ADJ BLD: 76.1 MM HG (ref 35–45)
PH BLDA: 7.3 PH UNITS (ref 7.35–7.45)
PH BLDA: 7.36 PH UNITS (ref 7.35–7.45)
PH, TEMP CORRECTED: 7.3 PH UNITS
PH, TEMP CORRECTED: 7.36 PH UNITS
PHOSPHATE SERPL-MCNC: 3.8 MG/DL (ref 2.5–4.5)
PLATELET # BLD AUTO: 322 10*3/MM3 (ref 140–450)
PMV BLD AUTO: 8.9 FL (ref 6–12)
PO2 BLDA: 116 MM HG (ref 83–108)
PO2 BLDA: 89.7 MM HG (ref 83–108)
PO2 TEMP ADJ BLD: 116 MM HG (ref 83–108)
PO2 TEMP ADJ BLD: 89.7 MM HG (ref 83–108)
POTASSIUM SERPL-SCNC: 3.7 MMOL/L (ref 3.5–5.2)
PROT SERPL-MCNC: 5.2 G/DL (ref 6–8.5)
PROTEINASE3 AB SER IA-ACNC: <0.2 UNITS (ref 0–0.9)
RBC # BLD AUTO: 3.69 10*6/MM3 (ref 3.77–5.28)
RPR SER QL: NORMAL
SODIUM SERPL-SCNC: 132 MMOL/L (ref 136–145)
TOTAL RATE: 0 BREATHS/MINUTE
TOTAL RATE: 0 BREATHS/MINUTE
VIT B12 BLD-MCNC: 970 PG/ML (ref 211–946)
WBC NRBC COR # BLD: 9.25 10*3/MM3 (ref 3.4–10.8)

## 2023-07-18 PROCEDURE — 36600 WITHDRAWAL OF ARTERIAL BLOOD: CPT

## 2023-07-18 PROCEDURE — 83050 HGB METHEMOGLOBIN QUAN: CPT

## 2023-07-18 PROCEDURE — 25010000002 PIPERACILLIN SOD-TAZOBACTAM PER 1 G: Performed by: INTERNAL MEDICINE

## 2023-07-18 PROCEDURE — 82375 ASSAY CARBOXYHB QUANT: CPT

## 2023-07-18 PROCEDURE — 99233 SBSQ HOSP IP/OBS HIGH 50: CPT | Performed by: INTERNAL MEDICINE

## 2023-07-18 PROCEDURE — 97530 THERAPEUTIC ACTIVITIES: CPT

## 2023-07-18 PROCEDURE — 94799 UNLISTED PULMONARY SVC/PX: CPT

## 2023-07-18 PROCEDURE — 85025 COMPLETE CBC W/AUTO DIFF WBC: CPT | Performed by: INTERNAL MEDICINE

## 2023-07-18 PROCEDURE — 97110 THERAPEUTIC EXERCISES: CPT

## 2023-07-18 PROCEDURE — 84100 ASSAY OF PHOSPHORUS: CPT | Performed by: INTERNAL MEDICINE

## 2023-07-18 PROCEDURE — 80053 COMPREHEN METABOLIC PANEL: CPT | Performed by: INTERNAL MEDICINE

## 2023-07-18 PROCEDURE — 94667 MNPJ CHEST WALL 1ST: CPT

## 2023-07-18 PROCEDURE — 82805 BLOOD GASES W/O2 SATURATION: CPT

## 2023-07-18 PROCEDURE — 99232 SBSQ HOSP IP/OBS MODERATE 35: CPT | Performed by: INTERNAL MEDICINE

## 2023-07-18 PROCEDURE — 94669 MECHANICAL CHEST WALL OSCILL: CPT

## 2023-07-18 PROCEDURE — 83735 ASSAY OF MAGNESIUM: CPT | Performed by: INTERNAL MEDICINE

## 2023-07-18 PROCEDURE — 82607 VITAMIN B-12: CPT | Performed by: INTERNAL MEDICINE

## 2023-07-18 PROCEDURE — 25010000002 AZITHROMYCIN PER 500 MG: Performed by: INTERNAL MEDICINE

## 2023-07-18 PROCEDURE — 86592 SYPHILIS TEST NON-TREP QUAL: CPT | Performed by: INTERNAL MEDICINE

## 2023-07-18 PROCEDURE — 93010 ELECTROCARDIOGRAM REPORT: CPT | Performed by: INTERNAL MEDICINE

## 2023-07-18 PROCEDURE — 93005 ELECTROCARDIOGRAM TRACING: CPT | Performed by: INTERNAL MEDICINE

## 2023-07-18 RX ADMIN — MIDODRINE HYDROCHLORIDE 5 MG: 5 TABLET ORAL at 18:24

## 2023-07-18 RX ADMIN — GUAIFENESIN 600 MG: 600 TABLET, EXTENDED RELEASE ORAL at 21:18

## 2023-07-18 RX ADMIN — PIPERACILLIN SODIUM AND TAZOBACTAM SODIUM 3.38 G: 3; .375 INJECTION, SOLUTION INTRAVENOUS at 18:24

## 2023-07-18 RX ADMIN — IPRATROPIUM BROMIDE AND ALBUTEROL SULFATE 3 ML: 2.5; .5 SOLUTION RESPIRATORY (INHALATION) at 12:58

## 2023-07-18 RX ADMIN — Medication 10 ML: at 09:22

## 2023-07-18 RX ADMIN — PIPERACILLIN SODIUM AND TAZOBACTAM SODIUM 3.38 G: 3; .375 INJECTION, SOLUTION INTRAVENOUS at 09:29

## 2023-07-18 RX ADMIN — GUAIFENESIN 600 MG: 600 TABLET, EXTENDED RELEASE ORAL at 12:33

## 2023-07-18 RX ADMIN — LEVOTHYROXINE SODIUM 88 MCG: 0.09 TABLET ORAL at 12:33

## 2023-07-18 RX ADMIN — POLYETHYLENE GLYCOL 3350 17 G: 17 POWDER, FOR SOLUTION ORAL at 12:38

## 2023-07-18 RX ADMIN — GALANTAMINE 8 MG: 4 TABLET, FILM COATED ORAL at 18:24

## 2023-07-18 RX ADMIN — IPRATROPIUM BROMIDE AND ALBUTEROL SULFATE 3 ML: 2.5; .5 SOLUTION RESPIRATORY (INHALATION) at 23:07

## 2023-07-18 RX ADMIN — BUMETANIDE 0.5 MG: 0.25 INJECTION, SOLUTION INTRAMUSCULAR; INTRAVENOUS at 09:21

## 2023-07-18 RX ADMIN — BRIMONIDINE TARTRATE 1 DROP: 2 SOLUTION/ DROPS OPHTHALMIC at 21:18

## 2023-07-18 RX ADMIN — BRIMONIDINE TARTRATE 1 DROP: 2 SOLUTION/ DROPS OPHTHALMIC at 09:21

## 2023-07-18 RX ADMIN — MIDODRINE HYDROCHLORIDE 5 MG: 5 TABLET ORAL at 12:32

## 2023-07-18 RX ADMIN — GALANTAMINE 8 MG: 4 TABLET, FILM COATED ORAL at 12:38

## 2023-07-18 RX ADMIN — TIMOLOL MALEATE 1 DROP: 5 SOLUTION/ DROPS OPHTHALMIC at 21:18

## 2023-07-18 RX ADMIN — AZITHROMYCIN 500 MG: 500 INJECTION, POWDER, LYOPHILIZED, FOR SOLUTION INTRAVENOUS at 05:40

## 2023-07-18 RX ADMIN — PANTOPRAZOLE SODIUM 40 MG: 40 TABLET, DELAYED RELEASE ORAL at 12:33

## 2023-07-18 RX ADMIN — Medication 5 MG: at 21:18

## 2023-07-18 RX ADMIN — FLUDROCORTISONE ACETATE 0.1 MG: 0.1 TABLET ORAL at 12:38

## 2023-07-18 RX ADMIN — PAROXETINE HYDROCHLORIDE 10 MG: 10 TABLET, FILM COATED ORAL at 21:18

## 2023-07-18 RX ADMIN — IPRATROPIUM BROMIDE AND ALBUTEROL SULFATE 3 ML: 2.5; .5 SOLUTION RESPIRATORY (INHALATION) at 19:31

## 2023-07-18 RX ADMIN — PIPERACILLIN SODIUM AND TAZOBACTAM SODIUM 3.38 G: 3; .375 INJECTION, SOLUTION INTRAVENOUS at 02:04

## 2023-07-18 RX ADMIN — SENNOSIDES AND DOCUSATE SODIUM 2 TABLET: 50; 8.6 TABLET ORAL at 12:33

## 2023-07-18 RX ADMIN — MIRTAZAPINE 15 MG: 15 TABLET, FILM COATED ORAL at 21:18

## 2023-07-18 RX ADMIN — Medication 10 ML: at 21:19

## 2023-07-18 RX ADMIN — APIXABAN 2.5 MG: 2.5 TABLET, FILM COATED ORAL at 21:18

## 2023-07-18 RX ADMIN — Medication 250 MG: at 12:33

## 2023-07-18 RX ADMIN — DIGOXIN 125 MCG: 125 TABLET ORAL at 14:45

## 2023-07-18 RX ADMIN — IPRATROPIUM BROMIDE AND ALBUTEROL SULFATE 3 ML: 2.5; .5 SOLUTION RESPIRATORY (INHALATION) at 07:56

## 2023-07-18 RX ADMIN — IPRATROPIUM BROMIDE AND ALBUTEROL SULFATE 3 ML: 2.5; .5 SOLUTION RESPIRATORY (INHALATION) at 02:50

## 2023-07-18 RX ADMIN — TIMOLOL MALEATE 1 DROP: 5 SOLUTION/ DROPS OPHTHALMIC at 09:21

## 2023-07-18 RX ADMIN — APIXABAN 2.5 MG: 2.5 TABLET, FILM COATED ORAL at 12:33

## 2023-07-18 NOTE — PAYOR COMM NOTE
"[facility]# [id number]   Clinical Update    Utilization Review  Phone [phone]  Fax [phone]    [facility]  [address] [place] [address]           Shane Miller \"Genie\" (83 y.o. Female)       Date of Birth   1940    Social Security Number       Address   [address]  [address] [place] [address]    Home Phone   [phone]    MRN   [id number]       Orthodox   Non-Restoration    Marital Status                               Admission Date   7/14/23    Admission Type   Emergency    Admitting Provider   Neftaly Belcher MD    Attending Provider   Neftaly Belcher MD    Department, Room/Bed   [facility] 5G, S564/1       Discharge Date       Discharge Disposition       Discharge Destination                                 Attending Provider: Neftaly Belcher MD    Allergies: Sulfa Antibiotics    Isolation: None   Infection: None   Code Status: CPR    Ht: 172.7 cm (68\")   Wt: 46.3 kg (102 lb)    Admission Cmt: None   Principal Problem: Acute respiratory failure with hypoxia [J96.01]                   Active Insurance as of 7/14/2023       Primary Coverage       Payor Plan Insurance Group Employer/Plan Group    [facility] MEDICARE REPLACEMENT [facility] MEDICARE REPLACEMENT [id number]       Payor Plan Address Payor Plan Phone Number Payor Plan Fax Number Effective Dates    [address] [address]   7/1/2020 - None Entered    [facility] [id number]         Subscriber Name Subscriber Birth Date Member ID       SHANE MILLER 1940 [id number]                     Emergency Contacts        (Rel.) Home Phone Work Phone Mobile Phone    EVANGELIST CHOUDHARY (Daughter) [phone] -- [phone]    Arun Miller (Son) -- -- [phone]                 Physician Progress Notes (last 72 hours)        Ventura Hook MD at 07/18/23 1148            Intensive Care Follow-up     Hospital:  LOS: 3 days   Ms. Shane Miller, 83 y.o. female " is followed for:   Acute respiratory failure with hypoxia     Subjective       History of present illness:   83-year-old female with history of Mycobacterium avium complex and bronchiectasis.  Patient previously has been treated for MAC.  She apparently has been intolerant of a lot of those medications and apparently has been not getting any treatment at this point.  She uses DuoNebs and percussion vest for her bronchiectasis.  Patient apparently was placed on supplemental oxygen recently as well.  Patient apparently was on Breo previously But was taken off due to concern about open-angle glaucoma which has required surgery x2. Patient was hospitalized on June 16 with new onset atrial fibrillation.  She was placed on flecainide and Eliquis at that time.  Patient readmitted with complains of palpitations and worsening shortness of breath.  Patient underwent work-up with CT scan of the chest which showed significant groundglass opacities bilaterally which were not present on a CT scan a month ago.  She was also started on esmolol and digoxin for rate control.  She was initially started on steroids with concern for vasculitis but that was stopped.  Patient apparently denied any new onset of fever, chest pain.  She does have chronic sputum production-year-old for which she uses nebulizer and percussion vest.  Flecainide has been put on hold as well.  She was also found to be hypercapnic so has been on and off BiPAP support.  Repeat blood gases showed improvement.    Subjective   Interval History:  Patient BiPAP currently.  Slight more interactive and communicating through the mask.  Patient's son in the room and we spent time talking about her overall care and plan at this point.  Imaging showed and differential diagnosis discussed.  Patient was started on diltiazem yesterday for rate control that dropped her blood pressure.  Patient became more lethargic and hypercapnic so was kept on BiPAP overnight.  Patient has not  "been on any BiPAP or oxygen therapy at home prior to this admission.                 The patient's past medical, surgical and social history were reviewed and updated in Epic as appropriate.    Objective   Objective     Infusions:  dilTIAZem, 5 mg/hr, Last Rate: Stopped (07/17/23 1816)    Medications:  acetaZOLAMIDE, 125 mg, Oral, Daily  apixaban, 2.5 mg, Oral, Q12H  ascorbic acid, 250 mg, Oral, Daily  azithromycin, 500 mg, Intravenous, Q24H  brimonidine, 1 drop, Both Eyes, Q12H   And  timolol, 1 drop, Both Eyes, Q12H  bumetanide, 0.5 mg, Intravenous, BID  digoxin, 125 mcg, Oral, Every Other Day  fludrocortisone, 0.1 mg, Oral, Daily  galantamine, 8 mg, Oral, BID With Meals  guaiFENesin, 600 mg, Oral, Q12H  ipratropium-albuterol, 3 mL, Nebulization, Q4H - RT  levothyroxine, 88 mcg, Oral, Daily  midodrine, 5 mg, Oral, TID AC  mirtazapine, 15 mg, Oral, Nightly  pantoprazole, 40 mg, Oral, Q AM  PARoxetine, 10 mg, Oral, Nightly  piperacillin-tazobactam, 3.375 g, Intravenous, Q8H  senna-docusate sodium, 2 tablet, Oral, BID  sodium chloride, 10 mL, Intravenous, Q12H        Vital Sign Min/Max for last 24 hours  Temp  Min: 98 øF (36.7 øC)  Max: 98.8 øF (37.1 øC)   BP  Min: 83/58  Max: 120/72   Pulse  Min: 89  Max: 139   Resp  Min: 15  Max: 23   SpO2  Min: 95 %  Max: 100 %   Flow (L/min)  Min: 2  Max: 2       Input/Output for last 24 hour shift  07/17 0701 - 07/18 0700  In: 520 [P.O.:420]  Out: 1650 [Urine:1650]      Objective:  Vital signs: (most recent): Blood pressure 92/62, pulse 100, temperature 98 øF (36.7 øC), temperature source Axillary, resp. rate 21, height 172.7 cm (68\"), weight 46.3 kg (102 lb), SpO2 100 %.          General Appearance: Seen sitting out in chair.  Wakes up and tries to communicate and answer questions appropriately, on BiPAP currently   Lungs:   B/L Breath sounds present with decreased breath sounds on bases, no wheezing heard, basilar crackles.   Heart: S1 and S2 present, A flutter with rate " controlled currently  Extremities:  no cyanosis or clubbing,  no edema.  Neurologic:  Moving all four extremities.       Results from last 7 days   Lab Units 07/18/23  0341 07/17/23  0402 07/16/23  0332   WBC 10*3/mm3 9.25 9.30 12.73*   HEMOGLOBIN g/dL 12.3 10.5* 10.0*   PLATELETS 10*3/mm3 322 287 273       Results from last 7 days   Lab Units 07/18/23  0341 07/17/23  1615 07/17/23  0402 07/16/23  0332 07/14/23  2241   SODIUM mmol/L 132*  --  134* 129* 129*   POTASSIUM mmol/L 3.7 4.5 4.2 4.0 4.5   CO2 mmol/L 33.0*  --  37.0* 33.0* 30.0*   BUN mg/dL 21  --  18 24* 19   CREATININE mg/dL 0.82  --  0.73 1.09* 0.79   MAGNESIUM mg/dL 2.5*  --   --  1.9 1.9   PHOSPHORUS mg/dL 3.8  --   --   --   --    GLUCOSE mg/dL 78  --  89 117* 125*       Estimated Creatinine Clearance: 38 mL/min (by C-G formula based on SCr of 0.82 mg/dL).    Results from last 7 days   Lab Units 07/18/23  0804   PH, ARTERIAL pH units 7.362   PCO2, ARTERIAL mm Hg 63.6*   PO2 ART mm Hg 89.7         Images:   Chest x-ray reviewed personally and showed bilateral multifocal pneumonia with small bilateral pleural effusions.  Left upper lobe opacity seems little less dense on chest x-ray today    I reviewed the patient's results and images.     CT scan of the chest reviewed and showed bilateral multifocal groundglass opacities with lower lobe predominant bronchiectasis and small left pleural effusion.    Assessment & Plan   Impression        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    Hypothyroidism    Cachexia    Severe Malnutrition (HCC)    Pneumonia of both lungs due to infectious  organism    Hyponatremia       Plan        1.  Patient presented with complaints of worsening shortness of breath, palpitations and atrial flutter with rapid ventricular rate.  Continue further management per cardiology.  Discussed with cardiology and given patient's weakness and respiratory issues she will be high risk of complications from anesthesia.  Will hold off external cardioversion today.  Heart rate is under better control.  If he can maintain rate control that may be acceptable.  If she starts to have more issues with rapid ventricular rate  then may need to go for DAYNA/ECV.  2.  Patient has significant groundglass opacities which are new compared to before.  Work-up for vasculitis and rheumatology conditions have been sent.  We will follow-up on the result of those.  Wondering if it could be all pulmonary edema pattern from rapid ventricular rate and decompensation secondary to that. ProBNP is elevated.  Continue diuresis as tolerated hemodynamically.  Continue nebulizer therapy.  Will add vest CPT at least once a day to keep airway clear.  Discussed with son that bronchoscopy may be needed to evaluate some of these differential diagnoses such as pulmonary hemorrhage but given her poor respiratory status she will be at high risk.  We agreed on conservative approach for now.  Sed rate is improving.  ANCA panel pending.  Rheumatoid factor and SLE panel negative.  3.  It could still be exacerbation of her MAC disease.  However clinically she is not presenting with any symptoms of fevers, weight loss though it has been difficult to obtain history from her today.  Will monitor for now.  4.  NIV support as needed.  5.  Continue current broad-spectrum antibiotics for possible pneumonia.  Cultures are thus far negative..  6.  Patient with intermittent delirium.  Continue management per hospitalist team.    We will continue to follow along from pulmonary standpoint.    Remains with guarded prognosis.    Ventura MAN  MD [facility]  Pulmonary, Critical care and Sleep Medicine         Electronically signed by Ventuar Hook MD at 07/18/23 1151       Kenton Mack MD at 07/18/23 0716          Vita Miller  [id number]  1940   LOS: 3 days   Patient Care Team:  Alfonso Steele MD as PCP - General (Internal Medicine)    Chief Complaint:  PAF / SEVERE LUNG DZ WITH CHRONIC HYPOXIC HYPERCARBIC RESPIRATORY FAILURE / HFpEF / CONFUSION / HYPONATREMIA     Subjective     Comfortable semisupine in bed on BiPAP therapy (FiO2 0.30) with nominal oximetry (98%).  Deep sleep.  No posturing or overt seizure activity.  I did not awaken at this time.  No awakening during physical examination.    Objective     Vital Sign Min/Max for last 24 hours  Temp  Min: 98.1 øF (36.7 øC)  Max: 98.8 øF (37.1 øC)   BP  Min: 83/58  Max: 120/72   Pulse  Min: 89  Max: 139   Resp  Min: 15  Max: 23   SpO2  Min: 95 %  Max: 100 %               07/14/23 2224   Weight: 46.3 kg (102 lb)         Intake/Output Summary (Last 24 hours) at 7/18/2023 0716  Last data filed at 7/17/2023 2100  Gross per 24 hour   Intake 520 ml   Output 1650 ml   Net -1130 ml       Physical Exam:     General Appearance:  Sleeping, in no acute distress   Lungs:   Diffuse diminished breath sounds with scattered rhonchi and wheezes,respirations regular, even and unlabored    Heart:    Irregular and normal rate, variable S1 and S2, grade 2/6 systolic murmur, no gallop, no rub, no click   Abdomen:  Extremities:   Soft, nontender, bowel sounds audible x4  Trace-1+ bipedal edema, normal range of motion   Pulses:   Pulses palpable and equal bilaterally      Results Review:   Results from last 7 days   Lab Units 07/18/23  0341 07/17/23  1615 07/17/23  0402 07/16/23  0332   SODIUM mmol/L 132*  --  134* 129*   POTASSIUM mmol/L 3.7 4.5 4.2 4.0   CHLORIDE mmol/L 92*  --  92* 87*   CO2 mmol/L 33.0*  --  37.0* 33.0*   BUN mg/dL 21  --  18 24*   CREATININE mg/dL 0.82  --  0.73 1.09*   GLUCOSE mg/dL  78  --  89 117*   CALCIUM mg/dL 8.6  --  8.2* 8.0*     Results from last 7 days   Lab Units 23  0402 23  0332 23  2241   WBC 10*3/mm3 9.30 12.73* 13.02*   HEMOGLOBIN g/dL 10.5* 10.0* 14.3   HEMATOCRIT % 33.3* 30.6* 43.0   PLATELETS 10*3/mm3 287 273 353     Results from last 7 days   Lab Units 23  2241   HSTROP T ng/L 16*     ABGs (FiO2 0.28): pH 7.30, PCO2 76, PO2 116 (97% saturation)  ALBUMIN: 2.9  LFTs; WNL  MAGNESIUM: 2.5  URINALYSIS: WNL  NO NEW CXR / EKG.    Medication Review: REVIEWED; NO DRIPS (IV diltiazem discontinued 6 hrs. 2023)    Assessment & Plan     Marginal labile systolic blood pressure with stable mild anemia, suboptimal nutrition, and markedly abnormal arterial blood gases.  Will review with Dr. Hook but I feel sedation with anesthesiology for DAYNA and ECV will be very problematic and very high risk.  Currently patient tolerating atrial fibrillation with ventricular response approximately 100/minute.  No family in room currently and will return later to discuss with him when available.      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    Hypothyroidism    Cachexia    Severe Malnutrition (HCC)    Pneumonia of both lungs due to infectious organism    Hyponatremia    23  07:16 EDT     Electronically signed by Kenton Mack MD at 23 0751       Neftaly Belcher MD at 23 1501              [facility] Medicine Services  PROGRESS NOTE    Patient Name: Vita Miller  : 1940  MRN: [id number]    Date of Admission: 2023  Primary Care Physician: Cedric  Alfonso CAMARGO MD    Subjective   Subjective     CC:  Follow-up PNA    HPI:  Per nursing did not tolerate bipap, was drowsy/confused.  Patient with no complaints but confused.      ROS:  Gen- No fevers, chills  CV- No chest pain, palpitations  Resp-as above  GI- No N/V/D, abd pain     Objective   Objective     Vital Signs:   Temp:  [97.8 øF (36.6 øC)-98.5 øF (36.9 øC)] 98.5 øF (36.9 øC)  Heart Rate:  [] 135  Resp:  [15-22] 16  BP: ()/(53-87) 116/68  Flow (L/min):  [1-2] 2     Physical Exam:  Constitutional: No acute distress, awake, alert  HENT: NCAT, mucous membranes moist  Respiratory: bibasilar crackles  Cardiovascular: IRIR, aflutter 132, no murmurs, rubs, or gallops  Gastrointestinal: Positive bowel sounds, soft, nontender, nondistended  Musculoskeletal: No bilateral ankle edema  Psychiatric: Appropriate affect, cooperative  Neurologic: Oriented to self, place, year, speech clear  Skin: No rashes    Results Reviewed:  LAB RESULTS:      Lab 07/17/23  0402 07/16/23  0332 07/15/23  0340 07/14/23  2241   WBC 9.30 12.73*  --  13.02*   HEMOGLOBIN 10.5* 10.0*  --  14.3   HEMATOCRIT 33.3* 30.6*  --  43.0   PLATELETS 287 273  --  353   NEUTROS ABS 6.68 11.29*  --  10.73*   IMMATURE GRANS (ABS) 0.07* 0.07*  --  0.09*   LYMPHS ABS 1.66 0.71  --  0.95   MONOS ABS 0.61 0.64  --  0.99*   EOS ABS 0.23 0.01  --  0.20   .6* 97.8*  --  97.7*   SED RATE  --  32*  --  99*   CRP  --   --   --  14.66*   PROCALCITONIN  --   --   --  0.13   LACTATE  --  0.9 1.0  --          Lab 07/17/23  0402 07/16/23  0332 07/14/23  2241   SODIUM 134* 129* 129*   POTASSIUM 4.2 4.0 4.5   CHLORIDE 92* 87* 87*   CO2 37.0* 33.0* 30.0*   ANION GAP 5.0 9.0 12.0   BUN 18 24* 19   CREATININE 0.73 1.09* 0.79   EGFR 81.7 50.5* 74.3   GLUCOSE 89 117* 125*   CALCIUM 8.2* 8.0* 9.1   MAGNESIUM  --  1.9 1.9   TSH  --   --  13.160*         Lab 07/16/23  0332 07/14/23  2241   TOTAL PROTEIN 6.0 6.8   ALBUMIN 3.6 3.5   GLOBULIN 2.4 3.3   ALT (SGPT) 14  24   AST (SGOT) 16 23   BILIRUBIN 0.2 0.4   ALK PHOS 53 90         Lab 07/14/23  2241   PROBNP 6,301.0*   HSTROP T 16*                 Lab 07/16/23  0518 07/16/23  0256   PH, ARTERIAL 7.380 7.325*   PCO2, ARTERIAL 60.2* 67.7*   PO2 .0* 71.6*   FIO2 30 24   HCO3 ART 35.6* 35.2*   BASE EXCESS ART 8.7* 7.2*   CARBOXYHEMOGLOBIN 1.3 1.4     Brief Urine Lab Results  (Last result in the past 365 days)        Color   Clarity   Blood   Leuk Est   Nitrite   Protein   CREAT   Urine HCG        07/16/23 0347 Yellow   Clear   Negative   Negative   Negative   Negative                   Microbiology Results Abnormal       Procedure Component Value - Date/Time    Blood Culture - Blood, Arm, Left [[id number]]  (Normal) Collected: 07/15/23 0340    Lab Status: Preliminary result Specimen: Blood from Arm, Left Updated: 07/17/23 0730     Blood Culture No growth at 2 days    Blood Culture - Blood, Arm, Right [[id number]]  (Normal) Collected: 07/15/23 0340    Lab Status: Preliminary result Specimen: Blood from Arm, Right Updated: 07/17/23 0730     Blood Culture No growth at 2 days    S. Pneumo Ag Urine or CSF - Urine, Urine, Clean Catch [[id number]]  (Normal) Collected: 07/15/23 0341    Lab Status: Final result Specimen: Urine, Clean Catch Updated: 07/15/23 1356     Strep Pneumo Ag Negative    Legionella Antigen, Urine - Urine, Urine, Clean Catch [[id number]]  (Normal) Collected: 07/15/23 0341    Lab Status: Final result Specimen: Urine, Clean Catch Updated: 07/15/23 1356     LEGIONELLA ANTIGEN, URINE Negative    MRSA Screen, PCR (Inpatient) - Swab, Nares [[id number]]  (Normal) Collected: 07/15/23 0310    Lab Status: Final result Specimen: Swab from Nares Updated: 07/15/23 0846     MRSA PCR Negative    Narrative:      The negative predictive value of this diagnostic test is high and should only be used to consider de-escalating anti-MRSA therapy. A positive result may indicate colonization with MRSA and must be correlated  clinically.  MRSA Negative    COVID PRE-OP / PRE-PROCEDURE SCREENING ORDER (NO ISOLATION) - Swab, Nasopharynx [[id number]]  (Normal) Collected: 07/15/23 0310    Lab Status: Final result Specimen: Swab from Nasopharynx Updated: 07/15/23 0410    Narrative:      The following orders were created for panel order COVID PRE-OP / PRE-PROCEDURE SCREENING ORDER (NO ISOLATION) - Swab, Nasopharynx.  Procedure                               Abnormality         Status                     ---------                               -----------         ------                     Respiratory Panel PCR w/...[[id number]]  Normal              Final result                 Please view results for these tests on the individual orders.    Respiratory Panel PCR w/COVID-19(SARS-CoV-2) JENNA/KELSI/NORMAN/PAD/COR/MAD/ARSEN In-House, NP Swab in UTM/VTM, 3-4 HR TAT - Swab, Nasopharynx [[id number]]  (Normal) Collected: 07/15/23 0310    Lab Status: Final result Specimen: Swab from Nasopharynx Updated: 07/15/23 0410     ADENOVIRUS, PCR Not Detected     Coronavirus 229E Not Detected     Coronavirus HKU1 Not Detected     Coronavirus NL63 Not Detected     Coronavirus OC43 Not Detected     COVID19 Not Detected     Human Metapneumovirus Not Detected     Human Rhinovirus/Enterovirus Not Detected     Influenza A PCR Not Detected     Influenza B PCR Not Detected     Parainfluenza Virus 1 Not Detected     Parainfluenza Virus 2 Not Detected     Parainfluenza Virus 3 Not Detected     Parainfluenza Virus 4 Not Detected     RSV, PCR Not Detected     Bordetella pertussis pcr Not Detected     Bordetella parapertussis PCR Not Detected     Chlamydophila pneumoniae PCR Not Detected     Mycoplasma pneumo by PCR Not Detected    Narrative:      In the setting of a positive respiratory panel with a viral infection PLUS a negative procalcitonin without other underlying concern for bacterial infection, consider observing off antibiotics or discontinuation of antibiotics and continue  supportive care. If the respiratory panel is positive for atypical bacterial infection (Bordetella pertussis, Chlamydophila pneumoniae, or Mycoplasma pneumoniae), consider antibiotic de-escalation to target atypical bacterial infection.            CT Head Without Contrast    Result Date: 7/16/2023  EXAMINATION: CT HEAD WITHOUT CONTRAST DATE: 7/16/2023 3:02 AM INDICATION: Delirium COMPARISON: MR brain 10/27/2020 TECHNIQUE: Noncontrast imaging obtained from the vertex to the skull base.  CT dose lowering techniques were used, to include: automated exposure control, adjustment for patient size, and or use of iterative reconstruction.? FINDINGS: Soft Tissues: No significant soft tissue abnormality. Skull: No underlying skull fracture or radiopaque foreign body. Sinuses: Paranasal sinuses are clear. Mastoids: Mastoid air cells are clear. Globes and Orbits: Globes and orbits are intact. Brain: No acute hemorrhage.  No midline shift, masses, or mass effect.  No evidence of acute infarct by noncontrast CT. Ventricles and Cisterns: Ventricular size and configuration is within normal limits. Basal cisterns are patent. No abnormal extra-axial fluid collection. Senescent Changes: Mild volume loss and chronic microvascular ischemic changes. Arteriosclerosis.     Impression: 1. No acute intracranial abnormality. 2. Mild volume loss and chronic microvascular ischemic changes. Electronically signed by:  Magen Sparks M.D.  7/16/2023 2:42 AM Mountain Time    CT Chest Without Contrast Diagnostic    Result Date: 7/16/2023  CT CHEST WO CONTRAST DIAGNOSTIC Date of Exam: 7/16/2023 11:28 AM EDT Indication: follow-up PNA vs pulmonary edema. Comparison: CT angiogram chest 7/15/2023 Technique: Axial CT images were obtained of the chest without contrast administration.  Reconstructed coronal and sagittal images were also obtained. Automated exposure control and iterative construction methods were used. Findings: Hilum and Mediastinum: No  pathologically enlarged lymph nodes. Cardiomegaly. Mild to moderate coronary artery atherosclerotic disease. No pericardial effusion. Unremarkable thoracic aorta and pulmonary arteries. Lung Parenchyma and Pleura: No moderate size left small size right pleural effusion. Geographic groundglass and septal thickening central predominant similar to previous exam. Bronchial wall thickening. Bronchiectasis diffuse but worse in the lingula and  right middle lobe and lower lobes. Consolidation in the left lower lobe less pronounced consolidation in the right lower lobe. Noncalcified nodule left lower lobe image #56 measures 1.2 cm. This is unchanged since 6/17/2023. There are smaller noncalcified nodules. These are not significantly changed. One of the largest in the right lower lobe on image #53 measures 9 mm. Upper Abdomen: Unremarkable. Soft tissues: Diffuse anasarca. Osseous structures: No aggressive focal lytic or sclerotic osseous lesions. Osteopenia.     Impression: 1.There groundglass opacity central predominant with septal thickening new from June study. 2.There is bronchiectasis throughout the lungs most pronounced in the lower lungs with consolidation left greater right lower lobe, moderate size left small size right pleural effusion. 3.There are scattered noncalcified pulmonary nodules which appear stable since 6/17/2023. Short-term follow-up CT chest and/or PET/CT recommended for better evaluation. The groundglass findings are likely due to superimposed infection. Organizing pneumonia or pulmonary edema or pulmonary hemorrhage thought to be less likely. Electronically Signed: Stephanie Casper  7/16/2023 11:45 AM EDT  Workstation ID: [id number]    XR Chest 1 View    Result Date: 7/17/2023  XR CHEST 1 VW Date of Exam: 7/17/2023 6:07 AM EDT Indication: pleural effusion Comparison: CT chest 7/16/2023, chest x-ray 7/14/2023 Findings: Stable cardiomediastinal silhouette within normal limits. Similar patchy bilateral  parenchymal and interstitial opacities compatible with multifocal infection; this appears grossly unchanged compared to prior chest x-ray from 7/14/2023. There are small bilateral pleural effusions, unchanged. No pneumothorax.     Impression: Impression: Similar appearance of multifocal pneumonia and small bilateral pleural effusions. Electronically Signed: Chago Zhang  7/17/2023 8:40 AM EDT  Workstation ID: [id number]     Results for orders placed during the hospital encounter of 06/16/23    Adult Transthoracic Echo Complete W/ Cont if Necessary Per Protocol    Interpretation Summary    Left ventricular systolic function is normal. Estimated left ventricular EF = 60%    Left ventricular diastolic function was normal.    Mild aortic insufficiency.    Trace to mild mitral and tricuspid regurgitation.    Calculated right ventricular systolic pressure from tricuspid regurgitation is 20 mmHg.    There is a trivial pericardial effusion.      Current medications:  Scheduled Meds:acetaZOLAMIDE, 125 mg, Oral, Daily  apixaban, 2.5 mg, Oral, Q12H  ascorbic acid, 250 mg, Oral, Daily  azithromycin, 500 mg, Intravenous, Q24H  brimonidine, 1 drop, Both Eyes, Q12H   And  timolol, 1 drop, Both Eyes, Q12H  bumetanide, 0.5 mg, Intravenous, BID  digoxin, 125 mcg, Oral, Every Other Day  fludrocortisone, 0.1 mg, Oral, Daily  galantamine, 8 mg, Oral, BID With Meals  guaiFENesin, 600 mg, Oral, Q12H  ipratropium-albuterol, 3 mL, Nebulization, Q4H - RT  levothyroxine, 88 mcg, Oral, Daily  midodrine, 5 mg, Oral, TID AC  mirtazapine, 15 mg, Oral, Nightly  pantoprazole, 40 mg, Oral, Q AM  PARoxetine, 10 mg, Oral, Nightly  piperacillin-tazobactam, 3.375 g, Intravenous, Q8H  senna-docusate sodium, 2 tablet, Oral, BID  sodium chloride, 10 mL, Intravenous, Q12H      Continuous Infusions:     PRN Meds:.  acetaminophen **OR** acetaminophen **OR** acetaminophen    senna-docusate sodium **AND** polyethylene glycol **AND** bisacodyl **AND**  bisacodyl    Magnesium Standard Dose Replacement - Follow Nurse / BPA Driven Protocol    melatonin    metoprolol tartrate    ondansetron    Potassium Replacement - Follow Nurse / BPA Driven Protocol    sodium chloride    sodium chloride    sodium chloride    Assessment & Plan   Assessment & Plan     Active Hospital Problems    Diagnosis  POA    **Acute respiratory failure with hypoxia [J96.01]  Yes    Pneumonia of both lungs due to infectious organism [J18.9]  Unknown    Hyponatremia [E87.1]  Unknown    Severe Malnutrition (HCC) [E43]  Yes    Atrial fibrillation with RVR [I48.91]  Yes    Cachexia [R64]  Yes    Senile osteoporosis [M81.0]  Yes    Hashimoto's thyroiditis [E06.3]  Yes    Primary open angle glaucoma of both eyes, severe stage [H40.1133]  Yes    Mixed anxiety and depressive disorder [F41.8]  Yes    Multiple nodules of lung [R91.8]  Yes    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        Brief Hospital Course to date:  Vita Miller is a 83 y.o. female with history of hypothyroidism, COPD with chronic bronchiectasis, history of chronic MAC with unknown intolerance to prior treatment plan, history of orthostatic hypotension, hyperlipidemia, osteoporosis, bilateral glaucoma, mild memory deficit, who was recently admitted to our facility approximately 1 month ago for new onset atrial fibrillation with RVR who returns to the ER tonight with complaints of palpitations and shortness of breath.     This patient's problems and plans were partially entered by my partner and updated as appropriate by me 07/17/23.     Acute respiratory failure with hypoxia  Bilateral pneumonia/infiltrates  History of MAC, intolerant to  treatment  COPD with acute exacerbation, chronic bronchiectasis  -- Vancomycin, Zosyn, azithromycin  -- Pulmonology following  --Off steroids  --ANCA, RF, ESR, SLE labs pending  --Continue Zosyn, azith for now; stopped vanc given negative MRSA screen  -- Duo nebs  -- Flutter valve, mucinex  --cultures, pcr negative, strep negative, legionella negative  -- diuresis per cards today with low dose bumex  --CT chest showed new GGO, bronchiectasis, scattered nodules.  Needs f/u CT or PET scan     Atrial fibrillation with rvr no in aflutter  --s/p esmolol drip  --eliquis  --flecainide --> bisoprolol ---> changed to digoxin per cards  --cards recommended midodrine  --cards following.  Now in aflutter at rate 130's.  S/p IV metoprolol with no effect.  S/p dig yesterday, ordered Qod.  Flecainide d/t concerns of possibility of pneumonitis so can't give amiodarone either per cards.  Called and d/w Dr. Mack who recs trying diltiazem gtt at rate of 5/hr with no bolus.  He plans possible DCCV in AM and discussed this with Dr. Hook with pulm.  Will keep NPO after MN.  I Called and d/w son/POA over the phone who plans to d/w Dr. Steele.  --LVEF 60%, normal diastolic dysfunction on echo from 6/17/2023    Altered mental status  --Suspect component of hospital delirium, also had some CO2 retention on ABG from overnight that improved with Bipap  --confused again this AM.  ABG looks compensated today with chronic hypercarbia.  Sodium now much better.  Will check u/a, B12, RPR     Hyponatremia  --Na 129 on admission; suspect related to volume overload  --improved     Orthostatic hypotension  --continue fludricortisone and midodrine     Hypothyroidism  --slightly elevated tsh  --continue synthroid, dose adjusted on 7/16; repeat tsh in 4-6 weeks; careful adjustment given RVR     Other chronic conditions: HLD, malnutrition, anxiety/depression, memory impairment  --continue home meds  --nutrition  --pt/ot    Social  ----partner discussed  with the family that only one family member will be updated. They elected the son.  I called and updated Aurn Miller  (son and POA) over the phone today and he would like to talk with Dr. Steele before deciding about DCCV.         Expected Discharge Location and Transportation: Home  Expected Discharge   Expected Discharge Date: 7/19/2023; Expected Discharge Time:      DVT prophylaxis:  Medical and mechanical DVT prophylaxis orders are present.     AM-PAC 6 Clicks Score (PT): 18 (07/15/23 2000)    CODE STATUS:   Code Status and Medical Interventions:   Ordered at: 07/15/23 0214     Code Status (Patient has no pulse and is not breathing):    CPR (Attempt to Resuscitate)     Medical Interventions (Patient has pulse or is breathing):    Full Support       Neftaly Belcher MD  07/17/23        Electronically signed by Neftaly Belcher MD at 07/17/23 7374       Ventura Hook MD at 07/17/23 1155            Intensive Care Follow-up     Hospital:  LOS: 2 days   Ms. Vita Miller, 83 y.o. female is followed for:   Acute respiratory failure with hypoxia     Subjective       History of present illness:   83-year-old female with history of Mycobacterium avium complex and bronchiectasis.  Patient previously has been treated for MAC.  She apparently has been intolerant of a lot of those medications and apparently has been not getting any treatment at this point.  She uses DuoNebs and percussion vest for her bronchiectasis.  Patient apparently was placed on supplemental oxygen recently as well.  Patient apparently was on Breo previously But was taken off due to concern about open-angle glaucoma which has required surgery x2. Patient was hospitalized on June 16 with new onset atrial fibrillation.  She was placed on flecainide and Eliquis at that time.  Patient readmitted with complains of palpitations and worsening shortness of breath.  Patient underwent work-up with CT scan of the chest which showed significant groundglass  opacities bilaterally which were not present on a CT scan a month ago.  She was also started on esmolol and digoxin for rate control.  She was initially started on steroids with concern for vasculitis but that was stopped.  Patient apparently denied any new onset of fever, chest pain.  She does have chronic sputum production-year-old for which she uses nebulizer and percussion vest.  Flecainide has been put on hold as well.  She was also found to be hypercapnic so has been on and off BiPAP support.  Repeat blood gases showed improvement.    Subjective   Interval History:  Patient unable to provide much history today.  She is quite confused and delirious.  She is not sure of her whereabouts currently.  She kept on asking where she is at and if she is alone and where her family is.  She is wearing anywhere from room air to 2 L nasal cannula oxygen currently.                 The patient's past medical, surgical and social history were reviewed and updated in Epic as appropriate.    Objective   Objective     Infusions:     Medications:  acetaZOLAMIDE, 125 mg, Oral, Daily  apixaban, 2.5 mg, Oral, Q12H  ascorbic acid, 250 mg, Oral, Daily  azithromycin, 500 mg, Intravenous, Q24H  brimonidine, 1 drop, Both Eyes, Q12H   And  timolol, 1 drop, Both Eyes, Q12H  bumetanide, 0.5 mg, Intravenous, BID  digoxin, 125 mcg, Oral, Every Other Day  fludrocortisone, 0.1 mg, Oral, Daily  galantamine, 8 mg, Oral, BID With Meals  guaiFENesin, 600 mg, Oral, Q12H  ipratropium-albuterol, 3 mL, Nebulization, Q4H - RT  levothyroxine, 88 mcg, Oral, Daily  midodrine, 5 mg, Oral, TID AC  mirtazapine, 15 mg, Oral, Nightly  pantoprazole, 40 mg, Oral, Q AM  PARoxetine, 10 mg, Oral, Nightly  piperacillin-tazobactam, 3.375 g, Intravenous, Q8H  senna-docusate sodium, 2 tablet, Oral, BID  sodium chloride, 10 mL, Intravenous, Q12H        Vital Sign Min/Max for last 24 hours  Temp  Min: 97.6 øF (36.4 øC)  Max: 98.5 øF (36.9 øC)   BP  Min: 71/53  Max: 117/87  "  Pulse  Min: 80  Max: 134   Resp  Min: 15  Max: 22   SpO2  Min: 90 %  Max: 100 %   Flow (L/min)  Min: 1  Max: 2       Input/Output for last 24 hour shift  07/16 0701 - 07/17 0700  In: 1040 [P.O.:840]  Out: 1050 [Urine:1050]      Objective:  Vital signs: (most recent): Blood pressure 116/68, pulse (!) 133, temperature 98.5 øF (36.9 øC), temperature source Oral, resp. rate 16, height 172.7 cm (68\"), weight 46.3 kg (102 lb), SpO2 97 %.          General Appearance: Awake, in no acute distress. Confused and delirious.    Lungs:   B/L Breath sounds present with decreased breath sounds on bases, no wheezing heard, basilar crackles.   Heart: S1 and S2 present, A flutter with RVR  Extremities:  no cyanosis or clubbing,  no edema.  Neurologic:  Moving all four extremities. Confused.       Results from last 7 days   Lab Units 07/17/23  0402 07/16/23  0332 07/14/23  2241   WBC 10*3/mm3 9.30 12.73* 13.02*   HEMOGLOBIN g/dL 10.5* 10.0* 14.3   PLATELETS 10*3/mm3 287 273 353     Results from last 7 days   Lab Units 07/17/23  0402 07/16/23  0332 07/14/23  2241   SODIUM mmol/L 134* 129* 129*   POTASSIUM mmol/L 4.2 4.0 4.5   CO2 mmol/L 37.0* 33.0* 30.0*   BUN mg/dL 18 24* 19   CREATININE mg/dL 0.73 1.09* 0.79   MAGNESIUM mg/dL  --  1.9 1.9   GLUCOSE mg/dL 89 117* 125*     Estimated Creatinine Clearance: 42.7 mL/min (by C-G formula based on SCr of 0.73 mg/dL).    Results from last 7 days   Lab Units 07/16/23  0518   PH, ARTERIAL pH units 7.380   PCO2, ARTERIAL mm Hg 60.2*   PO2 ART mm Hg 117.0*       Images:   Chest x-ray reviewed personally and showed bilateral multifocal pneumonia with small bilateral pleural effusions.  Left upper lobe opacity seems little less dense on chest x-ray today    I reviewed the patient's results and images.     CT scan of the chest reviewed and showed bilateral multifocal groundglass opacities with lower lobe predominant bronchiectasis and small left pleural effusion.    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  Patient presented with complaints of worsening shortness of breath, palpitations and atrial flutter with rapid ventricular rate.  Continue further management per cardiology.  2.  Patient has significant groundglass opacities which are new compared to before.  Work-up for vasculitis and rheumatology conditions have been sent.  We will follow-up on the result of those.  Wondering if it could be all pulmonary edema pattern from rapid ventricular rate and decompensation secondary to that. ProBNP is elevated.  Continue diuresis in the meantime..  Nebulizer therapy.  Continue pulmonary toilet.  3.  It could still be exacerbation of her MAC disease.  However clinically she is not presenting with any symptoms of fevers, weight loss though it has been difficult to obtain history from her today.  Will monitor for now.  4.  NIV support as needed.  5.  Continue current broad-spectrum antibiotics for possible pneumonia.  Follow cultures.  6.  Patient is currently encephalopathic and delirious.  Continue further management per hospitalist team.    We will continue to follow along from pulmonary standpoint.    Ventura Hook MD, [facility]  Pulmonary, Critical care and Sleep Medicine         Electronically signed by Ventura Hook MD at 07/17/23 1216       Kenton Mack MD at 07/17/23 0740          Vita Miller  [id number]  1940   LOS: 2 days   Patient Care  Team:  Alfonso Steele MD as PCP - General (Internal Medicine)    Chief Complaint:  PAF / SEVERE LUNG DZ WITH CHRONIC HYPOXIC HYPERCARBIC RESPIRATORY FAILURE / HFpEF / CONFUSION / HYPONATREMIA    Subjective     Comfortable, alert, and conversant this morning but remains confused and disoriented.  No cough, sputum production, pleurisy, fever, or chills.  Suboptimal appetite.    Objective     Vital Sign Min/Max for last 24 hours  Temp  Min: 97.6 øF (36.4 øC)  Max: 98.1 øF (36.7 øC)   BP  Min: 71/53  Max: 117/87   Pulse  Min: 80  Max: 131   Resp  Min: 15  Max: 22   SpO2  Min: 90 %  Max: 100 %               07/14/23 2224   Weight: 46.3 kg (102 lb)         Intake/Output Summary (Last 24 hours) at 7/17/2023 0740  Last data filed at 7/16/2023 1707  Gross per 24 hour   Intake 1040 ml   Output 1050 ml   Net -10 ml       Physical Exam:     General Appearance:    Alert, cooperative, in no acute distress   Lungs:   Diminished breath sounds with scattered rhonchi and wheezes with bibasilar crackles,respirations regular, even and unlabored    Heart:    Regular and rapid rate, normal S1 and S2, grade 1/6 systolic murmur, no gallop, no rub, no click   Abdomen:  Extremities:   Soft, nontender, bowel sounds audible x4    No edema, normal range of motion   Pulses:   Pulses palpable and equal bilaterally      Results Review:   Results from last 7 days   Lab Units 07/17/23  0402 07/16/23  0332 07/14/23  2241   SODIUM mmol/L 134* 129* 129*   POTASSIUM mmol/L 4.2 4.0 4.5   CHLORIDE mmol/L 92* 87* 87*   CO2 mmol/L 37.0* 33.0* 30.0*   BUN mg/dL 18 24* 19   CREATININE mg/dL 0.73 1.09* 0.79   GLUCOSE mg/dL 89 117* 125*   CALCIUM mg/dL 8.2* 8.0* 9.1     Results from last 7 days   Lab Units 07/17/23  0402 07/16/23  0332 07/14/23  2241   WBC 10*3/mm3 9.30 12.73* 13.02*   HEMOGLOBIN g/dL 10.5* 10.0* 14.3   HEMATOCRIT % 33.3* 30.6* 43.0   PLATELETS 10*3/mm3 287 273 353     Results from last 7 days   Lab Units 07/14/23  2241   HSTROP T ng/L 16*      ABGs (FiO2 0.30): pH 7.38, PCO2 60, PO2 117 (98% saturation)    CHEST CT WO CONTRAST:    Findings:    Hilum and Mediastinum: No pathologically enlarged lymph nodes. Cardiomegaly. Mild to moderate coronary artery atherosclerotic disease. No pericardial effusion. Unremarkable thoracic aorta and pulmonary arteries.     Lung Parenchyma and Pleura: No moderate size left small size right pleural effusion. Geographic groundglass and septal thickening central predominant similar to previous exam. Bronchial wall thickening. Bronchiectasis diffuse but worse in the lingula and right middle lobe and lower lobes. Consolidation in the left lower lobe less pronounced consolidation in the right lower lobe. Noncalcified nodule left lower lobe image #56 measures 1.2 cm. This is unchanged since 6/17/2023. There are smaller noncalcified nodules. These are not significantly changed. One of the largest in the right lower lobe on image #53 measures 9 mm.     Upper Abdomen: Unremarkable.     Soft tissues: Diffuse anasarca.     Osseous structures: No aggressive focal lytic or sclerotic osseous lesions. Osteopenia.     IMPRESSIONS:    1.There groundglass opacity central predominant with septal thickening new from June study.  2.There is bronchiectasis throughout the lungs most pronounced in the lower lungs with consolidation left greater right lower lobe, moderate size left small size right pleural effusion.  3.There are scattered noncalcified pulmonary nodules which appear stable since 6/17/2023.     Short-term follow-up CT chest and/or PET/CT recommended for better evaluation. The groundglass findings are likely due to superimposed infection. Organizing pneumonia or pulmonary edema or pulmonary hemorrhage thought to be less likely.    NO EKG.    CXR: Hyperinflation with diffuse interstitial and alveolar infiltrates with consolidation right upper lobe and left lateral base without discernible pneumothorax; formal official report  pending.    Medication Review: REVIEWED; NO DRIPS.    Assessment & Plan     Definite component of right sided heart failure related to significant pulmonary dysfunction/disease.  Will initiate IV Bumex 0.5 mg BID and follow GFR closely.  Currently the patient is in atrial flutter with 2 1 AV block.  Would defer any formal antiarrhythmic therapy at this time.  Prognosis remains guarded.  Scheduled to see [facility] electrophysiology on 21 July 2023.    Discussed with patient and son in room.      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    Hypothyroidism    Cachexia    Severe Malnutrition (HCC)    Pneumonia of both lungs due to infectious organism    Hyponatremia    07/17/23  07:40 EDT     Electronically signed by Kenton Mack MD at 07/17/23 0803       Luis Huggins MD at 07/16/23 0829          [facility]  IP Progress Note    PROBLEM LIST:  Atrial fibrillation of uncertain duration/etiology ([id number] score 4; 4% annual stroke risk) with initial rapid response and subsequent spontaneous cardioversion on IV diltiazem, June 2023  Recurrent A-fib 7/15  Chronic pulmonary dysfunction with probable emphysema/bronchiectasis/MAC  Intermittent orthostatic hypotension- on fludrocortisone  Chronic hypothyroidism/replacement therapy  Dyslipidemia  Osteoporosis  Chronic open-angle glaucoma, O.U.  Remote operations:  A.  Hip surgery  B.  Patella surgery  C.  Shoulder surgery    CHIEF COMPLAINTS:  PAF    Subjective   Overnight patient had altered mental status, possibly delirium.  This morning patient was resting  "comfortably.  Denies any pain or shortness of breath.      Objective     Blood pressure 99/66, pulse 113, temperature 97.7 øF (36.5 øC), temperature source Oral, resp. rate 18, height 172.7 cm (68\"), weight 46.3 kg (102 lb), SpO2 99 %.     Intake/Output Summary (Last 24 hours) at 7/16/2023 0826  Last data filed at 7/16/2023 0347  Gross per 24 hour   Intake 250 ml   Output 700 ml   Net -450 ml       Vitals reviewed.   Constitutional:       Appearance: Not in distress. Frail. Chronically ill-appearing.   HENT:    Mouth/Throat:      Pharynx: Oropharynx is clear.   Neck:      Vascular: No carotid bruit or JVD.   Pulmonary:      Effort: Pulmonary effort is normal.      Comments: Diffuse rhonchi and crackles bilaterally.  Cardiovascular:      Tachycardia present. Irregularly irregular rhythm.      Murmurs: There is no murmur.      No rub.   Pulses:     Intact distal pulses.   Edema:     Peripheral edema absent.   Abdominal:      General: There is no distension.      Palpations: Abdomen is soft.      Tenderness: There is no abdominal tenderness.   Musculoskeletal:         General: No deformity. Skin:     General: Skin is warm and dry.   Neurological:      General: No focal deficit present.      Mental Status: Alert and oriented to person, place and time.          RESULTS REVIEW:    I reviewed the patient's new clinical results.      MEDICATIONS:    apixaban, 2.5 mg, Oral, Q12H  ascorbic acid, 250 mg, Oral, Daily  azithromycin, 500 mg, Intravenous, Q24H  bisoprolol, 2.5 mg, Oral, Q24H  brimonidine, 1 drop, Both Eyes, Q12H   And  timolol, 1 drop, Both Eyes, Q12H  fludrocortisone, 0.1 mg, Oral, Daily  furosemide, 40 mg, Intravenous, Q12H  galantamine, 8 mg, Oral, BID With Meals  guaiFENesin, 600 mg, Oral, Q12H  ipratropium-albuterol, 3 mL, Nebulization, Q4H - RT  levothyroxine, 88 mcg, Oral, Daily  mirtazapine, 15 mg, Oral, Nightly  pantoprazole, 40 mg, Oral, Q AM  PARoxetine, 10 mg, Oral, Nightly  piperacillin-tazobactam, " 3.375 g, Intravenous, Q8H  senna-docusate sodium, 2 tablet, Oral, BID  sodium chloride, 10 mL, Intravenous, Q12H          Results from last 7 days   Lab Units 07/16/23  0332   WBC 10*3/mm3 12.73*   HEMOGLOBIN g/dL 10.0*   HEMATOCRIT % 30.6*   PLATELETS 10*3/mm3 273     Results from last 7 days   Lab Units 07/16/23  0332   SODIUM mmol/L 129*   POTASSIUM mmol/L 4.0   CHLORIDE mmol/L 87*   CO2 mmol/L 33.0*   BUN mg/dL 24*   CREATININE mg/dL 1.09*   CALCIUM mg/dL 8.0*   BILIRUBIN mg/dL 0.2   ALK PHOS U/L 53   ALT (SGPT) U/L 14   AST (SGOT) U/L 16   GLUCOSE mg/dL 117*         Lab Results   Component Value Date    TROPONINT 16 (H) 07/14/2023     Results from last 7 days   Lab Units 07/14/23  2241   TSH uIU/mL 13.160*             No results found for: IRON, FERRITIN, LABIRON, TIBC   Magnesium   Date Value Ref Range Status   07/16/2023 1.9 1.6 - 2.4 mg/dL Final      CT Head Without Contrast    Result Date: 7/16/2023  1. No acute intracranial abnormality. 2. Mild volume loss and chronic microvascular ischemic changes. Electronically signed by:  Magen Sparks M.D.  7/16/2023 2:42 AM Mountain Time    XR Chest 1 View    Result Date: 7/14/2023  1.  Increasing bilateral upper lung opacities, edema versus pneumonia. 2.  Small bilateral pleural effusions. 3.  Redemonstration of airways disease and interstitial and nodular opacities scattered throughout the lungs. Electronically signed by:  Dedra Alcantara M.D.  7/14/2023 9:30 PM Mountain Time    CT Angiogram Chest    Result Date: 7/15/2023  1.  Favor contrast limitation rather than singular small pulmonary embolism in the distal aspect of the left lower lobe posterior subsegmental pulmonary artery. 2.  Constellation of findings in the lungs suggestive of advanced inflammatory/infectious process such as fungal/mycobacterial process, eosinophilic pneumonia, or possibly autoimmune process. Overall appearance has markedly worsened over the past 30 days  to include a small left  pleural effusion. Bilateral pulmonary nodules are unchanged and likely related. Recommend pulmonology consultation. Thank you for the referral of this patient. This exam was interpreted by an American Board of Radiology certified radiologist with subspecialty training. If there are any questions regarding this exam please feel free to contact a radiologist directly at  [phone]. Slot: [phone] Electronically signed by:  Eh Mc M.D.  7/14/2023 11:32 PM Mountain Time       Tele: Atrial Fib with Rapid Rate    Results for orders placed during the hospital encounter of 06/16/23    Adult Transthoracic Echo Complete W/ Cont if Necessary Per Protocol    Interpretation Summary    Left ventricular systolic function is normal. Estimated left ventricular EF = 60%    Left ventricular diastolic function was normal.    Mild aortic insufficiency.    Trace to mild mitral and tricuspid regurgitation.    Calculated right ventricular systolic pressure from tricuspid regurgitation is 20 mmHg.    There is a trivial pericardial effusion.      Assessment:   PAF, [id number] = 4, on outpatient flecainide therapy  Acute HFpEF, in the setting of A-fib with RVR  Orthostatic hypotension  Acute respiratory failure with hypoxia, chronic bronchiectasis  Hyponatremia       Plan:   Pulmonology believes flecainide may be causing some pneumonitis and this was stopped yesterday.  She was started on bisoprolol 2.5 mg yesterday.  This morning it will be held due to patient's blood pressure.  We will add midodrine 5 mg 3 times a day to help with blood pressure and see if later in the day she can tolerate beta-blockade.  Heart rate currently in the 90s-100s.  Spoke with the hospitalist.  We will repeat CT scan to see if patient has signs more consistent with pneumonitis versus fluid overload.  Consider Lasix if blood pressure improves later this day.  Continue Eliquis 2.5 mg twice daily.  Goal is to discharge patient before EP evaluation on  Friday at the [facility]  Dr. Mack will assume care of this patient tomorrow.    Electronically signed by Juanita Arias PA-C, 23, 9:51 AM EDT.         STAFF CARDIOLOGIST:      SUMMARY:    83 years old very sweet lady admitted with atrial fibrillation and heart failure with preserved ejection fraction      PERTINENT:    Severe lung disease  BNP 6000  Sodium 129      IMPRESSION:    Heart failure with preserved ejection fraction  Chronic pulmonary problems with MAC/bronchiectasis      RECOMMENDATIONS:    At this point we cannot try to control the heart rate without dropping her blood pressure  We will use Diamox for diuresis to see how her sodium does  I will add low-dose digoxin to control the rate        I have seen and examined the patient, reviewed the above note, necessary changes were made and I agree with the final note.   Luis Huggins MD                                       Electronically signed by Luis Huggins MD at [phone]       Catherine Perry MD at 23 1147              [facility] Medicine Services  PROGRESS NOTE    Patient Name: Vita Miller  : 1940  MRN: [id number]    Date of Admission: 2023  Primary Care Physician: Alfonso Steele MD    Subjective   Subjective     CC:  Follow-up PNA    HPI:  Overnight, patient was confused.  ABG obtained 7.3  and was placed on BiPAP by the night team.  Repeat ABG 7.3 60/117. CT head with chronic findings.  She was also hypotensive overnight with systolic in the 80s and given albumin x2 and 1 dose of Lasix early.  UA was negative, lactic 0.9.    At time of evaluation, patient was satting 99% on 1 L nasal cannula with no evidence of respiratory distress.  She was feeding herself breakfast.  Hypotensive with systolic blood pressure in the 70s this morning, heart rate was in the 90s at the time.  Mild cough.  No nausea or vomiting.  No diarrhea.  No urinary symptoms.    ROS:  Gen- No  fevers, chills  CV- No chest pain, palpitations  Resp-as above  GI- No N/V/D, abd pain     Objective   Objective     Vital Signs:   Temp:  [95.9 øF (35.5 øC)-98.4 øF (36.9 øC)] 97.4 øF (36.3 øC)  Heart Rate:  [] 99  Resp:  [16-22] 22  BP: ()/(53-83) 103/74  Flow (L/min):  [1-4] 2.5     Physical Exam:  Constitutional: No acute distress, awake, alert  HENT: NCAT, mucous membranes moist  Respiratory: Bibasilar crackles, some rhonchi, no rales, no wheezing, respiratory rate 16  Cardiovascular: IRIR, no murmurs, rubs, or gallops  Gastrointestinal: Positive bowel sounds, soft, nontender, nondistended  Musculoskeletal: No bilateral ankle edema  Psychiatric: Appropriate affect, cooperative  Neurologic: Oriented to self, place, year, speech clear  Skin: No rashes    Results Reviewed:  LAB RESULTS:      Lab 07/16/23  0332 07/15/23  0340 07/14/23  2241   WBC 12.73*  --  13.02*   HEMOGLOBIN 10.0*  --  14.3   HEMATOCRIT 30.6*  --  43.0   PLATELETS 273  --  353   NEUTROS ABS 11.29*  --  10.73*   IMMATURE GRANS (ABS) 0.07*  --  0.09*   LYMPHS ABS 0.71  --  0.95   MONOS ABS 0.64  --  0.99*   EOS ABS 0.01  --  0.20   MCV 97.8*  --  97.7*   SED RATE 32*  --  99*   CRP  --   --  14.66*   PROCALCITONIN  --   --  0.13   LACTATE 0.9 1.0  --          Lab 07/16/23  0332 07/14/23  2241   SODIUM 129* 129*   POTASSIUM 4.0 4.5   CHLORIDE 87* 87*   CO2 33.0* 30.0*   ANION GAP 9.0 12.0   BUN 24* 19   CREATININE 1.09* 0.79   EGFR 50.5* 74.3   GLUCOSE 117* 125*   CALCIUM 8.0* 9.1   MAGNESIUM 1.9 1.9   TSH  --  13.160*         Lab 07/16/23  0332 07/14/23  2241   TOTAL PROTEIN 6.0 6.8   ALBUMIN 3.6 3.5   GLOBULIN 2.4 3.3   ALT (SGPT) 14 24   AST (SGOT) 16 23   BILIRUBIN 0.2 0.4   ALK PHOS 53 90         Lab 07/14/23  2241   PROBNP 6,301.0*   HSTROP T 16*                 Lab 07/16/23  0518 07/16/23  0256   PH, ARTERIAL 7.380 7.325*   PCO2, ARTERIAL 60.2* 67.7*   PO2 .0* 71.6*   FIO2 30 24   HCO3 ART 35.6* 35.2*   BASE EXCESS ART  8.7* 7.2*   CARBOXYHEMOGLOBIN 1.3 1.4     Brief Urine Lab Results  (Last result in the past 365 days)        Color   Clarity   Blood   Leuk Est   Nitrite   Protein   CREAT   Urine HCG        07/16/23 0347 Yellow   Clear   Negative   Negative   Negative   Negative                   Microbiology Results Abnormal       Procedure Component Value - Date/Time    Blood Culture - Blood, Arm, Left [[id number]]  (Normal) Collected: 07/15/23 0340    Lab Status: Preliminary result Specimen: Blood from Arm, Left Updated: 07/16/23 0731     Blood Culture No growth at 24 hours    Blood Culture - Blood, Arm, Right [[id number]]  (Normal) Collected: 07/15/23 0340    Lab Status: Preliminary result Specimen: Blood from Arm, Right Updated: 07/16/23 0731     Blood Culture No growth at 24 hours    S. Pneumo Ag Urine or CSF - Urine, Urine, Clean Catch [[id number]]  (Normal) Collected: 07/15/23 0341    Lab Status: Final result Specimen: Urine, Clean Catch Updated: 07/15/23 1356     Strep Pneumo Ag Negative    Legionella Antigen, Urine - Urine, Urine, Clean Catch [[id number]]  (Normal) Collected: 07/15/23 0341    Lab Status: Final result Specimen: Urine, Clean Catch Updated: 07/15/23 1356     LEGIONELLA ANTIGEN, URINE Negative    MRSA Screen, PCR (Inpatient) - Swab, Nares [[id number]]  (Normal) Collected: 07/15/23 0310    Lab Status: Final result Specimen: Swab from Nares Updated: 07/15/23 0846     MRSA PCR Negative    Narrative:      The negative predictive value of this diagnostic test is high and should only be used to consider de-escalating anti-MRSA therapy. A positive result may indicate colonization with MRSA and must be correlated clinically.  MRSA Negative    COVID PRE-OP / PRE-PROCEDURE SCREENING ORDER (NO ISOLATION) - Swab, Nasopharynx [[id number]]  (Normal) Collected: 07/15/23 0310    Lab Status: Final result Specimen: Swab from Nasopharynx Updated: 07/15/23 0410    Narrative:      The following orders were created for panel order COVID  PRE-OP / PRE-PROCEDURE SCREENING ORDER (NO ISOLATION) - Swab, Nasopharynx.  Procedure                               Abnormality         Status                     ---------                               -----------         ------                     Respiratory Panel PCR w/...[[id number]]  Normal              Final result                 Please view results for these tests on the individual orders.    Respiratory Panel PCR w/COVID-19(SARS-CoV-2) JENNA/KELSI/NORMAN/PAD/COR/MAD/ARSEN In-House, NP Swab in UTM/VTM, 3-4 HR TAT - Swab, Nasopharynx [[id number]]  (Normal) Collected: 07/15/23 0310    Lab Status: Final result Specimen: Swab from Nasopharynx Updated: 07/15/23 0410     ADENOVIRUS, PCR Not Detected     Coronavirus 229E Not Detected     Coronavirus HKU1 Not Detected     Coronavirus NL63 Not Detected     Coronavirus OC43 Not Detected     COVID19 Not Detected     Human Metapneumovirus Not Detected     Human Rhinovirus/Enterovirus Not Detected     Influenza A PCR Not Detected     Influenza B PCR Not Detected     Parainfluenza Virus 1 Not Detected     Parainfluenza Virus 2 Not Detected     Parainfluenza Virus 3 Not Detected     Parainfluenza Virus 4 Not Detected     RSV, PCR Not Detected     Bordetella pertussis pcr Not Detected     Bordetella parapertussis PCR Not Detected     Chlamydophila pneumoniae PCR Not Detected     Mycoplasma pneumo by PCR Not Detected    Narrative:      In the setting of a positive respiratory panel with a viral infection PLUS a negative procalcitonin without other underlying concern for bacterial infection, consider observing off antibiotics or discontinuation of antibiotics and continue supportive care. If the respiratory panel is positive for atypical bacterial infection (Bordetella pertussis, Chlamydophila pneumoniae, or Mycoplasma pneumoniae), consider antibiotic de-escalation to target atypical bacterial infection.            CT Head Without Contrast    Result Date: 7/16/2023  EXAMINATION: CT  HEAD WITHOUT CONTRAST DATE: 7/16/2023 3:02 AM INDICATION: Delirium COMPARISON: MR brain 10/27/2020 TECHNIQUE: Noncontrast imaging obtained from the vertex to the skull base.  CT dose lowering techniques were used, to include: automated exposure control, adjustment for patient size, and or use of iterative reconstruction.? FINDINGS: Soft Tissues: No significant soft tissue abnormality. Skull: No underlying skull fracture or radiopaque foreign body. Sinuses: Paranasal sinuses are clear. Mastoids: Mastoid air cells are clear. Globes and Orbits: Globes and orbits are intact. Brain: No acute hemorrhage.  No midline shift, masses, or mass effect.  No evidence of acute infarct by noncontrast CT. Ventricles and Cisterns: Ventricular size and configuration is within normal limits. Basal cisterns are patent. No abnormal extra-axial fluid collection. Senescent Changes: Mild volume loss and chronic microvascular ischemic changes. Arteriosclerosis.     Impression: 1. No acute intracranial abnormality. 2. Mild volume loss and chronic microvascular ischemic changes. Electronically signed by:  Magen Sparks M.D.  7/16/2023 2:42 AM Mountain Time    XR Chest 1 View    Result Date: 7/14/2023  EXAMINATION: XR CHEST 1 VW DATE: 7/14/2023 11:00 PM INDICATION:  Dysrhythmia; COMPARISON:  June 16, 2023 FINDINGS: Moderate patchy interstitial appearing opacities throughout both lungs are again demonstrated. There is increasing more confluent opacity in the upper lungs. Background peribronchial thickening or bronchiectasis again demonstrated Small bilateral pleural effusions. No pneumothorax. Cardiomediastinal silhouette  unchanged No acute osseous abnormality.     Impression: 1.  Increasing bilateral upper lung opacities, edema versus pneumonia. 2.  Small bilateral pleural effusions. 3.  Redemonstration of airways disease and interstitial and nodular opacities scattered throughout the lungs. Electronically signed by:  Dedra Alcantara M.D.   7/14/2023 9:30 PM Mountain Time    CT Angiogram Chest    Result Date: 7/15/2023  EXAMINATION:  CTA CHEST WITH IV CONTRAST, CT PULMONARY ANGIOGRAM DATE OF EXAM: 7/15/2023 12:08 AM HISTORY: Suspected pulmonary embolism TECHNIQUE: CTA examination of the chest was performed following the intravenous administration of iodinated contrast. Sagittal, coronal and 3-D reformatted images were provided. CT dose lowering techniques were used, to include: automated exposure control, adjustment for patient size, and or use of iterative reconstruction. COMPARISON: 6/17/2023 FINDINGS: Lungs: Patchy mosaic prominent groundglass opacities seen symmetrically throughout the bilateral lungs. Airway debris is seen in the bilateral lower lobes. Focal bronchiectasis in the right middle lobe is present. Prominent peribronchial cuffing is seen in the left lower lobe. Small left pleural effusion is present. 12 mm pulmonary nodule in the left lower lobe seen on series 4 image 50. Other scattered smaller solid nodules are seen throughout the bilateral lungs. Pleura: As above Mediastinum and Maria Guadalupe: Normal. Pulmonary Arteries: Posterior subsegment of the left lower lobe pulmonary artery shows diminished opacification as it courses distally, as seen on series 4 images 64-73. Otherwise, the pulmonary arteries are normal. Cardiovascular: Normal.  No coronary artery calcifications. Upper Abdomen: Normal. Chest Wall: Normal. Musculoskeletal: Normal.     Impression: 1.  Favor contrast limitation rather than singular small pulmonary embolism in the distal aspect of the left lower lobe posterior subsegmental pulmonary artery. 2.  Constellation of findings in the lungs suggestive of advanced inflammatory/infectious process such as fungal/mycobacterial process, eosinophilic pneumonia, or possibly autoimmune process. Overall appearance has markedly worsened over the past 30 days  to include a small left pleural effusion. Bilateral pulmonary nodules are  unchanged and likely related. Recommend pulmonology consultation. Thank you for the referral of this patient. This exam was interpreted by an [employer] Board of Rad[employer] certified radiologist with subspecialty training. If there are any questions regarding this exam please feel free to contact a radiologist directly at  [phone]. Slot: [phone] Electronically signed by:  Eh Mc M.D.  7/14/2023 11:32 PM Mountain Time     Results for orders placed during the hospital encounter of 06/16/23    Adult Transthoracic Echo Complete W/ Cont if Necessary Per Protocol    Interpretation Summary    Left ventricular systolic function is normal. Estimated left ventricular EF = 60%    Left ventricular diastolic function was normal.    Mild aortic insufficiency.    Trace to mild mitral and tricuspid regurgitation.    Calculated right ventricular systolic pressure from tricuspid regurgitation is 20 mmHg.    There is a trivial pericardial effusion.      Current medications:  Scheduled Meds:apixaban, 2.5 mg, Oral, Q12H  ascorbic acid, 250 mg, Oral, Daily  azithromycin, 500 mg, Intravenous, Q24H  bisoprolol, 2.5 mg, Oral, Q24H  brimonidine, 1 drop, Both Eyes, Q12H   And  timolol, 1 drop, Both Eyes, Q12H  fludrocortisone, 0.1 mg, Oral, Daily  furosemide, 40 mg, Intravenous, Q12H  galantamine, 8 mg, Oral, BID With Meals  guaiFENesin, 600 mg, Oral, Q12H  ipratropium-albuterol, 3 mL, Nebulization, Q4H - RT  levothyroxine, 88 mcg, Oral, Daily  mirtazapine, 15 mg, Oral, Nightly  pantoprazole, 40 mg, Oral, Q AM  PARoxetine, 10 mg, Oral, Nightly  piperacillin-tazobactam, 3.375 g, Intravenous, Q8H  senna-docusate sodium, 2 tablet, Oral, BID  sodium chloride, 10 mL, Intravenous, Q12H      Continuous Infusions:esmolol,  mcg/kg/min, Last Rate: Stopped (07/15/23 0754)      PRN Meds:.  acetaminophen **OR** acetaminophen **OR** acetaminophen    senna-docusate sodium **AND** polyethylene glycol **AND** bisacodyl **AND** bisacodyl     melatonin    metoprolol tartrate    ondansetron    sodium chloride    sodium chloride    sodium chloride    Assessment & Plan   Assessment & Plan     Active Hospital Problems    Diagnosis  POA    **Acute respiratory failure with hypoxia [J96.01]  Yes    Pneumonia of both lungs due to infectious organism [J18.9]  Unknown    Hyponatremia [E87.1]  Unknown    Severe Malnutrition (HCC) [E43]  Yes    Atrial fibrillation with RVR [I48.91]  Yes    Cachexia [R64]  Yes    Senile osteoporosis [M81.0]  Yes    Hashimoto's thyroiditis [E06.3]  Yes    Primary open angle glaucoma of both eyes, severe stage [H40.1133]  Yes    Mixed anxiety and depressive disorder [F41.8]  Yes    Multiple nodules of lung [R91.8]  Yes    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        Brief Hospital Course to date:  Vita Miller is a 83 y.o. female with history of hypothyroidism, COPD with chronic bronchiectasis, history of chronic MAC with unknown intolerance to prior treatment plan, history of orthostatic hypotension, hyperlipidemia, osteoporosis, bilateral glaucoma, mild memory deficit, who was recently admitted to our facility approximately 1 month ago for new onset atrial fibrillation with RVR who returns to the ER tonight with complaints of palpitations and shortness of breath.     This patient's problems and plans were partially entered by my partner and updated as appropriate by me 07/16/23.     Acute respiratory failure with hypoxia  Bilateral pneumonia/infiltrates  History of MAC, intolerant to treatment  COPD with acute exacerbation, chronic bronchiectasis  -- Vancomycin, Zosyn, azithromycin  -- Pulmonology consult  --Off steroids  --ANCA, RF,  ESR, SLE labs pending  --Continue Zosyn, azith for now; stopped vanc given negative MRSA screen  -- Duo nebs  -- Flutter valve, mucinex  --cultures, pcr negative, strep negative, legionella negative  -- Holding on further diuresis given hypotension with systolic in the 70s after diuresis  --Discussed CT findings with pulm on 7/15; trialed diuresis and repeat CT chest w/o contrast on  completed     Atrial fibrillation with rvr  --s/p esmolol drip  --eliquis  --flecainide --> bisoprolol ---> changed to digoxin per cards  --cards recommended midodrine  --cards eval  --LVEF 60%, normal diastolic dysfunction on echo from 2023    Altered mental status, resolved  --Suspect component of hospital delirium, also had some CO2 retention on ABG from overnight that improved with Bipap  --Resolved at time of evaluation this morning     Hyponatremia  --Na 129 on admission; suspect related to volume overload     Orthostatic hypotension  --continue fludricortisone     Hypothyroidism  --slightly elevated tsh  --continue synthroid, dose adjusted on ; repeat tsh in 4-6 weeks; careful adjustment given just had episode of RVR     Other chronic conditions: HLD, malnutrition, anxiety/depression, memory impairment  --continue home meds  --nutrition  --pt/ot    Social  --updated the patient's daughter on plan of care per her request and per nursing's request to update her. Verified patient name and  first. After a complete update, she then told me that I had to update the patient's son as he is the primary person to update. He was called.   --discussed with the family that only one family member will be updated. They elected the son.   --Dr. Steele updated as well.      Expected Discharge Location and Transportation: Home  Expected Discharge   Expected Discharge Date: 2023; Expected Discharge Time:      DVT prophylaxis:  Medical and mechanical DVT prophylaxis orders are present.     AM-PAC 6 Clicks Score (PT): 18 (07/15/23  2000)    CODE STATUS:   Code Status and Medical Interventions:   Ordered at: 07/15/23 0214     Code Status (Patient has no pulse and is not breathing):    CPR (Attempt to Resuscitate)     Medical Interventions (Patient has pulse or is breathing):    Full Support       Catherine Perry MD  07/16/23        Electronically signed by Catherine Perry MD at 07/16/23 1644       Consult Notes (last 72 hours)  Notes from 07/15/23 1512 through 07/18/23 1512   No notes of this type exist for this encounter.

## 2023-07-18 NOTE — PLAN OF CARE
Goal Outcome Evaluation:  Plan of Care Reviewed With: (P) patient        Progress: (P) no change  Outcome Evaluation: (P) Pt continues to present below baseline function d/t generalized weakness, decreased balance, and decreased activity tolerance. Pt required CGA for supine>sit, STS, and to transfer from bed>BSC and BSC>chair. Functional mobility limited this date d/t lethargy and use of Bipap. Pt would continue to benefit from skilled IP PT. Recommend SNF at d/c.      Anticipated Discharge Disposition (PT): (P) skilled nursing facility

## 2023-07-18 NOTE — THERAPY TREATMENT NOTE
Patient Name: Vita Miller  : 1940    MRN: [id number]                              Today's Date: 2023       Admit Date: 2023    Visit Dx:     ICD-10-CM ICD-9-CM   1. Atrial fibrillation with RVR  I48.91 427.31   2. Acute respiratory failure with hypoxia  J96.01 518.81   3. Pneumonia of both upper lobes due to infectious organism  J18.9 486   4. Pneumonia of both lungs due to infectious organism, unspecified part of lung  J18.9 483.8   5. Severe Malnutrition (HCC)  E43 261   6. 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 Primary open angle glaucoma of both eyes, severe stage  H40.1133 365.11     365.73   8. Multiple nodules of lung  R91.8 793.19   9. H/O orthostatic hypotension  Z86.79 V12.59   10. COPD with acute exacerbation  J44.1 491.21   11. Acquired bronchiectasis  J47.9 494.0   12. 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    Hypothyroidism    Acute respiratory failure with hypoxia    Cachexia    Severe Malnutrition (HCC)    Pneumonia of both lungs due to infectious organism    Hyponatremia     Past Medical History:   Diagnosis Date    A-fib     Disease of thyroid gland     Glaucoma     Hypotension      Past Surgical History:   Procedure Laterality Date    HIP SURGERY      PATELLA SURGERY      SHOULDER SURGERY        General Information       Row Name 23          Physical Therapy Time and Intention    Document Type therapy note (daily note) (P)   -     Mode of Treatment physical therapy (P)   -       Row Name 23          General  Information    Patient Profile Reviewed yes (P)   -     Existing Precautions/Restrictions fall;oxygen therapy device and L/min;other (see comments) (P)   Bipap  -     Barriers to Rehab medically complex;previous functional deficit;visual deficit (P)   -[facility] Name 07/18/23 0920          Cognition    Orientation Status (Cognition) oriented to;person;disoriented to;place;time;verbal cues/prompts needed for orientation (P)   -[facility] Name 07/18/23 0920          Safety Issues, Functional Mobility    Safety Issues Affecting Function (Mobility) awareness of need for assistance;insight into deficits/self-awareness;safety precaution awareness;safety precautions follow-through/compliance;sequencing abilities;judgment (P)   -     Impairments Affecting Function (Mobility) balance;endurance/activity tolerance;postural/trunk control;shortness of breath;strength;visual/perceptual (P)   -     Comment, Safety Issues/Impairments (Mobility) Pt lethargic at beginning of session that improved with time (P)   -               User Key  (r) = Recorded By, (t) = Taken By, (c) = Cosigned By      Initials Name Provider Type     Lili Crawford, PT Student PT Student                   Mobility       [facility] Name 07/18/23 0921          Bed Mobility    Bed Mobility supine-sit (P)   -     Supine-Sit [place] (Bed Mobility) contact guard;verbal cues;nonverbal cues (demo/gesture) (P)   -     Assistive Device (Bed Mobility) bed rails;head of bed elevated (P)   -     Comment, (Bed Mobility) Increased time and effort d/t Bipap. Denied dizziness upon sitting (P)   -[facility] Name 07/18/23 0921          Transfers    Comment, (Transfers) STS x2, transfer bed>BSC and BSC> chair (P)   -LH       Row Name 07/18/23 0921          Bed-Chair Transfer    Bed-Chair [place] (Transfers) contact guard;verbal cues;nonverbal cues (demo/gesture) (P)   -     Assistive Device (Bed-Chair Transfers) other (see comments) (P)   B UE  support  -     Comment, (Bed-Chair Transfer) VCs and tactile cues for hand placement and sequencing. Pt has difficulty seeing BSC/chair and required manual assist to reach for armrest. Side steps to transfer. (P)   -       Row Name 07/18/23 0921          Sit-Stand Transfer    Sit-Stand [place] (Transfers) contact guard;verbal cues;nonverbal cues (demo/gesture) (P)   -     Assistive Device (Sit-Stand Transfers) other (see comments) (P)   B UE support  -     Comment, (Sit-Stand Transfer) Increased time d/t Bipap. VCs and tactile cues for hand placement and sequencing. Denied dizziness upon standing (P)   -       Row Name 07/18/23 0921          Gait/Stairs (Locomotion)    Comment, (Gait/Stairs) Deferred this date d/t Bipap and lethargy (P)   -               User Key  (r) = Recorded By, (t) = Taken By, (c) = Cosigned By      Initials Name Provider Type     Lili Crawford, PT Student PT Student                   Obj/Interventions       [facility] Name 07/18/23 0927          Motor Skills    Therapeutic Exercise hip;knee;ankle (P)   -[facility] Name 07/18/23 0927          Hip (Therapeutic Exercise)    Hip (Therapeutic Exercise) strengthening exercise (P)   -     Hip Strengthening (Therapeutic Exercise) bilateral;aBduction;aDduction;heel slides;marching while seated;10 repetitions (P)   -[facility] Name 07/18/23 0927          Knee (Therapeutic Exercise)    Knee (Therapeutic Exercise) strengthening exercise (P)   -     Knee Strengthening (Therapeutic Exercise) bilateral;SLR (straight leg raise);LAQ (long arc quad);10 repetitions (P)   -[facility] Name 07/18/23 0927          Ankle (Therapeutic Exercise)    Ankle (Therapeutic Exercise) AROM (active range of motion) (P)   -     Ankle AROM (Therapeutic Exercise) bilateral;dorsiflexion;plantarflexion;10 repetitions (P)   -[facility] Name 07/18/23 0927          Balance    Balance Assessment sitting static balance;sitting dynamic balance;standing static  balance;standing dynamic balance (P)   -     Static Sitting Balance standby assist (P)   -     Dynamic Sitting Balance supervision (P)   -     Position, Sitting Balance unsupported;sitting edge of bed (P)   -     Static Standing Balance contact guard (P)   -     Dynamic Standing Balance contact guard (P)   -     Position/Device Used, Standing Balance supported;other (see comments) (P)   B UE support  -     Balance Interventions sitting;standing;sit to stand;supported;static;dynamic (P)   -     Comment, Balance No LOB noted (P)   -               User Key  (r) = Recorded By, (t) = Taken By, (c) = Cosigned By      Initials Name Provider Type     Lili Crawford, PT Student PT Student                   Goals/Plan    No documentation.                  Clinical Impression       Row Name 07/18/23 0929          Pain    Pretreatment Pain Rating 0/10 - no pain (P)   -     Posttreatment Pain Rating 0/10 - no pain (P)   -       Row Name 07/18/23 0929          Plan of Care Review    Plan of Care Reviewed With patient (P)   -     Progress no change (P)   -     Outcome Evaluation Pt continues to present below baseline function d/t generalized weakness, decreased balance, and decreased activity tolerance. Pt required CGA for supine>sit, STS, and to transfer from bed>BSC and BSC>chair. Functional mobility limited this date d/t lethargy and use of Bipap. Pt would continue to benefit from skilled IP PT. Recommend SNF at d/c. (P)   -       Row Name 07/18/23 0929          Vital Signs    Pre Systolic BP Rehab -- (P)   VSS  -     Pretreatment Heart Rate (beats/min) 97 (P)   -     Intratreatment Heart Rate (beats/min) 144 (P)   -     Posttreatment Heart Rate (beats/min) 103 (P)   -     Pre SpO2 (%) 100 (P)   -     O2 Delivery Pre Treatment other (see comments) (P)   bipap  -     Intra SpO2 (%) 96 (P)   -     O2 Delivery Intra Treatment other (see comments) (P)   bipap  -     Post SpO2 (%) 99  (P)   -     O2 Delivery Post Treatment other (see comments) (P)   bipap  -     Pre Patient Position Supine (P)   -     Intra Patient Position Standing (P)   -     Post Patient Position Sitting (P)   -       Row Name 07/18/23 0929          Positioning and Restraints    Pre-Treatment Position in bed (P)   -LH     Post Treatment Position chair (P)   -LH     In Chair reclined;call light within reach;encouraged to call for assist;exit alarm on;with nsg;legs elevated;heels elevated;notified nsg (P)   -               User Key  (r) = Recorded By, (t) = Taken By, (c) = Cosigned By      Initials Name Provider Type     Lili Crawford, PT Student PT Student                   Outcome Measures       Row Name 07/18/23 0933          How much help from another person do you currently need...    Turning from your back to your side while in flat bed without using bedrails? 3 (P)   -LH     Moving from lying on back to sitting on the side of a flat bed without bedrails? 3 (P)   -LH     Moving to and from a bed to a chair (including a wheelchair)? 3 (P)   -LH     Standing up from a chair using your arms (e.g., wheelchair, bedside chair)? 3 (P)   -LH     Climbing 3-5 steps with a railing? 2 (P)   -LH     To walk in hospital room? 3 (P)   -     AM-PAC 6 Clicks Score (PT) 17 (P)   -     Highest level of mobility 5 --> Static standing (P)   -       Row Name 07/18/23 0933          Functional Assessment    Outcome Measure Options AM-PAC 6 Clicks Basic Mobility (PT) (P)   -               User Key  (r) = Recorded By, (t) = Taken By, (c) = Cosigned By      Initials Name Provider Type     Lili Crawford, PT Student PT Student                                 Physical Therapy Education       Title: PT OT SLP Therapies (In Progress)       Topic: Physical Therapy (In Progress)       Point: Mobility training (Done)       Learning Progress Summary             Patient Acceptance, E, ANN MARIE,NR by  at 7/18/2023 0933    Comment: see  flowsheet    Eager, E,D, NR by DM at 7/15/2023 1642                         Point: Home exercise program (In Progress)       Learning Progress Summary             Patient Eager, E,D, NR by  at 7/15/2023 1642                         Point: Body mechanics (Done)       Learning Progress Summary             Patient Acceptance, E, VU,NR by  at 7/18/2023 0933    Comment: see flowsheet    Eager, E,D, NR by  at 7/15/2023 1642                         Point: Precautions (Done)       Learning Progress Summary             Patient Acceptance, E, VU,NR by  at 7/18/2023 0933    Comment: see flowsheet    Eager, E,D, NR by DM at 7/15/2023 1642                                         User Key       Initials Effective Dates Name Provider Type Discipline     02/03/23 -  Merced Patel, PT Physical Therapist PT     05/15/23 -  Lili Crawford, PT Student PT Student PT                  PT Recommendation and Plan     Plan of Care Reviewed With: (P) patient  Progress: (P) no change  Outcome Evaluation: (P) Pt continues to present below baseline function d/t generalized weakness, decreased balance, and decreased activity tolerance. Pt required CGA for supine>sit, STS, and to transfer from bed>BSC and BSC>chair. Functional mobility limited this date d/t lethargy and use of Bipap. Pt would continue to benefit from skilled IP PT. Recommend SNF at d/c.     Time Calculation:         PT Charges       Row Name 07/18/23 0933             Time Calculation    Start Time 0840 (P)   -      PT Received On 07/18/23 (P)   -      PT Goal Re-Cert Due Date 07/25/23 (P)   -         Timed Charges    73979 - PT Therapeutic Exercise Minutes 15 (P)   -      24704 - PT Therapeutic Activity Minutes 24 (P)   -         Total Minutes    Timed Charges Total Minutes 39 (P)   -       Total Minutes 39 (P)   -                User Key  (r) = Recorded By, (t) = Taken By, (c) = Cosigned By      Initials Name Provider Type    LH Lili Crawford, PT  Student PT Student                  Therapy Charges for Today       Code Description Service Date Service Provider Modifiers Qty    29683162126 HC PT THER PROC EA 15 MIN 7/18/2023 Lili Crawford, PT Student GP 1    58810353839 HC PT THERAPEUTIC ACT EA 15 MIN 7/18/2023 Lili Crawford, PT Student GP 2            PT G-Codes  Outcome Measure Options: (P) AM-PAC 6 Clicks Basic Mobility (PT)  AM-PAC 6 Clicks Score (PT): (P) 17  PT Discharge Summary  Anticipated Discharge Disposition (PT): (P) skilled nursing facility    Lili Crawford, PT Student  7/18/2023

## 2023-07-18 NOTE — NURSING NOTE
RTT at bedside. Critical ABG reported to Provider. Pt educated; pt to keep bipap ON. Repeat ABG in 2 hrs per order

## 2023-07-18 NOTE — PROGRESS NOTES
[facility] Medicine Services  PROGRESS NOTE    Patient Name: Vita Miller  : 1940  MRN: [id number]    Date of Admission: 2023  Primary Care Physician: Alfonso Steele MD    Subjective   Subjective     CC:  Follow-up PNA    HPI:  States that she feels ok.  Still confused but a little better.  ABG worse overnight, wore bipap overnight.  Confusion a little better    ROS:  Gen- No fevers, chills  CV- No chest pain, palpitations  Resp-as above  GI- No N/V/D, abd pain     Objective   Objective     Vital Signs:   Temp:  [98 øF (36.7 øC)-98.8 øF (37.1 øC)] 98 øF (36.7 øC)  Heart Rate:  [] 138  Resp:  [15-23] 21  BP: ()/(49-86) 90/65  Flow (L/min):  [2] 2     Physical Exam:  Constitutional: No acute distress, awake, alert, sitting up in chair  HENT: NCAT, mucous membranes moist  Respiratory: bibasilar crackles  Cardiovascular: IRIR, aflutter 110, no murmurs, rubs, or gallops  Gastrointestinal: Positive bowel sounds, soft, nontender, nondistended  Musculoskeletal: No bilateral ankle edema  Psychiatric: Appropriate affect, cooperative  Neurologic: oriented x 2 (not date), speech clear  Skin: No rashes    Results Reviewed:  LAB RESULTS:      Lab 23  0341 23  0402 23  0332 07/15/23  0340 23  2241   WBC 9.25 9.30 12.73*  --  13.02*   HEMOGLOBIN 12.3 10.5* 10.0*  --  14.3   HEMATOCRIT 38.5 33.3* 30.6*  --  43.0   PLATELETS 322 287 273  --  353   NEUTROS ABS 6.51 6.68 11.29*  --  10.73*   IMMATURE GRANS (ABS) 0.07* 0.07* 0.07*  --  0.09*   LYMPHS ABS 1.71 1.66 0.71  --  0.95   MONOS ABS 0.63 0.61 0.64  --  0.99*   EOS ABS 0.28 0.23 0.01  --  0.20   .3* 100.6* 97.8*  --  97.7*   SED RATE  --   --  32*  --  99*   CRP  --   --   --   --  14.66*   PROCALCITONIN  --   --   --   --  0.13   LACTATE  --   --  0.9 1.0  --          Lab 23  0341 23  1615 23  0402 23  0332 23  4771   SODIUM 132*  --  134* 129* 129*   POTASSIUM  3.7 4.5 4.2 4.0 4.5   CHLORIDE 92*  --  92* 87* 87*   CO2 33.0*  --  37.0* 33.0* 30.0*   ANION GAP 7.0  --  5.0 9.0 12.0   BUN 21  --  18 24* 19   CREATININE 0.82  --  0.73 1.09* 0.79   EGFR 71.1  --  81.7 50.5* 74.3   GLUCOSE 78  --  89 117* 125*   CALCIUM 8.6  --  8.2* 8.0* 9.1   MAGNESIUM 2.5*  --   --  1.9 1.9   PHOSPHORUS 3.8  --   --   --   --    TSH  --   --   --   --  13.160*         Lab 07/18/23  0341 07/16/23  0332 07/14/23  2241   TOTAL PROTEIN 5.2* 6.0 6.8   ALBUMIN 2.9* 3.6 3.5   GLOBULIN 2.3 2.4 3.3   ALT (SGPT) 12 14 24   AST (SGOT) 15 16 23   BILIRUBIN 0.2 0.2 0.4   ALK PHOS 47 53 90         Lab 07/14/23  2241   PROBNP 6,301.0*   HSTROP T 16*             Lab 07/18/23  0341   VITAMIN B 12 970*         Lab 07/18/23  0804 07/18/23  0525 07/16/23  0518   PH, ARTERIAL 7.362 7.296* 7.380   PCO2, ARTERIAL 63.6* 76.1* 60.2*   PO2 ART 89.7 116.0* 117.0*   FIO2 30 28 30   HCO3 ART 36.0* 37.1* 35.6*   BASE EXCESS ART 8.4* 7.8* 8.7*   CARBOXYHEMOGLOBIN 1.1 1.0 1.3     Brief Urine Lab Results  (Last result in the past 365 days)        Color   Clarity   Blood   Leuk Est   Nitrite   Protein   CREAT   Urine HCG        07/17/23 1645 Yellow   Clear   Negative   Negative   Negative   Negative                   Microbiology Results Abnormal       Procedure Component Value - Date/Time    Blood Culture - Blood, Arm, Left [[id number]]  (Normal) Collected: 07/15/23 0340    Lab Status: Preliminary result Specimen: Blood from Arm, Left Updated: 07/18/23 0730     Blood Culture No growth at 3 days    Blood Culture - Blood, Arm, Right [[id number]]  (Normal) Collected: 07/15/23 0340    Lab Status: Preliminary result Specimen: Blood from Arm, Right Updated: 07/18/23 0730     Blood Culture No growth at 3 days    S. Pneumo Ag Urine or CSF - Urine, Urine, Clean Catch [[id number]]  (Normal) Collected: 07/15/23 0341    Lab Status: Final result Specimen: Urine, Clean Catch Updated: 07/15/23 1356     Strep Pneumo Ag Negative    Legionella  Antigen, Urine - Urine, Urine, Clean Catch [[id number]]  (Normal) Collected: 07/15/23 0341    Lab Status: Final result Specimen: Urine, Clean Catch Updated: 07/15/23 1356     LEGIONELLA ANTIGEN, URINE Negative    MRSA Screen, PCR (Inpatient) - Swab, Nares [[id number]]  (Normal) Collected: 07/15/23 0310    Lab Status: Final result Specimen: Swab from Nares Updated: 07/15/23 0846     MRSA PCR Negative    Narrative:      The negative predictive value of this diagnostic test is high and should only be used to consider de-escalating anti-MRSA therapy. A positive result may indicate colonization with MRSA and must be correlated clinically.  MRSA Negative    COVID PRE-OP / PRE-PROCEDURE SCREENING ORDER (NO ISOLATION) - Swab, Nasopharynx [[id number]]  (Normal) Collected: 07/15/23 0310    Lab Status: Final result Specimen: Swab from Nasopharynx Updated: 07/15/23 0410    Narrative:      The following orders were created for panel order COVID PRE-OP / PRE-PROCEDURE SCREENING ORDER (NO ISOLATION) - Swab, Nasopharynx.  Procedure                               Abnormality         Status                     ---------                               -----------         ------                     Respiratory Panel PCR w/...[[id number]]  Normal              Final result                 Please view results for these tests on the individual orders.    Respiratory Panel PCR w/COVID-19(SARS-CoV-2) JENNA/KELSI/NORMAN/PAD/COR/MAD/ARSEN In-House, NP Swab in UTM/[facility], 3-4 HR TAT - Swab, Nasopharynx [[id number]]  (Normal) Collected: 07/15/23 0310    Lab Status: Final result Specimen: Swab from Nasopharynx Updated: 07/15/23 0410     ADENOVIRUS, PCR Not Detected     Coronavirus 229E Not Detected     Coronavirus HKU1 Not Detected     Coronavirus NL63 Not Detected     Coronavirus OC43 Not Detected     COVID19 Not Detected     Human Metapneumovirus Not Detected     Human Rhinovirus/Enterovirus Not Detected     Influenza A PCR Not Detected     Influenza B PCR Not  Detected     Parainfluenza Virus 1 Not Detected     Parainfluenza Virus 2 Not Detected     Parainfluenza Virus 3 Not Detected     Parainfluenza Virus 4 Not Detected     RSV, PCR Not Detected     Bordetella pertussis pcr Not Detected     Bordetella parapertussis PCR Not Detected     Chlamydophila pneumoniae PCR Not Detected     Mycoplasma pneumo by PCR Not Detected    Narrative:      In the setting of a positive respiratory panel with a viral infection PLUS a negative procalcitonin without other underlying concern for bacterial infection, consider observing off antibiotics or discontinuation of antibiotics and continue supportive care. If the respiratory panel is positive for atypical bacterial infection (Bordetella pertussis, Chlamydophila pneumoniae, or Mycoplasma pneumoniae), consider antibiotic de-escalation to target atypical bacterial infection.            XR Chest 1 View    Result Date: 7/17/2023  XR CHEST 1 VW Date of Exam: 7/17/2023 6:07 AM EDT Indication: pleural effusion Comparison: CT chest 7/16/2023, chest x-ray 7/14/2023 Findings: Stable cardiomediastinal silhouette within normal limits. Similar patchy bilateral parenchymal and interstitial opacities compatible with multifocal infection; this appears grossly unchanged compared to prior chest x-ray from 7/14/2023. There are small bilateral pleural effusions, unchanged. No pneumothorax.     Impression: Impression: Similar appearance of multifocal pneumonia and small bilateral pleural effusions. Electronically Signed: Chago Holcomb  7/17/2023 8:40 AM EDT  Workstation ID: [id number]     Results for orders placed during the hospital encounter of 06/16/23    Adult Transthoracic Echo Complete W/ Cont if Necessary Per Protocol    Interpretation Summary    Left ventricular systolic function is normal. Estimated left ventricular EF = 60%    Left ventricular diastolic function was normal.    Mild aortic insufficiency.    Trace to mild mitral and tricuspid  regurgitation.    Calculated right ventricular systolic pressure from tricuspid regurgitation is 20 mmHg.    There is a trivial pericardial effusion.      Current medications:  Scheduled Meds:acetaZOLAMIDE, 125 mg, Oral, Daily  apixaban, 2.5 mg, Oral, Q12H  ascorbic acid, 250 mg, Oral, Daily  azithromycin, 500 mg, Intravenous, Q24H  brimonidine, 1 drop, Both Eyes, Q12H   And  timolol, 1 drop, Both Eyes, Q12H  bumetanide, 0.5 mg, Intravenous, BID  digoxin, 125 mcg, Oral, Every Other Day  fludrocortisone, 0.1 mg, Oral, Daily  galantamine, 8 mg, Oral, BID With Meals  guaiFENesin, 600 mg, Oral, Q12H  ipratropium-albuterol, 3 mL, Nebulization, Q4H - RT  levothyroxine, 88 mcg, Oral, Daily  midodrine, 5 mg, Oral, TID AC  mirtazapine, 15 mg, Oral, Nightly  pantoprazole, 40 mg, Oral, Q AM  PARoxetine, 10 mg, Oral, Nightly  piperacillin-tazobactam, 3.375 g, Intravenous, Q8H  senna-docusate sodium, 2 tablet, Oral, BID  sodium chloride, 10 mL, Intravenous, Q12H      Continuous Infusions:dilTIAZem, 5 mg/hr, Last Rate: Stopped (07/17/23 1816)        PRN Meds:.  acetaminophen **OR** acetaminophen **OR** acetaminophen    senna-docusate sodium **AND** polyethylene glycol **AND** bisacodyl **AND** bisacodyl    Magnesium Standard Dose Replacement - Follow Nurse / BPA Driven Protocol    melatonin    metoprolol tartrate    ondansetron    Potassium Replacement - Follow Nurse / BPA Driven Protocol    sodium chloride    sodium chloride    sodium chloride    Assessment & Plan   Assessment & Plan     Active Hospital Problems    Diagnosis  POA    **Acute respiratory failure with hypoxia [J96.01]  Yes    Pneumonia of both lungs due to infectious organism [J18.9]  Unknown    Hyponatremia [E87.1]  Unknown    Severe Malnutrition (HCC) [E43]  Yes    Atrial fibrillation with RVR [I48.91]  Yes    Cachexia [R64]  Yes    Senile osteoporosis [M81.0]  Yes    Hashimoto's thyroiditis [E06.3]  Yes    Primary open angle glaucoma of both eyes, severe stage  [H40.1133]  Yes    Mixed anxiety and depressive disorder [F41.8]  Yes    Multiple nodules of lung [R91.8]  Yes    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        Brief Hospital Course to date:  Vita Miller is a 83 y.o. female with history of hypothyroidism, COPD with chronic bronchiectasis, history of chronic MAC with unknown intolerance to prior treatment plan, history of orthostatic hypotension, hyperlipidemia, osteoporosis, bilateral glaucoma, mild memory deficit, who was recently admitted to our facility approximately 1 month ago for new onset atrial fibrillation with RVR who returns to the ER tonight with complaints of palpitations and shortness of breath.     This patient's problems and plans were partially entered by my partner and updated as appropriate by me 07/18/23.     Acute respiratory failure with hypoxia  Bilateral pneumonia/infiltrates  History of MAC, intolerant to treatment  COPD with acute exacerbation, chronic bronchiectasis  -- Vancomycin, Zosyn, azithromycin  -- Pulmonology following  --Off steroids  --ANCA, RF, ESR, SLE labs pending  --Continue Zosyn, azith for now; stopped vanc given negative MRSA screen  -- Duo nebs  -- Flutter valve, mucinex  --cultures, pcr negative, strep negative, legionella negative  -- diuresis per cards today with low dose bumex  --CT chest showed new GGO, bronchiectasis, scattered nodules.  Needs f/u CT or PET scan     Atrial fibrillation with rvr no in aflutter  --s/p esmolol drip  --eliquis  --flecainide --> bisoprolol ---> changed to digoxin per cards  --cards recommended midodrine  --cards following.  Now in aflutter at rate 130's.  S/p IV metoprolol with no effect.   S/p dig yesterday, ordered Qod.  Flecainide d/t concerns of possibility of pneumonitis so can't give amiodarone either per cards.  Called and d/w Dr. Mack who recs trying diltiazem gtt at rate of 5/hr with no bolus yesterday and improved, now off.  HR a little better today, currently holding on DCCV  --LVEF 60%, normal diastolic dysfunction on echo from 6/17/2023    Altered mental status  --Suspect component of hospital delirium, also had some CO2 retention on ABG from overnight that improved with Bipap  --confused again this AM.  ABG looks compensated today with chronic hypercarbia.  Sodium now much better.  u/a bland, B12 wnl.   RPR pending     Hyponatremia  --Na 129 on admission; suspect related to volume overload  --improved     Orthostatic hypotension  --continue fludricortisone and midodrine     Hypothyroidism  --slightly elevated tsh  --continue synthroid, dose adjusted on 7/16; repeat tsh in 4-6 weeks; careful adjustment given RVR     Other chronic conditions: HLD, malnutrition, anxiety/depression, memory impairment  --continue home meds  --nutrition  --pt/ot    Social  ---- I called and updated Arun Miller  (son and POA) over the phone      Expected Discharge Location and Transportation: PT recs SNF/d/w son, family planning for 24/7 care at home  Expected Discharge   Expected Discharge Date: 7/19/2023; Expected Discharge Time:      DVT prophylaxis:  Medical and mechanical DVT prophylaxis orders are present.     AM-PAC 6 Clicks Score (PT): 17 (07/18/23 5145)    CODE STATUS:   Code Status and Medical Interventions:   Ordered at: 07/15/23 0214     Code Status (Patient has no pulse and is not breathing):    CPR (Attempt to Resuscitate)     Medical Interventions (Patient has pulse or is breathing):    Full Support       Neftaly Belcher MD  07/18/23

## 2023-07-18 NOTE — NURSING NOTE
Pt converted to sinus rhythm @ ~1600. Rate noted to be slightly bradycardic (50s-60s). 12 lead EKG obtained per order to confirm. Will continue to monitor.

## 2023-07-18 NOTE — PROGRESS NOTES
Vita Miller  [id number]  1940   LOS: 3 days   Patient Care Team:  Alfonso Steele MD as PCP - General (Internal Medicine)    Chief Complaint:  PAF / SEVERE LUNG DZ WITH CHRONIC HYPOXIC HYPERCARBIC RESPIRATORY FAILURE / HFpEF / CONFUSION / HYPONATREMIA     Subjective     Comfortable semisupine in bed on BiPAP therapy (FiO2 0.30) with nominal oximetry (98%).  Deep sleep.  No posturing or overt seizure activity.  I did not awaken at this time.  No awakening during physical examination.    Objective     Vital Sign Min/Max for last 24 hours  Temp  Min: 98.1 øF (36.7 øC)  Max: 98.8 øF (37.1 øC)   BP  Min: 83/58  Max: 120/72   Pulse  Min: 89  Max: 139   Resp  Min: 15  Max: 23   SpO2  Min: 95 %  Max: 100 %               07/14/23  2224   Weight: 46.3 kg (102 lb)         Intake/Output Summary (Last 24 hours) at 7/18/2023 0716  Last data filed at 7/17/2023 2100  Gross per 24 hour   Intake 520 ml   Output 1650 ml   Net -1130 ml       Physical Exam:     General Appearance:  Sleeping, in no acute distress   Lungs:   Diffuse diminished breath sounds with scattered rhonchi and wheezes,respirations regular, even and unlabored    Heart:    Irregular and normal rate, variable S1 and S2, grade 2/6 systolic murmur, no gallop, no rub, no click   Abdomen:  Extremities:   Soft, nontender, bowel sounds audible x4  Trace-1+ bipedal edema, normal range of motion   Pulses:   Pulses palpable and equal bilaterally      Results Review:   Results from last 7 days   Lab Units 07/18/23  0341 07/17/23  1615 07/17/23  0402 07/16/23  0332   SODIUM mmol/L 132*  --  134* 129*   POTASSIUM mmol/L 3.7 4.5 4.2 4.0   CHLORIDE mmol/L 92*  --  92* 87*   CO2 mmol/L 33.0*  --  37.0* 33.0*   BUN mg/dL 21  --  18 24*   CREATININE mg/dL 0.82  --  0.73 1.09*   GLUCOSE mg/dL 78  --  89 117*   CALCIUM mg/dL 8.6  --  8.2* 8.0*     Results from last 7 days   Lab Units 07/17/23  0402 07/16/23  0332 07/14/23  2241   WBC 10*3/mm3 9.30 12.73* 13.02*   HEMOGLOBIN  g/dL 10.5* 10.0* 14.3   HEMATOCRIT % 33.3* 30.6* 43.0   PLATELETS 10*3/mm3 287 273 353     Results from last 7 days   Lab Units 07/14/23  2241   HSTROP T ng/L 16*     ABGs (FiO2 0.28): pH 7.30, PCO2 76, PO2 116 (97% saturation)  ALBUMIN: 2.9  LFTs; WNL  MAGNESIUM: 2.5  URINALYSIS: WNL  NO NEW CXR / EKG.    Medication Review: REVIEWED; NO DRIPS (IV diltiazem discontinued 1816 hrs. 17 July 2023)    Assessment & Plan     Marginal labile systolic blood pressure with stable mild anemia, suboptimal nutrition, and markedly abnormal arterial blood gases.  Will review with Dr. Hook but I feel sedation with anesthesiology for DAYNA and ECV will be very problematic and very high risk.  Currently patient tolerating atrial fibrillation with ventricular response approximately 100/minute.  No family in room currently and will return later to discuss with him when available.      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

## 2023-07-18 NOTE — CASE MANAGEMENT/SOCIAL WORK
Continued Stay Note  [facility]     Patient Name: Vita Miller  MRN: [id number]  Today's Date: 7/18/2023    Admit Date: 7/14/2023    Plan: home with home health   Discharge Plan       Row Name 07/18/23 1413       Plan    Plan home with home health    Plan Comments Met with Ms. Miller and her son at bedside for discharge planning. Patient's son is still requesting patient be set up with home health at discharge rather than SNF which has been recommended by therapy. Rolling walker was ordered yesterday and is at bedside now.  will continue to follow plan of care and assist with discharge planning needs as indicated.                   Discharge Codes    No documentation.                 Expected Discharge Date and Time       Expected Discharge Date Expected Discharge Time    Jul 19, 2023               Loly Moreira RN

## 2023-07-18 NOTE — PROGRESS NOTES
Intensive Care Follow-up     Hospital:  LOS: 3 days   Ms. Vita Miller, 83 y.o. female is followed for:   Acute respiratory failure with hypoxia            History of present illness:   83-year-old female with history of Mycobacterium avium complex and bronchiectasis.  Patient previously has been treated for MAC.  She apparently has been intolerant of a lot of those medications and apparently has been not getting any treatment at this point.  She uses DuoNebs and percussion vest for her bronchiectasis.  Patient apparently was placed on supplemental oxygen recently as well.  Patient apparently was on Breo previously But was taken off due to concern about open-angle glaucoma which has required surgery x2. Patient was hospitalized on June 16 with new onset atrial fibrillation.  She was placed on flecainide and Eliquis at that time.  Patient readmitted with complains of palpitations and worsening shortness of breath.  Patient underwent work-up with CT scan of the chest which showed significant groundglass opacities bilaterally which were not present on a CT scan a month ago.  She was also started on esmolol and digoxin for rate control.  She was initially started on steroids with concern for vasculitis but that was stopped.  Patient apparently denied any new onset of fever, chest pain.  She does have chronic sputum production-year-old for which she uses nebulizer and percussion vest.  Flecainide has been put on hold as well.  She was also found to be hypercapnic so has been on and off BiPAP support.  Repeat blood gases showed improvement.    Subjective   Interval History:  Patient BiPAP currently.  Slight more interactive and communicating through the mask.  Patient's son in the room and we spent time talking about her overall care and plan at this point.  Imaging showed and differential diagnosis discussed.  Patient was started on diltiazem yesterday for rate control that dropped her blood pressure.  Patient became  "more lethargic and hypercapnic so was kept on BiPAP overnight.  Patient has not been on any BiPAP or oxygen therapy at home prior to this admission.                 The patient's past medical, surgical and social history were reviewed and updated in Epic as appropriate.       Objective     Infusions:  dilTIAZem, 5 mg/hr, Last Rate: Stopped (07/17/23 1816)    Medications:  acetaZOLAMIDE, 125 mg, Oral, Daily  apixaban, 2.5 mg, Oral, Q12H  ascorbic acid, 250 mg, Oral, Daily  azithromycin, 500 mg, Intravenous, Q24H  brimonidine, 1 drop, Both Eyes, Q12H   And  timolol, 1 drop, Both Eyes, Q12H  bumetanide, 0.5 mg, Intravenous, BID  digoxin, 125 mcg, Oral, Every Other Day  fludrocortisone, 0.1 mg, Oral, Daily  galantamine, 8 mg, Oral, BID With Meals  guaiFENesin, 600 mg, Oral, Q12H  ipratropium-albuterol, 3 mL, Nebulization, Q4H - RT  levothyroxine, 88 mcg, Oral, Daily  midodrine, 5 mg, Oral, TID AC  mirtazapine, 15 mg, Oral, Nightly  pantoprazole, 40 mg, Oral, Q AM  PARoxetine, 10 mg, Oral, Nightly  piperacillin-tazobactam, 3.375 g, Intravenous, Q8H  senna-docusate sodium, 2 tablet, Oral, BID  sodium chloride, 10 mL, Intravenous, Q12H        Vital Sign Min/Max for last 24 hours  Temp  Min: 98 øF (36.7 øC)  Max: 98.8 øF (37.1 øC)   BP  Min: 83/58  Max: 120/72   Pulse  Min: 89  Max: 139   Resp  Min: 15  Max: 23   SpO2  Min: 95 %  Max: 100 %   Flow (L/min)  Min: 2  Max: 2       Input/Output for last 24 hour shift  07/17 0701 - 07/18 0700  In: 520 [P.O.:420]  Out: 1650 [Urine:1650]      Objective:  Vital signs: (most recent): Blood pressure 92/62, pulse 100, temperature 98 øF (36.7 øC), temperature source Axillary, resp. rate 21, height 172.7 cm (68\"), weight 46.3 kg (102 lb), SpO2 100 %.          General Appearance: Seen sitting out in chair.  Wakes up and tries to communicate and answer questions appropriately, on BiPAP currently   Lungs:   B/L Breath sounds present with decreased breath sounds on bases, no wheezing heard, " basilar crackles.   Heart: S1 and S2 present, A flutter with rate controlled currently  Extremities:  no cyanosis or clubbing,  no edema.  Neurologic:  Moving all four extremities.       Results from last 7 days   Lab Units 07/18/23  0341 07/17/23  0402 07/16/23  0332   WBC 10*3/mm3 9.25 9.30 12.73*   HEMOGLOBIN g/dL 12.3 10.5* 10.0*   PLATELETS 10*3/mm3 322 287 273       Results from last 7 days   Lab Units 07/18/23  0341 07/17/23  1615 07/17/23  0402 07/16/23  0332 07/14/23  2241   SODIUM mmol/L 132*  --  134* 129* 129*   POTASSIUM mmol/L 3.7 4.5 4.2 4.0 4.5   CO2 mmol/L 33.0*  --  37.0* 33.0* 30.0*   BUN mg/dL 21  --  18 24* 19   CREATININE mg/dL 0.82  --  0.73 1.09* 0.79   MAGNESIUM mg/dL 2.5*  --   --  1.9 1.9   PHOSPHORUS mg/dL 3.8  --   --   --   --    GLUCOSE mg/dL 78  --  89 117* 125*       Estimated Creatinine Clearance: 38 mL/min (by C-G formula based on SCr of 0.82 mg/dL).    Results from last 7 days   Lab Units 07/18/23  0804   PH, ARTERIAL pH units 7.362   PCO2, ARTERIAL mm Hg 63.6*   PO2 ART mm Hg 89.7         Images:   Chest x-ray reviewed personally and showed bilateral multifocal pneumonia with small bilateral pleural effusions.  Left upper lobe opacity seems little less dense on chest x-ray today    I reviewed the patient's results and images.     CT scan of the chest reviewed and showed bilateral multifocal groundglass opacities with lower lobe predominant bronchiectasis and small left pleural effusion.    Assessment & Plan   Impression        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    Hypothyroidism    Cachexia    Severe  Malnutrition (HCC)    Pneumonia of both lungs due to infectious organism    Hyponatremia       Plan        1.  Patient presented with complaints of worsening shortness of breath, palpitations and atrial flutter with rapid ventricular rate.  Continue further management per cardiology.  Discussed with cardiology and given patient's weakness and respiratory issues she will be high risk of complications from anesthesia.  Will hold off external cardioversion today.  Heart rate is under better control.  If he can maintain rate control that may be acceptable.  If she starts to have more issues with rapid ventricular rate  then may need to go for DAYNA/ECV.  2.  Patient has significant groundglass opacities which are new compared to before.  Work-up for vasculitis and rheumatology conditions have been sent.  We will follow-up on the result of those.  Wondering if it could be all pulmonary edema pattern from rapid ventricular rate and decompensation secondary to that. ProBNP is elevated.  Continue diuresis as tolerated hemodynamically.  Continue nebulizer therapy.  Will add vest CPT at least once a day to keep airway clear.  Discussed with son that bronchoscopy may be needed to evaluate some of these differential diagnoses such as pulmonary hemorrhage but given her poor respiratory status she will be at high risk.  We agreed on conservative approach for now.  Sed rate is improving.  ANCA panel pending.  Rheumatoid factor and SLE panel negative.  3.  It could still be exacerbation of her MAC disease.  However clinically she is not presenting with any symptoms of fevers, weight loss though it has been difficult to obtain history from her today.  Will monitor for now.  4.  NIV support as needed.  5.  Continue current broad-spectrum antibiotics for possible pneumonia.  Cultures are thus far negative..  6.  Patient with intermittent delirium.  Continue management per hospitalist team.    We will continue to follow along from  pulmonary standpoint.    Remains with guarded prognosis.    Ventura Hook MD, [facility]  Pulmonary, Critical care and Sleep Medicine

## 2023-07-19 LAB
ALBUMIN SERPL-MCNC: 3 G/DL (ref 3.5–5.2)
ANION GAP SERPL CALCULATED.3IONS-SCNC: 7 MMOL/L (ref 5–15)
BUN SERPL-MCNC: 20 MG/DL (ref 8–23)
BUN/CREAT SERPL: 20.8 (ref 7–25)
CALCIUM SPEC-SCNC: 8.8 MG/DL (ref 8.6–10.5)
CHLORIDE SERPL-SCNC: 92 MMOL/L (ref 98–107)
CO2 SERPL-SCNC: 35 MMOL/L (ref 22–29)
CREAT SERPL-MCNC: 0.96 MG/DL (ref 0.57–1)
EGFRCR SERPLBLD CKD-EPI 2021: 58.8 ML/MIN/1.73
GLUCOSE SERPL-MCNC: 85 MG/DL (ref 65–99)
MAGNESIUM SERPL-MCNC: 2.2 MG/DL (ref 1.6–2.4)
PHOSPHATE SERPL-MCNC: 3.5 MG/DL (ref 2.5–4.5)
POTASSIUM SERPL-SCNC: 3.9 MMOL/L (ref 3.5–5.2)
SODIUM SERPL-SCNC: 134 MMOL/L (ref 136–145)

## 2023-07-19 PROCEDURE — 99232 SBSQ HOSP IP/OBS MODERATE 35: CPT | Performed by: INTERNAL MEDICINE

## 2023-07-19 PROCEDURE — 25010000002 PIPERACILLIN SOD-TAZOBACTAM PER 1 G: Performed by: INTERNAL MEDICINE

## 2023-07-19 PROCEDURE — 94660 CPAP INITIATION&MGMT: CPT

## 2023-07-19 PROCEDURE — 83735 ASSAY OF MAGNESIUM: CPT | Performed by: INTERNAL MEDICINE

## 2023-07-19 PROCEDURE — 25010000002 AZITHROMYCIN PER 500 MG: Performed by: INTERNAL MEDICINE

## 2023-07-19 PROCEDURE — 94799 UNLISTED PULMONARY SVC/PX: CPT

## 2023-07-19 PROCEDURE — 97530 THERAPEUTIC ACTIVITIES: CPT

## 2023-07-19 PROCEDURE — 97535 SELF CARE MNGMENT TRAINING: CPT

## 2023-07-19 PROCEDURE — 94669 MECHANICAL CHEST WALL OSCILL: CPT

## 2023-07-19 PROCEDURE — 80069 RENAL FUNCTION PANEL: CPT | Performed by: INTERNAL MEDICINE

## 2023-07-19 RX ORDER — DILTIAZEM HYDROCHLORIDE 120 MG/1
120 CAPSULE, COATED, EXTENDED RELEASE ORAL
Status: DISCONTINUED | OUTPATIENT
Start: 2023-07-19 | End: 2023-07-25

## 2023-07-19 RX ADMIN — MIDODRINE HYDROCHLORIDE 5 MG: 5 TABLET ORAL at 12:23

## 2023-07-19 RX ADMIN — IPRATROPIUM BROMIDE AND ALBUTEROL SULFATE 3 ML: 2.5; .5 SOLUTION RESPIRATORY (INHALATION) at 16:10

## 2023-07-19 RX ADMIN — MIDODRINE HYDROCHLORIDE 5 MG: 5 TABLET ORAL at 08:27

## 2023-07-19 RX ADMIN — IPRATROPIUM BROMIDE AND ALBUTEROL SULFATE 3 ML: 2.5; .5 SOLUTION RESPIRATORY (INHALATION) at 20:10

## 2023-07-19 RX ADMIN — MIDODRINE HYDROCHLORIDE 5 MG: 5 TABLET ORAL at 17:24

## 2023-07-19 RX ADMIN — IPRATROPIUM BROMIDE AND ALBUTEROL SULFATE 3 ML: 2.5; .5 SOLUTION RESPIRATORY (INHALATION) at 23:10

## 2023-07-19 RX ADMIN — PANTOPRAZOLE SODIUM 40 MG: 40 TABLET, DELAYED RELEASE ORAL at 06:11

## 2023-07-19 RX ADMIN — APIXABAN 2.5 MG: 2.5 TABLET, FILM COATED ORAL at 21:48

## 2023-07-19 RX ADMIN — GUAIFENESIN 600 MG: 600 TABLET, EXTENDED RELEASE ORAL at 21:48

## 2023-07-19 RX ADMIN — SENNOSIDES AND DOCUSATE SODIUM 2 TABLET: 50; 8.6 TABLET ORAL at 08:27

## 2023-07-19 RX ADMIN — PIPERACILLIN SODIUM AND TAZOBACTAM SODIUM 3.38 G: 3; .375 INJECTION, SOLUTION INTRAVENOUS at 03:14

## 2023-07-19 RX ADMIN — Medication 10 ML: at 21:49

## 2023-07-19 RX ADMIN — Medication 250 MG: at 08:27

## 2023-07-19 RX ADMIN — TIMOLOL MALEATE 1 DROP: 5 SOLUTION/ DROPS OPHTHALMIC at 08:28

## 2023-07-19 RX ADMIN — IPRATROPIUM BROMIDE AND ALBUTEROL SULFATE 3 ML: 2.5; .5 SOLUTION RESPIRATORY (INHALATION) at 08:15

## 2023-07-19 RX ADMIN — Medication 10 ML: at 08:28

## 2023-07-19 RX ADMIN — BRIMONIDINE TARTRATE 1 DROP: 2 SOLUTION/ DROPS OPHTHALMIC at 21:49

## 2023-07-19 RX ADMIN — LEVOTHYROXINE SODIUM 88 MCG: 0.09 TABLET ORAL at 06:11

## 2023-07-19 RX ADMIN — IPRATROPIUM BROMIDE AND ALBUTEROL SULFATE 3 ML: 2.5; .5 SOLUTION RESPIRATORY (INHALATION) at 13:19

## 2023-07-19 RX ADMIN — APIXABAN 2.5 MG: 2.5 TABLET, FILM COATED ORAL at 08:27

## 2023-07-19 RX ADMIN — GUAIFENESIN 600 MG: 600 TABLET, EXTENDED RELEASE ORAL at 08:27

## 2023-07-19 RX ADMIN — AZITHROMYCIN 500 MG: 500 INJECTION, POWDER, LYOPHILIZED, FOR SOLUTION INTRAVENOUS at 06:08

## 2023-07-19 RX ADMIN — GALANTAMINE 8 MG: 4 TABLET, FILM COATED ORAL at 17:23

## 2023-07-19 RX ADMIN — PAROXETINE HYDROCHLORIDE 10 MG: 10 TABLET, FILM COATED ORAL at 21:48

## 2023-07-19 RX ADMIN — MIRTAZAPINE 15 MG: 15 TABLET, FILM COATED ORAL at 21:48

## 2023-07-19 RX ADMIN — Medication 5 MG: at 21:48

## 2023-07-19 RX ADMIN — PIPERACILLIN SODIUM AND TAZOBACTAM SODIUM 3.38 G: 3; .375 INJECTION, SOLUTION INTRAVENOUS at 10:16

## 2023-07-19 RX ADMIN — GALANTAMINE 8 MG: 4 TABLET, FILM COATED ORAL at 08:27

## 2023-07-19 RX ADMIN — TIMOLOL MALEATE 1 DROP: 5 SOLUTION/ DROPS OPHTHALMIC at 21:49

## 2023-07-19 RX ADMIN — PIPERACILLIN SODIUM AND TAZOBACTAM SODIUM 3.38 G: 3; .375 INJECTION, SOLUTION INTRAVENOUS at 17:23

## 2023-07-19 RX ADMIN — FLUDROCORTISONE ACETATE 0.1 MG: 0.1 TABLET ORAL at 08:28

## 2023-07-19 RX ADMIN — IPRATROPIUM BROMIDE AND ALBUTEROL SULFATE 3 ML: 2.5; .5 SOLUTION RESPIRATORY (INHALATION) at 03:16

## 2023-07-19 RX ADMIN — BRIMONIDINE TARTRATE 1 DROP: 2 SOLUTION/ DROPS OPHTHALMIC at 08:28

## 2023-07-19 NOTE — PROGRESS NOTES
"    [facility] Medicine Services  PROGRESS NOTE    Patient Name: Vita Miller  : 1940  MRN: [id number]    Date of Admission: 2023  Primary Care Physician: Alfonso Steele MD    Subjective   Subjective     CC:  Follow-up PNA    HPI:  \"I'm ok.\"    HR improved per nursing running .    ROS:  Gen- No fevers, chills  CV- No chest pain, palpitations  Resp-no soa  GI- No N/V/D, abd pain     Objective   Objective     Vital Signs:   Temp:  [96.8 øF (36 øC)-97.5 øF (36.4 øC)] 97.5 øF (36.4 øC)  Heart Rate:  [] 93  Resp:  [17-22] 18  BP: ()/(47-79) 98/71  Flow (L/min):  [2-2.5] 2     Physical Exam:  Constitutional: No acute distress, awake, alert, sitting up in bed  HENT: NCAT, mucous membranes moist  Respiratory: bibasilar crackles  Cardiovascular: IRIR, no murmurs, rubs, or gallops  Gastrointestinal: Positive bowel sounds, soft, nontender, nondistended  Musculoskeletal: No bilateral ankle edema  Psychiatric: Appropriate affect, cooperative  Neurologic: oriented x 3, speech clear  Skin: No rashes    Results Reviewed:  LAB RESULTS:      Lab 23  0341 23  0402 23  0332 07/15/23  0340 23  2241   WBC 9.25 9.30 12.73*  --  13.02*   HEMOGLOBIN 12.3 10.5* 10.0*  --  14.3   HEMATOCRIT 38.5 33.3* 30.6*  --  43.0   PLATELETS 322 287 273  --  353   NEUTROS ABS 6.51 6.68 11.29*  --  10.73*   IMMATURE GRANS (ABS) 0.07* 0.07* 0.07*  --  0.09*   LYMPHS ABS 1.71 1.66 0.71  --  0.95   MONOS ABS 0.63 0.61 0.64  --  0.99*   EOS ABS 0.28 0.23 0.01  --  0.20   .3* 100.6* 97.8*  --  97.7*   SED RATE  --   --  32*  --  99*   CRP  --   --   --   --  14.66*   PROCALCITONIN  --   --   --   --  0.13   LACTATE  --   --  0.9 1.0  --          Lab 23  0456 23  0341 23  1615 23  0402 23  0332 23  2241   SODIUM 134* 132*  --  134* 129* 129*   POTASSIUM 3.9 3.7 4.5 4.2 4.0 4.5   CHLORIDE 92* 92*  --  92* 87* 87*   CO2 35.0* 33.0*  --  " 37.0* 33.0* 30.0*   ANION GAP 7.0 7.0  --  5.0 9.0 12.0   BUN 20 21  --  18 24* 19   CREATININE 0.96 0.82  --  0.73 1.09* 0.79   EGFR 58.8* 71.1  --  81.7 50.5* 74.3   GLUCOSE 85 78  --  89 117* 125*   CALCIUM 8.8 8.6  --  8.2* 8.0* 9.1   MAGNESIUM 2.2 2.5*  --   --  1.9 1.9   PHOSPHORUS 3.5 3.8  --   --   --   --    TSH  --   --   --   --   --  13.160*         Lab 07/19/23  0456 07/18/23  0341 07/16/23  0332 07/14/23  2241   TOTAL PROTEIN  --  5.2* 6.0 6.8   ALBUMIN 3.0* 2.9* 3.6 3.5   GLOBULIN  --  2.3 2.4 3.3   ALT (SGPT)  --  12 14 24   AST (SGOT)  --  15 16 23   BILIRUBIN  --  0.2 0.2 0.4   ALK PHOS  --  47 53 90         Lab 07/14/23  2241   PROBNP 6,301.0*   HSTROP T 16*             Lab 07/18/23  0341   VITAMIN B 12 970*         Lab 07/18/23  0804 07/18/23  0525 07/16/23  0518   PH, ARTERIAL 7.362 7.296* 7.380   PCO2, ARTERIAL 63.6* 76.1* 60.2*   PO2 ART 89.7 116.0* 117.0*   FIO2 30 28 30   HCO3 ART 36.0* 37.1* 35.6*   BASE EXCESS ART 8.4* 7.8* 8.7*   CARBOXYHEMOGLOBIN 1.1 1.0 1.3     Brief Urine Lab Results  (Last result in the past 365 days)        Color   Clarity   Blood   Leuk Est   Nitrite   Protein   CREAT   Urine HCG        07/17/23 1645 Yellow   Clear   Negative   Negative   Negative   Negative                   Microbiology Results Abnormal       Procedure Component Value - Date/Time    Blood Culture - Blood, Arm, Left [[id number]]  (Normal) Collected: 07/15/23 0340    Lab Status: Preliminary result Specimen: Blood from Arm, Left Updated: 07/19/23 0730     Blood Culture No growth at 4 days    Blood Culture - Blood, Arm, Right [[id number]]  (Normal) Collected: 07/15/23 0340    Lab Status: Preliminary result Specimen: Blood from Arm, Right Updated: 07/19/23 0730     Blood Culture No growth at 4 days    S. Pneumo Ag Urine or CSF - Urine, Urine, Clean Catch [[id number]]  (Normal) Collected: 07/15/23 0341    Lab Status: Final result Specimen: Urine, Clean Catch Updated: 07/15/23 1356     Strep Pneumo Ag  Negative    Legionella Antigen, Urine - Urine, Urine, Clean Catch [[id number]]  (Normal) Collected: 07/15/23 0341    Lab Status: Final result Specimen: Urine, Clean Catch Updated: 07/15/23 1356     LEGIONELLA ANTIGEN, URINE Negative    MRSA Screen, PCR (Inpatient) - Swab, Nares [[id number]]  (Normal) Collected: 07/15/23 0310    Lab Status: Final result Specimen: Swab from Nares Updated: 07/15/23 0846     MRSA PCR Negative    Narrative:      The negative predictive value of this diagnostic test is high and should only be used to consider de-escalating anti-MRSA therapy. A positive result may indicate colonization with MRSA and must be correlated clinically.  MRSA Negative    COVID PRE-OP / PRE-PROCEDURE SCREENING ORDER (NO ISOLATION) - Swab, Nasopharynx [[id number]]  (Normal) Collected: 07/15/23 0310    Lab Status: Final result Specimen: Swab from Nasopharynx Updated: 07/15/23 0410    Narrative:      The following orders were created for panel order COVID PRE-OP / PRE-PROCEDURE SCREENING ORDER (NO ISOLATION) - Swab, Nasopharynx.  Procedure                               Abnormality         Status                     ---------                               -----------         ------                     Respiratory Panel PCR w/...[[id number]]  Normal              Final result                 Please view results for these tests on the individual orders.    Respiratory Panel PCR w/COVID-19(SARS-CoV-2) JENNA/KELSI/NORMAN/PAD/COR/MAD/ASREN In-House, NP Swab in UTM/VTM, 3-4 HR TAT - Swab, Nasopharynx [[id number]]  (Normal) Collected: 07/15/23 0310    Lab Status: Final result Specimen: Swab from Nasopharynx Updated: 07/15/23 0410     ADENOVIRUS, PCR Not Detected     Coronavirus 229E Not Detected     Coronavirus HKU1 Not Detected     Coronavirus NL63 Not Detected     Coronavirus OC43 Not Detected     COVID19 Not Detected     Human Metapneumovirus Not Detected     Human Rhinovirus/Enterovirus Not Detected     Influenza A PCR Not Detected      Influenza B PCR Not Detected     Parainfluenza Virus 1 Not Detected     Parainfluenza Virus 2 Not Detected     Parainfluenza Virus 3 Not Detected     Parainfluenza Virus 4 Not Detected     RSV, PCR Not Detected     Bordetella pertussis pcr Not Detected     Bordetella parapertussis PCR Not Detected     Chlamydophila pneumoniae PCR Not Detected     Mycoplasma pneumo by PCR Not Detected    Narrative:      In the setting of a positive respiratory panel with a viral infection PLUS a negative procalcitonin without other underlying concern for bacterial infection, consider observing off antibiotics or discontinuation of antibiotics and continue supportive care. If the respiratory panel is positive for atypical bacterial infection (Bordetella pertussis, Chlamydophila pneumoniae, or Mycoplasma pneumoniae), consider antibiotic de-escalation to target atypical bacterial infection.            No radiology results from the last 24 hrs    Results for orders placed during the hospital encounter of 06/16/23    Adult Transthoracic Echo Complete W/ Cont if Necessary Per Protocol    Interpretation Summary    Left ventricular systolic function is normal. Estimated left ventricular EF = 60%    Left ventricular diastolic function was normal.    Mild aortic insufficiency.    Trace to mild mitral and tricuspid regurgitation.    Calculated right ventricular systolic pressure from tricuspid regurgitation is 20 mmHg.    There is a trivial pericardial effusion.      Current medications:  Scheduled Meds:acetaZOLAMIDE, 125 mg, Oral, Daily  apixaban, 2.5 mg, Oral, Q12H  ascorbic acid, 250 mg, Oral, Daily  brimonidine, 1 drop, Both Eyes, Q12H   And  timolol, 1 drop, Both Eyes, Q12H  bumetanide, 0.5 mg, Intravenous, BID  digoxin, 125 mcg, Oral, Every Other Day  dilTIAZem CD, 120 mg, Oral, Q24H  fludrocortisone, 0.1 mg, Oral, Daily  galantamine, 8 mg, Oral, BID With Meals  guaiFENesin, 600 mg, Oral, Q12H  ipratropium-albuterol, 3 mL, Nebulization,  Q4H - RT  levothyroxine, 88 mcg, Oral, Daily  midodrine, 5 mg, Oral, TID AC  mirtazapine, 15 mg, Oral, Nightly  pantoprazole, 40 mg, Oral, Q AM  PARoxetine, 10 mg, Oral, Nightly  piperacillin-tazobactam, 3.375 g, Intravenous, Q8H  senna-docusate sodium, 2 tablet, Oral, BID  sodium chloride, 10 mL, Intravenous, Q12H      Continuous Infusions:       PRN Meds:.  acetaminophen **OR** acetaminophen **OR** acetaminophen    senna-docusate sodium **AND** polyethylene glycol **AND** bisacodyl **AND** bisacodyl    Magnesium Standard Dose Replacement - Follow Nurse / BPA Driven Protocol    melatonin    metoprolol tartrate    ondansetron    Potassium Replacement - Follow Nurse / BPA Driven Protocol    sodium chloride    sodium chloride    sodium chloride    Assessment & Plan   Assessment & Plan     Active Hospital Problems    Diagnosis  POA    **Acute respiratory failure with hypoxia [J96.01]  Yes    Pneumonia of both lungs due to infectious organism [J18.9]  Unknown    Hyponatremia [E87.1]  Unknown    Severe Malnutrition (HCC) [E43]  Yes    Atrial fibrillation with RVR [I48.91]  Yes    Cachexia [R64]  Yes    Senile osteoporosis [M81.0]  Yes    Hashimoto's thyroiditis [E06.3]  Yes    Primary open angle glaucoma of both eyes, severe stage [H40.1133]  Yes    Mixed anxiety and depressive disorder [F41.8]  Yes    Multiple nodules of lung [R91.8]  Yes    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        Brief Hospital Course to date:  Vita Miller is a 83 y.o. female with history of hypothyroidism, COPD with chronic bronchiectasis, history of chronic MAC with unknown intolerance to prior treatment plan, history of orthostatic hypotension,  hyperlipidemia, osteoporosis, bilateral glaucoma, mild memory deficit, who was recently admitted to our facility approximately 1 month ago for new onset atrial fibrillation with RVR who returns to the ER tonight with complaints of palpitations and shortness of breath.     This patient's problems and plans were partially entered by my partner and updated as appropriate by me 07/19/23.     Acute respiratory failure with hypoxia  Bilateral pneumonia/infiltrates  History of MAC, intolerant to treatment  COPD with acute exacerbation, chronic bronchiectasis  -- Vancomycin, Zosyn, azithromycin  -- Pulmonology following.  Clinically improved.  AI/vasculitis panels are negative.  Recs to continue vest therapy  --Off steroids  --Continue Zosyn, azith for now  -- Duo nebs  -- Flutter valve, mucinex  --MRSA pcr negative, strep negative, legionella negative  -- diuresis per cards, bumex held this AM d/t hypotension  --CT chest showed new GGO, bronchiectasis, scattered nodules.  Needs f/u CT or PET scan     Atrial fibrillation with rvr no in aflutter  --s/p esmolol drip  --eliquis  --flecainide --> bisoprolol ---> changed to digoxin per cards  --cards recommended midodrine  --cards following.   S/p IV metoprolol with no effect.  S/p dig yesterday, ordered Qod.  Flecainide d/t concerns of possibility of pneumonitis so can't give amiodarone either per cards.  Called and d/w Dr. Mack a few days ago who recommended trying diltiazem gtt at rate of 5/hr with no bolus  and improved, now off.  Plans PO diltiazem today but hypotension limiting  --LVEF 60%, normal diastolic dysfunction on echo from 6/17/2023    Altered mental status  --Suspect component of hospital delirium, also had some CO2 retention on ABG from overnight that improved with Bipap  --confused again this AM.  ABG looks compensated now with chronic hypercarbia.  Sodium now much better.  u/a bland, B12 wnl.   RPR nonreactive     Hyponatremia  --Na 129 on admission; suspect  related to volume overload  --improved     Orthostatic hypotension  --continue fludricortisone and midodrine     Hypothyroidism  --slightly elevated tsh  --continue synthroid, dose adjusted on 7/16; repeat tsh in 4-6 weeks; careful adjustment given RVR     Other chronic conditions: HLD, malnutrition, anxiety/depression, memory impairment  --continue home meds  --nutrition  --pt/ot    Social  ---- I called and updated Arun Miller  (son and POA) over the phone on 7/18      Expected Discharge Location and Transportation: PT recs SNF/d/w son, family planning for 24/7 care at home  Expected Discharge   Expected Discharge Date: 7/19/2023; Expected Discharge Time:      DVT prophylaxis:  Medical and mechanical DVT prophylaxis orders are present.     AM-PAC 6 Clicks Score (PT): 17 (07/18/23 8550)    CODE STATUS:   Code Status and Medical Interventions:   Ordered at: 07/15/23 0214     Code Status (Patient has no pulse and is not breathing):    CPR (Attempt to Resuscitate)     Medical Interventions (Patient has pulse or is breathing):    Full Support       Neftaly Belcher MD  07/19/23

## 2023-07-19 NOTE — THERAPY TREATMENT NOTE
Patient Name: Vita Miller  : 1940    MRN: [id number]                              Today's Date: 2023       Admit Date: 2023    Visit Dx:     ICD-10-CM ICD-9-CM   1. Atrial fibrillation with RVR  I48.91 427.31   2. Acute respiratory failure with hypoxia  J96.01 518.81   3. Pneumonia of both upper lobes due to infectious organism  J18.9 486   4. Pneumonia of both lungs due to infectious organism, unspecified part of lung  J18.9 483.8   5. Severe Malnutrition (HCC)  E43 261   6. 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 Primary open angle glaucoma of both eyes, severe stage  H40.1133 365.11     365.73   8. Multiple nodules of lung  R91.8 793.19   9. H/O orthostatic hypotension  Z86.79 V12.59   10. COPD with acute exacerbation  J44.1 491.21   11. Acquired bronchiectasis  J47.9 494.0   12. 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    Hypothyroidism    Acute respiratory failure with hypoxia    Cachexia    Severe Malnutrition (HCC)    Pneumonia of both lungs due to infectious organism    Hyponatremia     Past Medical History:   Diagnosis Date    A-fib     Disease of thyroid gland     Glaucoma     Hypotension      Past Surgical History:   Procedure Laterality Date    HIP SURGERY      PATELLA SURGERY      SHOULDER SURGERY        General Information       Row Name 23 1310          OT Time and Intention    Document Type therapy note (daily note)  -AC     Mode of Treatment occupational therapy  -AC       Row Name 23 1310          General Information    Patient  Profile Reviewed yes  -     Existing Precautions/Restrictions fall;oxygen therapy device and L/min  OOB with brief, monitor HR  -       Row Name 07/19/23 1310          Cognition    Orientation Status (Cognition) oriented to;person;time;disoriented to;place  -       Row Name 07/19/23 1310          Safety Issues, Functional Mobility    Safety Issues Affecting Function (Mobility) insight into deficits/self-awareness;judgment;safety precaution awareness;safety precautions follow-through/compliance;sequencing abilities  -     Impairments Affecting Function (Mobility) balance;endurance/activity tolerance;postural/trunk control;shortness of breath;strength;visual/perceptual  -               User Key  (r) = Recorded By, (t) = Taken By, (c) = Cosigned By      Initials Name Provider Type     Tanisha Treadwell, OT Occupational Therapist                     Mobility/ADL's       Row Name 07/19/23 1443          Bed Mobility    Bed Mobility supine-sit;sit-supine  -     Supine-Sit [place] (Bed Mobility) contact guard;verbal cues;nonverbal cues (demo/gesture)  -     Sit-Supine [place] (Bed Mobility) standby assist  -     Assistive Device (Bed Mobility) bed rails;head of bed elevated  -       Row Name 07/19/23 1443          Transfers    Transfers sit-stand transfer;toilet transfer  -       Row Name 07/19/23 1443          Sit-Stand Transfer    Sit-Stand [place] (Transfers) contact guard;verbal cues;nonverbal cues (demo/gesture)  -     Assistive Device (Sit-Stand Transfers) walker, front-wheeled  -       Row Name 07/19/23 1443          Toilet Transfer    [place] Level (Toilet Transfer) contact guard  -     Assistive Device (Toilet Transfer) commode, bedside without drop arms;walker, front-wheeled  -       Row Name 07/19/23 1443          Functional Mobility    Functional Mobility- Ind. Level contact guard assist;verbal cues required;nonverbal cues required (demo/gesture)  -     Functional  Mobility- Device walker, front-wheeled  -AC     Functional Mobility-Distance (Feet) --  8 + 8  -AC     Functional Mobility- Safety Issues step length decreased  -AC       Row Name 07/19/23 1443          Activities of Daily Living    BADL Assessment/Intervention lower body dressing;grooming;toileting  -AC       Row Name 07/19/23 1443          Lower Body Dressing Assessment/Training    [place] Level (Lower Body Dressing) don;socks;contact guard assist  -AC     Position (Lower Body Dressing) edge of bed sitting  -AC       Row Name 07/19/23 1443          Grooming Assessment/Training    [place] Level (Grooming) oral care regimen;standby assist  -AC     Position (Grooming) supported standing;sink side  -AC       Row Name 07/19/23 1443          Toileting Assessment/Training    [place] Level (Toileting) adjust/manage clothing;perform perineal hygiene;minimum assist (75% patient effort)  -AC     Position (Toileting) unsupported sitting;supported standing  -AC               User Key  (r) = Recorded By, (t) = Taken By, (c) = Cosigned By      Initials Name Provider Type    Tanisha Young, RENE Occupational Therapist                   Obj/Interventions       Row Name 07/19/23 1447          Balance    Balance Assessment sitting static balance;sitting dynamic balance;standing static balance;standing dynamic balance  -AC     Static Sitting Balance standby assist  -AC     Dynamic Sitting Balance contact guard  -AC     Position, Sitting Balance unsupported;sitting edge of bed  -AC     Static Standing Balance contact guard  -AC     Dynamic Standing Balance contact guard  -AC     Position/Device Used, Standing Balance supported;walker, front-wheeled  -AC               User Key  (r) = Recorded By, (t) = Taken By, (c) = Cosigned By      Initials Name Provider Type    Tanisha Young, OT Occupational Therapist                   Goals/Plan    No documentation.                  Clinical Impression       Row Name 07/19/23  1447          Pain Assessment    Pretreatment Pain Rating 0/10 - no pain  -AC     Posttreatment Pain Rating 0/10 - no pain  -AC       Row Name 07/19/23 1447          Plan of Care Review    Plan of Care Reviewed With patient  -AC     Outcome Evaluation Pt CGA LBD, CGA BSC, SBA to complete grooming standing sinkside, CGA to ambulate in room with RW.  Pt with elevated HR to 161 and returned to 102 at rest. Pt progressing, but continues below baseline with self care/mobility d/t confusion, weakness and decreased activity tolerance. Recommend SNF upon d/c.  -AC       Row Name 07/19/23 1447          Vital Signs    Pre Systolic BP Rehab 98  -AC     Pre Treatment Diastolic BP 71  -AC     Pretreatment Heart Rate (beats/min) 117  -AC     Intratreatment Heart Rate (beats/min) 161   RN notified  -AC     Posttreatment Heart Rate (beats/min) 102  -AC     Pre SpO2 (%) 99  -AC     O2 Delivery Pre Treatment supplemental O2  -AC     O2 Delivery Intra Treatment supplemental O2  -AC     Post SpO2 (%) 99  -AC     O2 Delivery Post Treatment supplemental O2  -AC     Pre Patient Position Supine  -AC     Post Patient Position Supine  -AC       Row Name 07/19/23 1447          Positioning and Restraints    Pre-Treatment Position in bed  -AC     Post Treatment Position bed  -AC     In Bed notified nsg;supine;call light within reach;encouraged to call for assist;exit alarm on;heels elevated  -AC               User Key  (r) = Recorded By, (t) = Taken By, (c) = Cosigned By      Initials Name Provider Type    AC Tanisha Treadwell, OT Occupational Therapist                   Outcome Measures       Row Name 07/19/23 5096          How much help from another is currently needed...    Putting on and taking off regular lower body clothing? 3  -AC     Bathing (including washing, rinsing, and drying) 3  -AC     Toileting (which includes using toilet bed pan or urinal) 3  -AC     Putting on and taking off regular upper body clothing 3  -AC     Taking care of  personal grooming (such as brushing teeth) 3  -AC     Eating meals 4  -AC     AM-PAC 6 Clicks Score (OT) 19  -AC       Row Name 07/19/23 1452          Functional Assessment    Outcome Measure Options AM-PAC 6 Clicks Daily Activity (OT)  -AC               User Key  (r) = Recorded By, (t) = Taken By, (c) = Cosigned By      Initials Name Provider Type    AC Tanisha Treadwell OT Occupational Therapist                    Occupational Therapy Education       Title: PT OT SLP Therapies (In Progress)       Topic: Occupational Therapy (In Progress)       Point: ADL training (In Progress)       Description:   Instruct learner(s) on proper safety adaptation and remediation techniques during self care or transfers.   Instruct in proper use of assistive devices.                  Learning Progress Summary             Patient Acceptance, E, NR by  at 7/19/2023 1453    Acceptance, E, VU,NR by MR at 7/16/2023 1544   Family Acceptance, E, VU,NR by MR at 7/16/2023 1544                         Point: Home exercise program (Not Started)       Description:   Instruct learner(s) on appropriate technique for monitoring, assisting and/or progressing therapeutic exercises/activities.                  Learner Progress:  Not documented in this visit.              Point: Precautions (Done)       Description:   Instruct learner(s) on prescribed precautions during self-care and functional transfers.                  Learning Progress Summary             Patient Acceptance, E, VU,NR by MR at 7/16/2023 1544   Family Acceptance, E, VU,NR by MR at 7/16/2023 1544                         Point: Body mechanics (Done)       Description:   Instruct learner(s) on proper positioning and spine alignment during self-care, functional mobility activities and/or exercises.                  Learning Progress Summary             Patient Acceptance, E, VU,NR by MR at 7/16/2023 1544   Family Acceptance, E, VU,NR by MR at 7/16/2023 1544                                          User Key       Initials Effective Dates Name Provider Type Discipline    AC 02/03/23 -  Tanisha Treadwell, OT Occupational Therapist OT    MR 09/22/22 -  Renay Skaggs OT Occupational Therapist OT                  OT Recommendation and Plan     Plan of Care Review  Plan of Care Reviewed With: patient  Outcome Evaluation: Pt CGA LBD, CGA BSC, SBA to complete grooming standing sinkside, CGA to ambulate in room with RW.  Pt with elevated HR to 161 and returned to 102 at rest. Pt progressing, but continues below baseline with self care/mobility d/t confusion, weakness and decreased activity tolerance. Recommend SNF upon d/c.     Time Calculation:         Time Calculation- OT       Row Name 07/19/23 1420             Time Calculation- OT    OT Start Time 1420  -AC      OT Received On 07/19/23  -AC      OT Goal Re-Cert Due Date 07/26/23  -AC         Timed Charges    40905 - OT Therapeutic Activity Minutes 10  -AC      79729 - OT Self Care/Mgmt Minutes 20  -AC         Total Minutes    Timed Charges Total Minutes 30  -AC       Total Minutes 30  -AC                User Key  (r) = Recorded By, (t) = Taken By, (c) = Cosigned By      Initials Name Provider Type    AC Tanisha Treadwell, OT Occupational Therapist                  Therapy Charges for Today       Code Description Service Date Service Provider Modifiers Qty    56054202906 HC OT THERAPEUTIC ACT EA 15 MIN 7/19/2023 Tanisha Treadwell, OT GO 1    15731430727 HC OT SELF CARE/MGMT/TRAIN EA 15 MIN 7/19/2023 Tanisha Treadwell OT GO 1                 Tanisha Treadwell OT  7/19/2023

## 2023-07-19 NOTE — PROGRESS NOTES
Vita Miller  [id number]  1940   LOS: 4 days   Patient Care Team:  Alfonso Steele MD as PCP - General (Internal Medicine)    Chief Complaint:  PAF / SEVERE LUNG DZ WITH CHRONIC HYPOXIC HYPERCARBIC RESPIRATORY FAILURE / HFpEF / CONFUSION / HYPONATREMIA      Subjective     Comfortable this morning BiPAP therapy.  She denies increased tachypnea/dyspnea, anginal type chest discomfort, marked sputum production, pleurisy, hemoptysis, or GI/ difficulty.  She states she has suboptimal appetite.  Undergoing neb treatment currently.  She is unaware of her recurrent atrial fibrillation.    Objective     Vital Sign Min/Max for last 24 hours  Temp  Min: 97.5 øF (36.4 øC)  Max: 98 øF (36.7 øC)   BP  Min: 78/51  Max: 114/79   Pulse  Min: 69  Max: 145   Resp  Min: 18  Max: 22   SpO2  Min: 95 %  Max: 100 %               07/14/23  2224   Weight: 46.3 kg (102 lb)         Intake/Output Summary (Last 24 hours) at 7/19/2023 0759  Last data filed at 7/19/2023 0444  Gross per 24 hour   Intake 800 ml   Output 950 ml   Net -150 ml       Physical Exam:     General Appearance:    Alert, cooperative, in no acute distress   Lungs:   Overall diminished breath sounds with scattered rhonchi,wheezes, and crackles,respirations regular, even and unlabored    Heart:    Irregular and moderately rapid rate, variable S1 and S2, grade 1/6 systolic murmur, no gallop, no rub, no click   Abdomen:  Extremities:   Soft, nontender, bowel sounds audible x4    No edema, normal range of motion   Pulses:   Pulses palpable and equal bilaterally      Results Review:   Results from last 7 days   Lab Units 07/19/23  0456 07/18/23  0341 07/17/23  1615 07/17/23  0402   SODIUM mmol/L 134* 132*  --  134*   POTASSIUM mmol/L 3.9 3.7 4.5 4.2   CHLORIDE mmol/L 92* 92*  --  92*   CO2 mmol/L 35.0* 33.0*  --  37.0*   BUN mg/dL 20 21  --  18   CREATININE mg/dL 0.96 0.82  --  0.73   GLUCOSE mg/dL 85 78  --  89   CALCIUM mg/dL 8.8 8.6  --  8.2*     Results from last 7  days   Lab Units 07/18/23  0341 07/17/23  0402 07/16/23  0332   WBC 10*3/mm3 9.25 9.30 12.73*   HEMOGLOBIN g/dL 12.3 10.5* 10.0*   HEMATOCRIT % 38.5 33.3* 30.6*   PLATELETS 10*3/mm3 322 287 273     Results from last 7 days   Lab Units 07/14/23  2241   HSTROP T ng/L 16*     ALBUMIN: 3.0  MAGNESIUM: 2.2  NO NEW CXR.  EKG:    Sinus rhythm with premature atrial complexes  Otherwise normal ECG  When compared with ECG of 16-JUL-2023 04:34,  Sinus rhythm has replaced Atrial fibrillation    Medication Review: REVIEWED; NO DRIPS.    Assessment & Plan     Recurrent atrial fibrillation with moderate ventricular response.Will initiate oral diltiazem CD.  Without antiarrhythmic therapy I feel she will have recurrent paroxysmal atrial fibrillation.  She and family desire evaluation by electrophysiology service at [facility].  We will defer DAYNA/ECV at this time.    Discussed with patient and son in room.      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

## 2023-07-19 NOTE — PROGRESS NOTES
Intensive Care Follow-up     Hospital:  LOS: 4 days   Ms. Vita Miller, 83 y.o. female is followed for:   Acute respiratory failure with hypoxia            History of present illness:   83-year-old female with history of Mycobacterium avium complex and bronchiectasis.  Patient previously has been treated for MAC.  She apparently has been intolerant of a lot of those medications and apparently has been not getting any treatment at this point.  She uses DuoNebs and percussion vest for her bronchiectasis.  Patient apparently was placed on supplemental oxygen recently as well.  Patient apparently was on Breo previously But was taken off due to concern about open-angle glaucoma which has required surgery x2. Patient was hospitalized on June 16 with new onset atrial fibrillation.  She was placed on flecainide and Eliquis at that time.  Patient readmitted with complains of palpitations and worsening shortness of breath.  Patient underwent work-up with CT scan of the chest which showed significant groundglass opacities bilaterally which were not present on a CT scan a month ago.  She was also started on esmolol and digoxin for rate control.  She was initially started on steroids with concern for vasculitis but that was stopped.  Patient apparently denied any new onset of fever, chest pain.  She does have chronic sputum production-year-old for which she uses nebulizer and percussion vest.  Flecainide has been put on hold as well.  She was also found to be hypercapnic so has been on and off BiPAP support.  Repeat blood gases showed improvement.    Subjective   Interval History:  Patient seems more awake and lucid today morning.  Son is at bedside.  Heart rate still variable.  Denies any significant cough or sputum production.  Was on BiPAP overnight but today morning on nasal cannula oxygen and breathing comfortably and eating breakfast.                 The patient's past medical, surgical and social history were reviewed  "and updated in Epic as appropriate.       Objective     Infusions:  dilTIAZem, 5 mg/hr, Last Rate: Stopped (07/17/23 1816)    Medications:  acetaZOLAMIDE, 125 mg, Oral, Daily  apixaban, 2.5 mg, Oral, Q12H  ascorbic acid, 250 mg, Oral, Daily  brimonidine, 1 drop, Both Eyes, Q12H   And  timolol, 1 drop, Both Eyes, Q12H  bumetanide, 0.5 mg, Intravenous, BID  digoxin, 125 mcg, Oral, Every Other Day  dilTIAZem CD, 120 mg, Oral, Q24H  fludrocortisone, 0.1 mg, Oral, Daily  galantamine, 8 mg, Oral, BID With Meals  guaiFENesin, 600 mg, Oral, Q12H  ipratropium-albuterol, 3 mL, Nebulization, Q4H - RT  levothyroxine, 88 mcg, Oral, Daily  midodrine, 5 mg, Oral, TID AC  mirtazapine, 15 mg, Oral, Nightly  pantoprazole, 40 mg, Oral, Q AM  PARoxetine, 10 mg, Oral, Nightly  piperacillin-tazobactam, 3.375 g, Intravenous, Q8H  senna-docusate sodium, 2 tablet, Oral, BID  sodium chloride, 10 mL, Intravenous, Q12H        Vital Sign Min/Max for last 24 hours  Temp  Min: 96.8 øF (36 øC)  Max: 97.5 øF (36.4 øC)   BP  Min: 78/51  Max: 114/79   Pulse  Min: 69  Max: 145   Resp  Min: 17  Max: 22   SpO2  Min: 95 %  Max: 100 %   Flow (L/min)  Min: 2  Max: 2.5       Input/Output for last 24 hour shift  07/18 0701 - 07/19 0700  In: 800 [P.O.:800]  Out: 950 [Urine:950]      Objective:  Vital signs: (most recent): Blood pressure 92/60, pulse 90, temperature 96.8 øF (36 øC), temperature source Oral, resp. rate 17, height 172.7 cm (68\"), weight 46.3 kg (102 lb), SpO2 100 %.          General Appearance: Sitting in bed, eating breakfast, in no acute distress   Lungs:   B/L Breath sounds present with decreased breath sounds on bases, no wheezing heard, basilar crackles.   Heart: S1 and S2 present, A flutter with variable heart rate  Extremities:  no cyanosis or clubbing,  no edema.  Neurologic:  Moving all four extremities.       Results from last 7 days   Lab Units 07/18/23  0341 07/17/23  0402 07/16/23  0332   WBC 10*3/mm3 9.25 9.30 12.73*   HEMOGLOBIN " g/dL 12.3 10.5* 10.0*   PLATELETS 10*3/mm3 322 287 273       Results from last 7 days   Lab Units 07/19/23  0456 07/18/23  0341 07/17/23  1615 07/17/23  0402 07/16/23  0332   SODIUM mmol/L 134* 132*  --  134* 129*   POTASSIUM mmol/L 3.9 3.7 4.5 4.2 4.0   CO2 mmol/L 35.0* 33.0*  --  37.0* 33.0*   BUN mg/dL 20 21  --  18 24*   CREATININE mg/dL 0.96 0.82  --  0.73 1.09*   MAGNESIUM mg/dL 2.2 2.5*  --   --  1.9   PHOSPHORUS mg/dL 3.5 3.8  --   --   --    GLUCOSE mg/dL 85 78  --  89 117*       Estimated Creatinine Clearance: 32.5 mL/min (by C-G formula based on SCr of 0.96 mg/dL).    Results from last 7 days   Lab Units 07/18/23  0804   PH, ARTERIAL pH units 7.362   PCO2, ARTERIAL mm Hg 63.6*   PO2 ART mm Hg 89.7         Images:   Chest x-ray reviewed personally and showed bilateral multifocal pneumonia with small bilateral pleural effusions.  Left upper lobe opacity seems little less dense on chest x-ray today    I reviewed the patient's results and images.     CT scan of the chest reviewed and showed bilateral multifocal groundglass opacities with lower lobe predominant bronchiectasis and small left pleural effusion.    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  Patient presented with complaints of worsening shortness of breath, palpitations and atrial flutter with rapid ventricular rate.  Continue further management per cardiology.  Discussed with cardiology and given  patient's weakness and respiratory issues she will be high risk of complications from anesthesia.  Will hold off external cardioversion today.  Heart rate is under better control.  If he can maintain rate control that may be acceptable.  If she starts to have more issues with rapid ventricular rate  then may need to go for DAYNA/ECV.  Cardiology is managing rate control medications.  Started on diltiazem controlled release tablet.  2.  Patient has significant groundglass opacities which are new compared to before.  Work-up for vasculitis and rheumatology conditions have been sent.  We will follow-up on the result of those.  Wondering if it could be all pulmonary edema pattern from rapid ventricular rate and decompensation secondary to that. ProBNP is elevated.  Continue diuresis as tolerated hemodynamically.  Continue nebulizer therapy.  Autoimmune and vasculitic panels are negative.  Clinically doing better.  Continue vest therapy.    3.  It could still be exacerbation of her MAC disease.  However clinically she is not presenting with any symptoms of fevers, weight loss.  Will monitor for now.  4.  NIV support as needed.  5.  Continue current broad-spectrum antibiotics for possible pneumonia.  Cultures are thus far negative..  6.  Patient with intermittent delirium.  Continue management per hospitalist team.    We will continue to follow along from pulmonary standpoint.    Remains with guarded prognosis.  Check chest x-ray in the morning.    Ventura Hook MD, [facility]  Pulmonary, Critical care and Sleep Medicine

## 2023-07-19 NOTE — PLAN OF CARE
Goal Outcome Evaluation:  Plan of Care Reviewed With: patient           Outcome Evaluation: Pt CGA LBD, CGA BSC, SBA to complete grooming standing sinkside, CGA to ambulate in room with RW.  Pt with elevated HR to 161 and returned to 102 at rest. Pt progressing, but continues below baseline with self care/mobility d/t confusion, weakness and decreased activity tolerance. Recommend SNF upon d/c.

## 2023-07-19 NOTE — CASE MANAGEMENT/SOCIAL WORK
Continued Stay Note  [facility]     Patient Name: Vita Miller  MRN: [id number]  Today's Date: 7/19/2023    Admit Date: 7/14/2023    Plan: home with home health   Discharge Plan       Row Name 07/19/23 1358       Plan    Plan home with home health    Patient/Family in Agreement with Plan yes    Plan Comments Met with Ms. Miller at bedside to discuss discharge plans. Ms. Miller and son are still insistant on home health rather than snf at discharge. Ms. Miller requested a referral to [facility] health.  made a phone referral to Soraida with [facility], and patient was accepted. Spoke with Ms. Miller's son, and he is in agreement with this plan. He reported he is in the process of interviewing care givers for the patient at discharge.  will continue to follow plan of care and assist with discharge planning needs as indicated.    Final Discharge Disposition Code 06 - home with home health care                   Discharge Codes    No documentation.                 Expected Discharge Date and Time       Expected Discharge Date Expected Discharge Time    Jul 19, 2023               Loly Moreira RN

## 2023-07-20 ENCOUNTER — APPOINTMENT (OUTPATIENT)
Dept: GENERAL RADIOLOGY | Facility: HOSPITAL | Age: 83
DRG: 190 | End: 2023-07-20
Payer: MEDICARE

## 2023-07-20 LAB
ANION GAP SERPL CALCULATED.3IONS-SCNC: 2 MMOL/L (ref 5–15)
BACTERIA SPEC AEROBE CULT: NORMAL
BACTERIA SPEC AEROBE CULT: NORMAL
BUN SERPL-MCNC: 20 MG/DL (ref 8–23)
BUN/CREAT SERPL: 21.3 (ref 7–25)
CALCIUM SPEC-SCNC: 8.8 MG/DL (ref 8.6–10.5)
CHLORIDE SERPL-SCNC: 94 MMOL/L (ref 98–107)
CO2 SERPL-SCNC: 40 MMOL/L (ref 22–29)
CREAT SERPL-MCNC: 0.94 MG/DL (ref 0.57–1)
EGFRCR SERPLBLD CKD-EPI 2021: 60.3 ML/MIN/1.73
GLUCOSE SERPL-MCNC: 98 MG/DL (ref 65–99)
NT-PROBNP SERPL-MCNC: 3624 PG/ML (ref 0–1800)
POTASSIUM SERPL-SCNC: 4.4 MMOL/L (ref 3.5–5.2)
SODIUM SERPL-SCNC: 136 MMOL/L (ref 136–145)

## 2023-07-20 PROCEDURE — 97530 THERAPEUTIC ACTIVITIES: CPT

## 2023-07-20 PROCEDURE — 94799 UNLISTED PULMONARY SVC/PX: CPT

## 2023-07-20 PROCEDURE — 94669 MECHANICAL CHEST WALL OSCILL: CPT

## 2023-07-20 PROCEDURE — 94664 DEMO&/EVAL PT USE INHALER: CPT

## 2023-07-20 PROCEDURE — 99232 SBSQ HOSP IP/OBS MODERATE 35: CPT | Performed by: INTERNAL MEDICINE

## 2023-07-20 PROCEDURE — 97110 THERAPEUTIC EXERCISES: CPT

## 2023-07-20 PROCEDURE — 99233 SBSQ HOSP IP/OBS HIGH 50: CPT | Performed by: NURSE PRACTITIONER

## 2023-07-20 PROCEDURE — 80048 BASIC METABOLIC PNL TOTAL CA: CPT | Performed by: INTERNAL MEDICINE

## 2023-07-20 PROCEDURE — 25010000002 PIPERACILLIN SOD-TAZOBACTAM PER 1 G: Performed by: INTERNAL MEDICINE

## 2023-07-20 PROCEDURE — 83880 ASSAY OF NATRIURETIC PEPTIDE: CPT | Performed by: INTERNAL MEDICINE

## 2023-07-20 PROCEDURE — 71045 X-RAY EXAM CHEST 1 VIEW: CPT

## 2023-07-20 RX ORDER — FLECAINIDE ACETATE 50 MG/1
50 TABLET ORAL EVERY 12 HOURS SCHEDULED
Status: DISCONTINUED | OUTPATIENT
Start: 2023-07-20 | End: 2023-07-20

## 2023-07-20 RX ADMIN — Medication 10 ML: at 21:09

## 2023-07-20 RX ADMIN — Medication 5 MG: at 22:43

## 2023-07-20 RX ADMIN — SENNOSIDES AND DOCUSATE SODIUM 2 TABLET: 50; 8.6 TABLET ORAL at 10:17

## 2023-07-20 RX ADMIN — MIDODRINE HYDROCHLORIDE 5 MG: 5 TABLET ORAL at 10:41

## 2023-07-20 RX ADMIN — GALANTAMINE 8 MG: 4 TABLET, FILM COATED ORAL at 10:16

## 2023-07-20 RX ADMIN — Medication 10 ML: at 10:32

## 2023-07-20 RX ADMIN — Medication 250 MG: at 10:15

## 2023-07-20 RX ADMIN — PIPERACILLIN SODIUM AND TAZOBACTAM SODIUM 3.38 G: 3; .375 INJECTION, SOLUTION INTRAVENOUS at 10:43

## 2023-07-20 RX ADMIN — PAROXETINE HYDROCHLORIDE 10 MG: 10 TABLET, FILM COATED ORAL at 21:10

## 2023-07-20 RX ADMIN — MIDODRINE HYDROCHLORIDE 5 MG: 5 TABLET ORAL at 18:07

## 2023-07-20 RX ADMIN — ACETAZOLAMIDE 125 MG: 250 TABLET ORAL at 10:17

## 2023-07-20 RX ADMIN — IPRATROPIUM BROMIDE AND ALBUTEROL SULFATE 3 ML: 2.5; .5 SOLUTION RESPIRATORY (INHALATION) at 12:33

## 2023-07-20 RX ADMIN — IPRATROPIUM BROMIDE AND ALBUTEROL SULFATE 3 ML: 2.5; .5 SOLUTION RESPIRATORY (INHALATION) at 07:20

## 2023-07-20 RX ADMIN — TIMOLOL MALEATE 1 DROP: 5 SOLUTION/ DROPS OPHTHALMIC at 10:31

## 2023-07-20 RX ADMIN — SENNOSIDES AND DOCUSATE SODIUM 2 TABLET: 50; 8.6 TABLET ORAL at 21:09

## 2023-07-20 RX ADMIN — APIXABAN 2.5 MG: 2.5 TABLET, FILM COATED ORAL at 10:18

## 2023-07-20 RX ADMIN — IPRATROPIUM BROMIDE AND ALBUTEROL SULFATE 3 ML: 2.5; .5 SOLUTION RESPIRATORY (INHALATION) at 19:20

## 2023-07-20 RX ADMIN — PANTOPRAZOLE SODIUM 40 MG: 40 TABLET, DELAYED RELEASE ORAL at 05:44

## 2023-07-20 RX ADMIN — IPRATROPIUM BROMIDE AND ALBUTEROL SULFATE 3 ML: 2.5; .5 SOLUTION RESPIRATORY (INHALATION) at 03:32

## 2023-07-20 RX ADMIN — PIPERACILLIN SODIUM AND TAZOBACTAM SODIUM 3.38 G: 3; .375 INJECTION, SOLUTION INTRAVENOUS at 02:56

## 2023-07-20 RX ADMIN — BRIMONIDINE TARTRATE 1 DROP: 2 SOLUTION/ DROPS OPHTHALMIC at 10:31

## 2023-07-20 RX ADMIN — BUMETANIDE 0.5 MG: 0.25 INJECTION, SOLUTION INTRAMUSCULAR; INTRAVENOUS at 10:41

## 2023-07-20 RX ADMIN — MIRTAZAPINE 15 MG: 15 TABLET, FILM COATED ORAL at 21:09

## 2023-07-20 RX ADMIN — DIGOXIN 125 MCG: 125 TABLET ORAL at 10:15

## 2023-07-20 RX ADMIN — MIDODRINE HYDROCHLORIDE 5 MG: 5 TABLET ORAL at 05:44

## 2023-07-20 RX ADMIN — BRIMONIDINE TARTRATE 1 DROP: 2 SOLUTION/ DROPS OPHTHALMIC at 21:10

## 2023-07-20 RX ADMIN — APIXABAN 2.5 MG: 2.5 TABLET, FILM COATED ORAL at 21:10

## 2023-07-20 RX ADMIN — FLUDROCORTISONE ACETATE 0.1 MG: 0.1 TABLET ORAL at 10:17

## 2023-07-20 RX ADMIN — GALANTAMINE 8 MG: 4 TABLET, FILM COATED ORAL at 18:07

## 2023-07-20 RX ADMIN — PIPERACILLIN SODIUM AND TAZOBACTAM SODIUM 3.38 G: 3; .375 INJECTION, SOLUTION INTRAVENOUS at 18:06

## 2023-07-20 RX ADMIN — LEVOTHYROXINE SODIUM 88 MCG: 0.09 TABLET ORAL at 05:44

## 2023-07-20 RX ADMIN — TIMOLOL MALEATE 1 DROP: 5 SOLUTION/ DROPS OPHTHALMIC at 21:10

## 2023-07-20 NOTE — PROGRESS NOTES
[facility] at [facility]  IP Progress Note      Chief Complaint/Reason for service: #1 PAF #2 heart failure with preserved EF #3 chronic hypoxic hypercarbic respiratory failure    Subjective   Subjective: Patient is sleeping but awakens easily.  She denies shortness of breath or chest pain.  I informed her that she converted back to sinus rhythm    Past medical, surgical, social and family history reviewed in the patient's electronic medical record.    Objective     Vital Sign Min/Max for last 24 hours  Temp  Min: 96.8 øF (36 øC)  Max: 98.4 øF (36.9 øC)   BP  Min: 92/60  Max: 106/59   Pulse  Min: 53  Max: 134   Resp  Min: 16  Max: 18   SpO2  Min: 96 %  Max: 100 %   Flow (L/min)  Min: 2  Max: 2      Intake/Output Summary (Last 24 hours) at 7/20/2023 0640  Last data filed at 7/20/2023 0400  Gross per 24 hour   Intake 777 ml   Output 655 ml   Net 122 ml             Current Facility-Administered Medications:     acetaminophen (TYLENOL) tablet 650 mg, 650 mg, Oral, Q4H PRN **OR** acetaminophen (TYLENOL) 160 MG/5ML solution 650 mg, 650 mg, Oral, Q4H PRN **OR** acetaminophen (TYLENOL) suppository 650 mg, 650 mg, Rectal, Q4H PRN, Kevin Shah,     acetaZOLAMIDE (DIAMOX) tablet 125 mg, 125 mg, Oral, Daily, Luis Huggins MD, 125 mg at 07/17/23 0904    apixaban (ELIQUIS) tablet 2.5 mg, 2.5 mg, Oral, Q12H, Kevin Shah, , 2.5 mg at 07/19/23 2148    ascorbic acid (VITAMIN C) tablet 250 mg, 250 mg, Oral, Daily, Kevin Shah DO, 250 mg at 07/19/23 0827    sennosides-docusate (PERICOLACE) 8.6-50 MG per tablet 2 tablet, 2 tablet, Oral, BID, 2 tablet at 07/19/23 0827 **AND** polyethylene glycol (MIRALAX) packet 17 g, 17 g, Oral, Daily PRN, 17 g at 07/18/23 1238 **AND** bisacodyl (DULCOLAX) EC tablet 5 mg, 5 mg, Oral, Daily PRN **AND** bisacodyl (DULCOLAX) suppository 10 mg, 10 mg, Rectal, Daily PRN, Kevin Shah,     brimonidine (ALPHAGAN) 0.2 % ophthalmic solution 1 drop, 1 drop, Both Eyes,  Q12H, 1 drop at 07/19/23 2149 **AND** timolol (TIMOPTIC) 0.5 % ophthalmic solution 1 drop, 1 drop, Both Eyes, Q12H, Kevin Shah, , 1 drop at 07/19/23 2149    bumetanide (BUMEX) injection 0.5 mg, 0.5 mg, Intravenous, BID, Kenton Mack MD, 0.5 mg at 07/18/23 0921    digoxin (LANOXIN) tablet 125 mcg, 125 mcg, Oral, Every Other Day, Luis Huggins MD, 125 mcg at 07/18/23 1445    dilTIAZem CD (CARDIZEM CD) 24 hr capsule 120 mg, 120 mg, Oral, Q24H, Kenton Mack MD    fludrocortisone tablet 0.1 mg, 0.1 mg, Oral, Daily, Kevin Shah DO, 0.1 mg at 07/19/23 0828    galantamine (RAZADYNE) tablet 8 mg, 8 mg, Oral, BID With Meals, Kevin Shah DO, 8 mg at 07/19/23 1723    guaiFENesin (MUCINEX) 12 hr tablet 600 mg, 600 mg, Oral, Q12H, Kevin Shah DO, 600 mg at 07/19/23 2148    ipratropium-albuterol (DUO-NEB) nebulizer solution 3 mL, 3 mL, Nebulization, Q4H - RT, Kevin Shah DO, 3 mL at 07/20/23 0332    levothyroxine (SYNTHROID, LEVOTHROID) tablet 88 mcg, 88 mcg, Oral, Daily, Catherine Perry MD, 88 mcg at 07/20/23 0544    Magnesium Standard Dose Replacement - Follow Nurse / BPA Driven Protocol, , Does not apply, PRN, Neftaly Belcher MD    melatonin tablet 5 mg, 5 mg, Oral, Nightly PRN, Kevin Shah DO, 5 mg at 07/19/23 2148    metoprolol tartrate (LOPRESSOR) injection 2.5 mg, 2.5 mg, Intravenous, Q5 Min PRN, Juanita Arias PA-C, 2.5 mg at 07/17/23 1048    midodrine (PROAMATINE) tablet 5 mg, 5 mg, Oral, TID AC, Catherine Perry MD, 5 mg at 07/20/23 0544    mirtazapine (REMERON) tablet 15 mg, 15 mg, Oral, Nightly, Kevin Shah, , 15 mg at 07/19/23 2148    ondansetron (ZOFRAN) injection 4 mg, 4 mg, Intravenous, Q6H PRN, Kevin Shah DO    pantoprazole (PROTONIX) EC tablet 40 mg, 40 mg, Oral, Q AM, Kevin Shah, , 40 mg at 07/20/23 0544    PARoxetine (PAXIL) tablet 10 mg, 10 mg, Oral, Nightly, Kevin Shah DO, 10 mg at 07/19/23 2148    piperacillin-tazobactam (ZOSYN) 3.375 g in iso-osmotic  dextrose 50 ml (premix), 3.375 g, Intravenous, Q8H, Neftaly Belcher MD, Last Rate: 12.5 mL/hr at 07/15/23 1713, 3.375 g at 07/20/23 0256    Potassium Replacement - Follow Nurse / BPA Driven Protocol, , Does not apply, PRN, Neftaly Belcher MD    sodium chloride 0.9 % flush 10 mL, 10 mL, Intravenous, PRN, Kevin Shah, DO    sodium chloride 0.9 % flush 10 mL, 10 mL, Intravenous, Q12H, Kevin Shah, , 10 mL at 07/19/23 2149    sodium chloride 0.9 % flush 10 mL, 10 mL, Intravenous, PRN, Kevin Shah,     sodium chloride 0.9 % infusion 40 mL, 40 mL, Intravenous, PRN, Kevin Shah,     Physical Exam: General thin elderly female in bed not dyspneic tachypneic current heart rate 65        HEENT: Nasal O2 is present.  No icterus       Respiratory: Equal bilateral symmetrical expansion with crackles bilaterally       Cardiovascular: Regular rate and rhythm without any obvious murmur no edema palpation       GI: Soft and flat       Lower Extremities: No lesions       Neuro: Facial expressions symmetrical moves all 4 extremities       Skin: Warm and dry no edema palpation       Psych: Pleasant affect    Results Review: Patient is is a net -1.3 L since admission.  Heart rate still in the 60s.  GFR is 58.8.    Radiology Results:  Imaging Results (Last 72 Hours)       Procedure Component Value Units Date/Time    XR Chest 1 View [[id number]] Resulted: 07/20/23 0508     Updated: 07/20/23 0509    XR Chest 1 View [[id number]] Collected: 07/17/23 0838     Updated: 07/17/23 0843    Narrative:      XR CHEST 1 VW    Date of Exam: 7/17/2023 6:07 AM EDT    Indication: pleural effusion    Comparison: CT chest 7/16/2023, chest x-ray 7/14/2023    Findings:  Stable cardiomediastinal silhouette within normal limits. Similar patchy bilateral parenchymal and interstitial opacities compatible with multifocal infection; this appears grossly unchanged compared to prior chest x-ray from 7/14/2023. There are small   bilateral pleural  effusions, unchanged. No pneumothorax.      Impression:      Impression:  Similar appearance of multifocal pneumonia and small bilateral pleural effusions.      Electronically Signed: Chago Holcomb    7/17/2023 8:40 AM EDT    Workstation ID: [id number]            EKG:     ECHO: Preserved EF 60%    Tele: Sinus rhythm    Assessment   Assessment/Plan: 1 PAF-the patient converted to sinus rhythm.  Since she is at high risk for recurrent A-fib we will go ahead and start her on flecainide 50 mg twice daily  2 heart failure with preserved EF-check BNP today    Brandon Padilla MD  07/20/23  06:40 EDT

## 2023-07-20 NOTE — PROGRESS NOTES
Intensive Care Follow-up     Hospital:  LOS: 5 days   Ms. Vita Miller, 83 y.o. female is followed for:   Acute respiratory failure with hypoxia            History of present illness:   83-year-old female with history of Mycobacterium avium complex and bronchiectasis.  Patient previously has been treated for MAC.  She apparently has been intolerant of a lot of those medications and apparently has been not getting any treatment at this point.  She uses DuoNebs and percussion vest for her bronchiectasis.  Patient apparently was placed on supplemental oxygen recently as well.  Patient apparently was on Breo previously But was taken off due to concern about open-angle glaucoma which has required surgery x2. Patient was hospitalized on June 16 with new onset atrial fibrillation.  She was placed on flecainide and Eliquis at that time.  Patient readmitted with complains of palpitations and worsening shortness of breath.  Patient underwent work-up with CT scan of the chest which showed significant groundglass opacities bilaterally which were not present on a CT scan a month ago.  She was also started on esmolol and digoxin for rate control.  She was initially started on steroids with concern for vasculitis but that was stopped.  Patient apparently denied any new onset of fever, chest pain.  She does have chronic sputum production-year-old for which she uses nebulizer and percussion vest.  Flecainide has been put on hold as well.  She was also found to be hypercapnic so has been on and off BiPAP support.  Repeat blood gases showed improvement.  Vasculitic and rheumatologic work-up is negative.    Subjective   Interval History:  Patient seems to be doing better.  Remained in sinus rhythm overnight.  Oxygen requirements are improving and did not require BiPAP support overnight and down to 1 L nasal cannula currently.  More awake but still somewhat confused.  Has not gotten out of bed yet.  Not working with incentive  "spirometry.               The patient's past medical, surgical and social history were reviewed and updated in Epic as appropriate.       Objective     Infusions:     Medications:  acetaZOLAMIDE, 125 mg, Oral, Daily  apixaban, 2.5 mg, Oral, Q12H  ascorbic acid, 250 mg, Oral, Daily  brimonidine, 1 drop, Both Eyes, Q12H   And  timolol, 1 drop, Both Eyes, Q12H  bumetanide, 0.5 mg, Intravenous, BID  digoxin, 125 mcg, Oral, Every Other Day  dilTIAZem CD, 120 mg, Oral, Q24H  fludrocortisone, 0.1 mg, Oral, Daily  galantamine, 8 mg, Oral, BID With Meals  ipratropium-albuterol, 3 mL, Nebulization, Q4H - RT  levothyroxine, 88 mcg, Oral, Daily  midodrine, 5 mg, Oral, TID AC  mirtazapine, 15 mg, Oral, Nightly  pantoprazole, 40 mg, Oral, Q AM  PARoxetine, 10 mg, Oral, Nightly  piperacillin-tazobactam, 3.375 g, Intravenous, Q8H  senna-docusate sodium, 2 tablet, Oral, BID  sodium chloride, 10 mL, Intravenous, Q12H        Vital Sign Min/Max for last 24 hours  Temp  Min: 97.5 øF (36.4 øC)  Max: 98.4 øF (36.9 øC)   BP  Min: 96/56  Max: 119/60   Pulse  Min: 50  Max: 134   Resp  Min: 16  Max: 18   SpO2  Min: 95 %  Max: 100 %   Flow (L/min)  Min: 1  Max: 2       Input/Output for last 24 hour shift  07/19 0701 - 07/20 0700  In: 777 [P.O.:777]  Out: 655 [Urine:655]      Objective:  Vital signs: (most recent): Blood pressure 119/60, pulse 61, temperature 97.5 øF (36.4 øC), temperature source Oral, resp. rate 18, height 172.7 cm (68\"), weight 46.3 kg (102 lb), SpO2 95 %.          General Appearance: Sitting in bed, in no acute distress   Lungs:   B/L Breath sounds present with decreased breath sounds on bases, no wheezing heard, basilar crackles.   Heart: S1 and S2 present, sinus rhythm currently  Extremities:  no cyanosis or clubbing,  no edema.  Neurologic:  Moving all four extremities.  No focal deficit      Results from last 7 days   Lab Units 07/18/23  0341 07/17/23  0402 07/16/23  0332   WBC 10*3/mm3 9.25 9.30 12.73*   HEMOGLOBIN g/dL " 12.3 10.5* 10.0*   PLATELETS 10*3/mm3 322 287 273       Results from last 7 days   Lab Units 07/20/23  0331 07/19/23  0456 07/18/23  0341 07/17/23  0402 07/16/23  0332   SODIUM mmol/L 136 134* 132*   < > 129*   POTASSIUM mmol/L 4.4 3.9 3.7   < > 4.0   CO2 mmol/L 40.0* 35.0* 33.0*   < > 33.0*   BUN mg/dL 20 20 21   < > 24*   CREATININE mg/dL 0.94 0.96 0.82   < > 1.09*   MAGNESIUM mg/dL  --  2.2 2.5*  --  1.9   PHOSPHORUS mg/dL  --  3.5 3.8  --   --    GLUCOSE mg/dL 98 85 78   < > 117*    < > = values in this interval not displayed.       Estimated Creatinine Clearance: 33.1 mL/min (by C-G formula based on SCr of 0.94 mg/dL).    Results from last 7 days   Lab Units 07/18/23  0804   PH, ARTERIAL pH units 7.362   PCO2, ARTERIAL mm Hg 63.6*   PO2 ART mm Hg 89.7         Images:   Chest x-ray reviewed personally and showed bilateral multifocal opacities.  Compared to admission x-ray left upper lobe dense opacity seems to be resolving.  The right upper lobe opacity is somewhat better as well.  However lower lobe opacity in the left lung is more pronounced on today's study and bibasilar atelectasis/effusions present as well.    I reviewed the patient's results and images.     CT scan of the chest reviewed and showed bilateral multifocal groundglass opacities with lower lobe predominant bronchiectasis and small left pleural effusion.    Assessment & Plan   Impression        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    Hypothyroidism    Cachexia    Severe Malnutrition (HCC)    Pneumonia of both lungs due to infectious organism    Hyponatremia       Plan         1.  Patient presented with complaints of worsening shortness of breath, palpitations and atrial flutter with rapid ventricular rate.  Continue further management per cardiology.  Discussed with cardiology and given patient's weakness and respiratory issues she will be high risk of complications from anesthesia.  Will hold off external cardioversion today.  Heart rate is under better control.  If he can maintain rate control that may be acceptable.  If she starts to have more issues with rapid ventricular rate  then may need to go for DAYNA/ECV.  Currently patient converted back to sinus rhythm.  2.  Patient has significant groundglass opacities which are new compared to before.  Work-up for vasculitis and rheumatology conditions have been negative.  Slight improvement in upper lobe opacities but more pronounced changes noted in lower lobes with basilar atelectasis and possible effusions.  Patient is not working with incentive spirometry.  Counseled her to work with more deep breathing exercises.  Since patient is clinically improving we will monitor her for now.  I discussed with son that bronchoscopy will be a risky procedure for her given her debility and cardiac arrhythmia issues.  We will have patient continue working with pulmonary toilet with vest therapy, nebulizer therapy and incentive spirometry.  Discussed with primary team and we will involve physical therapy to evaluate her and try to get her out of bed..    3.  It could still be exacerbation of her MAC disease.  However clinically she is not presenting with any symptoms of fevers, weight loss.  Will monitor for now.  4.  Not requiring NIV support.  Will use as needed.  5.  Continue current broad-spectrum antibiotics for possible pneumonia.  Cultures are thus far negative..  6.  Patient with intermittent delirium.  Continue management per hospitalist team.    Son is thinking that patient may be able to discharge home today and I cautioned him that that  does not seem like possibility as we will need to assess if patient will be able to get out of bed and manage herself as she lives alone.  She may need to go to short-term rehab.  We will continue to follow along from pulmonary standpoint.  Remains with guarded prognosis.     Ventura Hook MD, [facility]  Pulmonary, Critical care and Sleep Medicine

## 2023-07-20 NOTE — PROGRESS NOTES
[facility] Medicine Services  PROGRESS NOTE    Patient Name: Vita Miller  : 1940  MRN: [id number]    Date of Admission: 2023  Primary Care Physician: Alfonso Steele MD    Subjective   Subjective     CC:  Follow-up PNA    HPI:  Son at bedside, reports they wish to take her home but are not completley against rehab, she denies any complaints    ROS:  Gen- No fevers, chills  CV- No chest pain, palpitations  Resp-no soa  GI- No N/V/D, abd pain     Objective   Objective     Vital Signs:   Temp:  [97.5 øF (36.4 øC)-98.4 øF (36.9 øC)] 97.5 øF (36.4 øC)  Heart Rate:  [] 66  Resp:  [16-18] 18  BP: ()/(54-60) 119/60  Flow (L/min):  [1-2] 1     Physical Exam:  Constitutional: No acute distress, awake, alert, sitting up in bed  HENT: NCAT, mucous membranes moist  Respiratory: fine crackles throughout, on supplemental O2, normal WOB   Cardiovascular: RRR, no murmurs, rubs, or gallops  Gastrointestinal: Positive bowel sounds, soft, nontender, nondistended  Musculoskeletal: No bilateral ankle edema  Psychiatric: Appropriate affect, cooperative  Neurologic: oriented x 3, speech clear  Skin: No rashes    Results Reviewed:  LAB RESULTS:      Lab 23  0341 23  0402 23  0332 07/15/23  0340 23  2241   WBC 9.25 9.30 12.73*  --  13.02*   HEMOGLOBIN 12.3 10.5* 10.0*  --  14.3   HEMATOCRIT 38.5 33.3* 30.6*  --  43.0   PLATELETS 322 287 273  --  353   NEUTROS ABS 6.51 6.68 11.29*  --  10.73*   IMMATURE GRANS (ABS) 0.07* 0.07* 0.07*  --  0.09*   LYMPHS ABS 1.71 1.66 0.71  --  0.95   MONOS ABS 0.63 0.61 0.64  --  0.99*   EOS ABS 0.28 0.23 0.01  --  0.20   .3* 100.6* 97.8*  --  97.7*   SED RATE  --   --  32*  --  99*   CRP  --   --   --   --  14.66*   PROCALCITONIN  --   --   --   --  0.13   LACTATE  --   --  0.9 1.0  --          Lab 23  0331 23  0456 23  0341 23  1615 23  0402 23  0332 23  2241   SODIUM 136 134*  132*  --  134* 129* 129*   POTASSIUM 4.4 3.9 3.7 4.5 4.2 4.0 4.5   CHLORIDE 94* 92* 92*  --  92* 87* 87*   CO2 40.0* 35.0* 33.0*  --  37.0* 33.0* 30.0*   ANION GAP 2.0* 7.0 7.0  --  5.0 9.0 12.0   BUN 20 20 21  --  18 24* 19   CREATININE 0.94 0.96 0.82  --  0.73 1.09* 0.79   EGFR 60.3 58.8* 71.1  --  81.7 50.5* 74.3   GLUCOSE 98 85 78  --  89 117* 125*   CALCIUM 8.8 8.8 8.6  --  8.2* 8.0* 9.1   MAGNESIUM  --  2.2 2.5*  --   --  1.9 1.9   PHOSPHORUS  --  3.5 3.8  --   --   --   --    TSH  --   --   --   --   --   --  13.160*         Lab 07/19/23  0456 07/18/23  0341 07/16/23  0332 07/14/23  2241   TOTAL PROTEIN  --  5.2* 6.0 6.8   ALBUMIN 3.0* 2.9* 3.6 3.5   GLOBULIN  --  2.3 2.4 3.3   ALT (SGPT)  --  12 14 24   AST (SGOT)  --  15 16 23   BILIRUBIN  --  0.2 0.2 0.4   ALK PHOS  --  47 53 90         Lab 07/20/23  0331 07/14/23  2241   PROBNP 3,624.0* 6,301.0*   HSTROP T  --  16*             Lab 07/18/23  0341   VITAMIN B 12 970*         Lab 07/18/23  0804 07/18/23  0525 07/16/23  0518   PH, ARTERIAL 7.362 7.296* 7.380   PCO2, ARTERIAL 63.6* 76.1* 60.2*   PO2 ART 89.7 116.0* 117.0*   FIO2 30 28 30   HCO3 ART 36.0* 37.1* 35.6*   BASE EXCESS ART 8.4* 7.8* 8.7*   CARBOXYHEMOGLOBIN 1.1 1.0 1.3     Brief Urine Lab Results  (Last result in the past 365 days)        Color   Clarity   Blood   Leuk Est   Nitrite   Protein   CREAT   Urine HCG        07/17/23 1645 Yellow   Clear   Negative   Negative   Negative   Negative                   Microbiology Results Abnormal       Procedure Component Value - Date/Time    Blood Culture - Blood, Arm, Left [[id number]]  (Normal) Collected: 07/15/23 0340    Lab Status: Final result Specimen: Blood from Arm, Left Updated: 07/20/23 0730     Blood Culture No growth at 5 days    Blood Culture - Blood, Arm, Right [[id number]]  (Normal) Collected: 07/15/23 0340    Lab Status: Final result Specimen: Blood from Arm, Right Updated: 07/20/23 0730     Blood Culture No growth at 5 days    S. Pneumo Ag  Urine or CSF - Urine, Urine, Clean Catch [[id number]]  (Normal) Collected: 07/15/23 0341    Lab Status: Final result Specimen: Urine, Clean Catch Updated: 07/15/23 1356     Strep Pneumo Ag Negative    Legionella Antigen, Urine - Urine, Urine, Clean Catch [[id number]]  (Normal) Collected: 07/15/23 0341    Lab Status: Final result Specimen: Urine, Clean Catch Updated: 07/15/23 1356     LEGIONELLA ANTIGEN, URINE Negative    MRSA Screen, PCR (Inpatient) - Swab, Nares [[id number]]  (Normal) Collected: 07/15/23 0310    Lab Status: Final result Specimen: Swab from Nares Updated: 07/15/23 0846     MRSA PCR Negative    Narrative:      The negative predictive value of this diagnostic test is high and should only be used to consider de-escalating anti-MRSA therapy. A positive result may indicate colonization with MRSA and must be correlated clinically.  MRSA Negative    COVID PRE-OP / PRE-PROCEDURE SCREENING ORDER (NO ISOLATION) - Swab, Nasopharynx [[id number]]  (Normal) Collected: 07/15/23 0310    Lab Status: Final result Specimen: Swab from Nasopharynx Updated: 07/15/23 0410    Narrative:      The following orders were created for panel order COVID PRE-OP / PRE-PROCEDURE SCREENING ORDER (NO ISOLATION) - Swab, Nasopharynx.  Procedure                               Abnormality         Status                     ---------                               -----------         ------                     Respiratory Panel PCR w/...[[id number]]  Normal              Final result                 Please view results for these tests on the individual orders.    Respiratory Panel PCR w/COVID-19(SARS-CoV-2) JENNA/KELSI/NORMAN/PAD/COR/MAD/ARSEN In-House, NP Swab in UTM/VTM, 3-4 HR TAT - Swab, Nasopharynx [[id number]]  (Normal) Collected: 07/15/23 0310    Lab Status: Final result Specimen: Swab from Nasopharynx Updated: 07/15/23 0410     ADENOVIRUS, PCR Not Detected     Coronavirus 229E Not Detected     Coronavirus HKU1 Not Detected     Coronavirus NL63 Not  Detected     Coronavirus OC43 Not Detected     COVID19 Not Detected     Human Metapneumovirus Not Detected     Human Rhinovirus/Enterovirus Not Detected     Influenza A PCR Not Detected     Influenza B PCR Not Detected     Parainfluenza Virus 1 Not Detected     Parainfluenza Virus 2 Not Detected     Parainfluenza Virus 3 Not Detected     Parainfluenza Virus 4 Not Detected     RSV, PCR Not Detected     Bordetella pertussis pcr Not Detected     Bordetella parapertussis PCR Not Detected     Chlamydophila pneumoniae PCR Not Detected     Mycoplasma pneumo by PCR Not Detected    Narrative:      In the setting of a positive respiratory panel with a viral infection PLUS a negative procalcitonin without other underlying concern for bacterial infection, consider observing off antibiotics or discontinuation of antibiotics and continue supportive care. If the respiratory panel is positive for atypical bacterial infection (Bordetella pertussis, Chlamydophila pneumoniae, or Mycoplasma pneumoniae), consider antibiotic de-escalation to target atypical bacterial infection.            XR Chest 1 View    Result Date: 7/20/2023  XR CHEST 1 VW Date of Exam: 7/20/2023 4:30 AM EDT Indication: Shortness of breath Comparison: 7/17/2023 Findings: There are increasing multifocal airspace opacities and increasing bilateral pleural effusions and bibasilar atelectasis. Cardiac mediastinal contours are unchanged. Regional skeleton is unremarkable.     Impression: Impression: 1. Increasing multifocal opacities as well as increasing bilateral pleural effusions and bibasilar consolidation. Findings likely reflect a progressive multifocal pneumonia or less likely asymmetric edema. Electronically Signed: Hugo Wesley  7/20/2023 7:42 AM EDT  Workstation ID: [id number]     Results for orders placed during the hospital encounter of 06/16/23    Adult Transthoracic Echo Complete W/ Cont if Necessary Per Protocol    Interpretation Summary    Left ventricular  systolic function is normal. Estimated left ventricular EF = 60%    Left ventricular diastolic function was normal.    Mild aortic insufficiency.    Trace to mild mitral and tricuspid regurgitation.    Calculated right ventricular systolic pressure from tricuspid regurgitation is 20 mmHg.    There is a trivial pericardial effusion.      Current medications:  Scheduled Meds:acetaZOLAMIDE, 125 mg, Oral, Daily  apixaban, 2.5 mg, Oral, Q12H  ascorbic acid, 250 mg, Oral, Daily  brimonidine, 1 drop, Both Eyes, Q12H   And  timolol, 1 drop, Both Eyes, Q12H  bumetanide, 0.5 mg, Intravenous, BID  digoxin, 125 mcg, Oral, Every Other Day  dilTIAZem CD, 120 mg, Oral, Q24H  fludrocortisone, 0.1 mg, Oral, Daily  galantamine, 8 mg, Oral, BID With Meals  ipratropium-albuterol, 3 mL, Nebulization, Q4H - RT  levothyroxine, 88 mcg, Oral, Daily  midodrine, 5 mg, Oral, TID AC  mirtazapine, 15 mg, Oral, Nightly  pantoprazole, 40 mg, Oral, Q AM  PARoxetine, 10 mg, Oral, Nightly  piperacillin-tazobactam, 3.375 g, Intravenous, Q8H  senna-docusate sodium, 2 tablet, Oral, BID  sodium chloride, 10 mL, Intravenous, Q12H      Continuous Infusions:       PRN Meds:.  acetaminophen **OR** acetaminophen **OR** acetaminophen    senna-docusate sodium **AND** polyethylene glycol **AND** bisacodyl **AND** bisacodyl    Magnesium Standard Dose Replacement - Follow Nurse / BPA Driven Protocol    melatonin    metoprolol tartrate    ondansetron    Potassium Replacement - Follow Nurse / BPA Driven Protocol    sodium chloride    sodium chloride    sodium chloride    Assessment & Plan   Assessment & Plan     Active Hospital Problems    Diagnosis  POA    **Acute respiratory failure with hypoxia [J96.01]  Yes    Pneumonia of both lungs due to infectious organism [J18.9]  Unknown    Hyponatremia [E87.1]  Unknown    Severe Malnutrition (HCC) [E43]  Yes    Atrial fibrillation with RVR [I48.91]  Yes    Cachexia [R64]  Yes    Senile osteoporosis [M81.0]  Yes     Hashimoto's thyroiditis [E06.3]  Yes    Primary open angle glaucoma of both eyes, severe stage [H40.1133]  Yes    Mixed anxiety and depressive disorder [F41.8]  Yes    Multiple nodules of lung [R91.8]  Yes    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        Brief Hospital Course to date:  Vita Miller is a 83 y.o. female with history of hypothyroidism, COPD with chronic bronchiectasis, history of chronic MAC with unknown intolerance to prior treatment plan, history of orthostatic hypotension, hyperlipidemia, osteoporosis, bilateral glaucoma, mild memory deficit, who was recently admitted to our facility approximately 1 month ago for new onset atrial fibrillation with RVR who returns to the ER tonight with complaints of palpitations and shortness of breath.     This patient's problems and plans were partially entered by my partner and updated as appropriate by me 07/20/23.     Acute respiratory failure with hypoxia  Bilateral pneumonia/infiltrates  History of MAC, intolerant to treatment  COPD with acute exacerbation, chronic bronchiectasis  -- Pulmonology following.  Clinically improved.  AI/vasculitis panels are negative.  Recs to continue vest therapy, discussed with Dr. Hook  --Off steroids  --Continue Zosyn, s/p azith  -- Duo nebs  -- Flutter valve, mucinex  --MRSA pcr negative, strep negative, legionella negative  -- bumex, diamox  --CT chest showed new GGO, bronchiectasis, scattered nodules.  Needs f/u CT or PET scan     Atrial fibrillation with rvr no in aflutter  --s/p esmolol drip  --eliquis  --flecainide --> bisoprolol ---> changed to digoxin per cards, no flecainide due to pneumonitis concerns, discussed with  Dr. Padilla  -- has upcoming appt with Dr. Martin at  (suggested making that telehealth, family very concerned about missing this appt)   --midodrine and fludrocortisone   --cards following.  Po dig, dilta.   -- now in sinus  --LVEF 60%, normal diastolic dysfunction on echo from 6/17/2023    Altered mental status  --better  --u/a bland, B12 wnl.   RPR nonreactive     Hyponatremia  --Na 129 on admission; suspect related to volume overload  --improved     Orthostatic hypotension  --continue fludricortisone and midodrine     Hypothyroidism  --slightly elevated tsh  --continue synthroid, dose adjusted on 7/16; repeat tsh in 4-6 weeks; careful adjustment given RVR     Other chronic conditions: HLD, malnutrition, anxiety/depression, memory impairment  --continue home meds  --nutrition  --pt/ot      Expected Discharge Location and Transportation: PT recs SNF/d/w son, family planning for 24/7 care at home  Expected Discharge   Expected Discharge Date: 7/22/2023; Expected Discharge Time:      DVT prophylaxis:  Medical and mechanical DVT prophylaxis orders are present.     AM-PAC 6 Clicks Score (PT): 15 (07/20/23 0800)    CODE STATUS:   Code Status and Medical Interventions:   Ordered at: 07/15/23 0214     Code Status (Patient has no pulse and is not breathing):    CPR (Attempt to Resuscitate)     Medical Interventions (Patient has pulse or is breathing):    Full Support       SCOTT Nickerson  07/20/23

## 2023-07-20 NOTE — PLAN OF CARE
Goal Outcome Evaluation:   Pt with eyes closed most of day but does interact/ hold a conversation. On 1L nasal cannula sating 95%. Continuing to work with pt. Son considering rehab upon discharge.     Problem: Adult Inpatient Plan of Care  Goal: Plan of Care Review  Outcome: Ongoing, Progressing  Goal: Patient-Specific Goal (Individualized)  Outcome: Ongoing, Progressing  Goal: Absence of Hospital-Acquired Illness or Injury  Outcome: Ongoing, Progressing  Intervention: Identify and Manage Fall Risk  Recent Flowsheet Documentation  Taken 7/20/2023 1200 by Brigette Torres RN  Safety Promotion/Fall Prevention:   activity supervised   assistive device/personal items within reach   clutter free environment maintained   safety round/check completed   room organization consistent  Taken 7/20/2023 0800 by Brigette Torres RN  Safety Promotion/Fall Prevention:   activity supervised   assistive device/personal items within reach   clutter free environment maintained   safety round/check completed   room organization consistent  Intervention: Prevent Skin Injury  Recent Flowsheet Documentation  Taken 7/20/2023 1400 by Brigette Torres RN  Body Position:   right   turned  Skin Protection:   adhesive use limited   transparent dressing maintained   tubing/devices free from skin contact  Taken 7/20/2023 1200 by Brigette Torres RN  Body Position:   turned   left  Skin Protection:   adhesive use limited   transparent dressing maintained   tubing/devices free from skin contact  Taken 7/20/2023 1000 by Brigette Torres RN  Body Position:   right   turned  Taken 7/20/2023 0800 by Brigette Torres RN  Body Position:   left   turned  Skin Protection:   adhesive use limited   skin-to-skin areas padded   skin-to-device areas padded   tubing/devices free from skin contact   transparent dressing maintained  Intervention: Prevent and Manage VTE (Venous Thromboembolism) Risk  Recent Flowsheet Documentation  Taken 7/20/2023 1200 by Brian  NELSON Machuca  Activity Management: activity encouraged  Taken 7/20/2023 0800 by Brigette Torres RN  Activity Management: activity encouraged  Range of Motion: active ROM (range of motion) encouraged  Intervention: Prevent Infection  Recent Flowsheet Documentation  Taken 7/20/2023 1200 by Brigette Torres RN  Infection Prevention: environmental surveillance performed  Taken 7/20/2023 0800 by Brigette Torres RN  Infection Prevention: environmental surveillance performed  Goal: Optimal Comfort and Wellbeing  Outcome: Ongoing, Progressing  Intervention: Provide Person-Centered Care  Recent Flowsheet Documentation  Taken 7/20/2023 0800 by Brigette Torres RN  Trust Relationship/Rapport:   choices provided   care explained   emotional support provided   questions answered   questions encouraged   reassurance provided   thoughts/feelings acknowledged   empathic listening provided  Goal: Readiness for Transition of Care  Outcome: Ongoing, Progressing     Problem: COPD (Chronic Obstructive Pulmonary Disease) Comorbidity  Goal: Maintenance of COPD Symptom Control  Outcome: Ongoing, Progressing  Intervention: Maintain COPD-Symptom Control  Recent Flowsheet Documentation  Taken 7/20/2023 1200 by Brigette Torres RN  Medication Review/Management: medications reviewed  Taken 7/20/2023 0800 by Brigette Torres RN  Supportive Measures: verbalization of feelings encouraged     Problem: Skin Injury Risk Increased  Goal: Skin Health and Integrity  Outcome: Ongoing, Progressing  Intervention: Optimize Skin Protection  Recent Flowsheet Documentation  Taken 7/20/2023 1400 by Brigette Torres RN  Pressure Reduction Techniques:   frequent weight shift encouraged   heels elevated off bed   positioned off wounds   pressure points protected   weight shift assistance provided  Head of Bed (HOB) Positioning: HOB elevated  Pressure Reduction Devices:   positioning supports utilized   pressure-redistributing mattress utilized   feet on  footrest/footstool  Skin Protection:   adhesive use limited   transparent dressing maintained   tubing/devices free from skin contact  Taken 7/20/2023 1200 by Brigette Torres RN  Pressure Reduction Techniques:   frequent weight shift encouraged   heels elevated off bed   positioned off wounds   pressure points protected   weight shift assistance provided  Head of Bed (HOB) Positioning: HOB elevated  Pressure Reduction Devices:   heel offloading device utilized   positioning supports utilized  Skin Protection:   adhesive use limited   transparent dressing maintained   tubing/devices free from skin contact  Taken 7/20/2023 1000 by Brigette Torres RN  Head of Bed (HOB) Positioning: HOB elevated  Taken 7/20/2023 0800 by Brigette Torres RN  Pressure Reduction Techniques:   frequent weight shift encouraged   heels elevated off bed   positioned off wounds   pressure points protected   weight shift assistance provided  Head of Bed (HOB) Positioning: HOB elevated  Pressure Reduction Devices:   heel offloading device utilized   feet on footrest/footstool   positioning supports utilized   pressure-redistributing mattress utilized  Skin Protection:   adhesive use limited   skin-to-skin areas padded   skin-to-device areas padded   tubing/devices free from skin contact   transparent dressing maintained     Problem: Fall Injury Risk  Goal: Absence of Fall and Fall-Related Injury  Outcome: Ongoing, Progressing  Intervention: Identify and Manage Contributors  Recent Flowsheet Documentation  Taken 7/20/2023 1200 by Brigette Torres RN  Medication Review/Management: medications reviewed  Self-Care Promotion:   independence encouraged   BADL personal objects within reach  Taken 7/20/2023 0800 by Brigette Torres RN  Self-Care Promotion:   independence encouraged   BADL personal objects within reach   BADL personal routines maintained  Intervention: Promote Injury-Free Environment  Recent Flowsheet Documentation  Taken 7/20/2023 1200  by Brian, Brigette, RN  Safety Promotion/Fall Prevention:   activity supervised   assistive device/personal items within reach   clutter free environment maintained   safety round/check completed   room organization consistent  Taken 7/20/2023 0800 by Brigette Torres RN  Safety Promotion/Fall Prevention:   activity supervised   assistive device/personal items within reach   clutter free environment maintained   safety round/check completed   room organization consistent     Problem: Adjustment to Illness (Sepsis/Septic Shock)  Goal: Optimal Coping  Outcome: Ongoing, Progressing  Intervention: Optimize Psychosocial Adjustment to Illness  Recent Flowsheet Documentation  Taken 7/20/2023 0800 by Brigette Torres RN  Supportive Measures: verbalization of feelings encouraged  Family/Support System Care: self-care encouraged     Problem: Bleeding (Sepsis/Septic Shock)  Goal: Absence of Bleeding  Outcome: Ongoing, Progressing     Problem: Glycemic Control Impaired (Sepsis/Septic Shock)  Goal: Blood Glucose Level Within Desired Range  Outcome: Ongoing, Progressing     Problem: Infection Progression (Sepsis/Septic Shock)  Goal: Absence of Infection Signs and Symptoms  Outcome: Ongoing, Progressing  Intervention: Initiate Sepsis Management  Recent Flowsheet Documentation  Taken 7/20/2023 1200 by Brigette Torres RN  Infection Prevention: environmental surveillance performed  Taken 7/20/2023 0800 by Brigette Torres RN  Infection Prevention: environmental surveillance performed  Intervention: Promote Recovery  Recent Flowsheet Documentation  Taken 7/20/2023 1400 by Brigette Torres RN  Sleep/Rest Enhancement: consistent schedule promoted  Taken 7/20/2023 1200 by Brigette Torres RN  Activity Management: activity encouraged  Taken 7/20/2023 0800 by Brigette Torres RN  Activity Management: activity encouraged  Sleep/Rest Enhancement:   consistent schedule promoted   family presence promoted     Problem: Nutrition Impaired  (Sepsis/Septic Shock)  Goal: Optimal Nutrition Intake  Outcome: Ongoing, Progressing

## 2023-07-20 NOTE — THERAPY TREATMENT NOTE
Patient Name: Vita Miller  : 1940    MRN: [id number]                              Today's Date: 2023       Admit Date: 2023    Visit Dx:     ICD-10-CM ICD-9-CM   1. Atrial fibrillation with RVR  I48.91 427.31   2. Acute respiratory failure with hypoxia  J96.01 518.81   3. Pneumonia of both upper lobes due to infectious organism  J18.9 486   4. Pneumonia of both lungs due to infectious organism, unspecified part of lung  J18.9 483.8   5. Severe Malnutrition (HCC)  E43 261   6. 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 Primary open angle glaucoma of both eyes, severe stage  H40.1133 365.11     365.73   8. Multiple nodules of lung  R91.8 793.19   9. H/O orthostatic hypotension  Z86.79 V12.59   10. COPD with acute exacerbation  J44.1 491.21   11. Acquired bronchiectasis  J47.9 494.0   12. 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    Hypothyroidism    Acute respiratory failure with hypoxia    Cachexia    Severe Malnutrition (HCC)    Pneumonia of both lungs due to infectious organism    Hyponatremia     Past Medical History:   Diagnosis Date    A-fib     Disease of thyroid gland     Glaucoma     Hypotension      Past Surgical History:   Procedure Laterality Date    HIP SURGERY      PATELLA SURGERY      SHOULDER SURGERY        General Information       Row Name 23 1558          Physical Therapy Time and Intention    Document Type therapy note (daily note) (P)   -     Mode of Treatment physical therapy (P)   -       Row Name 23 1558          General  Information    Patient Profile Reviewed yes (P)   -     Existing Precautions/Restrictions fall;oxygen therapy device and L/min (P)   OOB w/ brief, monitor HR  -     Barriers to Rehab medically complex;previous functional deficit;visual deficit (P)   -[facility] Name 07/20/23 8521          Cognition    Orientation Status (Cognition) oriented to;person;place;verbal cues/prompts needed for orientation;time (P)   required choices for month  -[facility] Name 07/20/23 3796          Safety Issues, Functional Mobility    Safety Issues Affecting Function (Mobility) insight into deficits/self-awareness;safety precaution awareness;safety precautions follow-through/compliance;judgment;sequencing abilities (P)   -     Impairments Affecting Function (Mobility) balance;endurance/activity tolerance;postural/trunk control;shortness of breath;strength;visual/perceptual (P)   -               User Key  (r) = Recorded By, (t) = Taken By, (c) = Cosigned By      Initials Name Provider Type     Lili Crawford PT Student PT Student                   Mobility       [facility] Name 07/20/23 4023          Bed Mobility    Bed Mobility supine-sit (P)   -     Supine-Sit [place] (Bed Mobility) contact guard;verbal cues;nonverbal cues (demo/gesture) (P)   -     Comment, (Bed Mobility) VCs for hand placement and VCs (P)   -[facility] Name 07/20/23 7627          Transfers    Comment, (Transfers) transfer bed>BSC, STS x2. Brief w/ OOB mobility as pt incontinent upon standing (P)   -[facility] Name 07/20/23 4825          Bed-Chair Transfer    Bed-Chair [place] (Transfers) contact guard;verbal cues;nonverbal cues (demo/gesture) (P)   -     Assistive Device (Bed-Chair Transfers) walker, front-wheeled (P)   -     Comment, (Bed-Chair Transfer) T/f bed>BSC. VCs for hand placement and sequencing (P)   -[facility] Name 07/20/23 1140          Sit-Stand Transfer    Sit-Stand [place] (Transfers) contact guard;verbal  cues;nonverbal cues (demo/gesture) (P)   -     Assistive Device (Sit-Stand Transfers) walker, front-wheeled (P)   -     Comment, (Sit-Stand Transfer) STS x 1 from EOB, x 1 from BSC. VCs for hand placement and sequencing (P)   -       Row Name 07/20/23 1559          Gait/Stairs (Locomotion)    [facility] [place] Level (Gait) contact guard;verbal cues (P)   -     Assistive Device (Gait) walker, front-wheeled (P)   -     Distance in Feet (Gait) 50 (P)   -     Deviations/Abnormal Patterns (Gait) bilateral deviations;base of support, narrow;leanna decreased;stride length decreased;weight shifting decreased;gait speed decreased (P)   -     Bilateral Gait Deviations forward flexed posture;heel strike decreased (P)   -     Comment, (Gait/Stairs) VCs for sequencing and intermittent manual assist to navigate walker. VCs for upright posture and to increase step length. Mild unsteadiness noted but no overt LOB. Distance limited by fatigue (P)   -               User Key  (r) = Recorded By, (t) = Taken By, (c) = Cosigned By      Initials Name Provider Type     Lili Crawford, PT Student PT Student                   Obj/Interventions       Row Name 07/20/23 1603          Motor Skills    Therapeutic Exercise hip;knee;ankle (P)   -       Row Name 07/20/23 1603          Hip (Therapeutic Exercise)    Hip (Therapeutic Exercise) isometric exercises;strengthening exercise (P)   -     Hip Isometrics (Therapeutic Exercise) bilateral;gluteal sets;supine;10 repetitions (P)   -     Hip Strengthening (Therapeutic Exercise) bilateral;aBduction;aDduction;heel slides;supine;marching while seated;15 repititions (P)   -       Row Name 07/20/23 1603          Knee (Therapeutic Exercise)    Knee (Therapeutic Exercise) isometric exercises;strengthening exercise (P)   -     Knee Isometrics (Therapeutic Exercise) bilateral;quad sets;10 repetitions (P)   -     Knee Strengthening (Therapeutic Exercise) bilateral;SLR (straight  leg raise);LAQ (long arc quad);15 repititions (P)   -[facility] Name 07/20/23 1603          Ankle (Therapeutic Exercise)    Ankle (Therapeutic Exercise) AROM (active range of motion) (P)   -     Ankle AROM (Therapeutic Exercise) bilateral;dorsiflexion;plantarflexion;15 repititions (P)   -[facility] Name 07/20/23 1603          Balance    Balance Assessment sitting static balance;sitting dynamic balance;sit to stand dynamic balance;standing static balance (P)   -     Static Sitting Balance standby assist (P)   -     Dynamic Sitting Balance contact guard (P)   -     Position, Sitting Balance unsupported;sitting edge of bed (P)   -     Sit to Stand Dynamic Balance contact guard (P)   -     Static Standing Balance contact guard (P)   -     Dynamic Standing Balance contact guard (P)   -     Position/Device Used, Standing Balance supported;walker, front-wheeled (P)   -     Balance Interventions sitting;standing;sit to stand;supported;static;dynamic (P)   -               User Key  (r) = Recorded By, (t) = Taken By, (c) = Cosigned By      Initials Name Provider Type     Lili Crawford, PT Student PT Student                   Goals/Plan    No documentation.                  Clinical Impression       [facility] Name 07/20/23 1605          Pain    Pretreatment Pain Rating 0/10 - no pain (P)   -     Posttreatment Pain Rating 0/10 - no pain (P)   -[facility]       Row Name 07/20/23 1605          Plan of Care Review    Plan of Care Reviewed With patient (P)   -     Progress improving (P)   -     Outcome Evaluation Pt continues to present below baseline d/t generalized weakness, decreased balance, and decreased activity tolerance. Pt required CGA for bed mobility, STS, and to ambulate 50 ft w/ walker. Brief with OOB mobility as pt incontinent upon standing. Pt would continue to benefit from skilled IP PT. Recommend SNF at d/c. (P)   -[facility] Name 07/20/23 1605          Vital Signs    Pre Systolic BP Rehab --  (P)   Attempted 2 times but did not read - nsg cleared for tx  -     Post Systolic BP Rehab 114 (P)   -     Post Treatment Diastolic BP 66 (P)   -     Pretreatment Heart Rate (beats/min) 95 (P)   -     Intratreatment Heart Rate (beats/min) 124 (P)   -     Posttreatment Heart Rate (beats/min) 98 (P)   -     Pre SpO2 (%) 95 (P)   -     O2 Delivery Pre Treatment supplemental O2 (P)   -     Post SpO2 (%) 98 (P)   -     O2 Delivery Post Treatment supplemental O2 (P)   -     Pre Patient Position Supine (P)   -     Intra Patient Position Standing (P)   -     Post Patient Position Sitting (P)   -       Row Name 07/20/23 1606          Positioning and Restraints    Pre-Treatment Position in bed (P)   -     Post Treatment Position chair (P)   -     In Chair reclined;sitting;call light within reach;encouraged to call for assist;exit alarm on;with nsg;waffle cushion;legs elevated;heels elevated;notified nsg (P)   -               User Key  (r) = Recorded By, (t) = Taken By, (c) = Cosigned By      Initials Name Provider Type     Lili Crawford, PT Student PT Student                   Outcome Measures       Row Name 07/20/23 1609 07/20/23 0800       How much help from another person do you currently need...    Turning from your back to your side while in flat bed without using bedrails? 3 (P)   - 3  -CH    Moving from lying on back to sitting on the side of a flat bed without bedrails? 3 (P)   - 3  -CH    Moving to and from a bed to a chair (including a wheelchair)? 3 (P)   - 3  -CH    Standing up from a chair using your arms (e.g., wheelchair, bedside chair)? 3 (P)   - 2  -CH    Climbing 3-5 steps with a railing? 2 (P)   - 2  -CH    To walk in hospital room? 3 (P)   - 2  -CH    AM-PAC 6 Clicks Score (PT) 17 (P)   - 15  -CH    Highest level of mobility 5 --> Static standing (P)   - 4 --> Transferred to chair/commode  -      Row Name 07/20/23 1606          Functional Assessment     Outcome Measure Options AM-PAC 6 Clicks Basic Mobility (PT) (P)   -               User Key  (r) = Recorded By, (t) = Taken By, (c) = Cosigned By      Initials Name Provider Type    Brigette De Leon, RN Registered Nurse     Lili Crawford, PT Student PT Student                                 Physical Therapy Education       Title: PT OT SLP Therapies (In Progress)       Topic: Physical Therapy (In Progress)       Point: Mobility training (Done)       Learning Progress Summary             Patient Acceptance, E, VU,NR by  at 7/20/2023 1609    Comment: see flowsheet    Acceptance, E, VU,NR by  at 7/18/2023 0933    Comment: see flowsheet    Eager, E,D, NR by  at 7/15/2023 1642                         Point: Home exercise program (In Progress)       Learning Progress Summary             Patient Eager, E,D, NR by  at 7/15/2023 1642                         Point: Body mechanics (Done)       Learning Progress Summary             Patient Acceptance, E, VU,NR by  at 7/20/2023 1609    Comment: see flowsheet    Acceptance, E, VU,NR by  at 7/18/2023 0933    Comment: see flowsheet    Eager, E,D, NR by  at 7/15/2023 1642                         Point: Precautions (Done)       Learning Progress Summary             Patient Acceptance, E, VU,NR by  at 7/20/2023 1609    Comment: see flowsheet    Acceptance, E, VU,NR by  at 7/18/2023 0933    Comment: see flowsheet    Eager, E,D, NR by  at 7/15/2023 1642                                         User Key       Initials Effective Dates Name Provider Type Discipline    DM 02/03/23 -  Merced Patel, PT Physical Therapist PT     05/15/23 -  Lili Crawford, PT Student PT Student PT                  PT Recommendation and Plan     Plan of Care Reviewed With: (P) patient  Progress: (P) improving  Outcome Evaluation: (P) Pt continues to present below baseline d/t generalized weakness, decreased balance, and decreased activity tolerance. Pt required CGA for bed  mobility, STS, and to ambulate 50 ft w/ walker. Brief with OOB mobility as pt incontinent upon standing. Pt would continue to benefit from skilled IP PT. Recommend SNF at d/c.     Time Calculation:         PT Charges       Row Name 07/20/23 1604             Time Calculation    Start Time 1512 (P)   -      PT Received On 07/20/23 (P)   -      PT Goal Re-Cert Due Date 07/25/23 (P)   -         Timed Charges    44251 - PT Therapeutic Exercise Minutes 15 (P)   -      87630 - PT Therapeutic Activity Minutes 26 (P)   -         Total Minutes    Timed Charges Total Minutes 41 (P)   -       Total Minutes 41 (P)   -                User Key  (r) = Recorded By, (t) = Taken By, (c) = Cosigned By      Initials Name Provider Type     Lili Crawford, PT Student PT Student                  Therapy Charges for Today       Code Description Service Date Service Provider Modifiers Qty    83897132649 HC PT THER PROC EA 15 MIN 7/20/2023 Lili Crawford, PT Student GP 1    38037374127 HC PT THERAPEUTIC ACT EA 15 MIN 7/20/2023 Lili Crawford, PT Student GP 2            PT G-Codes  Outcome Measure Options: (P) AM-PAC 6 Clicks Basic Mobility (PT)  AM-PAC 6 Clicks Score (PT): (P) 17  AM-PAC 6 Clicks Score (OT): 19  PT Discharge Summary  Anticipated Discharge Disposition (PT): (P) skilled nursing facility    Lili Crawford PT Student  7/20/2023

## 2023-07-20 NOTE — CASE MANAGEMENT/SOCIAL WORK
Continued Stay Note  [facility]     Patient Name: Vita Miller  MRN: [id number]  Today's Date: 7/20/2023    Admit Date: 7/14/2023    Plan: SNF   Discharge Plan       Row Name 07/20/23 1459       Plan    Plan SNF    Patient/Family in Agreement with Plan other (see comments)  Spoke to son, Arun. Patient was sleeping.    Plan Comments Spoke with Ms. Dallas's son, Arun by phone to discuss discharge plans. Son reported that he and Ms. Miller are now considering inpatient rehab at discharge. Son reported that he lives out of town and is not familiar with local rehab facilities. He stated that he plans to talk to a family friend tonight to get suggestions on rehab facilities.  asked that the son come up with his top 3 choices and call me tomorrow. Son agreed.  will continue to follow plan of care and assist with discharge planning/needs as recommendations indicate.      15:46 EDT  Received call from Ms. Miller's son, Arun, requesting SNF referrals to the [facility] and [facility]. CM made both referrals. I will follow up tomorrow.     Final Discharge Disposition Code 03 - skilled nursing facility (SNF)                   Discharge Codes    No documentation.                 Expected Discharge Date and Time       Expected Discharge Date Expected Discharge Time    Jul 22, 2023               Loly Moreira RN

## 2023-07-20 NOTE — PLAN OF CARE
Goal Outcome Evaluation:           Progress: improving     SBP >90 through out shift. Tolerating 2 L NC ; refused bipap. Transferring to bsc with minimal assist. No cough noted. Tolerating PO intake. NSR to SB on monitor.

## 2023-07-20 NOTE — PLAN OF CARE
Goal Outcome Evaluation:  Plan of Care Reviewed With: (P) patient        Progress: (P) improving  Outcome Evaluation: (P) Pt continues to present below baseline d/t generalized weakness, decreased balance, and decreased activity tolerance. Pt required CGA for bed mobility, STS, and to ambulate 50 ft w/ walker. Brief with OOB mobility as pt incontinent upon standing. Pt would continue to benefit from skilled IP PT. Recommend SNF at d/c.      Anticipated Discharge Disposition (PT): (P) skilled nursing facility

## 2023-07-21 LAB
QT INTERVAL: 274 MS
QT INTERVAL: 370 MS
QTC INTERVAL: 375 MS
QTC INTERVAL: 413 MS
VASOPRESSIN SERPL-MCNC: <0.8 PG/ML (ref 0–4.7)

## 2023-07-21 PROCEDURE — 99232 SBSQ HOSP IP/OBS MODERATE 35: CPT | Performed by: NURSE PRACTITIONER

## 2023-07-21 PROCEDURE — 25010000002 PIPERACILLIN SOD-TAZOBACTAM PER 1 G: Performed by: INTERNAL MEDICINE

## 2023-07-21 PROCEDURE — 93010 ELECTROCARDIOGRAM REPORT: CPT | Performed by: INTERNAL MEDICINE

## 2023-07-21 PROCEDURE — 93005 ELECTROCARDIOGRAM TRACING: CPT | Performed by: INTERNAL MEDICINE

## 2023-07-21 PROCEDURE — 94799 UNLISTED PULMONARY SVC/PX: CPT

## 2023-07-21 PROCEDURE — 99232 SBSQ HOSP IP/OBS MODERATE 35: CPT | Performed by: INTERNAL MEDICINE

## 2023-07-21 PROCEDURE — 94669 MECHANICAL CHEST WALL OSCILL: CPT

## 2023-07-21 PROCEDURE — 94664 DEMO&/EVAL PT USE INHALER: CPT

## 2023-07-21 PROCEDURE — 99232 SBSQ HOSP IP/OBS MODERATE 35: CPT

## 2023-07-21 PROCEDURE — P9047 ALBUMIN (HUMAN), 25%, 50ML: HCPCS | Performed by: NURSE PRACTITIONER

## 2023-07-21 PROCEDURE — 94761 N-INVAS EAR/PLS OXIMETRY MLT: CPT

## 2023-07-21 PROCEDURE — 25010000002 ALBUMIN HUMAN 25% PER 50 ML: Performed by: NURSE PRACTITIONER

## 2023-07-21 RX ORDER — MIDODRINE HYDROCHLORIDE 5 MG/1
5 TABLET ORAL ONCE
Status: COMPLETED | OUTPATIENT
Start: 2023-07-21 | End: 2023-07-21

## 2023-07-21 RX ORDER — METOPROLOL TARTRATE 5 MG/5ML
5 INJECTION INTRAVENOUS ONCE
Status: DISCONTINUED | OUTPATIENT
Start: 2023-07-21 | End: 2023-07-21

## 2023-07-21 RX ORDER — ALBUMIN (HUMAN) 12.5 G/50ML
12.5 SOLUTION INTRAVENOUS ONCE
Status: COMPLETED | OUTPATIENT
Start: 2023-07-21 | End: 2023-07-21

## 2023-07-21 RX ORDER — MIDODRINE HYDROCHLORIDE 10 MG/1
10 TABLET ORAL
Status: DISCONTINUED | OUTPATIENT
Start: 2023-07-21 | End: 2023-07-25

## 2023-07-21 RX ORDER — ACETAZOLAMIDE 250 MG/1
250 TABLET ORAL DAILY
Status: DISCONTINUED | OUTPATIENT
Start: 2023-07-22 | End: 2023-08-01 | Stop reason: HOSPADM

## 2023-07-21 RX ADMIN — MIDODRINE HYDROCHLORIDE 5 MG: 5 TABLET ORAL at 10:57

## 2023-07-21 RX ADMIN — MIDODRINE HYDROCHLORIDE 5 MG: 5 TABLET ORAL at 08:06

## 2023-07-21 RX ADMIN — IPRATROPIUM BROMIDE AND ALBUTEROL SULFATE 3 ML: 2.5; .5 SOLUTION RESPIRATORY (INHALATION) at 22:56

## 2023-07-21 RX ADMIN — TIMOLOL MALEATE 1 DROP: 5 SOLUTION/ DROPS OPHTHALMIC at 21:18

## 2023-07-21 RX ADMIN — IPRATROPIUM BROMIDE AND ALBUTEROL SULFATE 3 ML: 2.5; .5 SOLUTION RESPIRATORY (INHALATION) at 15:30

## 2023-07-21 RX ADMIN — Medication 5 MG: at 22:31

## 2023-07-21 RX ADMIN — DILTIAZEM HYDROCHLORIDE 120 MG: 120 CAPSULE, COATED, EXTENDED RELEASE ORAL at 08:05

## 2023-07-21 RX ADMIN — BUMETANIDE 0.5 MG: 0.25 INJECTION, SOLUTION INTRAMUSCULAR; INTRAVENOUS at 08:03

## 2023-07-21 RX ADMIN — SENNOSIDES AND DOCUSATE SODIUM 2 TABLET: 50; 8.6 TABLET ORAL at 21:18

## 2023-07-21 RX ADMIN — IPRATROPIUM BROMIDE AND ALBUTEROL SULFATE 3 ML: 2.5; .5 SOLUTION RESPIRATORY (INHALATION) at 12:09

## 2023-07-21 RX ADMIN — GALANTAMINE 8 MG: 4 TABLET, FILM COATED ORAL at 18:13

## 2023-07-21 RX ADMIN — MIDODRINE HYDROCHLORIDE 5 MG: 5 TABLET ORAL at 12:32

## 2023-07-21 RX ADMIN — SENNOSIDES AND DOCUSATE SODIUM 2 TABLET: 50; 8.6 TABLET ORAL at 08:03

## 2023-07-21 RX ADMIN — BRIMONIDINE TARTRATE 1 DROP: 2 SOLUTION/ DROPS OPHTHALMIC at 21:18

## 2023-07-21 RX ADMIN — LEVOTHYROXINE SODIUM 88 MCG: 0.09 TABLET ORAL at 05:37

## 2023-07-21 RX ADMIN — PIPERACILLIN SODIUM AND TAZOBACTAM SODIUM 3.38 G: 3; .375 INJECTION, SOLUTION INTRAVENOUS at 18:13

## 2023-07-21 RX ADMIN — BRIMONIDINE TARTRATE 1 DROP: 2 SOLUTION/ DROPS OPHTHALMIC at 08:03

## 2023-07-21 RX ADMIN — APIXABAN 2.5 MG: 2.5 TABLET, FILM COATED ORAL at 08:03

## 2023-07-21 RX ADMIN — PAROXETINE HYDROCHLORIDE 10 MG: 10 TABLET, FILM COATED ORAL at 21:18

## 2023-07-21 RX ADMIN — MIRTAZAPINE 15 MG: 15 TABLET, FILM COATED ORAL at 21:18

## 2023-07-21 RX ADMIN — GALANTAMINE 8 MG: 4 TABLET, FILM COATED ORAL at 08:03

## 2023-07-21 RX ADMIN — Medication 10 ML: at 08:17

## 2023-07-21 RX ADMIN — ACETAZOLAMIDE 125 MG: 250 TABLET ORAL at 08:03

## 2023-07-21 RX ADMIN — IPRATROPIUM BROMIDE AND ALBUTEROL SULFATE 3 ML: 2.5; .5 SOLUTION RESPIRATORY (INHALATION) at 07:22

## 2023-07-21 RX ADMIN — PIPERACILLIN SODIUM AND TAZOBACTAM SODIUM 3.38 G: 3; .375 INJECTION, SOLUTION INTRAVENOUS at 09:27

## 2023-07-21 RX ADMIN — Medication 10 ML: at 21:18

## 2023-07-21 RX ADMIN — PANTOPRAZOLE SODIUM 40 MG: 40 TABLET, DELAYED RELEASE ORAL at 05:37

## 2023-07-21 RX ADMIN — IPRATROPIUM BROMIDE AND ALBUTEROL SULFATE 3 ML: 2.5; .5 SOLUTION RESPIRATORY (INHALATION) at 19:38

## 2023-07-21 RX ADMIN — APIXABAN 2.5 MG: 2.5 TABLET, FILM COATED ORAL at 21:18

## 2023-07-21 RX ADMIN — TIMOLOL MALEATE 1 DROP: 5 SOLUTION/ DROPS OPHTHALMIC at 08:03

## 2023-07-21 RX ADMIN — MIDODRINE HYDROCHLORIDE 10 MG: 10 TABLET ORAL at 16:30

## 2023-07-21 RX ADMIN — PIPERACILLIN SODIUM AND TAZOBACTAM SODIUM 3.38 G: 3; .375 INJECTION, SOLUTION INTRAVENOUS at 02:03

## 2023-07-21 RX ADMIN — ALBUMIN (HUMAN) 12.5 G: 0.25 INJECTION, SOLUTION INTRAVENOUS at 16:16

## 2023-07-21 RX ADMIN — Medication 250 MG: at 08:12

## 2023-07-21 NOTE — PROGRESS NOTES
[facility]  PROGRESS NOTE    Patient Name: Vita Miller  : 1940  MRN: [id number]    Date of Admission: 2023  Primary Care Physician: Alfonso Steele MD    Subjective   Subjective     CC:  Follow-up PNA    HPI:  Pt converted to afib with RVR following a BM this morning around 0800 with rate between 110-147. Following morning meds, patient's blood pressure remained soft at 103/67. In the early afternoon, telemetry showed episodes of NSR with HR 87, converting back to Afib RVR in the 120s. Blood pressure continued to remain low. Midodrine increased to 10 mg TID, albumin IV added for BP support. Pt/family declined lopressor indefinitely  Pt denies shortness of air, heart palpitations, chest pain. Reviewed lab work and ECHO results with the patient's son. Spoke cardiology throughout the date and have updated them.    Objective   Objective     Vital Signs:   Temp:  [96.8 øF (36 øC)-98.4 øF (36.9 øC)] 97.7 øF (36.5 øC)  Heart Rate:  [] 101  Resp:  [18] 18  BP: (103-129)/(56-87) 107/85  Flow (L/min):  [0.5-1] 0.5     Physical Exam:  Constitutional: Awake, alert, NAD  HENT: NCAT, mucous membranes moist  Respiratory: diminished in the bases with occasional crackles  Cardiovascular: Irregular/tachy, no murmur, gallop, rub  Gastrointestinal: Positive bowel sounds, soft, nontender, nondistended  Musculoskeletal: No bilateral ankle edema  Psychiatric: Appropriate affect, cooperative  Neurologic: Oriented x 3, no focal deficit, PEREZ, speech clear  Skin: No rashes      Results Reviewed:  LAB RESULTS:      Lab 23  0341 23  0402 23  0332 07/15/23  0340 23  2241   WBC 9.25 9.30 12.73*  --  13.02*   HEMOGLOBIN 12.3 10.5* 10.0*  --  14.3   HEMATOCRIT 38.5 33.3* 30.6*  --  43.0   PLATELETS 322 287 273  --  353   NEUTROS ABS 6.51 6.68 11.29*  --  10.73*   IMMATURE GRANS (ABS) 0.07* 0.07* 0.07*  --  0.09*   LYMPHS ABS 1.71 1.66 0.71  --  0.95    MONOS ABS 0.63 0.61 0.64  --  0.99*   EOS ABS 0.28 0.23 0.01  --  0.20   .3* 100.6* 97.8*  --  97.7*   SED RATE  --   --  32*  --  99*   CRP  --   --   --   --  14.66*   PROCALCITONIN  --   --   --   --  0.13   LACTATE  --   --  0.9 1.0  --            Lab 07/20/23  0331 07/19/23 0456 07/18/23 0341 07/17/23  1615 07/17/23  0402 07/16/23 0332 07/14/23  2241   SODIUM 136 134* 132*  --  134* 129* 129*   POTASSIUM 4.4 3.9 3.7 4.5 4.2 4.0 4.5   CHLORIDE 94* 92* 92*  --  92* 87* 87*   CO2 40.0* 35.0* 33.0*  --  37.0* 33.0* 30.0*   ANION GAP 2.0* 7.0 7.0  --  5.0 9.0 12.0   BUN 20 20 21  --  18 24* 19   CREATININE 0.94 0.96 0.82  --  0.73 1.09* 0.79   EGFR 60.3 58.8* 71.1  --  81.7 50.5* 74.3   GLUCOSE 98 85 78  --  89 117* 125*   CALCIUM 8.8 8.8 8.6  --  8.2* 8.0* 9.1   MAGNESIUM  --  2.2 2.5*  --   --  1.9 1.9   PHOSPHORUS  --  3.5 3.8  --   --   --   --    TSH  --   --   --   --   --   --  13.160*           Lab 07/19/23  0456 07/18/23 0341 07/16/23 0332 07/14/23  2241   TOTAL PROTEIN  --  5.2* 6.0 6.8   ALBUMIN 3.0* 2.9* 3.6 3.5   GLOBULIN  --  2.3 2.4 3.3   ALT (SGPT)  --  12 14 24   AST (SGOT)  --  15 16 23   BILIRUBIN  --  0.2 0.2 0.4   ALK PHOS  --  47 53 90           Lab 07/20/23  0331 07/14/23  2241   PROBNP 3,624.0* 6,301.0*   HSTROP T  --  16*               Lab 07/18/23  0341   VITAMIN B 12 970*           Lab 07/18/23  0804 07/18/23  0525 07/16/23  0518   PH, ARTERIAL 7.362 7.296* 7.380   PCO2, ARTERIAL 63.6* 76.1* 60.2*   PO2 ART 89.7 116.0* 117.0*   FIO2 30 28 30   HCO3 ART 36.0* 37.1* 35.6*   BASE EXCESS ART 8.4* 7.8* 8.7*   CARBOXYHEMOGLOBIN 1.1 1.0 1.3       Brief Urine Lab Results  (Last result in the past 365 days)        Color   Clarity   Blood   Leuk Est   Nitrite   Protein   CREAT   Urine HCG        07/17/23 1645 Yellow   Clear   Negative   Negative   Negative   Negative                   Microbiology Results Abnormal       Procedure Component Value - Date/Time    Blood Culture - Blood,  Arm, Left [[id number]]  (Normal) Collected: 07/15/23 0340    Lab Status: Final result Specimen: Blood from Arm, Left Updated: 07/20/23 0730     Blood Culture No growth at 5 days    Blood Culture - Blood, Arm, Right [[id number]]  (Normal) Collected: 07/15/23 0340    Lab Status: Final result Specimen: Blood from Arm, Right Updated: 07/20/23 0730     Blood Culture No growth at 5 days    S. Pneumo Ag Urine or CSF - Urine, Urine, Clean Catch [[id number]]  (Normal) Collected: 07/15/23 0341    Lab Status: Final result Specimen: Urine, Clean Catch Updated: 07/15/23 1356     Strep Pneumo Ag Negative    Legionella Antigen, Urine - Urine, Urine, Clean Catch [[id number]]  (Normal) Collected: 07/15/23 0341    Lab Status: Final result Specimen: Urine, Clean Catch Updated: 07/15/23 1356     LEGIONELLA ANTIGEN, URINE Negative    MRSA Screen, PCR (Inpatient) - Swab, Nares [[id number]]  (Normal) Collected: 07/15/23 0310    Lab Status: Final result Specimen: Swab from Nares Updated: 07/15/23 0846     MRSA PCR Negative    Narrative:      The negative predictive value of this diagnostic test is high and should only be used to consider de-escalating anti-MRSA therapy. A positive result may indicate colonization with MRSA and must be correlated clinically.  MRSA Negative    COVID PRE-OP / PRE-PROCEDURE SCREENING ORDER (NO ISOLATION) - Swab, Nasopharynx [[id number]]  (Normal) Collected: 07/15/23 0310    Lab Status: Final result Specimen: Swab from Nasopharynx Updated: 07/15/23 0410    Narrative:      The following orders were created for panel order COVID PRE-OP / PRE-PROCEDURE SCREENING ORDER (NO ISOLATION) - Swab, Nasopharynx.  Procedure                               Abnormality         Status                     ---------                               -----------         ------                     Respiratory Panel PCR w/...[[id number]]  Normal              Final result                 Please view results for these tests on the individual  orders.    Respiratory Panel PCR w/COVID-19(SARS-CoV-2) JENNA/KELSI/NORMAN/PAD/COR/MAD/ARSEN In-House, NP Swab in UTM/VTM, 3-4 HR TAT - Swab, Nasopharynx [[id number]]  (Normal) Collected: 07/15/23 0310    Lab Status: Final result Specimen: Swab from Nasopharynx Updated: 07/15/23 0410     ADENOVIRUS, PCR Not Detected     Coronavirus 229E Not Detected     Coronavirus HKU1 Not Detected     Coronavirus NL63 Not Detected     Coronavirus OC43 Not Detected     COVID19 Not Detected     Human Metapneumovirus Not Detected     Human Rhinovirus/Enterovirus Not Detected     Influenza A PCR Not Detected     Influenza B PCR Not Detected     Parainfluenza Virus 1 Not Detected     Parainfluenza Virus 2 Not Detected     Parainfluenza Virus 3 Not Detected     Parainfluenza Virus 4 Not Detected     RSV, PCR Not Detected     Bordetella pertussis pcr Not Detected     Bordetella parapertussis PCR Not Detected     Chlamydophila pneumoniae PCR Not Detected     Mycoplasma pneumo by PCR Not Detected    Narrative:      In the setting of a positive respiratory panel with a viral infection PLUS a negative procalcitonin without other underlying concern for bacterial infection, consider observing off antibiotics or discontinuation of antibiotics and continue supportive care. If the respiratory panel is positive for atypical bacterial infection (Bordetella pertussis, Chlamydophila pneumoniae, or Mycoplasma pneumoniae), consider antibiotic de-escalation to target atypical bacterial infection.            XR Chest 1 View    Result Date: 7/20/2023  XR CHEST 1 VW Date of Exam: 7/20/2023 4:30 AM EDT Indication: Shortness of breath Comparison: 7/17/2023 Findings: There are increasing multifocal airspace opacities and increasing bilateral pleural effusions and bibasilar atelectasis. Cardiac mediastinal contours are unchanged. Regional skeleton is unremarkable.     Impression: Impression: 1. Increasing multifocal opacities as well as increasing bilateral pleural  effusions and bibasilar consolidation. Findings likely reflect a progressive multifocal pneumonia or less likely asymmetric edema. Electronically Signed: Hugo Wesley  7/20/2023 7:42 AM EDT  Workstation ID: [id number]     Results for orders placed during the hospital encounter of 06/16/23    Adult Transthoracic Echo Complete W/ Cont if Necessary Per Protocol    Interpretation Summary    Left ventricular systolic function is normal. Estimated left ventricular EF = 60%    Left ventricular diastolic function was normal.    Mild aortic insufficiency.    Trace to mild mitral and tricuspid regurgitation.    Calculated right ventricular systolic pressure from tricuspid regurgitation is 20 mmHg.    There is a trivial pericardial effusion.      Current medications:  Scheduled Meds:[START ON 7/22/2023] acetaZOLAMIDE, 250 mg, Oral, Daily  albumin human, 12.5 g, Intravenous, Once  apixaban, 2.5 mg, Oral, Q12H  ascorbic acid, 250 mg, Oral, Daily  brimonidine, 1 drop, Both Eyes, Q12H   And  timolol, 1 drop, Both Eyes, Q12H  bumetanide, 0.5 mg, Intravenous, BID  digoxin, 125 mcg, Oral, Every Other Day  dilTIAZem CD, 120 mg, Oral, Q24H  galantamine, 8 mg, Oral, BID With Meals  ipratropium-albuterol, 3 mL, Nebulization, Q4H - RT  levothyroxine, 88 mcg, Oral, Daily  midodrine, 10 mg, Oral, TID AC  mirtazapine, 15 mg, Oral, Nightly  pantoprazole, 40 mg, Oral, Q AM  PARoxetine, 10 mg, Oral, Nightly  piperacillin-tazobactam, 3.375 g, Intravenous, Q8H  senna-docusate sodium, 2 tablet, Oral, BID  sodium chloride, 10 mL, Intravenous, Q12H      Continuous Infusions:       PRN Meds:.  acetaminophen **OR** acetaminophen **OR** acetaminophen    senna-docusate sodium **AND** polyethylene glycol **AND** bisacodyl **AND** bisacodyl    Magnesium Standard Dose Replacement - Follow Nurse / BPA Driven Protocol    melatonin    metoprolol tartrate    ondansetron    Potassium Replacement - Follow Nurse / BPA Driven Protocol    sodium chloride    sodium  chloride    sodium chloride    Assessment & Plan   Assessment & Plan     Active Hospital Problems    Diagnosis  POA    **Acute respiratory failure with hypoxia [J96.01]  Yes    Pneumonia of both lungs due to infectious organism [J18.9]  Unknown    Hyponatremia [E87.1]  Unknown    Severe Malnutrition (HCC) [E43]  Yes    Atrial fibrillation with RVR [I48.91]  Yes    Cachexia [R64]  Yes    Senile osteoporosis [M81.0]  Yes    Hashimoto's thyroiditis [E06.3]  Yes    Primary open angle glaucoma of both eyes, severe stage [H40.1133]  Yes    Mixed anxiety and depressive disorder [F41.8]  Yes    Multiple nodules of lung [R91.8]  Yes    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        Brief Hospital Course to date:  Vita Miller is a 83 y.o. female with history of hypothyroidism, COPD with chronic bronchiectasis, history of chronic MAC with unknown intolerance to prior treatment plan, history of orthostatic hypotension, hyperlipidemia, osteoporosis, bilateral glaucoma, mild memory deficit, who was recently admitted to our facility approximately 1 month ago for new onset atrial fibrillation with RVR who returns to the ER tonight with complaints of palpitations and shortness of breath.     This patient's problems and plans were partially entered by my partner and updated as appropriate by me 07/21/23.    Copied text in this note has been reviewed and is accurate as of 07/21/23.        Acute respiratory failure with hypoxia  Bilateral pneumonia/infiltrates  History of MAC, intolerant to treatment  COPD with acute exacerbation, chronic bronchiectasis  -- Pulmonology following.  Clinically improved.  AI/vasculitis panels are negative.   Recs to continue vest therapy, discussed with Dr. Hook  --Off steroids  --Continue Zosyn, s/p azith  -- Duo nebs  -- Flutter valve, mucinex  --MRSA pcr negative, strep negative, legionella negative  -- bumex, diamox  --CT chest showed new GGO, bronchiectasis, scattered nodules.  Needs f/u CT or PET scan     Atrial fibrillation with rvr no in aflutter  --07/21 converted back to Afib RVR following a BM at 8am. Pt was hypotensive. Family/patient declined lopressor indefinitely. Midodrine increased 10 mg TID and albumin adm for BP support. Cardiology updated.  --s/p esmolol drip  --eliquis  --flecainide --> bisoprolol ---> changed to digoxin per cards, no flecainide due to pneumonitis concerns, discussed with Dr. Padilla  -- has upcoming appt with Dr. Martin at  (suggested making that telehealth, family very concerned about missing this appt)   --fludrocortisone discontinued 07/21  --cards following.  Po dig, dilta.   --LVEF 60%, normal diastolic dysfunction on echo from 6/17/2023    Altered mental status  --better  --u/a bland, B12 wnl.   RPR nonreactive     Hyponatremia  --Na 129 on admission; suspect related to volume overload  --improved     Orthostatic hypotension  --continue fludricortisone and midodrine     Hypothyroidism  --slightly elevated tsh  --continue synthroid, dose adjusted on 7/16; repeat tsh in 4-6 weeks; careful adjustment given RVR     Other chronic conditions: HLD, malnutrition, anxiety/depression, memory impairment  --continue home meds  --nutrition  --pt/ot      Expected Discharge Location and Transportation: PT recs SNF/d/w son, family planning for 24/7 care at home  Expected Discharge   Expected Discharge Date: 7/22/2023; Expected Discharge Time:      DVT prophylaxis:  Medical and mechanical DVT prophylaxis orders are present.     AM-PAC 6 Clicks Score (PT): 17 (07/21/23 1000)    CODE STATUS:   Code Status and Medical Interventions:   Ordered at: 07/15/23 0217     Code Status (Patient has  no pulse and is not breathing):    CPR (Attempt to Resuscitate)     Medical Interventions (Patient has pulse or is breathing):    Full Support       Deya Sorensen, APRN  07/21/23

## 2023-07-21 NOTE — PROGRESS NOTES
"  [facility]  PROGRESS NOTE    Date of Admission: 7/14/2023  Date of Service: 07/21/23    Primary Care Physician: Alfonso Steele MD    Chief Complaint: Follow-up atrial fibrillation, HFpEF, respiratory failure        Subjective      No acute events overnight.  No chest pain or shortness of breath.  Is maintaining normal sinus rhythm.  Blood pressure 120 systolic.      Objective   Vitals: /87 (BP Location: Left arm, Patient Position: Lying)   Pulse 60   Temp 96.8 øF (36 øC) (Axillary)   Resp 18   Ht 172.7 cm (68\")   Wt 46.3 kg (102 lb)   SpO2 99%   BMI 15.51 kg/mý     Physical Exam:  GENERAL: Alert, cooperative, in no acute distress.   HEENT: Normocephalic, no jugular venous distention  HEART: Regular rhythm, normal rate, and no murmurs, gallops, or rubs.   LUNGS: Clear to auscultation bilaterally.  Crackles bilaterally  EXTREMITIES: No clubbing, cyanosis, or edema noted.  Ecchymoses on right lateral foot.    Results:  Results from last 7 days   Lab Units 07/18/23  0341 07/17/23  0402 07/16/23  0332   WBC 10*3/mm3 9.25 9.30 12.73*   HEMOGLOBIN g/dL 12.3 10.5* 10.0*   HEMATOCRIT % 38.5 33.3* 30.6*   PLATELETS 10*3/mm3 322 287 273     Results from last 7 days   Lab Units 07/20/23  0331 07/19/23  0456 07/18/23  0341   SODIUM mmol/L 136 134* 132*   POTASSIUM mmol/L 4.4 3.9 3.7   CHLORIDE mmol/L 94* 92* 92*   CO2 mmol/L 40.0* 35.0* 33.0*   BUN mg/dL 20 20 21   CREATININE mg/dL 0.94 0.96 0.82   GLUCOSE mg/dL 98 85 78      Lab Results   Component Value Date    AST 15 07/18/2023    ALT 12 07/18/2023             Results from last 7 days   Lab Units 07/14/23  2241   TSH uIU/mL 13.160*             Results from last 7 days   Lab Units 07/14/23  2241   HSTROP T ng/L 16*     Results from last 7 days   Lab Units 07/20/23  0331   PROBNP pg/mL 3,624.0*         Intake/Output Summary (Last 24 hours) at 7/21/2023 0756  Last data filed at 7/21/2023 0300  Gross per 24 hour   Intake 240 " ml   Output 1750 ml   Net -1510 ml       I personally reviewed the patient's EKG/Telemetry data    Radiology Data:   Results for orders placed during the hospital encounter of 06/16/23    Adult Transthoracic Echo Complete W/ Cont if Necessary Per Protocol    Interpretation Summary    Left ventricular systolic function is normal. Estimated left ventricular EF = 60%    Left ventricular diastolic function was normal.    Mild aortic insufficiency.    Trace to mild mitral and tricuspid regurgitation.    Calculated right ventricular systolic pressure from tricuspid regurgitation is 20 mmHg.    There is a trivial pericardial effusion.      Current Medications:  acetaZOLAMIDE, 125 mg, Oral, Daily  apixaban, 2.5 mg, Oral, Q12H  ascorbic acid, 250 mg, Oral, Daily  brimonidine, 1 drop, Both Eyes, Q12H   And  timolol, 1 drop, Both Eyes, Q12H  bumetanide, 0.5 mg, Intravenous, BID  digoxin, 125 mcg, Oral, Every Other Day  dilTIAZem CD, 120 mg, Oral, Q24H  fludrocortisone, 0.1 mg, Oral, Daily  galantamine, 8 mg, Oral, BID With Meals  ipratropium-albuterol, 3 mL, Nebulization, Q4H - RT  levothyroxine, 88 mcg, Oral, Daily  midodrine, 5 mg, Oral, TID AC  mirtazapine, 15 mg, Oral, Nightly  pantoprazole, 40 mg, Oral, Q AM  PARoxetine, 10 mg, Oral, Nightly  piperacillin-tazobactam, 3.375 g, Intravenous, Q8H  senna-docusate sodium, 2 tablet, Oral, BID  sodium chloride, 10 mL, Intravenous, Q12H           Assessment:  Paroxysmal atrial fibrillation  Started on Eliquis this admission  Currently rate controlled with digoxin  No amiodarone or flecainide due to concern for pneumonitis  Maintaining normal sinus rhythm  Has upcoming appointment with Dr. Martin at   Heart failure preserved ejection fraction  Bilateral pneumonia, history of MAC  Acute on chronic respiratory failure with hypoxia secondary to #2 and #3  Orthostatic hypotension, on midodrine and fludrocortisone    Plan  Continue digoxin 125 mcg every other day as well as  diltiazem 120 mg daily for heart rate control.  Would discontinue fludrocortisone and continue midodrine 5 mg 3 times daily for blood pressure support.  If she requires increased blood pressure support would increase to 10 mg 3 times daily.  Would avoid amiodarone and flecainide secondary to respiratory status and concern for pneumonitis.     Little Kinney PA-C

## 2023-07-21 NOTE — PROGRESS NOTES
Intensive Care Follow-up     Hospital:  LOS: 6 days   Ms. Vita Miller, 83 y.o. female is followed for:   Acute respiratory failure with hypoxia            History of present illness:   83-year-old female with history of Mycobacterium avium complex and bronchiectasis.  Patient previously has been treated for MAC.  She apparently has been intolerant of a lot of those medications and apparently has been not getting any treatment at this point.  She uses DuoNebs and percussion vest for her bronchiectasis.  Patient apparently was placed on supplemental oxygen recently as well.  Patient apparently was on Breo previously But was taken off due to concern about open-angle glaucoma which has required surgery x2. Patient was hospitalized on June 16 with new onset atrial fibrillation.  She was placed on flecainide and Eliquis at that time.  Patient readmitted with complains of palpitations and worsening shortness of breath.  Patient underwent work-up with CT scan of the chest which showed significant groundglass opacities bilaterally which were not present on a CT scan a month ago.  She was also started on esmolol and digoxin for rate control.  She was initially started on steroids with concern for vasculitis but that was stopped.  Patient apparently denied any new onset of fever, chest pain.  She does have chronic sputum production-year-old for which she uses nebulizer and percussion vest.  Flecainide has been put on hold as well.  She was also found to be hypercapnic so has been on and off BiPAP support.  Repeat blood gases showed improvement.  Vasculitic and rheumatologic work-up is negative.    Subjective   Interval History:  Patient more awake today.  States that she does not know how her breathing is doing.  She is down to 1 L nasal cannula oxygen currently.  Saturating well.  Denies any chest pain.  Back in atrial fibrillation but rate is Below 120s.             The patient's past medical, surgical and social  "history were reviewed and updated in Epic as appropriate.       Objective     Infusions:     Medications:  acetaZOLAMIDE, 125 mg, Oral, Daily  apixaban, 2.5 mg, Oral, Q12H  ascorbic acid, 250 mg, Oral, Daily  brimonidine, 1 drop, Both Eyes, Q12H   And  timolol, 1 drop, Both Eyes, Q12H  bumetanide, 0.5 mg, Intravenous, BID  digoxin, 125 mcg, Oral, Every Other Day  dilTIAZem CD, 120 mg, Oral, Q24H  galantamine, 8 mg, Oral, BID With Meals  ipratropium-albuterol, 3 mL, Nebulization, Q4H - RT  levothyroxine, 88 mcg, Oral, Daily  midodrine, 10 mg, Oral, TID AC  mirtazapine, 15 mg, Oral, Nightly  pantoprazole, 40 mg, Oral, Q AM  PARoxetine, 10 mg, Oral, Nightly  piperacillin-tazobactam, 3.375 g, Intravenous, Q8H  senna-docusate sodium, 2 tablet, Oral, BID  sodium chloride, 10 mL, Intravenous, Q12H        Vital Sign Min/Max for last 24 hours  Temp  Min: 96.8 øF (36 øC)  Max: 98.4 øF (36.9 øC)   BP  Min: 103/67  Max: 129/64   Pulse  Min: 56  Max: 127   Resp  Min: 18  Max: 18   SpO2  Min: 96 %  Max: 99 %   Flow (L/min)  Min: 0.5  Max: 1       Input/Output for last 24 hour shift  07/20 0701 - 07/21 0700  In: 240 [P.O.:240]  Out: 1750 [Urine:1750]      Objective:  Vital signs: (most recent): Blood pressure 107/85, pulse 101, temperature 97.7 øF (36.5 øC), temperature source Oral, resp. rate 18, height 172.7 cm (68\"), weight 46.3 kg (102 lb), SpO2 99 %.          General Appearance: Sitting in bed, in no acute distress   Lungs:   B/L Breath sounds present with decreased breath sounds on bases, no wheezing heard, occasional basilar crackles.   Heart: S1 and S2 present, sinus rhythm currently  Extremities:  no edema.  Neurologic:  Moving all four extremities.  No focal deficit      Results from last 7 days   Lab Units 07/18/23  0341 07/17/23  0402 07/16/23  0332   WBC 10*3/mm3 9.25 9.30 12.73*   HEMOGLOBIN g/dL 12.3 10.5* 10.0*   PLATELETS 10*3/mm3 322 287 111       Results from last 7 days   Lab Units 07/20/23  0331 07/19/23  0456 " 07/18/23  0341 07/17/23  0402 07/16/23  0332   SODIUM mmol/L 136 134* 132*   < > 129*   POTASSIUM mmol/L 4.4 3.9 3.7   < > 4.0   CO2 mmol/L 40.0* 35.0* 33.0*   < > 33.0*   BUN mg/dL 20 20 21   < > 24*   CREATININE mg/dL 0.94 0.96 0.82   < > 1.09*   MAGNESIUM mg/dL  --  2.2 2.5*  --  1.9   PHOSPHORUS mg/dL  --  3.5 3.8  --   --    GLUCOSE mg/dL 98 85 78   < > 117*    < > = values in this interval not displayed.       Estimated Creatinine Clearance: 33.1 mL/min (by C-G formula based on SCr of 0.94 mg/dL).    Results from last 7 days   Lab Units 07/18/23  0804   PH, ARTERIAL pH units 7.362   PCO2, ARTERIAL mm Hg 63.6*   PO2 ART mm Hg 89.7         Images:   Chest x-ray reviewed personally from yesterday and showed bilateral multifocal opacities.  Compared to admission x-ray left upper lobe dense opacity seems to be resolving.  The right upper lobe opacity is somewhat better as well.  However lower lobe opacity in the left lung is more pronounced on today's study and bibasilar atelectasis/effusions present as well.    I reviewed the patient's results and images.     CT scan of the chest reviewed and showed bilateral multifocal groundglass opacities with lower lobe predominant bronchiectasis and small left pleural effusion.    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  Patient presented with complaints of worsening shortness of breath, palpitations and atrial flutter with  rapid ventricular rate.  Continue further management per cardiology.  Discussed with cardiology and given patient's weakness and respiratory issues she will be high risk of complications from anesthesia.  Will hold off external cardioversion today.  Heart rate is under better control.  If he can maintain rate control that may be acceptable.  If she starts to have more issues with rapid ventricular rate  then may need to go for DAYNA/ECV.  Patient had converted back to sinus rhythm but now is in A-fib again.  Cardiology team is following and managing.  2.  Patient has significant groundglass opacities which are new compared to before.  Work-up for vasculitis and rheumatology conditions have been negative.  Slight improvement in upper lobe opacities but more pronounced changes noted in lower lobes with basilar atelectasis and possible effusions.  Patient is not working with incentive spirometry.  Counseled her to work with more deep breathing exercises.  Since patient is clinically improving we will monitor her for now.  I discussed with son that bronchoscopy will be a risky procedure for her given her debility and cardiac arrhythmia issues.  We will have patient continue working with pulmonary toilet with vest therapy, nebulizer therapy and incentive spirometry.    3.  It could still be exacerbation of her MAC disease.  However clinically she is not presenting with any symptoms of fevers, weight loss.  Will monitor for now.  4.  Not requiring NIV support.  Will use as needed.  5.  Continue current broad-spectrum antibiotics for possible pneumonia.  Cultures are thus far negative.  Complete 7 days of therapy.  6.  Patient with intermittent delirium.  Continue management per hospitalist team.  7.  Continue diuresis as tolerated.  Bicarb is increasing.  Will increase Diamox dose.  8.  PT/OT.  May benefit from rehab    Ventura Hook MD, [facility]  Pulmonary, Critical care and Sleep Medicine

## 2023-07-21 NOTE — CASE MANAGEMENT/SOCIAL WORK
Continued Stay Note  [facility]     Patient Name: Vita Miller  MRN: [id number]  Today's Date: 7/21/2023    Admit Date: 7/14/2023    Plan: SNF   Discharge Plan       Row Name 07/21/23 1602       Plan    Plan SNF    Patient/Family in Agreement with Plan yes    Plan Comments Met with Ms. Miller and her son at bedside to discuss discharge plan. Both agreed that SNF at [facility] or [facility] is the plan. Informed them that [facility] and [facility] are still reviewing her referral, and  will follow up on referrals Monday.  will continue to follow plan of care and assist with discharge needs as indicated.                   Discharge Codes    No documentation.                 Expected Discharge Date and Time       Expected Discharge Date Expected Discharge Time    Jul 22, 2023               Loly Moreira RN

## 2023-07-21 NOTE — PLAN OF CARE
Pt went into Afib rvr after bowel movement, EKG confirmed. Notified hospitalist. Son refused metoprolol. Pt not symptomatic but remained in afib rvr throughout shift with rates between 110-130. Patient more awake alert today compared to yesterday. Dose of albumin given today for soft blood pressures. Patient on 0.5L NC. Difficult to get an accurate reading of O2 sats due to poor perfusion.     Problem: Adult Inpatient Plan of Care  Goal: Plan of Care Review  Outcome: Ongoing, Progressing  Goal: Patient-Specific Goal (Individualized)  Outcome: Ongoing, Progressing  Goal: Absence of Hospital-Acquired Illness or Injury  Outcome: Ongoing, Progressing  Intervention: Identify and Manage Fall Risk  Recent Flowsheet Documentation  Taken 7/21/2023 1400 by Brigette Torres RN  Safety Promotion/Fall Prevention:   activity supervised   assistive device/personal items within reach   clutter free environment maintained   safety round/check completed   room organization consistent  Taken 7/21/2023 1200 by Brigette Trores RN  Safety Promotion/Fall Prevention:   activity supervised   assistive device/personal items within reach   clutter free environment maintained   safety round/check completed   room organization consistent  Taken 7/21/2023 1000 by Brigette Torres RN  Safety Promotion/Fall Prevention:   activity supervised   assistive device/personal items within reach   clutter free environment maintained   room organization consistent   safety round/check completed  Taken 7/21/2023 0800 by Brigette Torres RN  Safety Promotion/Fall Prevention:   assistive device/personal items within reach   activity supervised   clutter free environment maintained   room organization consistent   safety round/check completed  Intervention: Prevent Skin Injury  Recent Flowsheet Documentation  Taken 7/21/2023 1400 by Brigette Torres RN  Body Position:   right   turned  Skin Protection:   adhesive use limited   transparent dressing maintained    tubing/devices free from skin contact   incontinence pads utilized  Taken 7/21/2023 1200 by Brigette Torres RN  Body Position:   left   turned  Skin Protection:   adhesive use limited   transparent dressing maintained   tubing/devices free from skin contact  Taken 7/21/2023 1000 by Brigette Torres RN  Body Position:   right   turned  Skin Protection:   adhesive use limited   tubing/devices free from skin contact   transparent dressing maintained  Taken 7/21/2023 0800 by Brigette Torres RN  Body Position:   left   turned  Skin Protection:   adhesive use limited   transparent dressing maintained   tubing/devices free from skin contact  Intervention: Prevent and Manage VTE (Venous Thromboembolism) Risk  Recent Flowsheet Documentation  Taken 7/21/2023 1400 by Brigette Torres RN  Activity Management: activity encouraged  Taken 7/21/2023 1200 by Brigette Torres RN  Activity Management: activity encouraged  Taken 7/21/2023 1000 by Brigette Torres RN  Activity Management: activity encouraged  Taken 7/21/2023 0800 by Brigette Torres RN  Activity Management: activity encouraged  Intervention: Prevent Infection  Recent Flowsheet Documentation  Taken 7/21/2023 1400 by Brigette Torres RN  Infection Prevention: environmental surveillance performed  Taken 7/21/2023 1200 by Brigette Torres RN  Infection Prevention: environmental surveillance performed  Taken 7/21/2023 1000 by Brigette Torres RN  Infection Prevention: environmental surveillance performed  Taken 7/21/2023 0800 by Brigette Torres RN  Infection Prevention: environmental surveillance performed  Goal: Optimal Comfort and Wellbeing  Outcome: Ongoing, Progressing  Goal: Readiness for Transition of Care  Outcome: Ongoing, Progressing     Problem: COPD (Chronic Obstructive Pulmonary Disease) Comorbidity  Goal: Maintenance of COPD Symptom Control  Outcome: Ongoing, Progressing  Intervention: Maintain COPD-Symptom Control  Recent Flowsheet Documentation  Taken  7/21/2023 1400 by Brigette Torres RN  Medication Review/Management: medications reviewed  Taken 7/21/2023 1200 by Brigette Torres RN  Medication Review/Management: medications reviewed  Taken 7/21/2023 1000 by Brigette Torres RN  Medication Review/Management: medications reviewed  Taken 7/21/2023 0800 by Brigette Torres RN  Supportive Measures: verbalization of feelings encouraged  Medication Review/Management: medications reviewed     Problem: Skin Injury Risk Increased  Goal: Skin Health and Integrity  Outcome: Ongoing, Progressing  Intervention: Optimize Skin Protection  Recent Flowsheet Documentation  Taken 7/21/2023 1400 by Brigette Torres RN  Pressure Reduction Techniques:   frequent weight shift encouraged   heels elevated off bed   positioned off wounds   pressure points protected   weight shift assistance provided  Head of Bed (HOB) Positioning: HOB elevated  Pressure Reduction Devices:   positioning supports utilized   pressure-redistributing mattress utilized   heel offloading device utilized   feet on footrest/footstool  Skin Protection:   adhesive use limited   transparent dressing maintained   tubing/devices free from skin contact   incontinence pads utilized  Taken 7/21/2023 1200 by Brigette Torres RN  Pressure Reduction Techniques:   frequent weight shift encouraged   heels elevated off bed   positioned off wounds   pressure points protected   weight shift assistance provided  Head of Bed (HOB) Positioning: HOB elevated  Pressure Reduction Devices:   pressure-redistributing mattress utilized   heel offloading device utilized   feet on footrest/footstool   positioning supports utilized  Skin Protection:   adhesive use limited   transparent dressing maintained   tubing/devices free from skin contact  Taken 7/21/2023 1000 by Brigette Torres RN  Pressure Reduction Techniques:   frequent weight shift encouraged   positioned off wounds   heels elevated off bed   pressure points protected   weight  shift assistance provided  Head of Bed (HOB) Positioning: [facility] elevated  Pressure Reduction Devices:   pressure-redistributing mattress utilized   feet on footrest/footstool   positioning supports utilized   heel offloading device utilized  Skin Protection:   adhesive use limited   tubing/devices free from skin contact   transparent dressing maintained  Taken 7/21/2023 0800 by Brigette Torres RN  Pressure Reduction Techniques:   frequent weight shift encouraged   heels elevated off bed   positioned off wounds   pressure points protected   weight shift assistance provided  Head of Bed ([facility]) Positioning: [facility] elevated  Pressure Reduction Devices:   pressure-redistributing mattress utilized   positioning supports utilized   heel offloading device utilized   feet on footrest/footstool  Skin Protection:   adhesive use limited   transparent dressing maintained   tubing/devices free from skin contact     Problem: Fall Injury Risk  Goal: Absence of Fall and Fall-Related Injury  Outcome: Ongoing, Progressing  Intervention: Identify and Manage Contributors  Recent Flowsheet Documentation  Taken 7/21/2023 1400 by Brigette Torres RN  Medication Review/Management: medications reviewed  Self-Care Promotion:   independence encouraged   BADL personal objects within reach  Taken 7/21/2023 1200 by Brigette Torres RN  Medication Review/Management: medications reviewed  Self-Care Promotion:   independence encouraged   BADL personal objects within reach   BADL personal routines maintained  Taken 7/21/2023 1000 by Brigette Torres RN  Medication Review/Management: medications reviewed  Self-Care Promotion:   independence encouraged   BADL personal objects within reach   BADL personal routines maintained  Taken 7/21/2023 0800 by Brigette Torres RN  Medication Review/Management: medications reviewed  Self-Care Promotion:   independence encouraged   BADL personal objects within reach   BADL personal routines maintained  Intervention:  Promote Injury-Free Environment  Recent Flowsheet Documentation  Taken 7/21/2023 1400 by Brigette Torres RN  Safety Promotion/Fall Prevention:   activity supervised   assistive device/personal items within reach   clutter free environment maintained   safety round/check completed   room organization consistent  Taken 7/21/2023 1200 by Brigette Torres RN  Safety Promotion/Fall Prevention:   activity supervised   assistive device/personal items within reach   clutter free environment maintained   safety round/check completed   room organization consistent  Taken 7/21/2023 1000 by Brigette Torres RN  Safety Promotion/Fall Prevention:   activity supervised   assistive device/personal items within reach   clutter free environment maintained   room organization consistent   safety round/check completed  Taken 7/21/2023 0800 by Brigette Torres RN  Safety Promotion/Fall Prevention:   assistive device/personal items within reach   activity supervised   clutter free environment maintained   room organization consistent   safety round/check completed     Problem: Adjustment to Illness (Sepsis/Septic Shock)  Goal: Optimal Coping  Outcome: Ongoing, Progressing  Intervention: Optimize Psychosocial Adjustment to Illness  Recent Flowsheet Documentation  Taken 7/21/2023 0800 by Brigette Torres RN  Supportive Measures: verbalization of feelings encouraged     Problem: Bleeding (Sepsis/Septic Shock)  Goal: Absence of Bleeding  Outcome: Ongoing, Progressing     Problem: Glycemic Control Impaired (Sepsis/Septic Shock)  Goal: Blood Glucose Level Within Desired Range  Outcome: Ongoing, Progressing     Problem: Infection Progression (Sepsis/Septic Shock)  Goal: Absence of Infection Signs and Symptoms  Outcome: Ongoing, Progressing  Intervention: Initiate Sepsis Management  Recent Flowsheet Documentation  Taken 7/21/2023 1400 by Brigette Torres RN  Infection Prevention: environmental surveillance performed  Taken 7/21/2023 1200 by  Brigette Torres RN  Infection Prevention: environmental surveillance performed  Taken 7/21/2023 1000 by Brigette Torres RN  Infection Prevention: environmental surveillance performed  Taken 7/21/2023 0800 by Brigette Torres RN  Infection Prevention: environmental surveillance performed  Intervention: Promote Recovery  Recent Flowsheet Documentation  Taken 7/21/2023 1400 by Brigette Torres RN  Activity Management: activity encouraged  Taken 7/21/2023 1200 by Brigette Torres RN  Activity Management: activity encouraged  Taken 7/21/2023 1000 by Brigette Torres RN  Activity Management: activity encouraged  Taken 7/21/2023 0800 by Brigette Torres RN  Activity Management: activity encouraged  Sleep/Rest Enhancement: consistent schedule promoted     Problem: Nutrition Impaired (Sepsis/Septic Shock)  Goal: Optimal Nutrition Intake  Outcome: Ongoing, Progressing   Goal Outcome Evaluation:

## 2023-07-22 LAB
ANION GAP SERPL CALCULATED.3IONS-SCNC: 16 MMOL/L (ref 5–15)
BASOPHILS # BLD AUTO: 0.12 10*3/MM3 (ref 0–0.2)
BASOPHILS NFR BLD AUTO: 1.5 % (ref 0–1.5)
BUN SERPL-MCNC: 15 MG/DL (ref 8–23)
BUN/CREAT SERPL: 18.8 (ref 7–25)
CALCIUM SPEC-SCNC: 8.9 MG/DL (ref 8.6–10.5)
CHLORIDE SERPL-SCNC: 97 MMOL/L (ref 98–107)
CO2 SERPL-SCNC: 24 MMOL/L (ref 22–29)
CREAT SERPL-MCNC: 0.8 MG/DL (ref 0.57–1)
DEPRECATED RDW RBC AUTO: 50.2 FL (ref 37–54)
EGFRCR SERPLBLD CKD-EPI 2021: 73.2 ML/MIN/1.73
EOSINOPHIL # BLD AUTO: 0.38 10*3/MM3 (ref 0–0.4)
EOSINOPHIL NFR BLD AUTO: 4.7 % (ref 0.3–6.2)
ERYTHROCYTE [DISTWIDTH] IN BLOOD BY AUTOMATED COUNT: 13.2 % (ref 12.3–15.4)
GLUCOSE SERPL-MCNC: 74 MG/DL (ref 65–99)
HCT VFR BLD AUTO: 42.4 % (ref 34–46.6)
HGB BLD-MCNC: 13.1 G/DL (ref 12–15.9)
IMM GRANULOCYTES # BLD AUTO: 0.06 10*3/MM3 (ref 0–0.05)
IMM GRANULOCYTES NFR BLD AUTO: 0.7 % (ref 0–0.5)
LYMPHOCYTES # BLD AUTO: 1.82 10*3/MM3 (ref 0.7–3.1)
LYMPHOCYTES NFR BLD AUTO: 22.7 % (ref 19.6–45.3)
MCH RBC QN AUTO: 31.6 PG (ref 26.6–33)
MCHC RBC AUTO-ENTMCNC: 30.9 G/DL (ref 31.5–35.7)
MCV RBC AUTO: 102.4 FL (ref 79–97)
MONOCYTES # BLD AUTO: 0.58 10*3/MM3 (ref 0.1–0.9)
MONOCYTES NFR BLD AUTO: 7.2 % (ref 5–12)
NEUTROPHILS NFR BLD AUTO: 5.07 10*3/MM3 (ref 1.7–7)
NEUTROPHILS NFR BLD AUTO: 63.2 % (ref 42.7–76)
NRBC BLD AUTO-RTO: 0 /100 WBC (ref 0–0.2)
PLATELET # BLD AUTO: 237 10*3/MM3 (ref 140–450)
PMV BLD AUTO: 8.4 FL (ref 6–12)
POTASSIUM SERPL-SCNC: 4.4 MMOL/L (ref 3.5–5.2)
RBC # BLD AUTO: 4.14 10*6/MM3 (ref 3.77–5.28)
SODIUM SERPL-SCNC: 137 MMOL/L (ref 136–145)
WBC NRBC COR # BLD: 8.03 10*3/MM3 (ref 3.4–10.8)

## 2023-07-22 PROCEDURE — 94799 UNLISTED PULMONARY SVC/PX: CPT

## 2023-07-22 PROCEDURE — 94761 N-INVAS EAR/PLS OXIMETRY MLT: CPT

## 2023-07-22 PROCEDURE — 99231 SBSQ HOSP IP/OBS SF/LOW 25: CPT | Performed by: NURSE PRACTITIONER

## 2023-07-22 PROCEDURE — 80048 BASIC METABOLIC PNL TOTAL CA: CPT | Performed by: NURSE PRACTITIONER

## 2023-07-22 PROCEDURE — 99232 SBSQ HOSP IP/OBS MODERATE 35: CPT | Performed by: INTERNAL MEDICINE

## 2023-07-22 PROCEDURE — 25010000002 PIPERACILLIN SOD-TAZOBACTAM PER 1 G: Performed by: INTERNAL MEDICINE

## 2023-07-22 PROCEDURE — 94669 MECHANICAL CHEST WALL OSCILL: CPT

## 2023-07-22 PROCEDURE — 94664 DEMO&/EVAL PT USE INHALER: CPT

## 2023-07-22 PROCEDURE — 85025 COMPLETE CBC W/AUTO DIFF WBC: CPT | Performed by: NURSE PRACTITIONER

## 2023-07-22 RX ADMIN — ACETAZOLAMIDE 250 MG: 250 TABLET ORAL at 09:46

## 2023-07-22 RX ADMIN — TIMOLOL MALEATE 1 DROP: 5 SOLUTION/ DROPS OPHTHALMIC at 20:03

## 2023-07-22 RX ADMIN — MIDODRINE HYDROCHLORIDE 10 MG: 10 TABLET ORAL at 17:41

## 2023-07-22 RX ADMIN — APIXABAN 2.5 MG: 2.5 TABLET, FILM COATED ORAL at 20:03

## 2023-07-22 RX ADMIN — DIGOXIN 125 MCG: 125 TABLET ORAL at 09:44

## 2023-07-22 RX ADMIN — GALANTAMINE 8 MG: 4 TABLET, FILM COATED ORAL at 09:45

## 2023-07-22 RX ADMIN — BUMETANIDE 0.5 MG: 0.25 INJECTION, SOLUTION INTRAMUSCULAR; INTRAVENOUS at 09:45

## 2023-07-22 RX ADMIN — PANTOPRAZOLE SODIUM 40 MG: 40 TABLET, DELAYED RELEASE ORAL at 05:23

## 2023-07-22 RX ADMIN — APIXABAN 2.5 MG: 2.5 TABLET, FILM COATED ORAL at 09:45

## 2023-07-22 RX ADMIN — IPRATROPIUM BROMIDE AND ALBUTEROL SULFATE 3 ML: 2.5; .5 SOLUTION RESPIRATORY (INHALATION) at 12:34

## 2023-07-22 RX ADMIN — TIMOLOL MALEATE 1 DROP: 5 SOLUTION/ DROPS OPHTHALMIC at 09:45

## 2023-07-22 RX ADMIN — MIDODRINE HYDROCHLORIDE 10 MG: 10 TABLET ORAL at 12:11

## 2023-07-22 RX ADMIN — IPRATROPIUM BROMIDE AND ALBUTEROL SULFATE 3 ML: 2.5; .5 SOLUTION RESPIRATORY (INHALATION) at 16:41

## 2023-07-22 RX ADMIN — IPRATROPIUM BROMIDE AND ALBUTEROL SULFATE 3 ML: 2.5; .5 SOLUTION RESPIRATORY (INHALATION) at 19:14

## 2023-07-22 RX ADMIN — MIDODRINE HYDROCHLORIDE 10 MG: 10 TABLET ORAL at 09:44

## 2023-07-22 RX ADMIN — PIPERACILLIN SODIUM AND TAZOBACTAM SODIUM 3.38 G: 3; .375 INJECTION, SOLUTION INTRAVENOUS at 09:44

## 2023-07-22 RX ADMIN — BRIMONIDINE TARTRATE 1 DROP: 2 SOLUTION/ DROPS OPHTHALMIC at 09:45

## 2023-07-22 RX ADMIN — Medication 250 MG: at 09:44

## 2023-07-22 RX ADMIN — BUMETANIDE 0.5 MG: 0.25 INJECTION, SOLUTION INTRAMUSCULAR; INTRAVENOUS at 17:41

## 2023-07-22 RX ADMIN — DILTIAZEM HYDROCHLORIDE 120 MG: 120 CAPSULE, COATED, EXTENDED RELEASE ORAL at 09:44

## 2023-07-22 RX ADMIN — PIPERACILLIN SODIUM AND TAZOBACTAM SODIUM 3.38 G: 3; .375 INJECTION, SOLUTION INTRAVENOUS at 01:21

## 2023-07-22 RX ADMIN — IPRATROPIUM BROMIDE AND ALBUTEROL SULFATE 3 ML: 2.5; .5 SOLUTION RESPIRATORY (INHALATION) at 08:53

## 2023-07-22 RX ADMIN — PIPERACILLIN SODIUM AND TAZOBACTAM SODIUM 3.38 G: 3; .375 INJECTION, SOLUTION INTRAVENOUS at 17:41

## 2023-07-22 RX ADMIN — LEVOTHYROXINE SODIUM 88 MCG: 0.09 TABLET ORAL at 05:23

## 2023-07-22 RX ADMIN — Medication 5 MG: at 20:03

## 2023-07-22 RX ADMIN — SENNOSIDES AND DOCUSATE SODIUM 2 TABLET: 50; 8.6 TABLET ORAL at 20:03

## 2023-07-22 RX ADMIN — BRIMONIDINE TARTRATE 1 DROP: 2 SOLUTION/ DROPS OPHTHALMIC at 20:03

## 2023-07-22 RX ADMIN — GALANTAMINE 8 MG: 4 TABLET, FILM COATED ORAL at 17:41

## 2023-07-22 RX ADMIN — MIRTAZAPINE 15 MG: 15 TABLET, FILM COATED ORAL at 20:03

## 2023-07-22 RX ADMIN — PAROXETINE HYDROCHLORIDE 10 MG: 10 TABLET, FILM COATED ORAL at 20:03

## 2023-07-22 NOTE — PROGRESS NOTES
Vita Miller  [id number]  1940   LOS: 7 days   Patient Care Team:  Alfonso Steele MD as PCP - General (Internal Medicine)    Chief Complaint:   PAF / SEVERE LUNG DZ WITH CHRONIC HYPOXIC HYPERCARBIC RESPIRATORY FAILURE / HFpEF / CONFUSION / HYPONATREMIA       Subjective     Comfortable semirecumbent in bed off supplemental oxygen therapy (room air oximetry 96%).  She ate the majority of lunch.  She is very pleased and happy that her daughter-in-law was kind enough to shampoo her hair.  She denies anginal type chest discomfort, increased tachypnea/dyspnea, nausea, emesis, or abdominal pain.  No current GI or  difficulty.  She denies significant sputum production, pleurisy, or hemoptysis.    Objective     Vital Sign Min/Max for last 24 hours  Temp  Min: 96.9 øF (36.1 øC)  Max: 97.8 øF (36.6 øC)   BP  Min: 91/60  Max: 112/91   Pulse  Min: 83  Max: 169   Resp  Min: 16  Max: 18   SpO2  Min: 87 %  Max: 98 %               07/14/23  2224   Weight: 46.3 kg (102 lb)         Intake/Output Summary (Last 24 hours) at 7/22/2023 1157  Last data filed at 7/22/2023 1034  Gross per 24 hour   Intake 50 ml   Output 4100 ml   Net -4050 ml       Physical Exam:     General Appearance:    Alert, cooperative, in no acute distress   Lungs:   Overall diminished breath sounds with scattered rhonchi, wheezes, crackles without dullness,respirations regular, even and unlabored    Heart:    Irregular and normal rate, variable S1 and S2, grade 2/6 systolic  murmur, no gallop, no rub, no click   Abdomen:  Extremities:   Soft, nontender, bowel sounds audible x4    No edema, normal range of motion   Pulses:   Pulses palpable and equal bilaterally      Results Review:   Results from last 7 days   Lab Units 07/22/23  0434 07/20/23  0331 07/19/23  0456   SODIUM mmol/L 137 136 134*   POTASSIUM mmol/L 4.4 4.4 3.9   CHLORIDE mmol/L 97* 94* 92*   CO2 mmol/L 24.0 40.0* 35.0*   BUN mg/dL 15 20 20   CREATININE mg/dL 0.80 0.94 0.96   GLUCOSE mg/dL  74 98 85   CALCIUM mg/dL 8.9 8.8 8.8     Results from last 7 days   Lab Units 07/22/23  0434 07/18/23  0341 07/17/23  0402   WBC 10*3/mm3 8.03 9.25 9.30   HEMOGLOBIN g/dL 13.1 12.3 10.5*   HEMATOCRIT % 42.4 38.5 33.3*   PLATELETS 10*3/mm3 237 322 287     CXR(7/20/23):    Findings:    There are increasing multifocal airspace opacities and increasing bilateral pleural effusions and bibasilar atelectasis. Cardiac mediastinal contours are unchanged. Regional skeleton is unremarkable.     IMPRESSION: Increasing multifocal opacities as well as increasing bilateral pleural effusions and bibasilar consolidation. Findings likely reflect a progressive multifocal pneumonia or less likely asymmetric edema.    EKG:    Atrial fibrillation with rapid ventricular response  Nonspecific ST and T wave abnormality  Abnormal ECG  When compared with ECG of 18-JUL-2023 16:58, (Unconfirmed)  Atrial fibrillation has replaced Sinus rhythm  Vent. rate has increased BY  38 BPM  ST now depressed in Anterior leads  Nonspecific T wave abnormality now evident in Inferior leads  Nonspecific T wave abnormality now evident in Lateral leads  Confirmed by MD Mark, Brandon ([id number]) on 7/21/2023 2:51:56 PM    Medication Review: REVIEWED; NO DRIPS.    Assessment & Plan     Marginal but stable cardiac course.  Nominal GFR would continue attempts at diuresis.  Perplexed her chest x-ray 48 hours ago was not improved.  Pulmonary hygiene and incentive spirometry use encouraged.  Would continue current cardiac medications.      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    Hypothyroidism     Cachexia    Severe Malnutrition (HCC)    Pneumonia of both lungs due to infectious organism    Hyponatremia      07/22/23  11:57 EDT

## 2023-07-22 NOTE — PROGRESS NOTES
[facility] Medicine Services  PROGRESS NOTE    Patient Name: Vita Miller  : 1940  MRN: [id number]    Date of Admission: 2023  Primary Care Physician: Alfonso Steele MD    Subjective   Subjective     CC:  Follow-up PNA    HPI:  Pt sitting up in bed after receiving a bath and getting her hair brushed. She reports feeling well today. Denies shortness of breath, chest pain, heart palpitations. HR improved today.     Objective   Objective     Vital Signs:   Temp:  [96.9 øF (36.1 øC)-97.8 øF (36.6 øC)] 97.7 øF (36.5 øC)  Heart Rate:  [] 84  Resp:  [16-18] 16  BP: ()/(60-91) 94/66  Flow (L/min):  [0.5-1] 1     Physical Exam:  Constitutional: Awake, alert, NAD  HENT: NCAT, mucous membranes moist  Respiratory: Clear to auscultation bilaterally, nonlabored respirations   Cardiovascular: IRR, no murmurs, rubs, or gallops  Gastrointestinal: Positive bowel sounds, soft, nontender, nondistended  Musculoskeletal: No bilateral ankle edema  Psychiatric: Appropriate affect, cooperative  Neurologic: Oriented x 3, Non focal, PEREZ, speech clear  Skin: No rashes      Results Reviewed:  LAB RESULTS:      Lab 23  0434 23  0341 23  0402 23  0332   WBC 8.03 9.25 9.30 12.73*   HEMOGLOBIN 13.1 12.3 10.5* 10.0*   HEMATOCRIT 42.4 38.5 33.3* 30.6*   PLATELETS 237 322 287 273   NEUTROS ABS 5.07 6.51 6.68 11.29*   IMMATURE GRANS (ABS) 0.06* 0.07* 0.07* 0.07*   LYMPHS ABS 1.82 1.71 1.66 0.71   MONOS ABS 0.58 0.63 0.61 0.64   EOS ABS 0.38 0.28 0.23 0.01   .4* 104.3* 100.6* 97.8*   SED RATE  --   --   --  32*   LACTATE  --   --   --  0.9           Lab 23  0434 23  0331 23  0456 23  0341 23  1615 23  0402 23  0332   SODIUM 137 136 134* 132*  --  134* 129*   POTASSIUM 4.4 4.4 3.9 3.7 4.5 4.2 4.0   CHLORIDE 97* 94* 92* 92*  --  92* 87*   CO2 24.0 40.0* 35.0* 33.0*  --  37.0* 33.0*   ANION GAP 16.0* 2.0* 7.0 7.0  --  5.0 9.0    BUN 15 20 20 21  --  18 24*   CREATININE 0.80 0.94 0.96 0.82  --  0.73 1.09*   EGFR 73.2 60.3 58.8* 71.1  --  81.7 50.5*   GLUCOSE 74 98 85 78  --  89 117*   CALCIUM 8.9 8.8 8.8 8.6  --  8.2* 8.0*   MAGNESIUM  --   --  2.2 2.5*  --   --  1.9   PHOSPHORUS  --   --  3.5 3.8  --   --   --            Lab 07/19/23  0456 07/18/23  0341 07/16/23  0332   TOTAL PROTEIN  --  5.2* 6.0   ALBUMIN 3.0* 2.9* 3.6   GLOBULIN  --  2.3 2.4   ALT (SGPT)  --  12 14   AST (SGOT)  --  15 16   BILIRUBIN  --  0.2 0.2   ALK PHOS  --  47 53           Lab 07/20/23  0331   PROBNP 3,624.0*               Lab 07/18/23  0341   VITAMIN B 12 970*           Lab 07/18/23  0804 07/18/23  0525 07/16/23  0518   PH, ARTERIAL 7.362 7.296* 7.380   PCO2, ARTERIAL 63.6* 76.1* 60.2*   PO2 ART 89.7 116.0* 117.0*   FIO2 30 28 30   HCO3 ART 36.0* 37.1* 35.6*   BASE EXCESS ART 8.4* 7.8* 8.7*   CARBOXYHEMOGLOBIN 1.1 1.0 1.3       Brief Urine Lab Results  (Last result in the past 365 days)        Color   Clarity   Blood   Leuk Est   Nitrite   Protein   CREAT   Urine HCG        07/17/23 1645 Yellow   Clear   Negative   Negative   Negative   Negative                   Microbiology Results Abnormal       Procedure Component Value - Date/Time    Blood Culture - Blood, Arm, Left [[id number]]  (Normal) Collected: 07/15/23 0340    Lab Status: Final result Specimen: Blood from Arm, Left Updated: 07/20/23 0730     Blood Culture No growth at 5 days    Blood Culture - Blood, Arm, Right [[id number]]  (Normal) Collected: 07/15/23 0340    Lab Status: Final result Specimen: Blood from Arm, Right Updated: 07/20/23 0730     Blood Culture No growth at 5 days    S. Pneumo Ag Urine or CSF - Urine, Urine, Clean Catch [[id number]]  (Normal) Collected: 07/15/23 0341    Lab Status: Final result Specimen: Urine, Clean Catch Updated: 07/15/23 1356     Strep Pneumo Ag Negative    Legionella Antigen, Urine - Urine, Urine, Clean Catch [[id number]]  (Normal) Collected: 07/15/23 0341    Lab Status:  Final result Specimen: Urine, Clean Catch Updated: 07/15/23 1356     LEGIONELLA ANTIGEN, URINE Negative    MRSA Screen, PCR (Inpatient) - Swab, Nares [[id number]]  (Normal) Collected: 07/15/23 0310    Lab Status: Final result Specimen: Swab from Nares Updated: 07/15/23 0846     MRSA PCR Negative    Narrative:      The negative predictive value of this diagnostic test is high and should only be used to consider de-escalating anti-MRSA therapy. A positive result may indicate colonization with MRSA and must be correlated clinically.  MRSA Negative    COVID PRE-OP / PRE-PROCEDURE SCREENING ORDER (NO ISOLATION) - Swab, Nasopharynx [[id number]]  (Normal) Collected: 07/15/23 0310    Lab Status: Final result Specimen: Swab from Nasopharynx Updated: 07/15/23 0410    Narrative:      The following orders were created for panel order COVID PRE-OP / PRE-PROCEDURE SCREENING ORDER (NO ISOLATION) - Swab, Nasopharynx.  Procedure                               Abnormality         Status                     ---------                               -----------         ------                     Respiratory Panel PCR w/...[[id number]]  Normal              Final result                 Please view results for these tests on the individual orders.    Respiratory Panel PCR w/COVID-19(SARS-CoV-2) JENNA/KELSI/NORMAN/PAD/COR/MAD/ARSEN In-House, NP Swab in UTM/VTM, 3-4 HR TAT - Swab, Nasopharynx [[id number]]  (Normal) Collected: 07/15/23 0310    Lab Status: Final result Specimen: Swab from Nasopharynx Updated: 07/15/23 0410     ADENOVIRUS, PCR Not Detected     Coronavirus 229E Not Detected     Coronavirus HKU1 Not Detected     Coronavirus NL63 Not Detected     Coronavirus OC43 Not Detected     COVID19 Not Detected     Human Metapneumovirus Not Detected     Human Rhinovirus/Enterovirus Not Detected     Influenza A PCR Not Detected     Influenza B PCR Not Detected     Parainfluenza Virus 1 Not Detected     Parainfluenza Virus 2 Not Detected     Parainfluenza  Virus 3 Not Detected     Parainfluenza Virus 4 Not Detected     RSV, PCR Not Detected     Bordetella pertussis pcr Not Detected     Bordetella parapertussis PCR Not Detected     Chlamydophila pneumoniae PCR Not Detected     Mycoplasma pneumo by PCR Not Detected    Narrative:      In the setting of a positive respiratory panel with a viral infection PLUS a negative procalcitonin without other underlying concern for bacterial infection, consider observing off antibiotics or discontinuation of antibiotics and continue supportive care. If the respiratory panel is positive for atypical bacterial infection (Bordetella pertussis, Chlamydophila pneumoniae, or Mycoplasma pneumoniae), consider antibiotic de-escalation to target atypical bacterial infection.            No radiology results from the last 24 hrs    Results for orders placed during the hospital encounter of 06/16/23    Adult Transthoracic Echo Complete W/ Cont if Necessary Per Protocol    Interpretation Summary    Left ventricular systolic function is normal. Estimated left ventricular EF = 60%    Left ventricular diastolic function was normal.    Mild aortic insufficiency.    Trace to mild mitral and tricuspid regurgitation.    Calculated right ventricular systolic pressure from tricuspid regurgitation is 20 mmHg.    There is a trivial pericardial effusion.      Current medications:  Scheduled Meds:acetaZOLAMIDE, 250 mg, Oral, Daily  apixaban, 2.5 mg, Oral, Q12H  ascorbic acid, 250 mg, Oral, Daily  brimonidine, 1 drop, Both Eyes, Q12H   And  timolol, 1 drop, Both Eyes, Q12H  bumetanide, 0.5 mg, Intravenous, BID  digoxin, 125 mcg, Oral, Every Other Day  dilTIAZem CD, 120 mg, Oral, Q24H  galantamine, 8 mg, Oral, BID With Meals  ipratropium-albuterol, 3 mL, Nebulization, Q4H - RT  levothyroxine, 88 mcg, Oral, Daily  midodrine, 10 mg, Oral, TID AC  mirtazapine, 15 mg, Oral, Nightly  pantoprazole, 40 mg, Oral, Q AM  PARoxetine, 10 mg, Oral,  Nightly  piperacillin-tazobactam, 3.375 g, Intravenous, Q8H  senna-docusate sodium, 2 tablet, Oral, BID  sodium chloride, 10 mL, Intravenous, Q12H      Continuous Infusions:       PRN Meds:.  acetaminophen **OR** acetaminophen **OR** acetaminophen    senna-docusate sodium **AND** polyethylene glycol **AND** bisacodyl **AND** bisacodyl    Magnesium Standard Dose Replacement - Follow Nurse / BPA Driven Protocol    melatonin    metoprolol tartrate    ondansetron    Potassium Replacement - Follow Nurse / BPA Driven Protocol    sodium chloride    sodium chloride    sodium chloride    Assessment & Plan   Assessment & Plan     Active Hospital Problems    Diagnosis  POA    **Acute respiratory failure with hypoxia [J96.01]  Yes    Pneumonia of both lungs due to infectious organism [J18.9]  Unknown    Hyponatremia [E87.1]  Unknown    Severe Malnutrition (HCC) [E43]  Yes    Atrial fibrillation with RVR [I48.91]  Yes    Cachexia [R64]  Yes    Senile osteoporosis [M81.0]  Yes    Hashimoto's thyroiditis [E06.3]  Yes    Primary open angle glaucoma of both eyes, severe stage [H40.1133]  Yes    Mixed anxiety and depressive disorder [F41.8]  Yes    Multiple nodules of lung [R91.8]  Yes    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        Brief Hospital Course to date:  Vita Miller is a 83 y.o. female with history of hypothyroidism, COPD with chronic bronchiectasis, history of chronic MAC with unknown intolerance to prior treatment plan, history of orthostatic hypotension, hyperlipidemia, osteoporosis, bilateral glaucoma, mild memory deficit, who was recently admitted to our facility approximately 1 month ago for new onset atrial  fibrillation with RVR who returns to the ER tonight with complaints of palpitations and shortness of breath.     This patient's problems and plans were partially entered by my partner and updated as appropriate by me 07/22/23.    Copied text in this note has been reviewed and is accurate as of 07/22/23.        Acute respiratory failure with hypoxia  Bilateral pneumonia/infiltrates  History of MAC, intolerant to treatment  COPD with acute exacerbation, chronic bronchiectasis  -- Pulmonology following.  Clinically improved.  AI/vasculitis panels are negative.  Recs to continue vest therapy, discussed with Dr. Hook  --Off steroids  --Continue Zosyn, s/p azith  -- Duo nebs  -- Flutter valve, mucinex  --MRSA pcr negative, strep negative, legionella negative  -- bumex, diamox  --CT chest showed new GGO, bronchiectasis, scattered nodules.  Needs f/u CT or PET scan     Atrial fibrillation with rvr, not in aflutter  --07/21 converted back to Afib RVR following a BM at 8am. Pt was hypotensive. Family/patient declined lopressor indefinitely. Midodrine increased 10 mg TID and albumin adm for BP support. Cardiology updated.  --s/p esmolol drip  --eliquis  --flecainide --> bisoprolol ---> changed to digoxin per cards, no flecainide due to pneumonitis concerns, discussed with Dr. Padilla  -- has upcoming appt with Dr. Martin at  (suggested making that telehealth, family very concerned about missing this appt)   --fludrocortisone discontinued 07/21  --cards following.  Po dig, dilta.   --LVEF 60%, normal diastolic dysfunction on echo from 6/17/2023    Altered mental status  --better  --u/a bland, B12 wnl.   RPR nonreactive     Hyponatremia  --Na 129 on admission; suspect related to volume overload  --improved     Orthostatic hypotension  --fludricortisone discontinued  --midodrine increased 10 mg TID     Hypothyroidism  --slightly elevated tsh  --continue synthroid, dose adjusted on 7/16; repeat tsh in 4-6 weeks; careful  adjustment given RVR     Other chronic conditions: HLD, malnutrition, anxiety/depression, memory impairment  --continue home meds  --nutrition  --pt/ot      Expected Discharge Location and Transportation: PT recs SNF/d/w son, family planning for 24/7 care at home  Expected Discharge   07/25/2023    DVT prophylaxis:  Medical and mechanical DVT prophylaxis orders are present.     AM-PAC 6 Clicks Score (PT): 17 (07/21/23 1000)    CODE STATUS:   Code Status and Medical Interventions:   Ordered at: 07/15/23 0214     Code Status (Patient has no pulse and is not breathing):    CPR (Attempt to Resuscitate)     Medical Interventions (Patient has pulse or is breathing):    Full Support       Deya Sorensen, SCOTT  07/22/23

## 2023-07-23 PROCEDURE — 99231 SBSQ HOSP IP/OBS SF/LOW 25: CPT | Performed by: INTERNAL MEDICINE

## 2023-07-23 PROCEDURE — 94664 DEMO&/EVAL PT USE INHALER: CPT

## 2023-07-23 PROCEDURE — 25010000002 PIPERACILLIN SOD-TAZOBACTAM PER 1 G: Performed by: INTERNAL MEDICINE

## 2023-07-23 PROCEDURE — 94761 N-INVAS EAR/PLS OXIMETRY MLT: CPT

## 2023-07-23 PROCEDURE — 94799 UNLISTED PULMONARY SVC/PX: CPT

## 2023-07-23 PROCEDURE — 99232 SBSQ HOSP IP/OBS MODERATE 35: CPT | Performed by: NURSE PRACTITIONER

## 2023-07-23 PROCEDURE — 94669 MECHANICAL CHEST WALL OSCILL: CPT

## 2023-07-23 RX ADMIN — SENNOSIDES AND DOCUSATE SODIUM 2 TABLET: 50; 8.6 TABLET ORAL at 08:28

## 2023-07-23 RX ADMIN — PIPERACILLIN SODIUM AND TAZOBACTAM SODIUM 3.38 G: 3; .375 INJECTION, SOLUTION INTRAVENOUS at 09:47

## 2023-07-23 RX ADMIN — TIMOLOL MALEATE 1 DROP: 5 SOLUTION/ DROPS OPHTHALMIC at 20:04

## 2023-07-23 RX ADMIN — BRIMONIDINE TARTRATE 1 DROP: 2 SOLUTION/ DROPS OPHTHALMIC at 08:28

## 2023-07-23 RX ADMIN — MIRTAZAPINE 15 MG: 15 TABLET, FILM COATED ORAL at 20:03

## 2023-07-23 RX ADMIN — IPRATROPIUM BROMIDE AND ALBUTEROL SULFATE 3 ML: 2.5; .5 SOLUTION RESPIRATORY (INHALATION) at 19:08

## 2023-07-23 RX ADMIN — LEVOTHYROXINE SODIUM 88 MCG: 0.09 TABLET ORAL at 05:48

## 2023-07-23 RX ADMIN — IPRATROPIUM BROMIDE AND ALBUTEROL SULFATE 3 ML: 2.5; .5 SOLUTION RESPIRATORY (INHALATION) at 23:15

## 2023-07-23 RX ADMIN — IPRATROPIUM BROMIDE AND ALBUTEROL SULFATE 3 ML: 2.5; .5 SOLUTION RESPIRATORY (INHALATION) at 07:24

## 2023-07-23 RX ADMIN — APIXABAN 2.5 MG: 2.5 TABLET, FILM COATED ORAL at 08:28

## 2023-07-23 RX ADMIN — TIMOLOL MALEATE 1 DROP: 5 SOLUTION/ DROPS OPHTHALMIC at 08:28

## 2023-07-23 RX ADMIN — MIDODRINE HYDROCHLORIDE 10 MG: 10 TABLET ORAL at 17:52

## 2023-07-23 RX ADMIN — BUMETANIDE 0.5 MG: 0.25 INJECTION, SOLUTION INTRAMUSCULAR; INTRAVENOUS at 17:52

## 2023-07-23 RX ADMIN — BRIMONIDINE TARTRATE 1 DROP: 2 SOLUTION/ DROPS OPHTHALMIC at 20:04

## 2023-07-23 RX ADMIN — BUMETANIDE 0.5 MG: 0.25 INJECTION, SOLUTION INTRAMUSCULAR; INTRAVENOUS at 08:28

## 2023-07-23 RX ADMIN — Medication 250 MG: at 08:28

## 2023-07-23 RX ADMIN — MIDODRINE HYDROCHLORIDE 10 MG: 10 TABLET ORAL at 08:28

## 2023-07-23 RX ADMIN — IPRATROPIUM BROMIDE AND ALBUTEROL SULFATE 3 ML: 2.5; .5 SOLUTION RESPIRATORY (INHALATION) at 10:55

## 2023-07-23 RX ADMIN — PIPERACILLIN SODIUM AND TAZOBACTAM SODIUM 3.38 G: 3; .375 INJECTION, SOLUTION INTRAVENOUS at 17:52

## 2023-07-23 RX ADMIN — DILTIAZEM HYDROCHLORIDE 120 MG: 120 CAPSULE, COATED, EXTENDED RELEASE ORAL at 08:28

## 2023-07-23 RX ADMIN — MIDODRINE HYDROCHLORIDE 10 MG: 10 TABLET ORAL at 12:01

## 2023-07-23 RX ADMIN — GALANTAMINE 8 MG: 4 TABLET, FILM COATED ORAL at 08:28

## 2023-07-23 RX ADMIN — ACETAZOLAMIDE 250 MG: 250 TABLET ORAL at 08:28

## 2023-07-23 RX ADMIN — IPRATROPIUM BROMIDE AND ALBUTEROL SULFATE 3 ML: 2.5; .5 SOLUTION RESPIRATORY (INHALATION) at 15:05

## 2023-07-23 RX ADMIN — PAROXETINE HYDROCHLORIDE 10 MG: 10 TABLET, FILM COATED ORAL at 20:03

## 2023-07-23 RX ADMIN — APIXABAN 2.5 MG: 2.5 TABLET, FILM COATED ORAL at 20:03

## 2023-07-23 RX ADMIN — PIPERACILLIN SODIUM AND TAZOBACTAM SODIUM 3.38 G: 3; .375 INJECTION, SOLUTION INTRAVENOUS at 02:17

## 2023-07-23 RX ADMIN — SENNOSIDES AND DOCUSATE SODIUM 2 TABLET: 50; 8.6 TABLET ORAL at 20:03

## 2023-07-23 RX ADMIN — PANTOPRAZOLE SODIUM 40 MG: 40 TABLET, DELAYED RELEASE ORAL at 05:48

## 2023-07-23 RX ADMIN — GALANTAMINE 8 MG: 4 TABLET, FILM COATED ORAL at 18:30

## 2023-07-23 NOTE — PROGRESS NOTES
Vita Miller  [id number]  1940   LOS: 8 days   Patient Care Team:  Alfonso Steele MD as PCP - General (Internal Medicine)    Chief Complaint:  PAF / SEVERE LUNG DZ WITH CHRONIC HYPOXIC HYPERCARBIC RESPIRATORY FAILURE / HFpEF / CONFUSION / HYPONATREMIA       Subjective     Comfortable sitting up in bed on no supplemental oxygen (room air oximetry 97%).  No current cardiopulmonary complaints.  Acceptable appetite.  No GI or  difficulty.  Patient states she slept well and appears to be fairly oriented, appropriate, and conversant.  She is visiting with family.    Objective     Vital Sign Min/Max for last 24 hours  Temp  Min: 97.4 øF (36.3 øC)  Max: 98.7 øF (37.1 øC)   BP  Min: 94/66  Max: 155/77   Pulse  Min: 55  Max: 100   Resp  Min: 15  Max: 18   SpO2  Min: 90 %  Max: 96 %               07/14/23  2224   Weight: 46.3 kg (102 lb)         Intake/Output Summary (Last 24 hours) at 7/23/2023 0952  Last data filed at 7/23/2023 0535  Gross per 24 hour   Intake 120 ml   Output 1600 ml   Net -1480 ml       Physical Exam:     General Appearance:    Alert, cooperative, in no acute distress   Lungs:   Diminished breath sounds without adventitial sounds,respirations regular, even and unlabored    Heart:    Regular and normal rate, normal S1 and S2, grade 1/6 systolic murmur, no gallop, no rub, no click   Abdomen:  Extremities:   Soft, nontender, bowel sounds audible x4    No edema, normal range of motion   Pulses:   Pulses palpable and equal bilaterally      Results Review:   Results from last 7 days   Lab Units 07/22/23  0434 07/20/23  0331 07/19/23  0456   SODIUM mmol/L 137 136 134*   POTASSIUM mmol/L 4.4 4.4 3.9   CHLORIDE mmol/L 97* 94* 92*   CO2 mmol/L 24.0 40.0* 35.0*   BUN mg/dL 15 20 20   CREATININE mg/dL 0.80 0.94 0.96   GLUCOSE mg/dL 74 98 85   CALCIUM mg/dL 8.9 8.8 8.8     Results from last 7 days   Lab Units 07/22/23  0434 07/18/23  0341 07/17/23  0402   WBC 10*3/mm3 8.03 9.25 9.30   HEMOGLOBIN g/dL 13.1  12.3 10.5*   HEMATOCRIT % 42.4 38.5 33.3*   PLATELETS 10*3/mm3 237 322 287     NO NEW CXR / EKG / LAB RESULTS.    Medication Review: REVIEWED; NO DRIPS.    Assessment & Plan     Acceptable clinical course.  Would continue current cardiac medications and monitor closely.  Fortunately, she remains in sinus rhythm.  Hopefully to rehabilitation soon and assessment with [facility] electrophysiology.    Discussed with patient, son, and daughter-in-law in room.      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

## 2023-07-23 NOTE — PROGRESS NOTES
[facility] Medicine Services  PROGRESS NOTE    Patient Name: Vita Miller  : 1940  MRN: [id number]    Date of Admission: 2023  Primary Care Physician: Alfonso Steele MD    Subjective   Subjective     CC:  F/u PNA    HPI:  Patient resting in bed. Family is at the bedside. Patient satting 97% on room air. Denies concerns, shortness of air.    ROS:  Gen- No fevers, chills  CV- No chest pain, palpitations  Resp- No cough, dyspnea  GI- No N/V/D, abd pain    Objective   Objective     Vital Signs:   Temp:  [97.4 øF (36.3 øC)-98.7 øF (37.1 øC)] 97.5 øF (36.4 øC)  Heart Rate:  [] 55  Resp:  [15-18] 16  BP: ()/(60-77) 138/70     Physical Exam:  Constitutional: No acute distress, awake, alert  HENT: NCAT, mucous membranes moist  Respiratory: Clear to auscultation bilaterally, respiratory effort normal, room air  Cardiovascular: RRR, no murmurs, rubs, or gallops  Gastrointestinal: Positive bowel sounds, soft, nontender, nondistended  Musculoskeletal: No bilateral ankle edema  Psychiatric: Appropriate affect, cooperative  Neurologic: Oriented x 3, moves all extremities, Cranial Nerves grossly intact to confrontation, speech clear  Skin: No rashes      Results Reviewed:  LAB RESULTS:      Lab 23  0434 23  0341 23  0402   WBC 8.03 9.25 9.30   HEMOGLOBIN 13.1 12.3 10.5*   HEMATOCRIT 42.4 38.5 33.3*   PLATELETS 237 322 287   NEUTROS ABS 5.07 6.51 6.68   IMMATURE GRANS (ABS) 0.06* 0.07* 0.07*   LYMPHS ABS 1.82 1.71 1.66   MONOS ABS 0.58 0.63 0.61   EOS ABS 0.38 0.28 0.23   .4* 104.3* 100.6*         Lab 23  0434 23  0331 23  0456 23  0341 23  1615 23  0402   SODIUM 137 136 134* 132*  --  134*   POTASSIUM 4.4 4.4 3.9 3.7 4.5 4.2   CHLORIDE 97* 94* 92* 92*  --  92*   CO2 24.0 40.0* 35.0* 33.0*  --  37.0*   ANION GAP 16.0* 2.0* 7.0 7.0  --  5.0   BUN 15 20 20 21  --  18   CREATININE 0.80 0.94 0.96 0.82  --  0.73   EGFR  73.2 60.3 58.8* 71.1  --  81.7   GLUCOSE 74 98 85 78  --  89   CALCIUM 8.9 8.8 8.8 8.6  --  8.2*   MAGNESIUM  --   --  2.2 2.5*  --   --    PHOSPHORUS  --   --  3.5 3.8  --   --          Lab 07/19/23  0456 07/18/23 0341   TOTAL PROTEIN  --  5.2*   ALBUMIN 3.0* 2.9*   GLOBULIN  --  2.3   ALT (SGPT)  --  12   AST (SGOT)  --  15   BILIRUBIN  --  0.2   ALK PHOS  --  47         Lab 07/20/23  0331   PROBNP 3,624.0*             Lab 07/18/23 0341   VITAMIN B 12 970*         Lab 07/18/23  0804 07/18/23  0525   PH, ARTERIAL 7.362 7.296*   PCO2, ARTERIAL 63.6* 76.1*   PO2 ART 89.7 116.0*   FIO2 30 28   HCO3 ART 36.0* 37.1*   BASE EXCESS ART 8.4* 7.8*   CARBOXYHEMOGLOBIN 1.1 1.0     Brief Urine Lab Results  (Last result in the past 365 days)        Color   Clarity   Blood   Leuk Est   Nitrite   Protein   CREAT   Urine HCG        07/17/23 1645 Yellow   Clear   Negative   Negative   Negative   Negative                   Microbiology Results Abnormal       Procedure Component Value - Date/Time    Blood Culture - Blood, Arm, Left [[id number]]  (Normal) Collected: 07/15/23 0340    Lab Status: Final result Specimen: Blood from Arm, Left Updated: 07/20/23 0730     Blood Culture No growth at 5 days    Blood Culture - Blood, Arm, Right [[id number]]  (Normal) Collected: 07/15/23 0340    Lab Status: Final result Specimen: Blood from Arm, Right Updated: 07/20/23 0730     Blood Culture No growth at 5 days    S. Pneumo Ag Urine or CSF - Urine, Urine, Clean Catch [[id number]]  (Normal) Collected: 07/15/23 0341    Lab Status: Final result Specimen: Urine, Clean Catch Updated: 07/15/23 1356     Strep Pneumo Ag Negative    Legionella Antigen, Urine - Urine, Urine, Clean Catch [[id number]]  (Normal) Collected: 07/15/23 0341    Lab Status: Final result Specimen: Urine, Clean Catch Updated: 07/15/23 1356     LEGIONELLA ANTIGEN, URINE Negative    MRSA Screen, PCR (Inpatient) - Swab, Nares [[id number]]  (Normal) Collected: 07/15/23 0310    Lab Status:  Final result Specimen: Swab from Nares Updated: 07/15/23 0846     MRSA PCR Negative    Narrative:      The negative predictive value of this diagnostic test is high and should only be used to consider de-escalating anti-MRSA therapy. A positive result may indicate colonization with MRSA and must be correlated clinically.  MRSA Negative    COVID PRE-OP / PRE-PROCEDURE SCREENING ORDER (NO ISOLATION) - Swab, Nasopharynx [[id number]]  (Normal) Collected: 07/15/23 0310    Lab Status: Final result Specimen: Swab from Nasopharynx Updated: 07/15/23 0410    Narrative:      The following orders were created for panel order COVID PRE-OP / PRE-PROCEDURE SCREENING ORDER (NO ISOLATION) - Swab, Nasopharynx.  Procedure                               Abnormality         Status                     ---------                               -----------         ------                     Respiratory Panel PCR w/...[[id number]]  Normal              Final result                 Please view results for these tests on the individual orders.    Respiratory Panel PCR w/COVID-19(SARS-CoV-2) JENNA/KELSI/NORMAN/PAD/COR/MAD/ARSEN In-House, NP Swab in UTM/VTM, 3-4 HR TAT - Swab, Nasopharynx [[id number]]  (Normal) Collected: 07/15/23 0310    Lab Status: Final result Specimen: Swab from Nasopharynx Updated: 07/15/23 0410     ADENOVIRUS, PCR Not Detected     Coronavirus 229E Not Detected     Coronavirus HKU1 Not Detected     Coronavirus NL63 Not Detected     Coronavirus OC43 Not Detected     COVID19 Not Detected     Human Metapneumovirus Not Detected     Human Rhinovirus/Enterovirus Not Detected     Influenza A PCR Not Detected     Influenza B PCR Not Detected     Parainfluenza Virus 1 Not Detected     Parainfluenza Virus 2 Not Detected     Parainfluenza Virus 3 Not Detected     Parainfluenza Virus 4 Not Detected     RSV, PCR Not Detected     Bordetella pertussis pcr Not Detected     Bordetella parapertussis PCR Not Detected     Chlamydophila pneumoniae PCR Not  Detected     Mycoplasma pneumo by PCR Not Detected    Narrative:      In the setting of a positive respiratory panel with a viral infection PLUS a negative procalcitonin without other underlying concern for bacterial infection, consider observing off antibiotics or discontinuation of antibiotics and continue supportive care. If the respiratory panel is positive for atypical bacterial infection (Bordetella pertussis, Chlamydophila pneumoniae, or Mycoplasma pneumoniae), consider antibiotic de-escalation to target atypical bacterial infection.            No radiology results from the last 24 hrs    Results for orders placed during the hospital encounter of 06/16/23    Adult Transthoracic Echo Complete W/ Cont if Necessary Per Protocol    Interpretation Summary    Left ventricular systolic function is normal. Estimated left ventricular EF = 60%    Left ventricular diastolic function was normal.    Mild aortic insufficiency.    Trace to mild mitral and tricuspid regurgitation.    Calculated right ventricular systolic pressure from tricuspid regurgitation is 20 mmHg.    There is a trivial pericardial effusion.      Current medications:  Scheduled Meds:acetaZOLAMIDE, 250 mg, Oral, Daily  apixaban, 2.5 mg, Oral, Q12H  ascorbic acid, 250 mg, Oral, Daily  brimonidine, 1 drop, Both Eyes, Q12H   And  timolol, 1 drop, Both Eyes, Q12H  bumetanide, 0.5 mg, Intravenous, BID  digoxin, 125 mcg, Oral, Every Other Day  dilTIAZem CD, 120 mg, Oral, Q24H  galantamine, 8 mg, Oral, BID With Meals  ipratropium-albuterol, 3 mL, Nebulization, Q4H - RT  levothyroxine, 88 mcg, Oral, Daily  midodrine, 10 mg, Oral, TID AC  mirtazapine, 15 mg, Oral, Nightly  pantoprazole, 40 mg, Oral, Q AM  PARoxetine, 10 mg, Oral, Nightly  piperacillin-tazobactam, 3.375 g, Intravenous, Q8H  senna-docusate sodium, 2 tablet, Oral, BID  sodium chloride, 10 mL, Intravenous, Q12H      Continuous Infusions:   PRN Meds:.  acetaminophen **OR** acetaminophen **OR**  acetaminophen    senna-docusate sodium **AND** polyethylene glycol **AND** bisacodyl **AND** bisacodyl    Magnesium Standard Dose Replacement - Follow Nurse / BPA Driven Protocol    melatonin    metoprolol tartrate    ondansetron    Potassium Replacement - Follow Nurse / BPA Driven Protocol    sodium chloride    sodium chloride    sodium chloride    Assessment & Plan   Assessment & Plan     Active Hospital Problems    Diagnosis  POA    **Acute respiratory failure with hypoxia [J96.01]  Yes    Pneumonia of both lungs due to infectious organism [J18.9]  Unknown    Hyponatremia [E87.1]  Unknown    Severe Malnutrition (HCC) [E43]  Yes    Atrial fibrillation with RVR [I48.91]  Yes    Cachexia [R64]  Yes    Senile osteoporosis [M81.0]  Yes    Hashimoto's thyroiditis [E06.3]  Yes    Primary open angle glaucoma of both eyes, severe stage [H40.1133]  Yes    Mixed anxiety and depressive disorder [F41.8]  Yes    Multiple nodules of lung [R91.8]  Yes    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        Brief Hospital Course to date:  Vita Miller is a 83 y.o. female with history of hypothyroidism, COPD with chronic bronchiectasis, history of chronic MAC with unknown intolerance to prior treatment plan, history of orthostatic hypotension, hyperlipidemia, osteoporosis, bilateral glaucoma, mild memory deficit, who was recently admitted to our facility approximately 1 month ago for new onset atrial fibrillation with RVR and returned to the ED twith complaints of palpitations and shortness of breath.     This patient's problems and plans were partially entered by my partner and updated as appropriate by me 07/23/23.        Acute  respiratory failure with hypoxia  Bilateral pneumonia/infiltrates  History of MAC, intolerant to treatment  COPD with acute exacerbation, chronic bronchiectasis  -- Pulmonology following.  Clinically improved.  AI/vasculitis panels are negative.  Recs to continue vest therapy, discussed with Dr. Hook  --Off steroids  --Continue Zosyn to complete 7 days tx, s/p azith  -- Duo nebs  -- Flutter valve, mucinex  --MRSA pcr negative, strep negative, legionella negative  --CT chest showed new GGO, bronchiectasis, scattered nodules.  Needs f/u CT or PET scan  -- bumex, diamox  --monitor renal function, AM BMP     Atrial fibrillation with rvr, not in aflutter  --07/21 converted back to Afib RVR following a BM at 8am. Pt was hypotensive. Family/patient declined lopressor indefinitely. Midodrine increased 10 mg TID and albumin adm for BP support. Cardiology updated.  --s/p esmolol drip  --eliquis  --flecainide --> bisoprolol ---> changed to digoxin per cards, no flecainide due to pneumonitis concerns, partner discussed with Dr. Padilla  -- has upcoming appt with Dr. Martin at [facility] (suggested making that telehealth, family very concerned about missing this appt)   --fludrocortisone discontinued 07/21  --cards following.  PO digoxin, diltiazem   --LVEF 60%, normal diastolic dysfunction on echo from 6/17/2023     Altered mental status  --improved  --u/a bland, B12 wnl.   RPR nonreactive     Hyponatremia, resolved  --Na 129 on admission; suspect related to volume overload  --Na 187 on 7/22     Orthostatic hypotension  --fludricortisone discontinued  --midodrine increased 10 mg TID     Hypothyroidism  --slightly elevated tsh  --continue synthroid, dose adjusted on 7/16; repeat tsh in 4-6 weeks; careful adjustment given RVR     Other chronic conditions: HLD, malnutrition, anxiety/depression, memory impairment  --continue home meds  --nutrition  --pt/otv       Expected Discharge Location and Transportation: SNF rehab   Expected  Discharge   Expected Discharge Date: 7/25/2023; Expected Discharge Time:      DVT prophylaxis:  Medical and mechanical DVT prophylaxis orders are present.     AM-PAC 6 Clicks Score (PT): 17 (07/21/23 1000)    CODE STATUS:   Code Status and Medical Interventions:   Ordered at: 07/15/23 0214     Code Status (Patient has no pulse and is not breathing):    CPR (Attempt to Resuscitate)     Medical Interventions (Patient has pulse or is breathing):    Full Support       Lorna Mills, APRN  07/23/23

## 2023-07-24 LAB
ANION GAP SERPL CALCULATED.3IONS-SCNC: 14 MMOL/L (ref 5–15)
BUN SERPL-MCNC: 17 MG/DL (ref 8–23)
BUN/CREAT SERPL: 18.1 (ref 7–25)
CALCIUM SPEC-SCNC: 9.1 MG/DL (ref 8.6–10.5)
CHLORIDE SERPL-SCNC: 100 MMOL/L (ref 98–107)
CO2 SERPL-SCNC: 22 MMOL/L (ref 22–29)
CREAT SERPL-MCNC: 0.94 MG/DL (ref 0.57–1)
EGFRCR SERPLBLD CKD-EPI 2021: 60.3 ML/MIN/1.73
GLUCOSE SERPL-MCNC: 83 MG/DL (ref 65–99)
POTASSIUM SERPL-SCNC: 4.3 MMOL/L (ref 3.5–5.2)
SODIUM SERPL-SCNC: 136 MMOL/L (ref 136–145)

## 2023-07-24 PROCEDURE — 94799 UNLISTED PULMONARY SVC/PX: CPT

## 2023-07-24 PROCEDURE — 99231 SBSQ HOSP IP/OBS SF/LOW 25: CPT | Performed by: NURSE PRACTITIONER

## 2023-07-24 PROCEDURE — 97535 SELF CARE MNGMENT TRAINING: CPT

## 2023-07-24 PROCEDURE — 97530 THERAPEUTIC ACTIVITIES: CPT

## 2023-07-24 PROCEDURE — 94664 DEMO&/EVAL PT USE INHALER: CPT

## 2023-07-24 PROCEDURE — 99232 SBSQ HOSP IP/OBS MODERATE 35: CPT | Performed by: INTERNAL MEDICINE

## 2023-07-24 PROCEDURE — 80048 BASIC METABOLIC PNL TOTAL CA: CPT | Performed by: NURSE PRACTITIONER

## 2023-07-24 PROCEDURE — 94669 MECHANICAL CHEST WALL OSCILL: CPT

## 2023-07-24 PROCEDURE — 25010000002 PIPERACILLIN SOD-TAZOBACTAM PER 1 G: Performed by: INTERNAL MEDICINE

## 2023-07-24 RX ORDER — TORSEMIDE 20 MG/1
10 TABLET ORAL DAILY
Status: DISCONTINUED | OUTPATIENT
Start: 2023-07-24 | End: 2023-08-01 | Stop reason: HOSPADM

## 2023-07-24 RX ADMIN — ACETAZOLAMIDE 250 MG: 250 TABLET ORAL at 10:13

## 2023-07-24 RX ADMIN — Medication 250 MG: at 10:12

## 2023-07-24 RX ADMIN — GALANTAMINE 8 MG: 4 TABLET, FILM COATED ORAL at 16:10

## 2023-07-24 RX ADMIN — TIMOLOL MALEATE 1 DROP: 5 SOLUTION/ DROPS OPHTHALMIC at 20:07

## 2023-07-24 RX ADMIN — BRIMONIDINE TARTRATE 1 DROP: 2 SOLUTION/ DROPS OPHTHALMIC at 20:07

## 2023-07-24 RX ADMIN — TORSEMIDE 10 MG: 20 TABLET ORAL at 10:12

## 2023-07-24 RX ADMIN — LEVOTHYROXINE SODIUM 88 MCG: 0.09 TABLET ORAL at 06:06

## 2023-07-24 RX ADMIN — APIXABAN 2.5 MG: 2.5 TABLET, FILM COATED ORAL at 20:07

## 2023-07-24 RX ADMIN — PANTOPRAZOLE SODIUM 40 MG: 40 TABLET, DELAYED RELEASE ORAL at 06:06

## 2023-07-24 RX ADMIN — IPRATROPIUM BROMIDE AND ALBUTEROL SULFATE 3 ML: 2.5; .5 SOLUTION RESPIRATORY (INHALATION) at 19:46

## 2023-07-24 RX ADMIN — DIGOXIN 125 MCG: 125 TABLET ORAL at 10:12

## 2023-07-24 RX ADMIN — MIRTAZAPINE 15 MG: 15 TABLET, FILM COATED ORAL at 20:07

## 2023-07-24 RX ADMIN — DILTIAZEM HYDROCHLORIDE 120 MG: 120 CAPSULE, COATED, EXTENDED RELEASE ORAL at 10:12

## 2023-07-24 RX ADMIN — PAROXETINE HYDROCHLORIDE 10 MG: 10 TABLET, FILM COATED ORAL at 20:07

## 2023-07-24 RX ADMIN — GALANTAMINE 8 MG: 4 TABLET, FILM COATED ORAL at 10:11

## 2023-07-24 RX ADMIN — TIMOLOL MALEATE 1 DROP: 5 SOLUTION/ DROPS OPHTHALMIC at 10:11

## 2023-07-24 RX ADMIN — MIDODRINE HYDROCHLORIDE 10 MG: 10 TABLET ORAL at 10:12

## 2023-07-24 RX ADMIN — BRIMONIDINE TARTRATE 1 DROP: 2 SOLUTION/ DROPS OPHTHALMIC at 10:11

## 2023-07-24 RX ADMIN — PIPERACILLIN SODIUM AND TAZOBACTAM SODIUM 3.38 G: 3; .375 INJECTION, SOLUTION INTRAVENOUS at 10:11

## 2023-07-24 RX ADMIN — PIPERACILLIN SODIUM AND TAZOBACTAM SODIUM 3.38 G: 3; .375 INJECTION, SOLUTION INTRAVENOUS at 01:23

## 2023-07-24 RX ADMIN — APIXABAN 2.5 MG: 2.5 TABLET, FILM COATED ORAL at 10:12

## 2023-07-24 RX ADMIN — MIDODRINE HYDROCHLORIDE 10 MG: 10 TABLET ORAL at 16:09

## 2023-07-24 RX ADMIN — IPRATROPIUM BROMIDE AND ALBUTEROL SULFATE 3 ML: 2.5; .5 SOLUTION RESPIRATORY (INHALATION) at 07:26

## 2023-07-24 NOTE — PROGRESS NOTES
Vita Miller  [id number]  1940   LOS: 9 days   Patient Care Team:  Alfonso Steele MD as PCP - General (Internal Medicine)    Chief Complaint:  PAF / SEVERE LUNG DZ WITH CHRONIC HYPOXIC HYPERCARBIC RESPIRATORY FAILURE / HFpEF / CONFUSION / HYPONATREMIA       Subjective     Comfortable semirecumbent in bed off supplemental oxygen therapy (room air oximetry 95-96%).  She denies anginal type chest discomfort, sputum production, pleurisy, hemoptysis, nausea, emesis, abdominal pain does relate somewhat marginal appetite as well as occasional nonproductive cough.  No fever or chills.  No GI or  difficulty.    Objective     Vital Sign Min/Max for last 24 hours  Temp  Min: 97.5 øF (36.4 øC)  Max: 98.2 øF (36.8 øC)   BP  Min: 133/68  Max: 155/80   Pulse  Min: 53  Max: 66   Resp  Min: 15  Max: 17   SpO2  Min: 90 %  Max: 97 %               07/14/23  2224   Weight: 46.3 kg (102 lb)         Intake/Output Summary (Last 24 hours) at 7/24/2023 0710  Last data filed at 7/24/2023 0500  Gross per 24 hour   Intake 360 ml   Output 2150 ml   Net -1790 ml       Physical Exam:     General Appearance:    Alert, cooperative, in no acute distress   Lungs:   Scattered rhonchi and wheezes without dullness,respirations regular, even and  unlabored on room air    Heart:    Regular and normal rate, normal S1 and S2, grade 1/6 systolic murmur, no gallop, no rub, no click   Abdomen:  Extremities:   Soft, nontender, bowel sounds audible x4    No edema, normal range of motion   Pulses:   Pulses palpable and equal bilaterally      Results Review:   Results from last 7 days   Lab Units 07/24/23  0409 07/22/23  0434 07/20/23  0331   SODIUM mmol/L 136 137 136   POTASSIUM mmol/L 4.3 4.4 4.4   CHLORIDE mmol/L 100 97* 94*   CO2 mmol/L 22.0 24.0 40.0*   BUN mg/dL 17 15 20   CREATININE mg/dL 0.94 0.80 0.94   GLUCOSE mg/dL 83 74 98   CALCIUM mg/dL 9.1 8.9 8.8     Results from last 7 days   Lab Units 07/22/23  0434 07/18/23  0341   WBC 10*3/mm3 8.03  9.25   HEMOGLOBIN g/dL 13.1 12.3   HEMATOCRIT % 42.4 38.5   PLATELETS 10*3/mm3 237 322     NO NEW CXR / EKG.    Medication Review: REVIEWED; NO DRIPS.    Assessment & Plan     Persistent normal sinus rhythm and acceptable hemodynamics with nominal GFR and stable serum sodium.  Would continue current cardiac medications and defer MPS/LHC.  Hopefully to inpatient rehabilitation soon.  Will obtain serum digoxin level in AM.  Will alter IV Bumex to torsemide 10 mg daily.      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

## 2023-07-24 NOTE — THERAPY TREATMENT NOTE
Patient Name: Vita Miller  : 1940    MRN: [id number]                              Today's Date: 2023       Admit Date: 2023    Visit Dx:     ICD-10-CM ICD-9-CM   1. Atrial fibrillation with RVR  I48.91 427.31   2. Acute respiratory failure with hypoxia  J96.01 518.81   3. Pneumonia of both upper lobes due to infectious organism  J18.9 486   4. Pneumonia of both lungs due to infectious organism, unspecified part of lung  J18.9 483.8   5. Severe Malnutrition (HCC)  E43 261   6. 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 Primary open angle glaucoma of both eyes, severe stage  H40.1133 365.11     365.73   8. Multiple nodules of lung  R91.8 793.19   9. H/O orthostatic hypotension  Z86.79 V12.59   10. COPD with acute exacerbation  J44.1 491.21   11. Acquired bronchiectasis  J47.9 494.0   12. 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    Hypothyroidism    Acute respiratory failure with hypoxia    Cachexia    Severe Malnutrition (HCC)    Pneumonia of both lungs due to infectious organism    Hyponatremia     Past Medical History:   Diagnosis Date    A-fib     Disease of thyroid gland     Glaucoma     Hypotension      Past Surgical History:   Procedure Laterality Date    HIP SURGERY      PATELLA SURGERY      SHOULDER SURGERY        General Information       Row Name 23 1530          OT Time and Intention    Document Type therapy note (daily note)  -AC     Mode of Treatment occupational therapy  -AC       Row Name 23 1530          General Information    Patient  Profile Reviewed yes  -     Existing Precautions/Restrictions fall  OOB with brief  -       Row Name 07/24/23 1530          Cognition    Orientation Status (Cognition) oriented to;person  required choices for type of place, and month  -       Row Name 07/24/23 1530          Safety Issues, Functional Mobility    Safety Issues Affecting Function (Mobility) insight into deficits/self-awareness;judgment;safety precaution awareness;safety precautions follow-through/compliance;sequencing abilities  -     Impairments Affecting Function (Mobility) balance;endurance/activity tolerance;postural/trunk control;strength;visual/perceptual  -               User Key  (r) = Recorded By, (t) = Taken By, (c) = Cosigned By      Initials Name Provider Type     Tanisha Treadwell OT Occupational Therapist                     Mobility/ADL's       Row Name 07/24/23 1532          Bed Mobility    Bed Mobility supine-sit  -     Supine-Sit [place] (Bed Mobility) verbal cues;standby assist  -     Assistive Device (Bed Mobility) bed rails;head of bed elevated  -       Row Name 07/24/23 1532          Transfers    Transfers sit-stand transfer;toilet transfer  -       Row Name 07/24/23 1532          Sit-Stand Transfer    Sit-Stand [place] (Transfers) contact guard;verbal cues;nonverbal cues (demo/gesture)  -     Assistive Device (Sit-Stand Transfers) walker, front-wheeled  -       Row Name 07/24/23 1532          Toilet Transfer    [place] Level (Toilet Transfer) contact guard  -     Assistive Device (Toilet Transfer) commode, bedside without drop arms;walker, front-wheeled  -       Row Name 07/24/23 1532          Functional Mobility    Functional Mobility- Ind. Level contact guard assist;verbal cues required;nonverbal cues required (demo/gesture)  -     Functional Mobility- Device walker, front-wheeled  -     Functional Mobility-Distance (Feet) 60  -     Functional Mobility- Safety Issues step length  decreased  -AC       Row Name 07/24/23 1532          Activities of Daily Living    BADL Assessment/Intervention lower body dressing;grooming;toileting  -AC       Row Name 07/24/23 1532          Lower Body Dressing Assessment/Training    [place] Level (Lower Body Dressing) don;socks;contact guard assist  -AC     Position (Lower Body Dressing) edge of bed sitting  -AC       Row Name 07/24/23 1532          Grooming Assessment/Training    [place] Level (Grooming) oral care regimen;standby assist  -AC     Position (Grooming) supported standing;sink side  -AC       Row Name 07/24/23 1532          Toileting Assessment/Training    [place] Level (Toileting) adjust/manage clothing;perform perineal hygiene;minimum assist (75% patient effort)  -AC     Position (Toileting) unsupported sitting;supported standing  -AC               User Key  (r) = Recorded By, (t) = Taken By, (c) = Cosigned By      Initials Name Provider Type    AC Tanisha Treadwell, OT Occupational Therapist                   Obj/Interventions       Row Name 07/24/23 1533          Balance    Balance Assessment sitting static balance;sitting dynamic balance;standing static balance;standing dynamic balance  -AC     Static Sitting Balance supervision  -AC     Dynamic Sitting Balance contact guard  -AC     Position, Sitting Balance unsupported  -AC     Static Standing Balance standby assist  -AC     Dynamic Standing Balance contact guard  -AC     Position/Device Used, Standing Balance supported;walker, front-wheeled  -AC               User Key  (r) = Recorded By, (t) = Taken By, (c) = Cosigned By      Initials Name Provider Type    AC Tanisha Treadwell, OT Occupational Therapist                   Goals/Plan    No documentation.                  Clinical Impression       Row Name 07/24/23 1534          Pain Assessment    Pretreatment Pain Rating 0/10 - no pain  -AC     Posttreatment Pain Rating 0/10 - no pain  -AC       Row Name 07/24/23 1534          Plan of  Care Review    Plan of Care Reviewed With patient  -AC     Progress improving  -AC     Outcome Evaluation Pt  is progressing, but continues below baseline with self care/mobility d/t confusion, weakness, and decreased balance.  Pt CGA LBD,  SBA sinkside grooming,  CGA to ambulate 60 ft with RW.  Recommend SNF upon d/c.  -AC       Row Name 07/24/23 1534          Vital Signs    Pre Systolic BP Rehab 128  -AC     Pre Treatment Diastolic BP 77  -AC     Pretreatment Heart Rate (beats/min) 57  -AC     Posttreatment Heart Rate (beats/min) 62  -AC     Pre SpO2 (%) 89  -AC     O2 Delivery Pre Treatment room air  -AC     O2 Delivery Intra Treatment room air  -AC     Post SpO2 (%) 100  -AC     O2 Delivery Post Treatment room air  -AC     Pre Patient Position Supine  -AC     Post Patient Position Sitting  -AC       Row Name 07/24/23 1534          Positioning and Restraints    Pre-Treatment Position in bed  -AC     Post Treatment Position chair  -AC     In Chair notified nsg;reclined;call light within reach;encouraged to call for assist;exit alarm on;waffle cushion  -AC               User Key  (r) = Recorded By, (t) = Taken By, (c) = Cosigned By      Initials Name Provider Type    AC Tanisha Treadwell, OT Occupational Therapist                   Outcome Measures       Row Name 07/24/23 1538          How much help from another is currently needed...    Putting on and taking off regular lower body clothing? 3  -AC     Bathing (including washing, rinsing, and drying) 3  -AC     Toileting (which includes using toilet bed pan or urinal) 3  -AC     Putting on and taking off regular upper body clothing 3  -AC     Taking care of personal grooming (such as brushing teeth) 3  -AC     Eating meals 4  -AC     AM-PAC 6 Clicks Score (OT) 19  -AC       Row Name 07/24/23 1538          Functional Assessment    Outcome Measure Options AM-PAC 6 Clicks Daily Activity (OT)  -AC               User Key  (r) = Recorded By, (t) = Taken By, (c) = Cosigned  By      Initials Name Provider Type     Tanisha Treadwell, OT Occupational Therapist                    Occupational Therapy Education       Title: PT OT SLP Therapies (In Progress)       Topic: Occupational Therapy (In Progress)       Point: ADL training (In Progress)       Description:   Instruct learner(s) on proper safety adaptation and remediation techniques during self care or transfers.   Instruct in proper use of assistive devices.                  Learning Progress Summary             Patient Acceptance, E, NR by  at 7/24/2023 1538    Acceptance, E, NR by  at 7/19/2023 1453    Acceptance, E, VU,NR by  at 7/16/2023 1544   Family Acceptance, E, VU,NR by  at 7/16/2023 1544                         Point: Home exercise program (Not Started)       Description:   Instruct learner(s) on appropriate technique for monitoring, assisting and/or progressing therapeutic exercises/activities.                  Learner Progress:  Not documented in this visit.              Point: Precautions (Done)       Description:   Instruct learner(s) on prescribed precautions during self-care and functional transfers.                  Learning Progress Summary             Patient Acceptance, E, VU,NR by  at 7/16/2023 1544   Family Acceptance, E, VU,NR by  at 7/16/2023 1544                         Point: Body mechanics (Done)       Description:   Instruct learner(s) on proper positioning and spine alignment during self-care, functional mobility activities and/or exercises.                  Learning Progress Summary             Patient Acceptance, E, VU by  at 7/21/2023 1537    Acceptance, E, VU,NR by  at 7/16/2023 1544   Family Acceptance, E, VU,NR by  at 7/16/2023 1544                                         User Key       Initials Effective Dates Name Provider Type Discipline     02/03/23 -  Tanisha Treadwell, OT Occupational Therapist OT     09/22/22 -  Renay Skaggs OT Occupational Therapist OT     06/21/23 -   Brigette Torres, RN Registered Nurse Nurse                  OT Recommendation and Plan     Plan of Care Review  Plan of Care Reviewed With: patient  Progress: improving  Outcome Evaluation: Pt  is progressing, but continues below baseline with self care/mobility d/t confusion, weakness, and decreased balance.  Pt CGA LBD,  SBA sinkside grooming,  CGA to ambulate 60 ft with RW.  Recommend SNF upon d/c.     Time Calculation:         Time Calculation- OT       Row Name 07/24/23 1440             Time Calculation- OT    OT Start Time 1440  -AC      OT Received On 07/24/23  -AC      OT Goal Re-Cert Due Date 07/26/23  -AC         Timed Charges    92115 - OT Therapeutic Activity Minutes 15  -AC      59944 - OT Self Care/Mgmt Minutes 25  -AC         Total Minutes    Timed Charges Total Minutes 40  -AC       Total Minutes 40  -AC                User Key  (r) = Recorded By, (t) = Taken By, (c) = Cosigned By      Initials Name Provider Type    AC Tanisha Treadwell, OT Occupational Therapist                  Therapy Charges for Today       Code Description Service Date Service Provider Modifiers Qty    54220248183  OT THERAPEUTIC ACT EA 15 MIN 7/24/2023 Tanisha Treadwell OT GO 1    [id number] HC OT SELF CARE/MGMT/TRAIN EA 15 MIN 7/24/2023 Tanisha Treadwell OT GO 2                 Tanisha Treadwell OT  7/24/2023

## 2023-07-24 NOTE — PAYOR COMM NOTE
"[facility], [place]# [id number]   Clinical Update    Utilization Review  Phone [phone]  Fax [phone]    [facility]  [address] [place] [address]         Shane Miller \"Genie\" (83 y.o. Female)       Date of Birth   1940    Social Security Number       Address   [address]  [address] [place], [place] [address]    Home Phone   [phone]    MRN   [id number]       Buddhism   Non-Taoist    Marital Status                               Admission Date   7/14/23    Admission Type   Emergency    Admitting Provider   Reina Mendosa MD    Attending Provider   Reina Mendosa MD    Department, Room/Bed   [facility] 5G, S564/1       Discharge Date       Discharge Disposition       Discharge Destination                                 Attending Provider: Reina Mendosa MD    Allergies: Sulfa Antibiotics    Isolation: None   Infection: None   Code Status: CPR    Ht: 172.7 cm (68\")   Wt: 46.3 kg (102 lb)    Admission Cmt: None   Principal Problem: Acute respiratory failure with hypoxia [J96.01]                   Active Insurance as of 7/14/2023       Primary Coverage       Payor Plan Insurance Group Employer/Plan Group    [facility] MEDICARE REPLACEMENT [facility] MEDICARE REPLACEMENT [id number]       Payor Plan Address Payor Plan Phone Number Payor Plan Fax Number Effective Dates    [address] [address]   7/1/2020 - None Entered    [facility] [id number]         Subscriber Name Subscriber Birth Date Member ID       SHANE MILLER 1940 [id number]                     Emergency Contacts        (Rel.) Home Phone Work Phone Mobile Phone    EVANGELIST CHOUDHARY (Daughter) [phone] -- [phone]    Arun Miller (Son) -- -- [phone]                 Physician Progress Notes (last 72 hours)        Deya Sorensen APRN at 07/24/23 [facility]              [facility] Medicine Services  PROGRESS " NOTE    Patient Name: Vita Miller  : 1940  MRN: [id number]    Date of Admission: 2023  Primary Care Physician: Alfonso Steele MD    Subjective   Subjective     CC:  F/u PNA    HPI:  Patient sitting up in bed, A&OX3, very pleasant and talkative. Tele showing NSR with HR 60. Denies any issues today. She states she is ready to go to rehab. Family member in the room.     Copied text in this note has been reviewed and is accurate as of 23.       ROS:  Gen- No fevers, chills  CV- No chest pain, palpitations  Resp- No cough, dyspnea  GI- No N/V/D, abd pain      Objective   Objective     Vital Signs:   Temp:  [97.7 øF (36.5 øC)-98.2 øF (36.8 øC)] 98 øF (36.7 øC)  Heart Rate:  [50-66] 57  Resp:  [16-17] 16  BP: (128-155)/(64-80) 128/77     Physical Exam:  Constitutional: Awake, alert, NAD  HENT: NCAT, mucous membranes moist  Respiratory: Clear to auscultation bilaterally, nonlabored respirations   Cardiovascular: RRR, no murmurs, rubs, or gallop  Gastrointestinal: Positive bowel sounds, soft, nontender, nondistended  Musculoskeletal: No bilateral ankle edema  Psychiatric: Appropriate affect, cooperative  Neurologic: Oriented x 3, PEREZ, non focal, speech clear  Skin: No rashes    Results Reviewed:  LAB RESULTS:      Lab 23  0434 23  0341   WBC 8.03 9.25   HEMOGLOBIN 13.1 12.3   HEMATOCRIT 42.4 38.5   PLATELETS 237 322   NEUTROS ABS 5.07 6.51   IMMATURE GRANS (ABS) 0.06* 0.07*   LYMPHS ABS 1.82 1.71   MONOS ABS 0.58 0.63   EOS ABS 0.38 0.28   .4* 104.3*           Lab 23  0409 23  0434 23  0331 23  0456 23  0341   SODIUM 136 137 136 134* 132*   POTASSIUM 4.3 4.4 4.4 3.9 3.7   CHLORIDE 100 97* 94* 92* 92*   CO2 22.0 24.0 40.0* 35.0* 33.0*   ANION GAP 14.0 16.0* 2.0* 7.0 7.0   BUN 17 15 20 20 21   CREATININE 0.94 0.80 0.94 0.96 0.82   EGFR 60.3 73.2 60.3 58.8* 71.1   GLUCOSE 83 74 98 85 78   CALCIUM 9.1 8.9 8.8 8.8 8.6   MAGNESIUM  --   --   --  2.2 2.5*    PHOSPHORUS  --   --   --  3.5 3.8           Lab 07/19/23  0456 07/18/23  0341   TOTAL PROTEIN  --  5.2*   ALBUMIN 3.0* 2.9*   GLOBULIN  --  2.3   ALT (SGPT)  --  12   AST (SGOT)  --  15   BILIRUBIN  --  0.2   ALK PHOS  --  47           Lab 07/20/23  0331   PROBNP 3,624.0*               Lab 07/18/23 0341   VITAMIN B 12 970*           Lab 07/18/23  0804 07/18/23  0525   PH, ARTERIAL 7.362 7.296*   PCO2, ARTERIAL 63.6* 76.1*   PO2 ART 89.7 116.0*   FIO2 30 28   HCO3 ART 36.0* 37.1*   BASE EXCESS ART 8.4* 7.8*   CARBOXYHEMOGLOBIN 1.1 1.0       Brief Urine Lab Results  (Last result in the past 365 days)        Color   Clarity   Blood   Leuk Est   Nitrite   Protein   CREAT   Urine HCG        07/17/23 1645 Yellow   Clear   Negative   Negative   Negative   Negative                   Microbiology Results Abnormal       Procedure Component Value - Date/Time    Blood Culture - Blood, Arm, Left [[id number]]  (Normal) Collected: 07/15/23 0340    Lab Status: Final result Specimen: Blood from Arm, Left Updated: 07/20/23 0730     Blood Culture No growth at 5 days    Blood Culture - Blood, Arm, Right [[id number]]  (Normal) Collected: 07/15/23 0340    Lab Status: Final result Specimen: Blood from Arm, Right Updated: 07/20/23 0730     Blood Culture No growth at 5 days    S. Pneumo Ag Urine or CSF - Urine, Urine, Clean Catch [[id number]]  (Normal) Collected: 07/15/23 0341    Lab Status: Final result Specimen: Urine, Clean Catch Updated: 07/15/23 1356     Strep Pneumo Ag Negative    Legionella Antigen, Urine - Urine, Urine, Clean Catch [[id number]]  (Normal) Collected: 07/15/23 0341    Lab Status: Final result Specimen: Urine, Clean Catch Updated: 07/15/23 1356     LEGIONELLA ANTIGEN, URINE Negative    MRSA Screen, PCR (Inpatient) - Swab, Nares [[id number]]  (Normal) Collected: 07/15/23 0310    Lab Status: Final result Specimen: Swab from Nares Updated: 07/15/23 0846     MRSA PCR Negative    Narrative:      The negative predictive value  of this diagnostic test is high and should only be used to consider de-escalating anti-MRSA therapy. A positive result may indicate colonization with MRSA and must be correlated clinically.  MRSA Negative    COVID PRE-OP / PRE-PROCEDURE SCREENING ORDER (NO ISOLATION) - Swab, Nasopharynx [[id number]]  (Normal) Collected: 07/15/23 0310    Lab Status: Final result Specimen: Swab from Nasopharynx Updated: 07/15/23 0410    Narrative:      The following orders were created for panel order COVID PRE-OP / PRE-PROCEDURE SCREENING ORDER (NO ISOLATION) - Swab, Nasopharynx.  Procedure                               Abnormality         Status                     ---------                               -----------         ------                     Respiratory Panel PCR w/...[[id number]]  Normal              Final result                 Please view results for these tests on the individual orders.    Respiratory Panel PCR w/COVID-19(SARS-CoV-2) JENNA/KELSI/NORMAN/PAD/COR/MAD/ARSEN In-House, NP Swab in UTM/VTM, 3-4 HR TAT - Swab, Nasopharynx [[id number]]  (Normal) Collected: 07/15/23 0310    Lab Status: Final result Specimen: Swab from Nasopharynx Updated: 07/15/23 0410     ADENOVIRUS, PCR Not Detected     Coronavirus 229E Not Detected     Coronavirus HKU1 Not Detected     Coronavirus NL63 Not Detected     Coronavirus OC43 Not Detected     COVID19 Not Detected     Human Metapneumovirus Not Detected     Human Rhinovirus/Enterovirus Not Detected     Influenza A PCR Not Detected     Influenza B PCR Not Detected     Parainfluenza Virus 1 Not Detected     Parainfluenza Virus 2 Not Detected     Parainfluenza Virus 3 Not Detected     Parainfluenza Virus 4 Not Detected     RSV, PCR Not Detected     Bordetella pertussis pcr Not Detected     Bordetella parapertussis PCR Not Detected     Chlamydophila pneumoniae PCR Not Detected     Mycoplasma pneumo by PCR Not Detected    Narrative:      In the setting of a positive respiratory panel with a viral  infection PLUS a negative procalcitonin without other underlying concern for bacterial infection, consider observing off antibiotics or discontinuation of antibiotics and continue supportive care. If the respiratory panel is positive for atypical bacterial infection (Bordetella pertussis, Chlamydophila pneumoniae, or Mycoplasma pneumoniae), consider antibiotic de-escalation to target atypical bacterial infection.            No radiology results from the last 24 hrs    Results for orders placed during the hospital encounter of 06/16/23    Adult Transthoracic Echo Complete W/ Cont if Necessary Per Protocol    Interpretation Summary    Left ventricular systolic function is normal. Estimated left ventricular EF = 60%    Left ventricular diastolic function was normal.    Mild aortic insufficiency.    Trace to mild mitral and tricuspid regurgitation.    Calculated right ventricular systolic pressure from tricuspid regurgitation is 20 mmHg.    There is a trivial pericardial effusion.      Current medications:  Scheduled Meds:acetaZOLAMIDE, 250 mg, Oral, Daily  apixaban, 2.5 mg, Oral, Q12H  ascorbic acid, 250 mg, Oral, Daily  brimonidine, 1 drop, Both Eyes, Q12H   And  timolol, 1 drop, Both Eyes, Q12H  digoxin, 125 mcg, Oral, Every Other Day  dilTIAZem CD, 120 mg, Oral, Q24H  galantamine, 8 mg, Oral, BID With Meals  ipratropium-albuterol, 3 mL, Nebulization, Q4H - RT  levothyroxine, 88 mcg, Oral, Daily  midodrine, 10 mg, Oral, TID AC  mirtazapine, 15 mg, Oral, Nightly  pantoprazole, 40 mg, Oral, Q AM  PARoxetine, 10 mg, Oral, Nightly  piperacillin-tazobactam, 3.375 g, Intravenous, Q8H  senna-docusate sodium, 2 tablet, Oral, BID  sodium chloride, 10 mL, Intravenous, Q12H  torsemide, 10 mg, Oral, Daily      Continuous Infusions:   PRN Meds:.  acetaminophen **OR** acetaminophen **OR** acetaminophen    senna-docusate sodium **AND** polyethylene glycol **AND** bisacodyl **AND** bisacodyl    Magnesium Standard Dose Replacement -  Follow Nurse / BPA Driven Protocol    melatonin    metoprolol tartrate    ondansetron    Potassium Replacement - Follow Nurse / BPA Driven Protocol    sodium chloride    sodium chloride    sodium chloride    Assessment & Plan   Assessment & Plan     Active Hospital Problems    Diagnosis  POA    **Acute respiratory failure with hypoxia [J96.01]  Yes    Pneumonia of both lungs due to infectious organism [J18.9]  Unknown    Hyponatremia [E87.1]  Unknown    Severe Malnutrition (HCC) [E43]  Yes    Atrial fibrillation with RVR [I48.91]  Yes    Cachexia [R64]  Yes    Senile osteoporosis [M81.0]  Yes    Hashimoto's thyroiditis [E06.3]  Yes    Primary open angle glaucoma of both eyes, severe stage [H40.1133]  Yes    Mixed anxiety and depressive disorder [F41.8]  Yes    Multiple nodules of lung [R91.8]  Yes    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        Brief Hospital Course to date:  Vita Miller is a 83 y.o. female with history of hypothyroidism, COPD with chronic bronchiectasis, history of chronic MAC with unknown intolerance to prior treatment plan, history of orthostatic hypotension, hyperlipidemia, osteoporosis, bilateral glaucoma, mild memory deficit, who was recently admitted to our facility approximately 1 month ago for new onset atrial fibrillation with RVR and returned to the ED twith complaints of palpitations and shortness of breath.  Was found with acute respiratory failure and hypoxia, bilateral pneumonia infiltrates with COPD exacerbation.  She completed Zosyn steroid therapy with improvement of symptoms.  AI/vasculitis panel negative.  Cardiology was consulted for A-fib with RVR which was complicated by  hypotension.  Flecainide was stopped due to concerns for           pneumonitis.  Midodrine was started for blood pressure support.  Anticoagulated with Eliquis.  Patient continues on diltiazem, digoxin.  LVEF 60% with normal diastolic on echo from 6/17/23.     This patient's problems and plans were partially entered by my partner and updated as appropriate by me 07/24/23.    Copied text in this note has been reviewed and is accurate as of 07/24/23.     Acute respiratory failure with hypoxia  Bilateral pneumonia/infiltrates  History of MAC, intolerant to treatment  COPD with acute exacerbation, chronic bronchiectasis  -- Pulmonology following.  Clinically improved.  AI/vasculitis panels are negative.  Recs to continue vest therapy, discussed with Dr. Hook  --Off steroids  --Continue Zosyn to complete 7 days tx, s/p azith  -- Duo nebs  -- Flutter valve, mucinex  --MRSA pcr negative, strep negative, legionella negative  --CT chest showed new GGO, bronchiectasis, scattered nodules.  Needs f/u CT or PET scan  -- bumex, diamox  --monitor renal function, AM BMP     Atrial fibrillation with rvr, not in aflutter  --07/21 converted back to Afib RVR following a BM at 8am. Pt was hypotensive. Family/patient declined lopressor indefinitely. Midodrine increased 10 mg TID and albumin adm for BP support. Cardiology updated.  --s/p esmolol drip  --eliquis  --flecainide --> bisoprolol ---> changed to digoxin per cards, no flecainide due to pneumonitis concerns, partner discussed with Dr. Padilla  -- has upcoming appt with Dr. Martin at [facility] (suggested making that telehealth, family very concerned about missing this appt)   --fludrocortisone discontinued 07/21  --cards following.  PO digoxin, diltiazem   --LVEF 60%, normal diastolic on echo from 6/17/2023     Altered mental status  --improved  --u/a bland, B12 wnl.   RPR nonreactive     Hyponatremia, resolved  --Na 129 on admission; suspect related to volume overload  --Na 136 on  7/24     Orthostatic hypotension  --fludricortisone discontinued  --midodrine increased 10 mg TID     Hypothyroidism  --slightly elevated tsh  --continue synthroid, dose adjusted on 7/16; repeat tsh in 4-6 weeks; careful adjustment given RVR     Other chronic conditions: HLD, malnutrition, anxiety/depression, memory impairment  --continue home meds  --nutrition  --pt/otv       Expected Discharge Location and Transportation: SNF rehab   Expected Discharge   Expected Discharge Date: 7/25/2023; Expected Discharge Time:      DVT prophylaxis:  Medical and mechanical DVT prophylaxis orders are present.     AM-PAC 6 Clicks Score (PT): 17 (07/21/23 1000)    CODE STATUS:   Code Status and Medical Interventions:   Ordered at: 07/15/23 0214     Code Status (Patient has no pulse and is not breathing):    CPR (Attempt to Resuscitate)     Medical Interventions (Patient has pulse or is breathing):    Full Support       SCOTT Bailey  07/24/23        Electronically signed by Deya Sorensen APRN at 07/24/23 1222       Kenton Mack MD at 07/24/23 0710          Vita Miller  [id number]  1940   LOS: 9 days   Patient Care Team:  Alfonso Steele MD as PCP - General (Internal Medicine)    Chief Complaint:  PAF / SEVERE LUNG DZ WITH CHRONIC HYPOXIC HYPERCARBIC RESPIRATORY FAILURE / HFpEF / CONFUSION / HYPONATREMIA       Subjective     Comfortable semirecumbent in bed off supplemental oxygen therapy (room air oximetry 95-96%).  She denies anginal type chest discomfort, sputum production, pleurisy, hemoptysis, nausea, emesis, abdominal pain does relate somewhat marginal appetite as well as occasional nonproductive cough.  No fever or chills.  No GI or  difficulty.    Objective     Vital Sign Min/Max for last 24 hours  Temp  Min: 97.5 øF (36.4 øC)  Max: 98.2 øF (36.8 øC)   BP  Min: 133/68  Max: 155/80   Pulse  Min: 53  Max: 66   Resp  Min: 15  Max: 17   SpO2  Min: 90 %  Max: 97 %               07/14/23  5738    Weight: 46.3 kg (102 lb)         Intake/Output Summary (Last 24 hours) at 7/24/2023 0710  Last data filed at 7/24/2023 0500  Gross per 24 hour   Intake 360 ml   Output 2150 ml   Net -1790 ml       Physical Exam:     General Appearance:    Alert, cooperative, in no acute distress   Lungs:   Scattered rhonchi and wheezes without dullness,respirations regular, even and  unlabored on room air    Heart:    Regular and normal rate, normal S1 and S2, grade 1/6 systolic murmur, no gallop, no rub, no click   Abdomen:  Extremities:   Soft, nontender, bowel sounds audible x4    No edema, normal range of motion   Pulses:   Pulses palpable and equal bilaterally      Results Review:   Results from last 7 days   Lab Units 07/24/23  0409 07/22/23  0434 07/20/23  0331   SODIUM mmol/L 136 137 136   POTASSIUM mmol/L 4.3 4.4 4.4   CHLORIDE mmol/L 100 97* 94*   CO2 mmol/L 22.0 24.0 40.0*   BUN mg/dL 17 15 20   CREATININE mg/dL 0.94 0.80 0.94   GLUCOSE mg/dL 83 74 98   CALCIUM mg/dL 9.1 8.9 8.8     Results from last 7 days   Lab Units 07/22/23  0434 07/18/23  0341   WBC 10*3/mm3 8.03 9.25   HEMOGLOBIN g/dL 13.1 12.3   HEMATOCRIT % 42.4 38.5   PLATELETS 10*3/mm3 237 322     NO NEW CXR / EKG.    Medication Review: REVIEWED; NO DRIPS.    Assessment & Plan     Persistent normal sinus rhythm and acceptable hemodynamics with nominal GFR and stable serum sodium.  Would continue current cardiac medications and defer MPS/LHC.  Hopefully to inpatient rehabilitation soon.  Will obtain serum digoxin level in AM.  Will alter IV Bumex to torsemide 10 mg daily.      Acute respiratory failure with hypoxia    Senile osteoporosis    Atrial fibrillation with RVR    Acquired bronchiectasis    COPD with acute exacerbation    H/O orthostatic hypotension    Hashimoto's thyroiditis    Hyperlipidemia    Mixed anxiety and depressive disorder    Multiple nodules of lung    Primary open angle glaucoma of both eyes, severe stage    Pulmonary infection due to  Mycobacterium avium complex    Hypothyroidism    Cachexia    Severe Malnutrition (HCC)    Pneumonia of both lungs due to infectious organism    Hyponatremia    07/24/23  07:10 EDT     Electronically signed by Kenton Mack MD at 07/24/23 0735       Kenton Mack MD at 07/23/23 0952          Vita Miller  [id number]  1940   LOS: 8 days   Patient Care Team:  Alfonso Steele MD as PCP - General (Internal Medicine)    Chief Complaint:  PAF / SEVERE LUNG DZ WITH CHRONIC HYPOXIC HYPERCARBIC RESPIRATORY FAILURE / HFpEF / CONFUSION / HYPONATREMIA       Subjective     Comfortable sitting up in bed on no supplemental oxygen (room air oximetry 97%).  No current cardiopulmonary complaints.  Acceptable appetite.  No GI or  difficulty.  Patient states she slept well and appears to be fairly oriented, appropriate, and conversant.  She is visiting with family.    Objective     Vital Sign Min/Max for last 24 hours  Temp  Min: 97.4 øF (36.3 øC)  Max: 98.7 øF (37.1 øC)   BP  Min: 94/66  Max: 155/77   Pulse  Min: 55  Max: 100   Resp  Min: 15  Max: 18   SpO2  Min: 90 %  Max: 96 %               07/14/23 2224   Weight: 46.3 kg (102 lb)         Intake/Output Summary (Last 24 hours) at 7/23/2023 0952  Last data filed at 7/23/2023 0535  Gross per 24 hour   Intake 120 ml   Output 1600 ml   Net -1480 ml       Physical Exam:     General Appearance:    Alert, cooperative, in no acute distress   Lungs:   Diminished breath sounds without adventitial sounds,respirations regular, even and unlabored    Heart:    Regular and normal rate, normal S1 and S2, grade 1/6 systolic murmur, no gallop, no rub, no click   Abdomen:  Extremities:   Soft, nontender, bowel sounds audible x4    No edema, normal range of motion   Pulses:   Pulses palpable and equal bilaterally      Results Review:   Results from last 7 days   Lab Units 07/22/23  0434 07/20/23  0331 07/19/23  0456   SODIUM mmol/L 137 136 134*   POTASSIUM mmol/L 4.4 4.4 3.9    CHLORIDE mmol/L 97* 94* 92*   CO2 mmol/L 24.0 40.0* 35.0*   BUN mg/dL 15 20 20   CREATININE mg/dL 0.80 0.94 0.96   GLUCOSE mg/dL 74 98 85   CALCIUM mg/dL 8.9 8.8 8.8     Results from last 7 days   Lab Units 23  0434 23  0341 23  0402   WBC 10*3/mm3 8.03 9.25 9.30   HEMOGLOBIN g/dL 13.1 12.3 10.5*   HEMATOCRIT % 42.4 38.5 33.3*   PLATELETS 10*3/mm3 237 322 287     NO NEW CXR / EKG / LAB RESULTS.    Medication Review: REVIEWED; NO DRIPS.    Assessment & Plan     Acceptable clinical course.  Would continue current cardiac medications and monitor closely.  Fortunately, she remains in sinus rhythm.  Hopefully to rehabilitation soon and assessment with [facility] electrophysiology.    Discussed with patient, son, and daughter-in-law in room.      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 Family is at the bedside. Patient satting 97% on room air. Denies concerns, shortness of air.    ROS:  Gen- No fevers, chills  CV- No chest pain, palpitations  Resp- No cough, dyspnea  GI- No N/V/D,  abd pain    Objective   Objective     Vital Signs:   Temp:  [97.4 øF (36.3 øC)-98.7 øF (37.1 øC)] 97.5 øF (36.4 øC)  Heart Rate:  [] 55  Resp:  [15-18] 16  BP: ()/(60-77) 138/70     Physical Exam:  Constitutional: No acute distress, awake, alert  HENT: NCAT, mucous membranes moist  Respiratory: Clear to auscultation bilaterally, respiratory effort normal, room air  Cardiovascular: RRR, no murmurs, rubs, or gallops  Gastrointestinal: Positive bowel sounds, soft, nontender, nondistended  Musculoskeletal: No bilateral ankle edema  Psychiatric: Appropriate affect, cooperative  Neurologic: Oriented x 3, moves all extremities, Cranial Nerves grossly intact to confrontation, speech clear  Skin: No rashes      Results Reviewed:  LAB RESULTS:      Lab 07/22/23  0434 07/18/23  0341 07/17/23  0402   WBC 8.03 9.25 9.30   HEMOGLOBIN 13.1 12.3 10.5*   HEMATOCRIT 42.4 38.5 33.3*   PLATELETS 237 322 287   NEUTROS ABS 5.07 6.51 6.68   IMMATURE GRANS (ABS) 0.06* 0.07* 0.07*   LYMPHS ABS 1.82 1.71 1.66   MONOS ABS 0.58 0.63 0.61   EOS ABS 0.38 0.28 0.23   .4* 104.3* 100.6*         Lab 07/22/23  0434 07/20/23  0331 07/19/23  0456 07/18/23  0341 07/17/23  1615 07/17/23  0402   SODIUM 137 136 134* 132*  --  134*   POTASSIUM 4.4 4.4 3.9 3.7 4.5 4.2   CHLORIDE 97* 94* 92* 92*  --  92*   CO2 24.0 40.0* 35.0* 33.0*  --  37.0*   ANION GAP 16.0* 2.0* 7.0 7.0  --  5.0   BUN 15 20 20 21  --  18   CREATININE 0.80 0.94 0.96 0.82  --  0.73   EGFR 73.2 60.3 58.8* 71.1  --  81.7   GLUCOSE 74 98 85 78  --  89   CALCIUM 8.9 8.8 8.8 8.6  --  8.2*   MAGNESIUM  --   --  2.2 2.5*  --   --    PHOSPHORUS  --   --  3.5 3.8  --   --          Lab 07/19/23  0456 07/18/23  0341   TOTAL PROTEIN  --  5.2*   ALBUMIN 3.0* 2.9*   GLOBULIN  --  2.3   ALT (SGPT)  --  12   AST (SGOT)  --  15   BILIRUBIN  --  0.2   ALK PHOS  --  47         Lab 07/20/23  0331   PROBNP 3,624.0*             Lab 07/18/23  0341   VITAMIN B 12 970*         Lab 07/18/23  0804  07/18/23  0525   PH, ARTERIAL 7.362 7.296*   PCO2, ARTERIAL 63.6* 76.1*   PO2 ART 89.7 116.0*   FIO2 30 28   HCO3 ART 36.0* 37.1*   BASE EXCESS ART 8.4* 7.8*   CARBOXYHEMOGLOBIN 1.1 1.0     Brief Urine Lab Results  (Last result in the past 365 days)        Color   Clarity   Blood   Leuk Est   Nitrite   Protein   CREAT   Urine HCG        07/17/23 1645 Yellow   Clear   Negative   Negative   Negative   Negative                   Microbiology Results Abnormal       Procedure Component Value - Date/Time    Blood Culture - Blood, Arm, Left [[id number]]  (Normal) Collected: 07/15/23 0340    Lab Status: Final result Specimen: Blood from Arm, Left Updated: 07/20/23 0730     Blood Culture No growth at 5 days    Blood Culture - Blood, Arm, Right [[id number]]  (Normal) Collected: 07/15/23 0340    Lab Status: Final result Specimen: Blood from Arm, Right Updated: 07/20/23 0730     Blood Culture No growth at 5 days    S. Pneumo Ag Urine or CSF - Urine, Urine, Clean Catch [[id number]]  (Normal) Collected: 07/15/23 0341    Lab Status: Final result Specimen: Urine, Clean Catch Updated: 07/15/23 1356     Strep Pneumo Ag Negative    Legionella Antigen, Urine - Urine, Urine, Clean Catch [[id number]]  (Normal) Collected: 07/15/23 0341    Lab Status: Final result Specimen: Urine, Clean Catch Updated: 07/15/23 1356     LEGIONELLA ANTIGEN, URINE Negative    MRSA Screen, PCR (Inpatient) - Swab, Nares [[id number]]  (Normal) Collected: 07/15/23 0310    Lab Status: Final result Specimen: Swab from Nares Updated: 07/15/23 0846     MRSA PCR Negative    Narrative:      The negative predictive value of this diagnostic test is high and should only be used to consider de-escalating anti-MRSA therapy. A positive result may indicate colonization with MRSA and must be correlated clinically.  MRSA Negative    COVID PRE-OP / PRE-PROCEDURE SCREENING ORDER (NO ISOLATION) - Swab, Nasopharynx [[id number]]  (Normal) Collected: 07/15/23 0310    Lab Status: Final  result Specimen: Swab from Nasopharynx Updated: 07/15/23 0410    Narrative:      The following orders were created for panel order COVID PRE-OP / PRE-PROCEDURE SCREENING ORDER (NO ISOLATION) - Swab, Nasopharynx.  Procedure                               Abnormality         Status                     ---------                               -----------         ------                     Respiratory Panel PCR w/...[[id number]]  Normal              Final result                 Please view results for these tests on the individual orders.    Respiratory Panel PCR w/COVID-19(SARS-CoV-2) JENNA/KELSI/NORMAN/PAD/[facility]/MAD/ARSEN In-House, NP Swab in UTM/VTM, 3-4 HR TAT - Swab, Nasopharynx [[id number]]  (Normal) Collected: 07/15/23 0310    Lab Status: Final result Specimen: Swab from Nasopharynx Updated: 07/15/23 0410     ADENOVIRUS, PCR Not Detected     Coronavirus 229E Not Detected     Coronavirus HKU1 Not Detected     Coronavirus NL63 Not Detected     Coronavirus OC43 Not Detected     COVID19 Not Detected     Human Metapneumovirus Not Detected     Human Rhinovirus/Enterovirus Not Detected     Influenza A PCR Not Detected     Influenza B PCR Not Detected     Parainfluenza Virus 1 Not Detected     Parainfluenza Virus 2 Not Detected     Parainfluenza Virus 3 Not Detected     Parainfluenza Virus 4 Not Detected     RSV, PCR Not Detected     Bordetella pertussis pcr Not Detected     Bordetella parapertussis PCR Not Detected     Chlamydophila pneumoniae PCR Not Detected     Mycoplasma pneumo by PCR Not Detected    Narrative:      In the setting of a positive respiratory panel with a viral infection PLUS a negative procalcitonin without other underlying concern for bacterial infection, consider observing off antibiotics or discontinuation of antibiotics and continue supportive care. If the respiratory panel is positive for atypical bacterial infection (Bordetella pertussis, Chlamydophila pneumoniae, or Mycoplasma pneumoniae), consider antibiotic  de-escalation to target atypical bacterial infection.            No radiology results from the last 24 hrs    Results for orders placed during the hospital encounter of 06/16/23    Adult Transthoracic Echo Complete W/ Cont if Necessary Per Protocol    Interpretation Summary    Left ventricular systolic function is normal. Estimated left ventricular EF = 60%    Left ventricular diastolic function was normal.    Mild aortic insufficiency.    Trace to mild mitral and tricuspid regurgitation.    Calculated right ventricular systolic pressure from tricuspid regurgitation is 20 mmHg.    There is a trivial pericardial effusion.      Current medications:  Scheduled Meds:acetaZOLAMIDE, 250 mg, Oral, Daily  apixaban, 2.5 mg, Oral, Q12H  ascorbic acid, 250 mg, Oral, Daily  brimonidine, 1 drop, Both Eyes, Q12H   And  timolol, 1 drop, Both Eyes, Q12H  bumetanide, 0.5 mg, Intravenous, BID  digoxin, 125 mcg, Oral, Every Other Day  dilTIAZem CD, 120 mg, Oral, Q24H  galantamine, 8 mg, Oral, BID With Meals  ipratropium-albuterol, 3 mL, Nebulization, Q4H - RT  levothyroxine, 88 mcg, Oral, Daily  midodrine, 10 mg, Oral, TID AC  mirtazapine, 15 mg, Oral, Nightly  pantoprazole, 40 mg, Oral, Q AM  PARoxetine, 10 mg, Oral, Nightly  piperacillin-tazobactam, 3.375 g, Intravenous, Q8H  senna-docusate sodium, 2 tablet, Oral, BID  sodium chloride, 10 mL, Intravenous, Q12H      Continuous Infusions:   PRN Meds:.  acetaminophen **OR** acetaminophen **OR** acetaminophen    senna-docusate sodium **AND** polyethylene glycol **AND** bisacodyl **AND** bisacodyl    Magnesium Standard Dose Replacement - Follow Nurse / BPA Driven Protocol    melatonin    metoprolol tartrate    ondansetron    Potassium Replacement - Follow Nurse / BPA Driven Protocol    sodium chloride    sodium chloride    sodium chloride    Assessment & Plan   Assessment & Plan     Active Hospital Problems    Diagnosis  POA    **Acute respiratory failure with hypoxia [J96.01]  Yes     Pneumonia of both lungs due to infectious organism [J18.9]  Unknown    Hyponatremia [E87.1]  Unknown    Severe Malnutrition (HCC) [E43]  Yes    Atrial fibrillation with RVR [I48.91]  Yes    Cachexia [R64]  Yes    Senile osteoporosis [M81.0]  Yes    Hashimoto's thyroiditis [E06.3]  Yes    Primary open angle glaucoma of both eyes, severe stage [H40.1133]  Yes    Mixed anxiety and depressive disorder [F41.8]  Yes    Multiple nodules of lung [R91.8]  Yes    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        Brief Hospital Course to date:  Vita Miller is a 83 y.o. female with history of hypothyroidism, COPD with chronic bronchiectasis, history of chronic MAC with unknown intolerance to prior treatment plan, history of orthostatic hypotension, hyperlipidemia, osteoporosis, bilateral glaucoma, mild memory deficit, who was recently admitted to our facility approximately 1 month ago for new onset atrial fibrillation with RVR and returned to the ED twith complaints of palpitations and shortness of breath.     This patient's problems and plans were partially entered by my partner and updated as appropriate by me 07/23/23.        Acute respiratory failure with hypoxia  Bilateral pneumonia/infiltrates  History of MAC, intolerant to treatment  COPD with acute exacerbation, chronic bronchiectasis  -- Pulmonology following.  Clinically improved.  AI/vasculitis panels are negative.  Recs to continue vest therapy, discussed with Dr. Hook  --Off steroids  --Continue Zosyn to complete 7 days tx, s/p azith  -- Duo nebs  -- Flutter valve, mucinex  --MRSA pcr negative, strep negative, legionella negative  --CT chest showed new GGO,  bronchiectasis, scattered nodules.  Needs f/u CT or PET scan  -- bumex, diamox  --monitor renal function, AM BMP     Atrial fibrillation with rvr, not in aflutter  --07/21 converted back to Afib RVR following a BM at 8am. Pt was hypotensive. Family/patient declined lopressor indefinitely. Midodrine increased 10 mg TID and albumin adm for BP support. Cardiology updated.  --s/p esmolol drip  --eliquis  --flecainide --> bisoprolol ---> changed to digoxin per cards, no flecainide due to pneumonitis concerns, partner discussed with Dr. Padilla  -- has upcoming appt with Dr. Martin at  (suggested making that telehealth, family very concerned about missing this appt)   --fludrocortisone discontinued 07/21  --cards following.  PO digoxin, diltiazem   --LVEF 60%, normal diastolic dysfunction on echo from 6/17/2023     Altered mental status  --improved  --u/a bland, B12 wnl.   RPR nonreactive     Hyponatremia, resolved  --Na 129 on admission; suspect related to volume overload  --Na 187 on 7/22     Orthostatic hypotension  --fludricortisone discontinued  --midodrine increased 10 mg TID     Hypothyroidism  --slightly elevated tsh  --continue synthroid, dose adjusted on 7/16; repeat tsh in 4-6 weeks; careful adjustment given RVR     Other chronic conditions: HLD, malnutrition, anxiety/depression, memory impairment  --continue home meds  --nutrition  --pt/otv       Expected Discharge Location and Transportation: SNF rehab   Expected Discharge   Expected Discharge Date: 7/25/2023; Expected Discharge Time:      DVT prophylaxis:  Medical and mechanical DVT prophylaxis orders are present.     AM-PAC 6 Clicks Score (PT): 17 (07/21/23 1000)    CODE STATUS:   Code Status and Medical Interventions:   Ordered at: 07/15/23 0214     Code Status (Patient has no pulse and is not breathing):    CPR (Attempt to Resuscitate)     Medical Interventions (Patient has pulse or is breathing):    Full Support       Lorna Mills,  SCOTT  23        Electronically signed by Lorna Mills APRN at 23 1248       Deya Sorensen APRN at 23 1403              [facility]  PROGRESS NOTE    Patient Name: Vita Miller  : 1940  MRN: [id number]    Date of Admission: 2023  Primary Care Physician: Alfonso Steele MD    Subjective   Subjective     CC:  Follow-up PNA    HPI:  Pt sitting up in bed after receiving a bath and getting her hair brushed. She reports feeling well today. Denies shortness of breath, chest pain, heart palpitations. HR improved today.     Objective   Objective     Vital Signs:   Temp:  [96.9 øF (36.1 øC)-97.8 øF (36.6 øC)] 97.7 øF (36.5 øC)  Heart Rate:  [] 84  Resp:  [16-18] 16  BP: ()/(60-91) 94/66  Flow (L/min):  [0.5-1] 1     Physical Exam:  Constitutional: Awake, alert, NAD  HENT: NCAT, mucous membranes moist  Respiratory: Clear to auscultation bilaterally, nonlabored respirations   Cardiovascular: IRR, no murmurs, rubs, or gallops  Gastrointestinal: Positive bowel sounds, soft, nontender, nondistended  Musculoskeletal: No bilateral ankle edema  Psychiatric: Appropriate affect, cooperative  Neurologic: Oriented x 3, Non focal, PEREZ, speech clear  Skin: No rashes      Results Reviewed:  LAB RESULTS:      Lab 23  0434 23  0341 23  0402 23  0332   WBC 8.03 9.25 9.30 12.73*   HEMOGLOBIN 13.1 12.3 10.5* 10.0*   HEMATOCRIT 42.4 38.5 33.3* 30.6*   PLATELETS 237 322 287 273   NEUTROS ABS 5.07 6.51 6.68 11.29*   IMMATURE GRANS (ABS) 0.06* 0.07* 0.07* 0.07*   LYMPHS ABS 1.82 1.71 1.66 0.71   MONOS ABS 0.58 0.63 0.61 0.64   EOS ABS 0.38 0.28 0.23 0.01   .4* 104.3* 100.6* 97.8*   SED RATE  --   --   --  32*   LACTATE  --   --   --  0.9           Lab 23  0434 23  0331 23  0456 23  0341 23  1615 23  0402 23  0332   SODIUM 137 136 134* 132*  --  134* 129*   POTASSIUM 4.4 4.4 3.9  3.7 4.5 4.2 4.0   CHLORIDE 97* 94* 92* 92*  --  92* 87*   CO2 24.0 40.0* 35.0* 33.0*  --  37.0* 33.0*   ANION GAP 16.0* 2.0* 7.0 7.0  --  5.0 9.0   BUN 15 20 20 21  --  18 24*   CREATININE 0.80 0.94 0.96 0.82  --  0.73 1.09*   EGFR 73.2 60.3 58.8* 71.1  --  81.7 50.5*   GLUCOSE 74 98 85 78  --  89 117*   CALCIUM 8.9 8.8 8.8 8.6  --  8.2* 8.0*   MAGNESIUM  --   --  2.2 2.5*  --   --  1.9   PHOSPHORUS  --   --  3.5 3.8  --   --   --            Lab 07/19/23  0456 07/18/23  0341 07/16/23  0332   TOTAL PROTEIN  --  5.2* 6.0   ALBUMIN 3.0* 2.9* 3.6   GLOBULIN  --  2.3 2.4   ALT (SGPT)  --  12 14   AST (SGOT)  --  15 16   BILIRUBIN  --  0.2 0.2   ALK PHOS  --  47 53           Lab 07/20/23  0331   PROBNP 3,624.0*               Lab 07/18/23  0341   VITAMIN B 12 970*           Lab 07/18/23  0804 07/18/23  0525 07/16/23  0518   PH, ARTERIAL 7.362 7.296* 7.380   PCO2, ARTERIAL 63.6* 76.1* 60.2*   PO2 ART 89.7 116.0* 117.0*   FIO2 30 28 30   HCO3 ART 36.0* 37.1* 35.6*   BASE EXCESS ART 8.4* 7.8* 8.7*   CARBOXYHEMOGLOBIN 1.1 1.0 1.3       Brief Urine Lab Results  (Last result in the past 365 days)        Color   Clarity   Blood   Leuk Est   Nitrite   Protein   CREAT   Urine HCG        07/17/23 1645 Yellow   Clear   Negative   Negative   Negative   Negative                   Microbiology Results Abnormal       Procedure Component Value - Date/Time    Blood Culture - Blood, Arm, Left [[id number]]  (Normal) Collected: 07/15/23 0340    Lab Status: Final result Specimen: Blood from Arm, Left Updated: 07/20/23 0730     Blood Culture No growth at 5 days    Blood Culture - Blood, Arm, Right [[id number]]  (Normal) Collected: 07/15/23 0340    Lab Status: Final result Specimen: Blood from Arm, Right Updated: 07/20/23 0730     Blood Culture No growth at 5 days    S. Pneumo Ag Urine or CSF - Urine, Urine, Clean Catch [[id number]]  (Normal) Collected: 07/15/23 0341    Lab Status: Final result Specimen: Urine, Clean Catch Updated: 07/15/23 0555      Strep Pneumo Ag Negative    Legionella Antigen, Urine - Urine, Urine, Clean Catch [[id number]]  (Normal) Collected: 07/15/23 0341    Lab Status: Final result Specimen: Urine, Clean Catch Updated: 07/15/23 1356     LEGIONELLA ANTIGEN, URINE Negative    MRSA Screen, PCR (Inpatient) - Swab, Nares [[id number]]  (Normal) Collected: 07/15/23 0310    Lab Status: Final result Specimen: Swab from Nares Updated: 07/15/23 0846     MRSA PCR Negative    Narrative:      The negative predictive value of this diagnostic test is high and should only be used to consider de-escalating anti-MRSA therapy. A positive result may indicate colonization with MRSA and must be correlated clinically.  MRSA Negative    COVID PRE-OP / PRE-PROCEDURE SCREENING ORDER (NO ISOLATION) - Swab, Nasopharynx [[id number]]  (Normal) Collected: 07/15/23 0310    Lab Status: Final result Specimen: Swab from Nasopharynx Updated: 07/15/23 0410    Narrative:      The following orders were created for panel order COVID PRE-OP / PRE-PROCEDURE SCREENING ORDER (NO ISOLATION) - Swab, Nasopharynx.  Procedure                               Abnormality         Status                     ---------                               -----------         ------                     Respiratory Panel PCR w/...[[id number]]  Normal              Final result                 Please view results for these tests on the individual orders.    Respiratory Panel PCR w/COVID-19(SARS-CoV-2) JENNA/KELSI/NORMAN/PAD/COR/MAD/ARSEN In-House, NP Swab in UTM/VTM, 3-4 HR TAT - Swab, Nasopharynx [[id number]]  (Normal) Collected: 07/15/23 0310    Lab Status: Final result Specimen: Swab from Nasopharynx Updated: 07/15/23 0410     ADENOVIRUS, PCR Not Detected     Coronavirus 229E Not Detected     Coronavirus HKU1 Not Detected     Coronavirus NL63 Not Detected     Coronavirus OC43 Not Detected     COVID19 Not Detected     Human Metapneumovirus Not Detected     Human Rhinovirus/Enterovirus Not Detected     Influenza A PCR  Not Detected     Influenza B PCR Not Detected     Parainfluenza Virus 1 Not Detected     Parainfluenza Virus 2 Not Detected     Parainfluenza Virus 3 Not Detected     Parainfluenza Virus 4 Not Detected     RSV, PCR Not Detected     Bordetella pertussis pcr Not Detected     Bordetella parapertussis PCR Not Detected     Chlamydophila pneumoniae PCR Not Detected     Mycoplasma pneumo by PCR Not Detected    Narrative:      In the setting of a positive respiratory panel with a viral infection PLUS a negative procalcitonin without other underlying concern for bacterial infection, consider observing off antibiotics or discontinuation of antibiotics and continue supportive care. If the respiratory panel is positive for atypical bacterial infection (Bordetella pertussis, Chlamydophila pneumoniae, or Mycoplasma pneumoniae), consider antibiotic de-escalation to target atypical bacterial infection.            No radiology results from the last 24 hrs    Results for orders placed during the hospital encounter of 06/16/23    Adult Transthoracic Echo Complete W/ Cont if Necessary Per Protocol    Interpretation Summary    Left ventricular systolic function is normal. Estimated left ventricular EF = 60%    Left ventricular diastolic function was normal.    Mild aortic insufficiency.    Trace to mild mitral and tricuspid regurgitation.    Calculated right ventricular systolic pressure from tricuspid regurgitation is 20 mmHg.    There is a trivial pericardial effusion.      Current medications:  Scheduled Meds:acetaZOLAMIDE, 250 mg, Oral, Daily  apixaban, 2.5 mg, Oral, Q12H  ascorbic acid, 250 mg, Oral, Daily  brimonidine, 1 drop, Both Eyes, Q12H   And  timolol, 1 drop, Both Eyes, Q12H  bumetanide, 0.5 mg, Intravenous, BID  digoxin, 125 mcg, Oral, Every Other Day  dilTIAZem CD, 120 mg, Oral, Q24H  galantamine, 8 mg, Oral, BID With Meals  ipratropium-albuterol, 3 mL, Nebulization, Q4H - RT  levothyroxine, 88 mcg, Oral,  Daily  midodrine, 10 mg, Oral, TID AC  mirtazapine, 15 mg, Oral, Nightly  pantoprazole, 40 mg, Oral, Q AM  PARoxetine, 10 mg, Oral, Nightly  piperacillin-tazobactam, 3.375 g, Intravenous, Q8H  senna-docusate sodium, 2 tablet, Oral, BID  sodium chloride, 10 mL, Intravenous, Q12H      Continuous Infusions:       PRN Meds:.  acetaminophen **OR** acetaminophen **OR** acetaminophen    senna-docusate sodium **AND** polyethylene glycol **AND** bisacodyl **AND** bisacodyl    Magnesium Standard Dose Replacement - Follow Nurse / BPA Driven Protocol    melatonin    metoprolol tartrate    ondansetron    Potassium Replacement - Follow Nurse / BPA Driven Protocol    sodium chloride    sodium chloride    sodium chloride    Assessment & Plan   Assessment & Plan     Active Hospital Problems    Diagnosis  POA    **Acute respiratory failure with hypoxia [J96.01]  Yes    Pneumonia of both lungs due to infectious organism [J18.9]  Unknown    Hyponatremia [E87.1]  Unknown    Severe Malnutrition (HCC) [E43]  Yes    Atrial fibrillation with RVR [I48.91]  Yes    Cachexia [R64]  Yes    Senile osteoporosis [M81.0]  Yes    Hashimoto's thyroiditis [E06.3]  Yes    Primary open angle glaucoma of both eyes, severe stage [H40.1133]  Yes    Mixed anxiety and depressive disorder [F41.8]  Yes    Multiple nodules of lung [R91.8]  Yes    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        Brief Hospital Course to date:  Vita Miller is a 83 y.o. female with history of hypothyroidism, COPD with chronic bronchiectasis, history of chronic MAC with unknown intolerance to prior treatment plan, history of orthostatic hypotension, hyperlipidemia, osteoporosis,  bilateral glaucoma, mild memory deficit, who was recently admitted to our facility approximately 1 month ago for new onset atrial fibrillation with RVR who returns to the ER tonight with complaints of palpitations and shortness of breath.     This patient's problems and plans were partially entered by my partner and updated as appropriate by me 07/22/23.    Copied text in this note has been reviewed and is accurate as of 07/22/23.        Acute respiratory failure with hypoxia  Bilateral pneumonia/infiltrates  History of MAC, intolerant to treatment  COPD with acute exacerbation, chronic bronchiectasis  -- Pulmonology following.  Clinically improved.  AI/vasculitis panels are negative.  Recs to continue vest therapy, discussed with Dr. Hook  --Off steroids  --Continue Zosyn, s/p azith  -- Duo nebs  -- Flutter valve, mucinex  --MRSA pcr negative, strep negative, legionella negative  -- bumex, diamox  --CT chest showed new GGO, bronchiectasis, scattered nodules.  Needs f/u CT or PET scan     Atrial fibrillation with rvr, not in aflutter  --07/21 converted back to Afib RVR following a BM at 8am. Pt was hypotensive. Family/patient declined lopressor indefinitely. Midodrine increased 10 mg TID and albumin adm for BP support. Cardiology updated.  --s/p esmolol drip  --eliquis  --flecainide --> bisoprolol ---> changed to digoxin per cards, no flecainide due to pneumonitis concerns, discussed with Dr. Padilla  -- has upcoming appt with Dr. Martin at  (suggested making that telehealth, family very concerned about missing this appt)   --fludrocortisone discontinued 07/21  --cards following.  Po dig, dilta.   --LVEF 60%, normal diastolic dysfunction on echo from 6/17/2023    Altered mental status  --better  --u/a bland, B12 wnl.   RPR nonreactive     Hyponatremia  --Na 129 on admission; suspect related to volume overload  --improved     Orthostatic hypotension  --fludricortisone discontinued  --midodrine increased 10 mg  TID     Hypothyroidism  --slightly elevated tsh  --continue synthroid, dose adjusted on 7/16; repeat tsh in 4-6 weeks; careful adjustment given RVR     Other chronic conditions: HLD, malnutrition, anxiety/depression, memory impairment  --continue home meds  --nutrition  --pt/ot      Expected Discharge Location and Transportation: PT recs SNF/d/w son, family planning for 24/7 care at home  Expected Discharge   07/25/2023    DVT prophylaxis:  Medical and mechanical DVT prophylaxis orders are present.     AM-PAC 6 Clicks Score (PT): 17 (07/21/23 1000)    CODE STATUS:   Code Status and Medical Interventions:   Ordered at: 07/15/23 0214     Code Status (Patient has no pulse and is not breathing):    CPR (Attempt to Resuscitate)     Medical Interventions (Patient has pulse or is breathing):    Full Support       SCOTT Bailey  07/22/23        Electronically signed by Deya Sorensen APRN at 07/24/23 1221       Kenton Mack MD at 07/22/23 1157          Vita Miller  [id number]  1940   LOS: 7 days   Patient Care Team:  Alfonso Steele MD as PCP - General (Internal Medicine)    Chief Complaint:   PAF / SEVERE LUNG DZ WITH CHRONIC HYPOXIC HYPERCARBIC RESPIRATORY FAILURE / HFpEF / CONFUSION / HYPONATREMIA       Subjective     Comfortable semirecumbent in bed off supplemental oxygen therapy (room air oximetry 96%).  She ate the majority of lunch.  She is very pleased and happy that her daughter-in-law was kind enough to shampoo her hair.  She denies anginal type chest discomfort, increased tachypnea/dyspnea, nausea, emesis, or abdominal pain.  No current GI or  difficulty.  She denies significant sputum production, pleurisy, or hemoptysis.    Objective     Vital Sign Min/Max for last 24 hours  Temp  Min: 96.9 øF (36.1 øC)  Max: 97.8 øF (36.6 øC)   BP  Min: 91/60  Max: 112/91   Pulse  Min: 83  Max: 169   Resp  Min: 16  Max: 18   SpO2  Min: 87 %  Max: 98 %               07/14/23 2224   Weight: 46.3 kg  (102 lb)         Intake/Output Summary (Last 24 hours) at 7/22/2023 1157  Last data filed at 7/22/2023 1034  Gross per 24 hour   Intake 50 ml   Output 4100 ml   Net -4050 ml       Physical Exam:     General Appearance:    Alert, cooperative, in no acute distress   Lungs:   Overall diminished breath sounds with scattered rhonchi, wheezes, crackles without dullness,respirations regular, even and unlabored    Heart:    Irregular and normal rate, variable S1 and S2, grade 2/6 systolic  murmur, no gallop, no rub, no click   Abdomen:  Extremities:   Soft, nontender, bowel sounds audible x4    No edema, normal range of motion   Pulses:   Pulses palpable and equal bilaterally      Results Review:   Results from last 7 days   Lab Units 07/22/23  0434 07/20/23  0331 07/19/23  0456   SODIUM mmol/L 137 136 134*   POTASSIUM mmol/L 4.4 4.4 3.9   CHLORIDE mmol/L 97* 94* 92*   CO2 mmol/L 24.0 40.0* 35.0*   BUN mg/dL 15 20 20   CREATININE mg/dL 0.80 0.94 0.96   GLUCOSE mg/dL 74 98 85   CALCIUM mg/dL 8.9 8.8 8.8     Results from last 7 days   Lab Units 07/22/23  0434 07/18/23  0341 07/17/23  0402   WBC 10*3/mm3 8.03 9.25 9.30   HEMOGLOBIN g/dL 13.1 12.3 10.5*   HEMATOCRIT % 42.4 38.5 33.3*   PLATELETS 10*3/mm3 237 322 287     CXR(7/20/23):    Findings:    There are increasing multifocal airspace opacities and increasing bilateral pleural effusions and bibasilar atelectasis. Cardiac mediastinal contours are unchanged. Regional skeleton is unremarkable.     IMPRESSION: Increasing multifocal opacities as well as increasing bilateral pleural effusions and bibasilar consolidation. Findings likely reflect a progressive multifocal pneumonia or less likely asymmetric edema.    EKG:    Atrial fibrillation with rapid ventricular response  Nonspecific ST and T wave abnormality  Abnormal ECG  When compared with ECG of 18-JUL-2023 16:58, (Unconfirmed)  Atrial fibrillation has replaced Sinus rhythm  Vent. rate has increased BY  38 BPM  ST now  depressed in Anterior leads  Nonspecific T wave abnormality now evident in Inferior leads  Nonspecific T wave abnormality now evident in Lateral leads  Confirmed by MD Flores Robert ([id number]) on 7/21/2023 2:51:56 PM    Medication Review: REVIEWED; NO DRIPS.    Assessment & Plan     Marginal but stable cardiac course.  Nominal GFR would continue attempts at diuresis.  Perplexed her chest x-ray 48 hours ago was not improved.  Pulmonary hygiene and incentive spirometry use encouraged.  Would continue current cardiac medications.      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

## 2023-07-24 NOTE — CASE MANAGEMENT/SOCIAL WORK
Continued Stay Note  [facility]     Patient Name: Vita Miller  MRN: [id number]  Today's Date: 7/24/2023    Admit Date: 7/14/2023    Plan: SNF   Discharge Plan       Row Name 07/24/23 1022       Plan    Plan SNF    Patient/Family in Agreement with Plan yes    Plan Comments Met with Ms. Miller at bedside to discuss discharge plan. Informed her that she has been offered a private room at [facility]. She was agreeable but asked me to call her son, Arun, to make sure he agrees. Called Arun, and he is in agreement.  called Stephanie with the [facility] and asked her to start the insurance precert.  will continue to follow plan of care and assist with discharge needs as indicated.      11:43 EDT  CM requested PT/OT to evaluate for SNF insurance precert.     Final Discharge Disposition Code 03 - skilled nursing facility (SNF)                   Discharge Codes    No documentation.                 Expected Discharge Date and Time       Expected Discharge Date Expected Discharge Time    Jul 25, 2023               Loly Moreira RN

## 2023-07-24 NOTE — PROGRESS NOTES
[facility] Medicine Services  PROGRESS NOTE    Patient Name: Vita Miller  : 1940  MRN: [id number]    Date of Admission: 2023  Primary Care Physician: Alfonso Steele MD    Subjective   Subjective     CC:  F/u [facility]    HPI:  Patient sitting up in bed, A&OX3, very pleasant and talkative. Tele showing NSR with HR 60. Denies any issues today. She states she is ready to go to rehab. Family member in the room.     Copied text in this note has been reviewed and is accurate as of 23.       ROS:  Gen- No fevers, chills  CV- No chest pain, palpitations  Resp- No cough, dyspnea  GI- No N/V/D, abd pain      Objective   Objective     Vital Signs:   Temp:  [97.7 øF (36.5 øC)-98.2 øF (36.8 øC)] 98 øF (36.7 øC)  Heart Rate:  [50-66] 57  Resp:  [16-17] 16  BP: (128-155)/(64-80) 128/77     Physical Exam:  Constitutional: Awake, alert, NAD  HENT: NCAT, mucous membranes moist  Respiratory: Clear to auscultation bilaterally, nonlabored respirations   Cardiovascular: RRR, no murmurs, rubs, or gallop  Gastrointestinal: Positive bowel sounds, soft, nontender, nondistended  Musculoskeletal: No bilateral ankle edema  Psychiatric: Appropriate affect, cooperative  Neurologic: Oriented x 3, PEREZ, non focal, speech clear  Skin: No rashes    Results Reviewed:  LAB RESULTS:      Lab 23  0434 23  0341   WBC 8.03 9.25   HEMOGLOBIN 13.1 12.3   HEMATOCRIT 42.4 38.5   PLATELETS 237 322   NEUTROS ABS 5.07 6.51   IMMATURE GRANS (ABS) 0.06* 0.07*   LYMPHS ABS 1.82 1.71   MONOS ABS 0.58 0.63   EOS ABS 0.38 0.28   .4* 104.3*           Lab 23  0409 23  0434 23  0331 23  0456 23  0341   SODIUM 136 137 136 134* 132*   POTASSIUM 4.3 4.4 4.4 3.9 3.7   CHLORIDE 100 97* 94* 92* 92*   CO2 22.0 24.0 40.0* 35.0* 33.0*   ANION GAP 14.0 16.0* 2.0* 7.0 7.0   BUN 17 15 20 20 21   CREATININE 0.94 0.80 0.94 0.96 0.82   EGFR 60.3 73.2 60.3 58.8* 71.1   GLUCOSE 83 74 98 85 78    CALCIUM 9.1 8.9 8.8 8.8 8.6   MAGNESIUM  --   --   --  2.2 2.5*   PHOSPHORUS  --   --   --  3.5 3.8           Lab 07/19/23  0456 07/18/23 0341   TOTAL PROTEIN  --  5.2*   ALBUMIN 3.0* 2.9*   GLOBULIN  --  2.3   ALT (SGPT)  --  12   AST (SGOT)  --  15   BILIRUBIN  --  0.2   ALK PHOS  --  47           Lab 07/20/23  0331   PROBNP 3,624.0*               Lab 07/18/23 0341   VITAMIN B 12 970*           Lab 07/18/23  0804 07/18/23  0525   PH, ARTERIAL 7.362 7.296*   PCO2, ARTERIAL 63.6* 76.1*   PO2 ART 89.7 116.0*   FIO2 30 28   HCO3 ART 36.0* 37.1*   BASE EXCESS ART 8.4* 7.8*   CARBOXYHEMOGLOBIN 1.1 1.0       Brief Urine Lab Results  (Last result in the past 365 days)        Color   Clarity   Blood   Leuk Est   Nitrite   Protein   CREAT   Urine HCG        07/17/23 1645 Yellow   Clear   Negative   Negative   Negative   Negative                   Microbiology Results Abnormal       Procedure Component Value - Date/Time    Blood Culture - Blood, Arm, Left [[id number]]  (Normal) Collected: 07/15/23 0340    Lab Status: Final result Specimen: Blood from Arm, Left Updated: 07/20/23 0730     Blood Culture No growth at 5 days    Blood Culture - Blood, Arm, Right [[id number]]  (Normal) Collected: 07/15/23 0340    Lab Status: Final result Specimen: Blood from Arm, Right Updated: 07/20/23 0730     Blood Culture No growth at 5 days    S. Pneumo Ag Urine or CSF - Urine, Urine, Clean Catch [[id number]]  (Normal) Collected: 07/15/23 0341    Lab Status: Final result Specimen: Urine, Clean Catch Updated: 07/15/23 1356     Strep Pneumo Ag Negative    Legionella Antigen, Urine - Urine, Urine, Clean Catch [[id number]]  (Normal) Collected: 07/15/23 0341    Lab Status: Final result Specimen: Urine, Clean Catch Updated: 07/15/23 1356     LEGIONELLA ANTIGEN, URINE Negative    MRSA Screen, PCR (Inpatient) - Swab, Nares [[id number]]  (Normal) Collected: 07/15/23 0310    Lab Status: Final result Specimen: Swab from Nares Updated: 07/15/23 0817      MRSA PCR Negative    Narrative:      The negative predictive value of this diagnostic test is high and should only be used to consider de-escalating anti-MRSA therapy. A positive result may indicate colonization with MRSA and must be correlated clinically.  MRSA Negative    COVID PRE-OP / PRE-PROCEDURE SCREENING ORDER (NO ISOLATION) - Swab, Nasopharynx [[id number]]  (Normal) Collected: 07/15/23 0310    Lab Status: Final result Specimen: Swab from Nasopharynx Updated: 07/15/23 0410    Narrative:      The following orders were created for panel order COVID PRE-OP / PRE-PROCEDURE SCREENING ORDER (NO ISOLATION) - Swab, Nasopharynx.  Procedure                               Abnormality         Status                     ---------                               -----------         ------                     Respiratory Panel PCR w/...[[id number]]  Normal              Final result                 Please view results for these tests on the individual orders.    Respiratory Panel PCR w/COVID-19(SARS-CoV-2) JENNA/KELSI/NORMAN/PAD/COR/MAD/ARSEN In-House, NP Swab in UTM/VTM, 3-4 HR TAT - Swab, Nasopharynx [[id number]]  (Normal) Collected: 07/15/23 0310    Lab Status: Final result Specimen: Swab from Nasopharynx Updated: 07/15/23 0410     ADENOVIRUS, PCR Not Detected     Coronavirus 229E Not Detected     Coronavirus HKU1 Not Detected     Coronavirus NL63 Not Detected     Coronavirus OC43 Not Detected     COVID19 Not Detected     Human Metapneumovirus Not Detected     Human Rhinovirus/Enterovirus Not Detected     Influenza A PCR Not Detected     Influenza B PCR Not Detected     Parainfluenza Virus 1 Not Detected     Parainfluenza Virus 2 Not Detected     Parainfluenza Virus 3 Not Detected     Parainfluenza Virus 4 Not Detected     RSV, PCR Not Detected     Bordetella pertussis pcr Not Detected     Bordetella parapertussis PCR Not Detected     Chlamydophila pneumoniae PCR Not Detected     Mycoplasma pneumo by PCR Not Detected    Narrative:       In the setting of a positive respiratory panel with a viral infection PLUS a negative procalcitonin without other underlying concern for bacterial infection, consider observing off antibiotics or discontinuation of antibiotics and continue supportive care. If the respiratory panel is positive for atypical bacterial infection (Bordetella pertussis, Chlamydophila pneumoniae, or Mycoplasma pneumoniae), consider antibiotic de-escalation to target atypical bacterial infection.            No radiology results from the last 24 hrs    Results for orders placed during the hospital encounter of 06/16/23    Adult Transthoracic Echo Complete W/ Cont if Necessary Per Protocol    Interpretation Summary    Left ventricular systolic function is normal. Estimated left ventricular EF = 60%    Left ventricular diastolic function was normal.    Mild aortic insufficiency.    Trace to mild mitral and tricuspid regurgitation.    Calculated right ventricular systolic pressure from tricuspid regurgitation is 20 mmHg.    There is a trivial pericardial effusion.      Current medications:  Scheduled Meds:acetaZOLAMIDE, 250 mg, Oral, Daily  apixaban, 2.5 mg, Oral, Q12H  ascorbic acid, 250 mg, Oral, Daily  brimonidine, 1 drop, Both Eyes, Q12H   And  timolol, 1 drop, Both Eyes, Q12H  digoxin, 125 mcg, Oral, Every Other Day  dilTIAZem CD, 120 mg, Oral, Q24H  galantamine, 8 mg, Oral, BID With Meals  ipratropium-albuterol, 3 mL, Nebulization, Q4H - RT  levothyroxine, 88 mcg, Oral, Daily  midodrine, 10 mg, Oral, TID AC  mirtazapine, 15 mg, Oral, Nightly  pantoprazole, 40 mg, Oral, Q AM  PARoxetine, 10 mg, Oral, Nightly  piperacillin-tazobactam, 3.375 g, Intravenous, Q8H  senna-docusate sodium, 2 tablet, Oral, BID  sodium chloride, 10 mL, Intravenous, Q12H  torsemide, 10 mg, Oral, Daily      Continuous Infusions:   PRN Meds:.  acetaminophen **OR** acetaminophen **OR** acetaminophen    senna-docusate sodium **AND** polyethylene glycol **AND** bisacodyl  **AND** bisacodyl    Magnesium Standard Dose Replacement - Follow Nurse / BPA Driven Protocol    melatonin    metoprolol tartrate    ondansetron    Potassium Replacement - Follow Nurse / BPA Driven Protocol    sodium chloride    sodium chloride    sodium chloride    Assessment & Plan   Assessment & Plan     Active Hospital Problems    Diagnosis  POA    **Acute respiratory failure with hypoxia [J96.01]  Yes    Pneumonia of both lungs due to infectious organism [J18.9]  Unknown    Hyponatremia [E87.1]  Unknown    Severe Malnutrition (HCC) [E43]  Yes    Atrial fibrillation with RVR [I48.91]  Yes    Cachexia [R64]  Yes    Senile osteoporosis [M81.0]  Yes    Hashimoto's thyroiditis [E06.3]  Yes    Primary open angle glaucoma of both eyes, severe stage [H40.1133]  Yes    Mixed anxiety and depressive disorder [F41.8]  Yes    Multiple nodules of lung [R91.8]  Yes    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        Brief Hospital Course to date:  Vita Miller is a 83 y.o. female with history of hypothyroidism, COPD with chronic bronchiectasis, history of chronic MAC with unknown intolerance to prior treatment plan, history of orthostatic hypotension, hyperlipidemia, osteoporosis, bilateral glaucoma, mild memory deficit, who was recently admitted to our facility approximately 1 month ago for new onset atrial fibrillation with RVR and returned to the ED twith complaints of palpitations and shortness of breath.  Was found with acute respiratory failure and hypoxia, bilateral pneumonia infiltrates with COPD exacerbation.  She completed Zosyn steroid therapy with improvement of symptoms.  AI/vasculitis panel negative.  Cardiology was  consulted for A-fib with RVR which was complicated by hypotension.  Flecainide was stopped due to concerns for           pneumonitis.  Midodrine was started for blood pressure support.  Anticoagulated with Eliquis.  Patient continues on diltiazem, digoxin.  LVEF 60% with normal diastolic on echo from 6/17/23.     This patient's problems and plans were partially entered by my partner and updated as appropriate by me 07/24/23.    Copied text in this note has been reviewed and is accurate as of 07/24/23.     Acute respiratory failure with hypoxia  Bilateral pneumonia/infiltrates  History of MAC, intolerant to treatment  COPD with acute exacerbation, chronic bronchiectasis  -- Pulmonology following.  Clinically improved.  AI/vasculitis panels are negative.  Recs to continue vest therapy, discussed with Dr. Hook  --Off steroids  --Continue Zosyn to complete 7 days tx, s/p azith  -- Duo nebs  -- Flutter valve, mucinex  --MRSA pcr negative, strep negative, legionella negative  --CT chest showed new GGO, bronchiectasis, scattered nodules.  Needs f/u CT or PET scan  -- bumex, diamox  --monitor renal function, AM BMP     Atrial fibrillation with rvr, not in aflutter  --07/21 converted back to Afib RVR following a BM at 8am. Pt was hypotensive. Family/patient declined lopressor indefinitely. Midodrine increased 10 mg TID and albumin adm for BP support. Cardiology updated.  --s/p esmolol drip  --eliquis  --flecainide --> bisoprolol ---> changed to digoxin per cards, no flecainide due to pneumonitis concerns, partner discussed with Dr. Padilla  -- has upcoming appt with Dr. Martin at  (suggested making that telehealth, family very concerned about missing this appt)   --fludrocortisone discontinued 07/21  --cards following.  PO digoxin, diltiazem   --LVEF 60%, normal diastolic on echo from 6/17/2023     Altered mental status  --improved  --u/a bland, B12 wnl.   RPR nonreactive     Hyponatremia, resolved  --Na 129 on  admission; suspect related to volume overload  --Na 136 on 7/24     Orthostatic hypotension  --fludricortisone discontinued  --midodrine increased 10 mg TID     Hypothyroidism  --slightly elevated tsh  --continue synthroid, dose adjusted on 7/16; repeat tsh in 4-6 weeks; careful adjustment given RVR     Other chronic conditions: HLD, malnutrition, anxiety/depression, memory impairment  --continue home meds  --nutrition  --pt/otv       Expected Discharge Location and Transportation: SNF rehab   Expected Discharge   Expected Discharge Date: 7/25/2023; Expected Discharge Time:      DVT prophylaxis:  Medical and mechanical DVT prophylaxis orders are present.     AM-PAC 6 Clicks Score (PT): 17 (07/21/23 1000)    CODE STATUS:   Code Status and Medical Interventions:   Ordered at: 07/15/23 0214     Code Status (Patient has no pulse and is not breathing):    CPR (Attempt to Resuscitate)     Medical Interventions (Patient has pulse or is breathing):    Full Support       Deya Sorensen, SCOTT  07/24/23

## 2023-07-24 NOTE — PLAN OF CARE
Goal Outcome Evaluation:  Plan of Care Reviewed With: patient        Progress: improving  Outcome Evaluation: Pt  is progressing, but continues below baseline with self care/mobility d/t confusion, weakness, and decreased balance.  Pt CGA LBD,  SBA sinkside grooming,  CGA to ambulate 60 ft with RW.  Recommend SNF upon d/c.

## 2023-07-25 LAB — DIGOXIN SERPL-MCNC: <0.3 NG/ML (ref 0.6–1.2)

## 2023-07-25 PROCEDURE — 94761 N-INVAS EAR/PLS OXIMETRY MLT: CPT

## 2023-07-25 PROCEDURE — 80162 ASSAY OF DIGOXIN TOTAL: CPT | Performed by: INTERNAL MEDICINE

## 2023-07-25 PROCEDURE — 99231 SBSQ HOSP IP/OBS SF/LOW 25: CPT | Performed by: INTERNAL MEDICINE

## 2023-07-25 PROCEDURE — 94669 MECHANICAL CHEST WALL OSCILL: CPT

## 2023-07-25 PROCEDURE — 99232 SBSQ HOSP IP/OBS MODERATE 35: CPT | Performed by: INTERNAL MEDICINE

## 2023-07-25 PROCEDURE — 97116 GAIT TRAINING THERAPY: CPT | Performed by: PHYSICAL THERAPIST

## 2023-07-25 PROCEDURE — 94799 UNLISTED PULMONARY SVC/PX: CPT

## 2023-07-25 PROCEDURE — 97110 THERAPEUTIC EXERCISES: CPT | Performed by: PHYSICAL THERAPIST

## 2023-07-25 RX ORDER — DILTIAZEM HYDROCHLORIDE 180 MG/1
180 CAPSULE, COATED, EXTENDED RELEASE ORAL
Status: DISCONTINUED | OUTPATIENT
Start: 2023-07-25 | End: 2023-08-01 | Stop reason: HOSPADM

## 2023-07-25 RX ORDER — MIDODRINE HYDROCHLORIDE 5 MG/1
5 TABLET ORAL
Status: DISCONTINUED | OUTPATIENT
Start: 2023-07-25 | End: 2023-08-01 | Stop reason: HOSPADM

## 2023-07-25 RX ADMIN — IPRATROPIUM BROMIDE AND ALBUTEROL SULFATE 3 ML: 2.5; .5 SOLUTION RESPIRATORY (INHALATION) at 08:02

## 2023-07-25 RX ADMIN — IPRATROPIUM BROMIDE AND ALBUTEROL SULFATE 3 ML: 2.5; .5 SOLUTION RESPIRATORY (INHALATION) at 23:46

## 2023-07-25 RX ADMIN — PANTOPRAZOLE SODIUM 40 MG: 40 TABLET, DELAYED RELEASE ORAL at 06:27

## 2023-07-25 RX ADMIN — ACETAZOLAMIDE 250 MG: 250 TABLET ORAL at 10:25

## 2023-07-25 RX ADMIN — GALANTAMINE 8 MG: 4 TABLET, FILM COATED ORAL at 10:25

## 2023-07-25 RX ADMIN — Medication 5 MG: at 23:34

## 2023-07-25 RX ADMIN — BRIMONIDINE TARTRATE 1 DROP: 2 SOLUTION/ DROPS OPHTHALMIC at 20:22

## 2023-07-25 RX ADMIN — GALANTAMINE 8 MG: 4 TABLET, FILM COATED ORAL at 17:32

## 2023-07-25 RX ADMIN — APIXABAN 2.5 MG: 2.5 TABLET, FILM COATED ORAL at 10:24

## 2023-07-25 RX ADMIN — IPRATROPIUM BROMIDE AND ALBUTEROL SULFATE 3 ML: 2.5; .5 SOLUTION RESPIRATORY (INHALATION) at 16:26

## 2023-07-25 RX ADMIN — IPRATROPIUM BROMIDE AND ALBUTEROL SULFATE 3 ML: 2.5; .5 SOLUTION RESPIRATORY (INHALATION) at 19:30

## 2023-07-25 RX ADMIN — BRIMONIDINE TARTRATE 1 DROP: 2 SOLUTION/ DROPS OPHTHALMIC at 10:31

## 2023-07-25 RX ADMIN — TORSEMIDE 10 MG: 20 TABLET ORAL at 10:24

## 2023-07-25 RX ADMIN — PAROXETINE HYDROCHLORIDE 10 MG: 10 TABLET, FILM COATED ORAL at 20:21

## 2023-07-25 RX ADMIN — LEVOTHYROXINE SODIUM 88 MCG: 0.09 TABLET ORAL at 06:28

## 2023-07-25 RX ADMIN — TIMOLOL MALEATE 1 DROP: 5 SOLUTION/ DROPS OPHTHALMIC at 10:31

## 2023-07-25 RX ADMIN — TIMOLOL MALEATE 1 DROP: 5 SOLUTION/ DROPS OPHTHALMIC at 20:22

## 2023-07-25 RX ADMIN — DILTIAZEM HYDROCHLORIDE 180 MG: 180 CAPSULE, EXTENDED RELEASE ORAL at 10:24

## 2023-07-25 RX ADMIN — APIXABAN 2.5 MG: 2.5 TABLET, FILM COATED ORAL at 20:22

## 2023-07-25 RX ADMIN — Medication 250 MG: at 10:24

## 2023-07-25 RX ADMIN — MIRTAZAPINE 15 MG: 15 TABLET, FILM COATED ORAL at 20:22

## 2023-07-25 RX ADMIN — Medication 10 ML: at 10:25

## 2023-07-25 NOTE — CASE MANAGEMENT/SOCIAL WORK
Continued Stay Note   [facility]     Patient Name: Vita Miller  MRN: [id number]  Today's Date: 7/25/2023    Admit Date: 7/14/2023    Plan: SNF   Discharge Plan       Row Name 07/25/23 1222       Plan    Plan SNF    Patient/Family in Agreement with Plan yes    Plan Comments Spoke with Ms. Miller and her family at bedside to discuss discharge plan. Ms. Miller reported that she still feels weaker than normal and still wants SNF at discharge. Informed patient that Stephanie with the [facility] started the insurance precert today. Family was in agreement with Ms. Miller regarding rehab and stated that they fear she would not be able to safely get from her room to the bathroom or other areas of her home.  will continue to follow plan of care and assist with discharge needs as indicated.    Final Discharge Disposition Code 03 - skilled nursing facility (SNF)                   Discharge Codes    No documentation.                 Expected Discharge Date and Time       Expected Discharge Date Expected Discharge Time    Jul 26, 2023               Loly Moreiar RN

## 2023-07-25 NOTE — PLAN OF CARE
Goal Outcome Evaluation:  Plan of Care Reviewed With: patient        Progress: improving  Outcome Evaluation: PT progress note completed.  1/4 goals met - goals revised for higher level of function.  Pt demonstrating very good progress and is very motivated to return to full independence.  Pt stood with CGA and ambulated 200 feet using rw with CGAx1.  Pt continues to be limited by weakness, decreased activity tolerance, and mild gait instability.  Continue to recommend SNF for further therapy at d/c.      Anticipated Discharge Disposition (PT): skilled nursing facility

## 2023-07-25 NOTE — PLAN OF CARE
Goal Outcome Evaluation:  Plan of Care Reviewed With: patient      Progress: improving       Problem: Adult Inpatient Plan of Care  Goal: Absence of Hospital-Acquired Illness or Injury  Intervention: Identify and Manage Fall Risk  Recent Flowsheet Documentation  Taken 7/24/2023 2000 by Giorgio Vásquez RN  Safety Promotion/Fall Prevention: activity supervised  Intervention: Prevent Skin Injury  Recent Flowsheet Documentation  Taken 7/24/2023 2000 by Giorgio Vásquez RN  Body Position: position changed independently  Intervention: Prevent and Manage VTE (Venous Thromboembolism) Risk  Recent Flowsheet Documentation  Taken 7/24/2023 2000 by Giorgio Vásquez RN  Activity Management: up in chair     Problem: Adult Inpatient Plan of Care  Goal: Absence of Hospital-Acquired Illness or Injury  Intervention: Prevent Skin Injury  Recent Flowsheet Documentation  Taken 7/24/2023 2000 by Giorgio Vásquez RN  Body Position: position changed independently     Problem: Adult Inpatient Plan of Care  Goal: Absence of Hospital-Acquired Illness or Injury  Intervention: Prevent and Manage VTE (Venous Thromboembolism) Risk  Recent Flowsheet Documentation  Taken 7/24/2023 2000 by Giorgio Vásquez RN  Activity Management: up in chair           Opt out

## 2023-07-25 NOTE — PROGRESS NOTES
Vita Miller  [id number]  1940   LOS: 10 days   Patient Care Team:  Alfonso Steele MD as PCP - General (Internal Medicine)    Chief Complaint:  PAF / SEVERE LUNG DZ WITH CHRONIC HYPOXIC HYPERCARBIC RESPIRATORY FAILURE / HFpEF / CONFUSION / HYPONATREMIA        Subjective     Comfortable semirecumbent in bed and awakened from light sleep.  No current cardiopulmonary complaints.  No significant cough, sputum production, pleurisy, hemoptysis, anginal type chest discomfort, tachypalpitation, or GI/ difficulty.  Acceptable appetite.  Normal room air oximetry (96-97%).    Objective     Vital Sign Min/Max for last 24 hours  Temp  Min: 96.6 øF (35.9 øC)  Max: 97.8 øF (36.6 øC)   BP  Min: 121/66  Max: 141/70   Pulse  Min: 50  Max: 68   Resp  Min: 16  Max: 17   SpO2  Min: 93 %  Max: 96 %               07/14/23  2224   Weight: 46.3 kg (102 lb)         Intake/Output Summary (Last 24 hours) at 7/25/2023 0708  Last data filed at 7/24/2023 2338  Gross per 24 hour   Intake 840 ml   Output 950 ml   Net -110 ml       Physical Exam:     General Appearance:    Alert, cooperative, in no acute distress   Lungs:   Overall diminished breath sounds with scattered rhonchi and wheezes without crackles/dullness,respirations regular, even and unlabored    Heart:    Regular and normal rate, normal S1 and S2, grade 1/6 systolic murmur, no gallop, no rub, no click   Abdomen:  Extremities:   Soft, nontender, bowel sounds audible x4    No edema, normal range of motion   Pulses:   Pulses palpable and equal bilaterally      Results Review:   Results from last 7 days   Lab Units 07/24/23  0409 07/22/23  0434 07/20/23  0331   SODIUM mmol/L 136 137 136   POTASSIUM mmol/L 4.3 4.4 4.4   CHLORIDE mmol/L 100 97* 94*   CO2 mmol/L 22.0 24.0 40.0*   BUN mg/dL 17 15 20   CREATININE mg/dL 0.94 0.80 0.94   GLUCOSE mg/dL 83 74 98   CALCIUM mg/dL 9.1 8.9 8.8     Results from last 7 days   Lab Units 07/22/23  0434   WBC 10*3/mm3 8.03   HEMOGLOBIN g/dL  "13.1   HEMATOCRIT % 42.4   PLATELETS 10*3/mm3 237     NO NEW CXR / EKG / LAB RESULTS.    Medication Review: REVIEWED; NO DRIPS.    Assessment & Plan     Acceptable hemodynamics and persistent normal sinus with excellent oxygenation.  She will need \"baseline\" chest x-ray at the time of discharge to inpatient cardiopulmonary rehabilitation.  She will initiate electrophysiology evaluation and care at [facility] post discharge.  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

## 2023-07-25 NOTE — THERAPY PROGRESS REPORT/RE-CERT
Patient Name: Vita Miller  : 1940    MRN: [id number]                              Today's Date: 2023       Admit Date: 2023    Visit Dx:     ICD-10-CM ICD-9-CM   1. Atrial fibrillation with RVR  I48.91 427.31   2. Acute respiratory failure with hypoxia  J96.01 518.81   3. Pneumonia of both upper lobes due to infectious organism  J18.9 486   4. Pneumonia of both lungs due to infectious organism, unspecified part of lung  J18.9 483.8   5. Severe Malnutrition (HCC)  E43 261   6. 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 Primary open angle glaucoma of both eyes, severe stage  H40.1133 365.11     365.73   8. Multiple nodules of lung  R91.8 793.19   9. H/O orthostatic hypotension  Z86.79 V12.59   10. COPD with acute exacerbation  J44.1 491.21   11. Acquired bronchiectasis  J47.9 494.0   12. 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    Hypothyroidism    Acute respiratory failure with hypoxia    Cachexia    Severe Malnutrition (HCC)    Pneumonia of both lungs due to infectious organism    Hyponatremia     Past Medical History:   Diagnosis Date    A-fib     Disease of thyroid gland     Glaucoma     Hypotension      Past Surgical History:   Procedure Laterality Date    HIP SURGERY      PATELLA SURGERY      SHOULDER SURGERY        General Information       Row Name 2344          Physical Therapy Time and Intention    Document Type progress note/recertification  -LM     Mode of Treatment individual therapy;physical therapy  -LM       Row Name 2344           General Information    Patient Profile Reviewed yes  -LM     Existing Precautions/Restrictions fall;other (see comments)  Brief with OOB activity  -LM     Barriers to Rehab medically complex  -LM       Row Name 07/25/23 0944          Cognition    Orientation Status (Cognition) oriented to;person;place;verbal cues/prompts needed for orientation;time  -LM       Row Name 07/25/23 0944          Safety Issues, Functional Mobility    Safety Issues Affecting Function (Mobility) insight into deficits/self-awareness;judgment;safety precaution awareness;safety precautions follow-through/compliance;sequencing abilities  -LM     Impairments Affecting Function (Mobility) balance;endurance/activity tolerance;strength  -LM               User Key  (r) = Recorded By, (t) = Taken By, (c) = Cosigned By      Initials Name Provider Type    LM January Fitzpatrick, PT Physical Therapist                   Mobility       Row Name 07/25/23 0945          Bed Mobility    Rolling Left Cache (Bed Mobility) modified independence  -LM     Scooting/Bridging Cache (Bed Mobility) modified independence  -LM     Supine-Sit Cache (Bed Mobility) standby assist  -LM     Comment, (Bed Mobility) Rolled bilaterally for brief placement.  HOB flat; No BRs with supine-->sit.  -LM       Row Name 07/25/23 0945          Sit-Stand Transfer    Sit-Stand Cache (Transfers) contact guard;1 person assist;verbal cues  -LM     Assistive Device (Sit-Stand Transfers) walker, front-wheeled  -LM     Comment, (Sit-Stand Transfer) Vc's for hand placement.  -LM       Row Name 07/25/23 0945          Gait/Stairs (Locomotion)    Cache Level (Gait) contact guard;1 person assist;verbal cues  -LM     Assistive Device (Gait) walker, front-wheeled  -LM     Distance in Feet (Gait) 200  -LM     Deviations/Abnormal Patterns (Gait) bilateral deviations;base of support, narrow;leanna decreased;stride length decreased;weight shifting decreased;gait speed  decreased  -LM     Bilateral Gait Deviations forward flexed posture;heel strike decreased  -LM     Comment, (Gait/Stairs) Vc's for upright posture and heel strike.  Mild instability noted during gait, but no overt LOB.  Pt fatigued post activity but O2 sat at 93% on RA.  -LM               User Key  (r) = Recorded By, (t) = Taken By, (c) = Cosigned By      Initials Name Provider Type    LM January Fitzpatrick, NABIL Physical Therapist                   Obj/Interventions       Row Name 07/25/23 0949          Range of Motion Comprehensive    General Range of Motion bilateral lower extremity ROM WFL  -LM       Row Name 07/25/23 0949          Strength Comprehensive (MMT)    General Manual Muscle Testing (MMT) Assessment lower extremity strength deficits identified  -LM     Comment, General Manual Muscle Testing (MMT) Assessment BLEs - Hip flex - 4-/5;  Knees/Ankles grossly 4/5  -LM       Row Name 07/25/23 0949          Hip (Therapeutic Exercise)    Hip AROM (Therapeutic Exercise) bilateral;aBduction;aDduction;supine;15 repititions  -LM     Hip Isometrics (Therapeutic Exercise) bilateral;gluteal sets;supine  15 reps  -LM       Row Name 07/25/23 0949          Knee (Therapeutic Exercise)    Knee AROM (Therapeutic Exercise) bilateral;SLR (straight leg raise);heel slides;supine;15 repititions  -LM     Knee Isometrics (Therapeutic Exercise) bilateral;quad sets;supine  15 reps  -LM       Row Name 07/25/23 0949          Ankle (Therapeutic Exercise)    Ankle AROM (Therapeutic Exercise) bilateral;dorsiflexion;plantarflexion;supine;20 repititions  -LM       Row Name 07/25/23 0949          Balance    Static Sitting Balance supervision  -LM     Dynamic Sitting Balance supervision  -LM     Position, Sitting Balance unsupported;sitting edge of bed  -LM     Static Standing Balance standby assist  -LM     Dynamic Standing Balance contact guard  -LM     Position/Device Used, Standing Balance supported;walker, front-wheeled  -LM               User  Key  (r) = Recorded By, (t) = Taken By, (c) = Cosigned By      Initials Name Provider Type    LM January Fitzpatrick, PT Physical Therapist                   Goals/Plan       Row Name 07/25/23 0951          Bed Mobility Goal 1 (PT)    Activity/Assistive Device (Bed Mobility Goal 1, PT) bed mobility activities, all  -LM     Craig Level/Cues Needed (Bed Mobility Goal 1, PT) independent  -LM     Time Frame (Bed Mobility Goal 1, PT) long term goal (LTG);1 week  -LM     Progress/Outcomes (Bed Mobility Goal 1, PT) goal ongoing  -LM       Row Name 07/25/23 0951          Transfer Goal 1 (PT)    Activity/Assistive Device (Transfer Goal 1, PT) sit-to-stand/stand-to-sit;bed-to-chair/chair-to-bed;cane, straight  -LM     Craig Level/Cues Needed (Transfer Goal 1, PT) standby assist  -LM     Time Frame (Transfer Goal 1, PT) long term goal (LTG);1 week  -LM     Progress/Outcome (Transfer Goal 1, PT) goal ongoing  -LM       Row Name 07/25/23 0951          Gait Training Goal 1 (PT)    Activity/Assistive Device (Gait Training Goal 1, PT) gait (walking locomotion);assistive device use  -LM     Craig Level (Gait Training Goal 1, PT) standby assist  -LM     Distance (Gait Training Goal 1, PT) 300 feet  -LM     Time Frame (Gait Training Goal 1, PT) long term goal (LTG);2 weeks  -LM     Progress/Outcome (Gait Training Goal 1, PT) goal revised this date  previous goal met  -LM       Row Name 07/25/23 0951          Patient Education Goal (PT)    Activity (Patient Education Goal, PT) HEP exer  -LM     Craig/Cues/Accuracy (Memory Goal 2, PT) demonstrates adequately;verbalizes understanding  -LM     Time Frame (Patient Education Goal, PT) long term goal (LTG);1 week  -LM     Progress/Outcome (Patient Education Goal, PT) goal ongoing  -LM       Row Name 07/25/23 0951          Therapy Assessment/Plan (PT)    Planned Therapy Interventions (PT) balance training;bed mobility training;gait training;home exercise program;motor  coordination training;neuromuscular re-education;patient/family education;postural re-education;ROM (range of motion);stair training;strengthening;stretching;transfer training  -LM               User Key  (r) = Recorded By, (t) = Taken By, (c) = Cosigned By      Initials Name Provider Type    LM January Fitzpatrick, PT Physical Therapist                   Clinical Impression       Row Name 07/25/23 0951          Pain    Pretreatment Pain Rating 0/10 - no pain  -LM     Posttreatment Pain Rating 0/10 - no pain  -LM       Row Name 07/25/23 0951          Plan of Care Review    Plan of Care Reviewed With patient  -LM     Progress improving  -LM     Outcome Evaluation PT progress note completed.  1/4 goals met - goals revised for higher level of function.  Pt demonstrating very good progress and is very motivated to return to full independence.  Pt stood with CGA and ambulated 200 feet using rw with CGAx1.  Pt continues to be limited by weakness, decreased activity tolerance, and mild gait instability.  Continue to recommend SNF for further therapy at d/c.  -       Row Name 07/25/23 0951          Vital Signs    Pre Systolic BP Rehab 121  -LM     Pre Treatment Diastolic BP 71  -LM     Pretreatment Heart Rate (beats/min) 74  -LM     Posttreatment Heart Rate (beats/min) 71  -LM     Pre SpO2 (%) 97  -LM     O2 Delivery Pre Treatment room air  -LM     Intra SpO2 (%) 93  -LM     O2 Delivery Intra Treatment room air  -LM     Post SpO2 (%) 96  -LM     O2 Delivery Post Treatment room air  -LM     Pre Patient Position Supine  -LM     Intra Patient Position Sitting  -LM     Post Patient Position Sitting  -LM       Row Name 07/25/23 0951          Positioning and Restraints    Pre-Treatment Position in bed  -LM     Post Treatment Position chair  -LM     In Chair reclined;call light within reach;encouraged to call for assist;exit alarm on;waffle cushion;with family/caregiver;notified nsg  -LM               User Key  (r) = Recorded By, (t)  = Taken By, (c) = Cosigned By      Initials Name Provider Type    January Lui PT Physical Therapist                   Outcome Measures       Row Name 07/25/23 0955          How much help from another person do you currently need...    Turning from your back to your side while in flat bed without using bedrails? 4  -LM     Moving from lying on back to sitting on the side of a flat bed without bedrails? 3  -LM     Moving to and from a bed to a chair (including a wheelchair)? 3  -LM     Standing up from a chair using your arms (e.g., wheelchair, bedside chair)? 3  -LM     Climbing 3-5 steps with a railing? 2  -LM     To walk in hospital room? 3  -LM     AM-PAC 6 Clicks Score (PT) 18  -LM     Highest level of mobility 6 --> Walked 10 steps or more  -LM       Row Name 07/25/23 0955          Functional Assessment    Outcome Measure Options AM-PAC 6 Clicks Basic Mobility (PT)  -LM               User Key  (r) = Recorded By, (t) = Taken By, (c) = Cosigned By      Initials Name Provider Type    January Lui PT Physical Therapist                                 Physical Therapy Education       Title: PT OT SLP Therapies (In Progress)       Topic: Physical Therapy (In Progress)       Point: Mobility training (Done)       Learning Progress Summary             Patient Acceptance, E, VU,NR by  at 7/20/2023 1609    Comment: see flowsheet    Acceptance, E, VU,NR by  at 7/18/2023 0933    Comment: see flowsheet    Eager, E,D, NR by DM at 7/15/2023 1642                         Point: Home exercise program (In Progress)       Learning Progress Summary             Patient Eager, E,D, NR by DM at 7/15/2023 1642                         Point: Body mechanics (Done)       Learning Progress Summary             Patient Acceptance, E, VU,NR by  at 7/20/2023 1609    Comment: see flowsheet    Acceptance, E, VU,NR by  at 7/18/2023 0933    Comment: see flowsheet    Eager, E,D, NR by DM at 7/15/2023 1642                          Point: Precautions (Done)       Learning Progress Summary             Patient Acceptance, E, VU,NR by  at 7/20/2023 1609    Comment: see flowsheet    Acceptance, E, VU,NR by  at 7/18/2023 0933    Comment: see flowsheet    Eager, E,D, NR by  at 7/15/2023 1642                                         User Key       Initials Effective Dates Name Provider Type Discipline     02/03/23 -  Merced Patel, PT Physical Therapist PT     05/15/23 -  Lili Crawford PT Student PT Student PT                  PT Recommendation and Plan  Planned Therapy Interventions (PT): balance training, bed mobility training, gait training, home exercise program, motor coordination training, neuromuscular re-education, patient/family education, postural re-education, ROM (range of motion), stair training, strengthening, stretching, transfer training  Plan of Care Reviewed With: patient  Progress: improving  Outcome Evaluation: PT progress note completed.  1/4 goals met - goals revised for higher level of function.  Pt demonstrating very good progress and is very motivated to return to full independence.  Pt stood with CGA and ambulated 200 feet using rw with CGAx1.  Pt continues to be limited by weakness, decreased activity tolerance, and mild gait instability.  Continue to recommend SNF for further therapy at d/c.     Time Calculation:         PT Charges       Row Name 07/25/23 0955             Time Calculation    Start Time 0845  -LM      PT Received On 07/25/23  -LM      PT Goal Re-Cert Due Date 08/04/23  -LM         Timed Charges    63320 - PT Therapeutic Exercise Minutes 10  -LM      03836 - Gait Training Minutes  23  -LM      49351 - PT Therapeutic Activity Minutes 5  -LM         Total Minutes    Timed Charges Total Minutes 38  -LM       Total Minutes 38  -LM                User Key  (r) = Recorded By, (t) = Taken By, (c) = Cosigned By      Initials Name Provider Type    January Lui, PT Physical Therapist                   Therapy Charges for Today       Code Description Service Date Service Provider Modifiers Qty    12769099071 HC GAIT TRAINING EA 15 MIN 7/25/2023 January Fitzpatrick, PT GP 2    38363231687 HC PT THER PROC EA 15 MIN 7/25/2023 January Fitzpatrick, PT GP 1            PT G-Codes  Outcome Measure Options: AM-PAC 6 Clicks Basic Mobility (PT)  AM-PAC 6 Clicks Score (PT): 18  AM-PAC 6 Clicks Score (OT): 19  PT Discharge Summary  Anticipated Discharge Disposition (PT): skilled nursing facility    January Fitzpatrick PT  7/25/2023

## 2023-07-25 NOTE — PLAN OF CARE
Problem: Adult Inpatient Plan of Care  Goal: Plan of Care Review  Outcome: Ongoing, Progressing  Flowsheets (Taken 7/25/2023 1442)  Progress: no change  Plan of Care Reviewed With:   patient   family  Goal: Patient-Specific Goal (Individualized)  Outcome: Ongoing, Progressing  Goal: Absence of Hospital-Acquired Illness or Injury  Outcome: Ongoing, Progressing  Intervention: Identify and Manage Fall Risk  Recent Flowsheet Documentation  Taken 7/25/2023 1200 by Genaro Nugent RN  Safety Promotion/Fall Prevention:   activity supervised   assistive device/personal items within reach   clutter free environment maintained   fall prevention program maintained   nonskid shoes/slippers when out of bed   room organization consistent   safety round/check completed  Taken 7/25/2023 1000 by Genaro Nugent RN  Safety Promotion/Fall Prevention:   activity supervised   assistive device/personal items within reach   fall prevention program maintained   room organization consistent   safety round/check completed  Taken 7/25/2023 0800 by Genaro Nugent RN  Safety Promotion/Fall Prevention:   activity supervised   assistive device/personal items within reach   clutter free environment maintained   nonskid shoes/slippers when out of bed   safety round/check completed   room organization consistent  Intervention: Prevent Skin Injury  Recent Flowsheet Documentation  Taken 7/25/2023 1200 by Genaro Nugent RN  Body Position:   position changed independently   weight shifting  Skin Protection:   adhesive use limited   skin-to-device areas padded   skin-to-skin areas padded   tubing/devices free from skin contact  Taken 7/25/2023 1000 by Genaro Nugent RN  Body Position: position changed independently  Skin Protection:   adhesive use limited   skin-to-device areas padded   skin-to-skin areas padded   tubing/devices free from skin contact  Taken 7/25/2023 0800 by Genaro Nugent RN  Body Position:   position changed independently    weight shifting  Skin Protection:   adhesive use limited   incontinence pads utilized   skin-to-device areas padded   skin-to-skin areas padded   tubing/devices free from skin contact  Intervention: Prevent and Manage VTE (Venous Thromboembolism) Risk  Recent Flowsheet Documentation  Taken 7/25/2023 1200 by Genaro Nugent RN  Activity Management:   activity encouraged   up in chair  Taken 7/25/2023 1000 by Genaro Nugent RN  Activity Management:   activity encouraged   up in chair  Taken 7/25/2023 0800 by Genaro Nugent RN  Activity Management: activity encouraged  VTE Prevention/Management: (patient is on Eliquis) --  Range of Motion: active ROM (range of motion) encouraged  Intervention: Prevent Infection  Recent Flowsheet Documentation  Taken 7/25/2023 1200 by Genaro Nugent RN  Infection Prevention:   environmental surveillance performed   equipment surfaces disinfected   hand hygiene promoted   personal protective equipment utilized   rest/sleep promoted   single patient room provided  Taken 7/25/2023 1000 by Genaro Nugent RN  Infection Prevention:   environmental surveillance performed   equipment surfaces disinfected   hand hygiene promoted   personal protective equipment utilized  Taken 7/25/2023 0800 by Genaro Nugent RN  Infection Prevention:   environmental surveillance performed   equipment surfaces disinfected   hand hygiene promoted   personal protective equipment utilized   rest/sleep promoted   single patient room provided  Goal: Optimal Comfort and Wellbeing  Outcome: Ongoing, Progressing  Intervention: Provide Person-Centered Care  Recent Flowsheet Documentation  Taken 7/25/2023 0800 by Genaro Nugent RN  Trust Relationship/Rapport:   care explained   emotional support provided   empathic listening provided   questions answered   questions encouraged   reassurance provided   thoughts/feelings acknowledged   choices provided  Goal: Readiness for Transition of Care  Outcome: Ongoing,  Progressing     Problem: COPD (Chronic Obstructive Pulmonary Disease) Comorbidity  Goal: Maintenance of COPD Symptom Control  Outcome: Ongoing, Progressing  Intervention: Maintain COPD-Symptom Control  Recent Flowsheet Documentation  Taken 7/25/2023 1200 by Genaro Nugent RN  Supportive Measures:   active listening utilized   decision-making supported   relaxation techniques promoted  Medication Review/Management:   medications reviewed   high-risk medications identified  Taken 7/25/2023 1000 by Genaro Nugent RN  Supportive Measures: active listening utilized  Medication Review/Management:   medications reviewed   high-risk medications identified  Taken 7/25/2023 0800 by Genaro Nugent RN  Supportive Measures:   active listening utilized   positive reinforcement provided   verbalization of feelings encouraged  Medication Review/Management:   medications reviewed   high-risk medications identified     Problem: Skin Injury Risk Increased  Goal: Skin Health and Integrity  Outcome: Ongoing, Progressing  Intervention: Promote and Optimize Oral Intake  Recent Flowsheet Documentation  Taken 7/25/2023 1200 by Genaro Nugent RN  Oral Nutrition Promotion:   rest periods promoted   safe use of adaptive equipment encouraged  Taken 7/25/2023 1000 by Genaro Nugent RN  Oral Nutrition Promotion:   rest periods promoted   safe use of adaptive equipment encouraged  Taken 7/25/2023 0800 by Genaro Nugent RN  Oral Nutrition Promotion:   rest periods promoted   safe use of adaptive equipment encouraged  Intervention: Optimize Skin Protection  Recent Flowsheet Documentation  Taken 7/25/2023 1200 by Genaro Nugent RN  Pressure Reduction Techniques:   frequent weight shift encouraged   heels elevated off bed   rest period provided between sit times   weight shift assistance provided  Head of Bed (HOB) Positioning: HOB elevated  Pressure Reduction Devices: pressure-redistributing mattress utilized  Skin Protection:   adhesive use  limited   skin-to-device areas padded   skin-to-skin areas padded   tubing/devices free from skin contact  Taken 7/25/2023 1000 by Genaro Nugent RN  Pressure Reduction Techniques:   frequent weight shift encouraged   heels elevated off bed   rest period provided between sit times   weight shift assistance provided  Head of Bed (HOB) Positioning: [facility] elevated  Pressure Reduction Devices:   pressure-redistributing mattress utilized   positioning supports utilized  Skin Protection:   adhesive use limited   skin-to-device areas padded   skin-to-skin areas padded   tubing/devices free from skin contact  Taken 7/25/2023 0800 by Genaro Nugent RN  Pressure Reduction Techniques:   frequent weight shift encouraged   heels elevated off bed   pressure points protected   rest period provided between sit times   weight shift assistance provided  Head of Bed (HOB) Positioning: [facility] elevated  Pressure Reduction Devices:   pressure-redistributing mattress utilized   positioning supports utilized  Skin Protection:   adhesive use limited   incontinence pads utilized   skin-to-device areas padded   skin-to-skin areas padded   tubing/devices free from skin contact     Problem: Fall Injury Risk  Goal: Absence of Fall and Fall-Related Injury  Outcome: Ongoing, Progressing  Intervention: Identify and Manage Contributors  Recent Flowsheet Documentation  Taken 7/25/2023 1200 by Genaro Nugent RN  Medication Review/Management:   medications reviewed   high-risk medications identified  Self-Care Promotion:   independence encouraged   safe use of adaptive equipment encouraged  Taken 7/25/2023 1000 by Genaro Nugent RN  Medication Review/Management:   medications reviewed   high-risk medications identified  Self-Care Promotion:   independence encouraged   safe use of adaptive equipment encouraged  Taken 7/25/2023 0800 by Genaro Nugent RN  Medication Review/Management:   medications reviewed   high-risk medications identified  Self-Care  Promotion:   independence encouraged   safe use of adaptive equipment encouraged  Intervention: Promote Injury-Free Environment  Recent Flowsheet Documentation  Taken 7/25/2023 1200 by Genaro Nugent RN  Safety Promotion/Fall Prevention:   activity supervised   assistive device/personal items within reach   clutter free environment maintained   fall prevention program maintained   nonskid shoes/slippers when out of bed   room organization consistent   safety round/check completed  Taken 7/25/2023 1000 by Genaro Nugent RN  Safety Promotion/Fall Prevention:   activity supervised   assistive device/personal items within reach   fall prevention program maintained   room organization consistent   safety round/check completed  Taken 7/25/2023 0800 by Genaro Nugent RN  Safety Promotion/Fall Prevention:   activity supervised   assistive device/personal items within reach   clutter free environment maintained   nonskid shoes/slippers when out of bed   safety round/check completed   room organization consistent     Problem: Adjustment to Illness (Sepsis/Septic Shock)  Goal: Optimal Coping  Outcome: Ongoing, Progressing  Intervention: Optimize Psychosocial Adjustment to Illness  Recent Flowsheet Documentation  Taken 7/25/2023 1200 by Genaro Nugent RN  Supportive Measures:   active listening utilized   decision-making supported   relaxation techniques promoted  Taken 7/25/2023 1000 by Genaro Nugent RN  Supportive Measures: active listening utilized  Taken 7/25/2023 0800 by Genaro Nugent RN  Supportive Measures:   active listening utilized   positive reinforcement provided   verbalization of feelings encouraged  Family/Support System Care: self-care encouraged     Problem: Bleeding (Sepsis/Septic Shock)  Goal: Absence of Bleeding  Outcome: Ongoing, Progressing  Intervention: Monitor and Manage Bleeding  Recent Flowsheet Documentation  Taken 7/25/2023 0800 by Genaro Nugent RN  Bleeding Precautions: monitored for  signs of bleeding     Problem: Glycemic Control Impaired (Sepsis/Septic Shock)  Goal: Blood Glucose Level Within Desired Range  Outcome: Ongoing, Progressing  Intervention: Optimize Glycemic Control  Recent Flowsheet Documentation  Taken 7/25/2023 1200 by Genaro Nugent RN  Glycemic Management: oral hydration promoted  Taken 7/25/2023 1000 by Genaro Nugent RN  Glycemic Management: oral hydration promoted  Taken 7/25/2023 0800 by Genaro Nugent RN  Glycemic Management: oral hydration promoted     Problem: Infection Progression (Sepsis/Septic Shock)  Goal: Absence of Infection Signs and Symptoms  Outcome: Ongoing, Progressing  Intervention: Initiate Sepsis Management  Recent Flowsheet Documentation  Taken 7/25/2023 1200 by Genaro Nugent RN  Infection Prevention:   environmental surveillance performed   equipment surfaces disinfected   hand hygiene promoted   personal protective equipment utilized   rest/sleep promoted   single patient room provided  Taken 7/25/2023 1000 by Genaro Nugent RN  Infection Prevention:   environmental surveillance performed   equipment surfaces disinfected   hand hygiene promoted   personal protective equipment utilized  Taken 7/25/2023 0800 by Genaro Nugent RN  Infection Prevention:   environmental surveillance performed   equipment surfaces disinfected   hand hygiene promoted   personal protective equipment utilized   rest/sleep promoted   single patient room provided  Intervention: Promote Recovery  Recent Flowsheet Documentation  Taken 7/25/2023 1200 by Genaro Nugent RN  Activity Management:   activity encouraged   up in chair  Sleep/Rest Enhancement:   consistent schedule promoted   regular sleep/rest pattern promoted   relaxation techniques promoted  Taken 7/25/2023 1000 by Genaro Nugent RN  Activity Management:   activity encouraged   up in chair  Sleep/Rest Enhancement:   consistent schedule promoted   relaxation techniques promoted  Taken 7/25/2023 0800 by Raffi  Genaro, RN  Activity Management: activity encouraged  Airway/Ventilation Support:   comfort measures provided   dyspnea relief promoted  Sleep/Rest Enhancement: consistent schedule promoted     Problem: Nutrition Impaired (Sepsis/Septic Shock)  Goal: Optimal Nutrition Intake  Outcome: Ongoing, Progressing   Goal Outcome Evaluation:  Plan of Care Reviewed With: patient, family        Progress: no change

## 2023-07-25 NOTE — PROGRESS NOTES
[facility] Medicine Services  PROGRESS NOTE    Patient Name: Vita Miller  : 1940  MRN: [id number]    Date of Admission: 2023  Primary Care Physician: Alfonso Steele MD    Subjective   Subjective     CC:  F/u PNA    HPI:  No new issues overnight. Sitting up in chair with daughter-in-law at bedside.       ROS:  Gen- No fevers, chills  CV- No chest pain, palpitations  Resp- No cough, dyspnea  GI- No N/V/D, abd pain      Objective   Objective     Vital Signs:   Temp:  [96.6 øF (35.9 øC)-97.8 øF (36.6 øC)] 96.7 øF (35.9 øC)  Heart Rate:  [52-68] 67  Resp:  [16-17] 16  BP: (121-141)/(65-71) 121/71     Physical Exam:  Constitutional: Awake, alert, NAD  HENT: NCAT, mucous membranes moist  Respiratory: Clear to auscultation bilaterally, nonlabored respirations   Cardiovascular: RRR, no murmurs, rubs, or gallop  Gastrointestinal: Positive bowel sounds, soft, nontender, nondistended  Musculoskeletal: No bilateral ankle edema  Psychiatric: Appropriate affect, cooperative  Neurologic: Oriented x 3, PEREZ, non focal, speech clear, some word finding difficulties  Skin: No rashes    Results Reviewed:  LAB RESULTS:      Lab 23  0434   WBC 8.03   HEMOGLOBIN 13.1   HEMATOCRIT 42.4   PLATELETS 237   NEUTROS ABS 5.07   IMMATURE GRANS (ABS) 0.06*   LYMPHS ABS 1.82   MONOS ABS 0.58   EOS ABS 0.38   .4*         Lab 23  0409 23  0434 23  0331 23  0456   SODIUM 136 137 136 134*   POTASSIUM 4.3 4.4 4.4 3.9   CHLORIDE 100 97* 94* 92*   CO2 22.0 24.0 40.0* 35.0*   ANION GAP 14.0 16.0* 2.0* 7.0   BUN 17 15 20 20   CREATININE 0.94 0.80 0.94 0.96   EGFR 60.3 73.2 60.3 58.8*   GLUCOSE 83 74 98 85   CALCIUM 9.1 8.9 8.8 8.8   MAGNESIUM  --   --   --  2.2   PHOSPHORUS  --   --   --  3.5         Lab 23  0456   ALBUMIN 3.0*         Lab 23  0331   PROBNP 3,624.0*                 Brief Urine Lab Results  (Last result in the past 365 days)        Color   Clarity    Blood   Leuk Est   Nitrite   Protein   CREAT   Urine HCG        07/17/23 1645 Yellow   Clear   Negative   Negative   Negative   Negative                   Microbiology Results Abnormal       Procedure Component Value - Date/Time    Blood Culture - Blood, Arm, Left [[id number]]  (Normal) Collected: 07/15/23 0340    Lab Status: Final result Specimen: Blood from Arm, Left Updated: 07/20/23 0730     Blood Culture No growth at 5 days    Blood Culture - Blood, Arm, Right [[id number]]  (Normal) Collected: 07/15/23 0340    Lab Status: Final result Specimen: Blood from Arm, Right Updated: 07/20/23 0730     Blood Culture No growth at 5 days    S. Pneumo Ag Urine or CSF - Urine, Urine, Clean Catch [[id number]]  (Normal) Collected: 07/15/23 0341    Lab Status: Final result Specimen: Urine, Clean Catch Updated: 07/15/23 1356     Strep Pneumo Ag Negative    Legionella Antigen, Urine - Urine, Urine, Clean Catch [[id number]]  (Normal) Collected: 07/15/23 0341    Lab Status: Final result Specimen: Urine, Clean Catch Updated: 07/15/23 1356     LEGIONELLA ANTIGEN, URINE Negative    MRSA Screen, PCR (Inpatient) - Swab, Nares [[id number]]  (Normal) Collected: 07/15/23 0310    Lab Status: Final result Specimen: Swab from Nares Updated: 07/15/23 0846     MRSA PCR Negative    Narrative:      The negative predictive value of this diagnostic test is high and should only be used to consider de-escalating anti-MRSA therapy. A positive result may indicate colonization with MRSA and must be correlated clinically.  MRSA Negative    COVID PRE-OP / PRE-PROCEDURE SCREENING ORDER (NO ISOLATION) - Swab, Nasopharynx [[id number]]  (Normal) Collected: 07/15/23 0310    Lab Status: Final result Specimen: Swab from Nasopharynx Updated: 07/15/23 0410    Narrative:      The following orders were created for panel order COVID PRE-OP / PRE-PROCEDURE SCREENING ORDER (NO ISOLATION) - Swab, Nasopharynx.  Procedure                               Abnormality          Status                     ---------                               -----------         ------                     Respiratory Panel PCR w/...[[id number]]  Normal              Final result                 Please view results for these tests on the individual orders.    Respiratory Panel PCR w/COVID-19(SARS-CoV-2) JENNA/KELSI/NORMAN/PAD/COR/MAD/ARSEN In-House, NP Swab in UTM/VTM, 3-4 HR TAT - Swab, Nasopharynx [[id number]]  (Normal) Collected: 07/15/23 0310    Lab Status: Final result Specimen: Swab from Nasopharynx Updated: 07/15/23 0410     ADENOVIRUS, PCR Not Detected     Coronavirus 229E Not Detected     Coronavirus HKU1 Not Detected     Coronavirus NL63 Not Detected     Coronavirus OC43 Not Detected     COVID19 Not Detected     Human Metapneumovirus Not Detected     Human Rhinovirus/Enterovirus Not Detected     Influenza A PCR Not Detected     Influenza B PCR Not Detected     Parainfluenza Virus 1 Not Detected     Parainfluenza Virus 2 Not Detected     Parainfluenza Virus 3 Not Detected     Parainfluenza Virus 4 Not Detected     RSV, PCR Not Detected     Bordetella pertussis pcr Not Detected     Bordetella parapertussis PCR Not Detected     Chlamydophila pneumoniae PCR Not Detected     Mycoplasma pneumo by PCR Not Detected    Narrative:      In the setting of a positive respiratory panel with a viral infection PLUS a negative procalcitonin without other underlying concern for bacterial infection, consider observing off antibiotics or discontinuation of antibiotics and continue supportive care. If the respiratory panel is positive for atypical bacterial infection (Bordetella pertussis, Chlamydophila pneumoniae, or Mycoplasma pneumoniae), consider antibiotic de-escalation to target atypical bacterial infection.            No radiology results from the last 24 hrs    Results for orders placed during the hospital encounter of 06/16/23    Adult Transthoracic Echo Complete W/ Cont if Necessary Per Protocol    Interpretation  Summary    Left ventricular systolic function is normal. Estimated left ventricular EF = 60%    Left ventricular diastolic function was normal.    Mild aortic insufficiency.    Trace to mild mitral and tricuspid regurgitation.    Calculated right ventricular systolic pressure from tricuspid regurgitation is 20 mmHg.    There is a trivial pericardial effusion.      Current medications:  Scheduled Meds:acetaZOLAMIDE, 250 mg, Oral, Daily  apixaban, 2.5 mg, Oral, Q12H  ascorbic acid, 250 mg, Oral, Daily  brimonidine, 1 drop, Both Eyes, Q12H   And  timolol, 1 drop, Both Eyes, Q12H  dilTIAZem CD, 180 mg, Oral, Q24H  galantamine, 8 mg, Oral, BID With Meals  ipratropium-albuterol, 3 mL, Nebulization, Q4H - RT  levothyroxine, 88 mcg, Oral, Daily  midodrine, 5 mg, Oral, TID AC  mirtazapine, 15 mg, Oral, Nightly  pantoprazole, 40 mg, Oral, Q AM  PARoxetine, 10 mg, Oral, Nightly  senna-docusate sodium, 2 tablet, Oral, BID  sodium chloride, 10 mL, Intravenous, Q12H  torsemide, 10 mg, Oral, Daily      Continuous Infusions:   PRN Meds:.  acetaminophen **OR** acetaminophen **OR** acetaminophen    senna-docusate sodium **AND** polyethylene glycol **AND** bisacodyl **AND** bisacodyl    Magnesium Standard Dose Replacement - Follow Nurse / BPA Driven Protocol    melatonin    metoprolol tartrate    ondansetron    Potassium Replacement - Follow Nurse / BPA Driven Protocol    sodium chloride    sodium chloride    sodium chloride    Assessment & Plan   Assessment & Plan     Active Hospital Problems    Diagnosis  POA    **Acute respiratory failure with hypoxia [J96.01]  Yes    Pneumonia of both lungs due to infectious organism [J18.9]  Unknown    Hyponatremia [E87.1]  Unknown    Severe Malnutrition (HCC) [E43]  Yes    Atrial fibrillation with RVR [I48.91]  Yes    Cachexia [R64]  Yes    Senile osteoporosis [M81.0]  Yes    Hashimoto's thyroiditis [E06.3]  Yes    Primary open angle glaucoma of both eyes, severe stage [H40.1133]  Yes    Mixed  anxiety and depressive disorder [F41.8]  Yes    Multiple nodules of lung [R91.8]  Yes    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        Brief Hospital Course to date:  Vita Miller is a 83 y.o. female with history of hypothyroidism, COPD with chronic bronchiectasis, history of chronic MAC with unknown intolerance to prior treatment plan, history of orthostatic hypotension, hyperlipidemia, osteoporosis, bilateral glaucoma, mild memory deficit, who was recently admitted to our facility approximately 1 month ago for new onset atrial fibrillation with RVR and returned to the ED with complaints of palpitations and shortness of breath.  She has been treated for PNA/COPD exacerbation as well as her Afib with RVR.      This patient's problems and plans were partially entered by my partner and updated as appropriate by me 07/25/23.    Copied text in this note has been reviewed and is accurate as of 07/25/23.     Acute respiratory failure with hypoxia  Bilateral pneumonia/infiltrates  History of MAC, intolerant to treatment  COPD with acute exacerbation, chronic bronchiectasis  -- Pulmonology followed earlier on this hospitalization.  Clinically improved.  AI/vasculitis panels are negative.  Recs to continue vest therapy, discussed with Dr. Hook  --Off steroids  --Completed Zosyn 7 days tx, s/p azith  -- Duo nebs  -- Flutter valve, mucinex  --MRSA pcr negative, strep negative, legionella negative  --CT chest showed new GGO, bronchiectasis, scattered nodules.  Needs f/u CT or PET scan  -- bumex, diamox       Atrial fibrillation with rvr, not in aflutter  --07/21 converted back to Afib RVR following a BM at 8am. Pt was  hypotensive. Family/patient declined lopressor indefinitely  --s/p esmolol drip  --eliquis  --flecainide --> bisoprolol ---> changed to digoxin per cards, no flecainide due to pneumonitis concerns, partner discussed with Dr. Padilla. Now Digoxin has been d/c'd  -- has upcoming appt with Dr. Martin at  (suggested making that telehealth, family very concerned about missing this appt)   --fludrocortisone discontinued 07/21  --cards following.  PO diltiazem- discontinued Digoxin 7/25  -- Midodrine had been increased to 10 mg TID, now back to 5mg TID per Cardiology  --LVEF 60%, normal diastolic on echo from 6/17/2023     Altered mental status  --improved  --u/a bland, B12 wnl.   RPR nonreactive     Hyponatremia, resolved  --Na 129 on admission; suspect related to volume overload  --Na 136 on 7/24     Orthostatic hypotension  --fludricortisone discontinued  --midodrine increased on 7/21 to 10 mg TID- now back to 5mg TID     Hypothyroidism  --slightly elevated tsh  --continue synthroid, dose adjusted on 7/16; repeat tsh in 4-6 weeks; careful adjustment given RVR     Other chronic conditions: HLD, malnutrition, anxiety/depression, memory impairment  --continue home meds  --nutrition  --pt/otv       Expected Discharge Location and Transportation: SNF rehab (awaiting precert)  Expected Discharge   Expected Discharge Date: 7/26/2023; Expected Discharge Time:      DVT prophylaxis:  Medical and mechanical DVT prophylaxis orders are present.     AM-PAC 6 Clicks Score (PT): 17 (07/21/23 1000)    CODE STATUS:   Code Status and Medical Interventions:   Ordered at: 07/15/23 0214     Code Status (Patient has no pulse and is not breathing):    CPR (Attempt to Resuscitate)     Medical Interventions (Patient has pulse or is breathing):    Full Support       Reina Mendosa MD  07/25/23

## 2023-07-25 NOTE — PROGRESS NOTES
Clinical Nutrition     Reason for Visit: Follow-up protocol      Patient Name: Vita Miller  YOB: 1940  MRN: [id number]  Date of Encounter: 07/25/23 16:13 EDT  Admission date: 7/14/2023      Nutrition Assessment   Admission Diagnosis:  Acute respiratory failure with hypoxia [J96.01]      Problem List:    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    PSH:  She  has a past surgical history that includes Patella surgery; Hip surgery; and Shoulder surgery.      Applicable Nutrition Concerns:   Skin:wnl  GI:last bm 7-15      Reported/Observed/Food/Nutrition Related History:   7/25  Overall fair appetite, likes Boost Breeze       7/15  Pt sitting up in chair, on nasal cannula, eating dinner, reports fair appetite, states her UBW is ~ 102lb's, has not lost any weight recently, reports she did used to weigh `~140lb's prior to her diagnosis of MAC, this has been several years ago, she had allergy testing done by her doctor, and she has been told to avoid gluten and dairy, when I asked her to clarify her dairy allergy she said she was lactose intolerant, she is willing to try oral supplements that are lactose free      Labs    Labs Reviewed: Yes     Results from last 7 days   Lab Units 07/24/23  0409 07/22/23  0434 07/20/23  0331 07/19/23  0456   GLUCOSE mg/dL 83 74 98 85   BUN mg/dL 17 15 20 20  "  CREATININE mg/dL 0.94 0.80 0.94 0.96   SODIUM mmol/L 136 137 136 134*   CHLORIDE mmol/L 100 97* 94* 92*   POTASSIUM mmol/L 4.3 4.4 4.4 3.9   PHOSPHORUS mg/dL  --   --   --  3.5   MAGNESIUM mg/dL  --   --   --  2.2         Results from last 7 days   Lab Units 07/19/23  0456   ALBUMIN g/dL 3.0*             No results found for: HGBA1C      Results from last 7 days   Lab Units 07/20/23  0331   PROBNP pg/mL 3,624.0*           Medications    Medications Reviewed: Yes  Remeron, paxil, protonix, midodrine, synthroid     Intake/Ouptut 24 hrs (0701 - 0700)   I&O's Reviewed: Yes     Intake & Output (last day)         07/24 0701 07/25 0700 07/25 0701 07/26 0700    P.O. 840     Total Intake(mL/kg) 840 (18.1)     Urine (mL/kg/hr) 950 (0.9) 900 (2.1)    Stool      Total Output 950 900    Net -110 -900                Anthropometrics     Flowsheet Rows      Flowsheet Row First Filed Value   Admission Height 172.7 cm (68\") Documented at 07/14/2023 2224   Admission Weight 46.3 kg (102 lb) Documented at 07/14/2023 2224       Height: Height: 172.7 cm (68\")  Last Filed Weight: Weight: 46.3 kg (102 lb) (07/14/23 2224)  Method: Weight Method: Stated  BMI: BMI (Calculated): 15.5  BMI classification: Underweight:<18.5kg/m2  IBW:  140lb    UBW: 102lb  Weight change: no per pt     Weight      Weight (kg) Weight (lbs) Weight Method   6/16/2023 43.999 kg  97 lb  Stated    6/17/2023 46.72 kg  103 lb  Bed scale     46.358 kg  102 lb 3.2 oz  Standing scale    6/21/2023 48.762 kg  107 lb 8 oz     7/14/2023 46.267 kg  102 lb  Stated      Nutrition Focused Physical Exam     Date: 715-22    Patient meets criteria for malnutrition diagnosis, see MSA note.    Current Nutrition Prescription     PO: Cardiac Diet  Oral Nutrition Supplement: Boost Breeze TID   Intake: Insufficient data 175% x 8 meals       Nutrition Diagnosis   Date: 7-15-23 Updated:   Problem Malnutrition    Etiology Per Clinical Status: MAC   Signs/Symptoms Severe Muscle wasting and " fat loss   Status:     Goal:   General: Nutrition support treatment  PO: Establish PO  EN/PN: N/A    Nutrition Intervention      Follow treatment progress, Advised available snacks, Interview for preferences, Menu adjusted, Supplement provided        Monitoring/Evaluation:   Per protocol      Freida Marcus RD  Time Spent: 25min

## 2023-07-26 LAB — NT-PROBNP SERPL-MCNC: 2917 PG/ML (ref 0–1800)

## 2023-07-26 PROCEDURE — 94761 N-INVAS EAR/PLS OXIMETRY MLT: CPT

## 2023-07-26 PROCEDURE — 94799 UNLISTED PULMONARY SVC/PX: CPT

## 2023-07-26 PROCEDURE — 94664 DEMO&/EVAL PT USE INHALER: CPT

## 2023-07-26 PROCEDURE — 83880 ASSAY OF NATRIURETIC PEPTIDE: CPT

## 2023-07-26 PROCEDURE — 97110 THERAPEUTIC EXERCISES: CPT

## 2023-07-26 PROCEDURE — 94669 MECHANICAL CHEST WALL OSCILL: CPT

## 2023-07-26 PROCEDURE — 99232 SBSQ HOSP IP/OBS MODERATE 35: CPT

## 2023-07-26 PROCEDURE — 99232 SBSQ HOSP IP/OBS MODERATE 35: CPT | Performed by: INTERNAL MEDICINE

## 2023-07-26 PROCEDURE — 97116 GAIT TRAINING THERAPY: CPT

## 2023-07-26 RX ADMIN — MIDODRINE HYDROCHLORIDE 5 MG: 5 TABLET ORAL at 08:18

## 2023-07-26 RX ADMIN — APIXABAN 2.5 MG: 2.5 TABLET, FILM COATED ORAL at 08:18

## 2023-07-26 RX ADMIN — TORSEMIDE 10 MG: 20 TABLET ORAL at 08:18

## 2023-07-26 RX ADMIN — DILTIAZEM HYDROCHLORIDE 180 MG: 180 CAPSULE, EXTENDED RELEASE ORAL at 08:18

## 2023-07-26 RX ADMIN — PAROXETINE HYDROCHLORIDE 10 MG: 10 TABLET, FILM COATED ORAL at 20:31

## 2023-07-26 RX ADMIN — Medication 250 MG: at 08:18

## 2023-07-26 RX ADMIN — TIMOLOL MALEATE 1 DROP: 5 SOLUTION/ DROPS OPHTHALMIC at 08:19

## 2023-07-26 RX ADMIN — IPRATROPIUM BROMIDE AND ALBUTEROL SULFATE 3 ML: 2.5; .5 SOLUTION RESPIRATORY (INHALATION) at 10:35

## 2023-07-26 RX ADMIN — BRIMONIDINE TARTRATE 1 DROP: 2 SOLUTION/ DROPS OPHTHALMIC at 20:32

## 2023-07-26 RX ADMIN — PANTOPRAZOLE SODIUM 40 MG: 40 TABLET, DELAYED RELEASE ORAL at 05:59

## 2023-07-26 RX ADMIN — GALANTAMINE 8 MG: 4 TABLET, FILM COATED ORAL at 17:12

## 2023-07-26 RX ADMIN — SENNOSIDES AND DOCUSATE SODIUM 2 TABLET: 50; 8.6 TABLET ORAL at 08:19

## 2023-07-26 RX ADMIN — APIXABAN 2.5 MG: 2.5 TABLET, FILM COATED ORAL at 20:31

## 2023-07-26 RX ADMIN — ACETAZOLAMIDE 250 MG: 250 TABLET ORAL at 08:18

## 2023-07-26 RX ADMIN — IPRATROPIUM BROMIDE AND ALBUTEROL SULFATE 3 ML: 2.5; .5 SOLUTION RESPIRATORY (INHALATION) at 07:05

## 2023-07-26 RX ADMIN — LEVOTHYROXINE SODIUM 88 MCG: 0.09 TABLET ORAL at 05:59

## 2023-07-26 RX ADMIN — IPRATROPIUM BROMIDE AND ALBUTEROL SULFATE 3 ML: 2.5; .5 SOLUTION RESPIRATORY (INHALATION) at 19:37

## 2023-07-26 RX ADMIN — IPRATROPIUM BROMIDE AND ALBUTEROL SULFATE 3 ML: 2.5; .5 SOLUTION RESPIRATORY (INHALATION) at 23:44

## 2023-07-26 RX ADMIN — IPRATROPIUM BROMIDE AND ALBUTEROL SULFATE 3 ML: 2.5; .5 SOLUTION RESPIRATORY (INHALATION) at 15:55

## 2023-07-26 RX ADMIN — GALANTAMINE 8 MG: 4 TABLET, FILM COATED ORAL at 08:19

## 2023-07-26 RX ADMIN — MIRTAZAPINE 15 MG: 15 TABLET, FILM COATED ORAL at 20:31

## 2023-07-26 RX ADMIN — Medication 10 ML: at 20:31

## 2023-07-26 RX ADMIN — Medication 10 ML: at 08:20

## 2023-07-26 RX ADMIN — TIMOLOL MALEATE 1 DROP: 5 SOLUTION/ DROPS OPHTHALMIC at 20:32

## 2023-07-26 RX ADMIN — BRIMONIDINE TARTRATE 1 DROP: 2 SOLUTION/ DROPS OPHTHALMIC at 08:19

## 2023-07-26 NOTE — PLAN OF CARE
Goal Outcome Evaluation:  Plan of Care Reviewed With: patient        Progress: improving  Outcome Evaluation: patient ambulated 100' + 100' with CGA x1 and rolling walker for support, verbal cues for walker placement and improvement in posture. 1 standing rest break due to weakness. Mild instability but no LOB noticed. Patient is below basleine level of function due to weakness, decreased activity tolerance and gait instability. Recommend SNF at D/C.

## 2023-07-26 NOTE — PROGRESS NOTES
[facility]  PROGRESS NOTE    Patient Name: Vita Miller  : 1940  MRN: [id number]    Date of Admission: 2023  Primary Care Physician: Alfonso Steele MD    Subjective   Subjective     CC:  F/u PNA    HPI:  Hypoxic while sleeping overnight per family. Also in and out of rate controlled Afib- pt asymptomatic      ROS:  Gen- No fevers, chills  CV- No chest pain, palpitations  Resp- No cough, dyspnea  GI- No N/V/D, abd pain      Objective   Objective     Vital Signs:   Temp:  [96.2 øF (35.7 øC)-97.9 øF (36.6 øC)] 96.2 øF (35.7 øC)  Heart Rate:  [] 87  Resp:  [14-18] 16  BP: (115-133)/(73-85) 115/82  Flow (L/min):  [1] 1     Physical Exam:  Constitutional: Awake, alert, NAD  HENT: NCAT, mucous membranes moist  Respiratory: Clear to auscultation bilaterally, nonlabored respirations   Cardiovascular: irregular rhythm, appears to be rate controlled Afib on tele, no murmurs, rubs, or gallop  Gastrointestinal: Positive bowel sounds, soft, nontender, nondistended  Musculoskeletal: No bilateral ankle edema  Psychiatric: Appropriate affect, cooperative  Neurologic: Oriented x 3, PEREZ, non focal, speech clear, some word finding difficulties  Skin: No rashes    Results Reviewed:  LAB RESULTS:      Lab 23  0434   WBC 8.03   HEMOGLOBIN 13.1   HEMATOCRIT 42.4   PLATELETS 237   NEUTROS ABS 5.07   IMMATURE GRANS (ABS) 0.06*   LYMPHS ABS 1.82   MONOS ABS 0.58   EOS ABS 0.38   .4*         Lab 23  0409 23  0434 23  0331   SODIUM 136 137 136   POTASSIUM 4.3 4.4 4.4   CHLORIDE 100 97* 94*   CO2 22.0 24.0 40.0*   ANION GAP 14.0 16.0* 2.0*   BUN 17 15 20   CREATININE 0.94 0.80 0.94   EGFR 60.3 73.2 60.3   GLUCOSE 83 74 98   CALCIUM 9.1 8.9 8.8               Lab 23  0858 23  0331   PROBNP 2,917.0* 3,624.0*                 Brief Urine Lab Results  (Last result in the past 365 days)        Color   Clarity   Blood   Leuk Est   Nitrite    Protein   CREAT   Urine HCG        07/17/23 1645 Yellow   Clear   Negative   Negative   Negative   Negative                   Microbiology Results Abnormal       Procedure Component Value - Date/Time    Blood Culture - Blood, Arm, Left [[id number]]  (Normal) Collected: 07/15/23 0340    Lab Status: Final result Specimen: Blood from Arm, Left Updated: 07/20/23 0730     Blood Culture No growth at 5 days    Blood Culture - Blood, Arm, Right [[id number]]  (Normal) Collected: 07/15/23 0340    Lab Status: Final result Specimen: Blood from Arm, Right Updated: 07/20/23 0730     Blood Culture No growth at 5 days    S. Pneumo Ag Urine or CSF - Urine, Urine, Clean Catch [[id number]]  (Normal) Collected: 07/15/23 0341    Lab Status: Final result Specimen: Urine, Clean Catch Updated: 07/15/23 1356     Strep Pneumo Ag Negative    Legionella Antigen, Urine - Urine, Urine, Clean Catch [[id number]]  (Normal) Collected: 07/15/23 0341    Lab Status: Final result Specimen: Urine, Clean Catch Updated: 07/15/23 1356     LEGIONELLA ANTIGEN, URINE Negative    MRSA Screen, PCR (Inpatient) - Swab, Nares [[id number]]  (Normal) Collected: 07/15/23 0310    Lab Status: Final result Specimen: Swab from Nares Updated: 07/15/23 0846     MRSA PCR Negative    Narrative:      The negative predictive value of this diagnostic test is high and should only be used to consider de-escalating anti-MRSA therapy. A positive result may indicate colonization with MRSA and must be correlated clinically.  MRSA Negative    COVID PRE-OP / PRE-PROCEDURE SCREENING ORDER (NO ISOLATION) - Swab, Nasopharynx [[id number]]  (Normal) Collected: 07/15/23 0310    Lab Status: Final result Specimen: Swab from Nasopharynx Updated: 07/15/23 0410    Narrative:      The following orders were created for panel order COVID PRE-OP / PRE-PROCEDURE SCREENING ORDER (NO ISOLATION) - Swab, Nasopharynx.  Procedure                               Abnormality         Status                      ---------                               -----------         ------                     Respiratory Panel PCR w/...[[id number]]  Normal              Final result                 Please view results for these tests on the individual orders.    Respiratory Panel PCR w/COVID-19(SARS-CoV-2) JENNA/KELSI/NORMAN/PAD/COR/MAD/ARSEN In-House, NP Swab in UTM/VTM, 3-4 HR TAT - Swab, Nasopharynx [[id number]]  (Normal) Collected: 07/15/23 0310    Lab Status: Final result Specimen: Swab from Nasopharynx Updated: 07/15/23 0410     ADENOVIRUS, PCR Not Detected     Coronavirus 229E Not Detected     Coronavirus HKU1 Not Detected     Coronavirus NL63 Not Detected     Coronavirus OC43 Not Detected     COVID19 Not Detected     Human Metapneumovirus Not Detected     Human Rhinovirus/Enterovirus Not Detected     Influenza A PCR Not Detected     Influenza B PCR Not Detected     Parainfluenza Virus 1 Not Detected     Parainfluenza Virus 2 Not Detected     Parainfluenza Virus 3 Not Detected     Parainfluenza Virus 4 Not Detected     RSV, PCR Not Detected     Bordetella pertussis pcr Not Detected     Bordetella parapertussis PCR Not Detected     Chlamydophila pneumoniae PCR Not Detected     Mycoplasma pneumo by PCR Not Detected    Narrative:      In the setting of a positive respiratory panel with a viral infection PLUS a negative procalcitonin without other underlying concern for bacterial infection, consider observing off antibiotics or discontinuation of antibiotics and continue supportive care. If the respiratory panel is positive for atypical bacterial infection (Bordetella pertussis, Chlamydophila pneumoniae, or Mycoplasma pneumoniae), consider antibiotic de-escalation to target atypical bacterial infection.            No radiology results from the last 24 hrs    Results for orders placed during the hospital encounter of 06/16/23    Adult Transthoracic Echo Complete W/ Cont if Necessary Per Protocol    Interpretation Summary    Left ventricular  systolic function is normal. Estimated left ventricular EF = 60%    Left ventricular diastolic function was normal.    Mild aortic insufficiency.    Trace to mild mitral and tricuspid regurgitation.    Calculated right ventricular systolic pressure from tricuspid regurgitation is 20 mmHg.    There is a trivial pericardial effusion.      Current medications:  Scheduled Meds:acetaZOLAMIDE, 250 mg, Oral, Daily  apixaban, 2.5 mg, Oral, Q12H  ascorbic acid, 250 mg, Oral, Daily  brimonidine, 1 drop, Both Eyes, Q12H   And  timolol, 1 drop, Both Eyes, Q12H  dilTIAZem CD, 180 mg, Oral, Q24H  galantamine, 8 mg, Oral, BID With Meals  ipratropium-albuterol, 3 mL, Nebulization, Q4H - RT  levothyroxine, 88 mcg, Oral, Daily  midodrine, 5 mg, Oral, TID AC  mirtazapine, 15 mg, Oral, Nightly  pantoprazole, 40 mg, Oral, Q AM  PARoxetine, 10 mg, Oral, Nightly  senna-docusate sodium, 2 tablet, Oral, BID  sodium chloride, 10 mL, Intravenous, Q12H  torsemide, 10 mg, Oral, Daily      Continuous Infusions:   PRN Meds:.  acetaminophen **OR** acetaminophen **OR** acetaminophen    senna-docusate sodium **AND** polyethylene glycol **AND** bisacodyl **AND** bisacodyl    Magnesium Standard Dose Replacement - Follow Nurse / BPA Driven Protocol    melatonin    metoprolol tartrate    ondansetron    Potassium Replacement - Follow Nurse / BPA Driven Protocol    sodium chloride    sodium chloride    sodium chloride    Assessment & Plan   Assessment & Plan     Active Hospital Problems    Diagnosis  POA    **Acute respiratory failure with hypoxia [J96.01]  Yes    Pneumonia of both lungs due to infectious organism [J18.9]  Unknown    Hyponatremia [E87.1]  Unknown    Severe malnutrition [E43]  Yes    Atrial fibrillation with RVR [I48.91]  Yes    Cachexia [R64]  Yes    Senile osteoporosis [M81.0]  Yes    Hashimoto's thyroiditis [E06.3]  Yes    Primary open angle glaucoma of both eyes, severe stage [H40.1133]  Yes    Mixed anxiety  and depressive disorder [F41.8]  Yes    Multiple nodules of lung [R91.8]  Yes    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        Brief Hospital Course to date:  Vita Miller is a 83 y.o. female with history of hypothyroidism, COPD with chronic bronchiectasis, history of chronic MAC with unknown intolerance to prior treatment plan, history of orthostatic hypotension, hyperlipidemia, osteoporosis, bilateral glaucoma, mild memory deficit, who was recently admitted to our facility approximately 1 month ago for new onset atrial fibrillation with RVR and returned to the ED with complaints of palpitations and shortness of breath.  She has been treated for PNA/COPD exacerbation as well as her Afib with RVR.      This patient's problems and plans were partially entered by my partner and updated as appropriate by me 07/26/23.    Copied text in this note has been reviewed and is accurate as of 07/26/23.     Acute respiratory failure with hypoxia  Bilateral pneumonia/infiltrates  History of MAC, intolerant to treatment  COPD with acute exacerbation, chronic bronchiectasis  -- Pulmonology followed earlier on this hospitalization.  Clinically improved.  AI/vasculitis panels are negative.  Recs to continue vest therapy, discussed with Dr. Hook  --Off steroids  --Completed Zosyn 7 days tx, s/p azith  -- Duo nebs  -- Flutter valve, mucinex  --MRSA pcr negative, strep negative, legionella negative  --CT chest showed new GGO, bronchiectasis, scattered nodules.  Needs f/u CT or PET scan  -- torsemide, diamox       Atrial fibrillation with rvr, not in aflutter  --07/21 converted back to Afib RVR following a BM at 8am. Pt was  hypotensive. Family/patient declined lopressor indefinitely  --s/p esmolol drip  --eliquis  --flecainide --> bisoprolol ---> changed to digoxin per cards, no flecainide due to pneumonitis concerns, partner discussed with Dr. Padilla. Now Digoxin has been d/c'd  -- has upcoming appt with Dr. Martin at  (suggested making that telehealth, family very concerned about missing this appt)   --fludrocortisone discontinued 07/21  --cards following.  PO diltiazem- discontinued Digoxin 7/25  -- Midodrine had been increased to 10 mg TID, now back to 5mg TID per Cardiology  --LVEF 60%, normal diastolic on echo from 6/17/2023     Altered mental status  --improved  --u/a bland, B12 wnl.   RPR nonreactive     Hyponatremia, resolved  --Na 129 on admission; suspect related to volume overload  --Na 136 on 7/24     Orthostatic hypotension  --fludricortisone discontinued  --midodrine increased on 7/21 to 10 mg TID- now back to 5mg TID     Hypothyroidism  --slightly elevated tsh  --continue synthroid, dose adjusted on 7/16; repeat tsh in 4-6 weeks; careful adjustment given RVR     Other chronic conditions: HLD, malnutrition, anxiety/depression, memory impairment  --continue home meds  --nutrition  --pt/otv       Expected Discharge Location and Transportation: SNF rehab (family appeal)  Expected Discharge   Expected Discharge Date: 7/28/2023; Expected Discharge Time:      DVT prophylaxis:  Medical and mechanical DVT prophylaxis orders are present.     AM-PAC 6 Clicks Score (PT): 21 (07/26/23 0800)    CODE STATUS:   Code Status and Medical Interventions:   Ordered at: 07/15/23 0214     Code Status (Patient has no pulse and is not breathing):    CPR (Attempt to Resuscitate)     Medical Interventions (Patient has pulse or is breathing):    Full Support       Reina Mendosa MD  07/26/23

## 2023-07-26 NOTE — THERAPY TREATMENT NOTE
Patient Name: Vita Miller  : 1940    MRN: [id number]                              Today's Date: 2023       Admit Date: 2023    Visit Dx:     ICD-10-CM ICD-9-CM   1. Atrial fibrillation with RVR  I48.91 427.31   2. Acute respiratory failure with hypoxia  J96.01 518.81   3. Pneumonia of both upper lobes due to infectious organism  J18.9 486   4. Pneumonia of both lungs due to infectious organism, unspecified part of lung  J18.9 483.8   5. Severe Malnutrition (HCC)  E43 261   6. 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 Primary open angle glaucoma of both eyes, severe stage  H40.1133 365.11     365.73   8. Multiple nodules of lung  R91.8 793.19   9. H/O orthostatic hypotension  Z86.79 V12.59   10. COPD with acute exacerbation  J44.1 491.21   11. Acquired bronchiectasis  J47.9 494.0   12. 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    Hypothyroidism    Acute respiratory failure with hypoxia    Cachexia    Severe malnutrition    Pneumonia of both lungs due to infectious organism    Hyponatremia     Past Medical History:   Diagnosis Date    A-fib     Disease of thyroid gland     Glaucoma     Hypotension      Past Surgical History:   Procedure Laterality Date    HIP SURGERY      PATELLA SURGERY      SHOULDER SURGERY        General Information       Row Name 23 1359          Physical Therapy Time and Intention    Document Type therapy note (daily note)  -AS     Mode of Treatment physical therapy  -AS       Row Name 23 1359          General Information     Patient Profile Reviewed yes  -AS     Existing Precautions/Restrictions fall;other (see comments)  brief with OOB activity  -AS     Barriers to Rehab medically complex  -AS       Row Name 07/26/23 1359          Cognition    Orientation Status (Cognition) disoriented to;person;place  -AS       Row Name 07/26/23 1359          Safety Issues, Functional Mobility    Safety Issues Affecting Function (Mobility) awareness of need for assistance;insight into deficits/self-awareness;safety precaution awareness;safety precautions follow-through/compliance;sequencing abilities;positioning of assistive device  -AS     Comment, Safety Issues/Impairments (Mobility) alert, needs redirection at times with tasks  -AS               User Key  (r) = Recorded By, (t) = Taken By, (c) = Cosigned By      Initials Name Provider Type    AS Soraida Dorantes PTA Physical Therapist Assistant                   Mobility       Row Name 07/26/23 1400          Bed Mobility    Supine-Sit [place] (Bed Mobility) standby assist  -AS     Assistive Device (Bed Mobility) head of bed elevated  -AS     Comment, (Bed Mobility) increased time and effort to reach EOB  -AS       Row Name 07/26/23 1400          Transfers    Comment, (Transfers) cues to push up from bed and not to pull up on walker  -AS       Row Name 07/26/23 1400          Bed-Chair Transfer    Bed-Chair [place] (Transfers) verbal cues;contact guard;1 person assist  -AS     Assistive Device (Bed-Chair Transfers) walker, front-wheeled  -AS       Row Name 07/26/23 1400          Sit-Stand Transfer    Sit-Stand [place] (Transfers) verbal cues;contact guard;1 person assist  -AS     Assistive Device (Sit-Stand Transfers) walker, front-wheeled  -AS     Comment, (Sit-Stand Transfer) ues for hand placement  -AS       Row Name 07/26/23 1400          Gait/Stairs (Locomotion)    [place] Level (Gait) verbal cues;contact guard;1 person assist  -AS     Assistive Device (Gait) walker,  front-wheeled  -AS     Distance in Feet (Gait) 100 + 100  -AS     Deviations/Abnormal Patterns (Gait) bilateral deviations;base of support, narrow;leanna decreased;stride length decreased;weight shifting decreased;gait speed decreased  -AS     Bilateral Gait Deviations forward flexed posture;heel strike decreased  -AS     Comment, (Gait/Stairs) patient ambulated 100' + 100' with CGA x1 and rolling walker for support, verbal cues for walker placement and improvement in posture. 1 standing rest break due to weakness. Mild instability but no LOB noticed.  -AS               User Key  (r) = Recorded By, (t) = Taken By, (c) = Cosigned By      Initials Name Provider Type    AS Soraida Dorantes PTA Physical Therapist Assistant                   Obj/Interventions       Row Name 07/26/23 1408          Motor Skills    Therapeutic Exercise knee;ankle  -AS       Row Name 07/26/23 1408          Knee (Therapeutic Exercise)    Knee (Therapeutic Exercise) strengthening exercise  -AS     Knee Strengthening (Therapeutic Exercise) bilateral;marching while seated;LAQ (long arc quad);sitting;10 repetitions  -AS       Row Name 07/26/23 1408          Ankle (Therapeutic Exercise)    Ankle (Therapeutic Exercise) AROM (active range of motion)  -AS     Ankle AROM (Therapeutic Exercise) bilateral;dorsiflexion;plantarflexion;sitting;10 repetitions  -AS       Row Name 07/26/23 1408          Balance    Dynamic Standing Balance verbal cues;contact guard;1-person assist  -AS     Position/Device Used, Standing Balance supported;walker, front-wheeled  -AS               User Key  (r) = Recorded By, (t) = Taken By, (c) = Cosigned By      Initials Name Provider Type    AS Soraida Dorantes PTA Physical Therapist Assistant                   Goals/Plan    No documentation.                  Clinical Impression       Row Name 07/26/23 1409          Pain    Pretreatment Pain Rating 0/10 - no pain  -AS     Posttreatment Pain Rating 0/10 - no pain  -AS        Row Name 07/26/23 1409          Plan of Care Review    Plan of Care Reviewed With patient  -AS     Progress improving  -AS     Outcome Evaluation patient ambulated 100' + 100' with CGA x1 and rolling walker for support, verbal cues for walker placement and improvement in posture. 1 standing rest break due to weakness. Mild instability but no LOB noticed. Patient is below basleine level of function due to weakness, decreased activity tolerance and gait instability. Recommend SNF at D/C.  -AS       Row Name 07/26/23 1409          Positioning and Restraints    Pre-Treatment Position in bed  -AS     Post Treatment Position chair  -AS     In Chair reclined;call light within reach;encouraged to call for assist;exit alarm on;waffle cushion;legs elevated  -AS               User Key  (r) = Recorded By, (t) = Taken By, (c) = Cosigned By      Initials Name Provider Type    AS Soraida Dorantes PTA Physical Therapist Assistant                   Outcome Measures       Row Name 07/26/23 1411 07/26/23 0800       How much help from another person do you currently need...    Turning from your back to your side while in flat bed without using bedrails? 4  -AS 4  -TRAVIS    Moving from lying on back to sitting on the side of a flat bed without bedrails? 4  -AS 4  -TRAVIS    Moving to and from a bed to a chair (including a wheelchair)? -- 4  -TRAVIS    Standing up from a chair using your arms (e.g., wheelchair, bedside chair)? 3  -AS 3  -TRAVIS    Climbing 3-5 steps with a railing? 3  -AS 3  -TRAVIS    To walk in hospital room? 3  -AS 3  -TRAVIS    AM-PAC 6 Clicks Score (PT) -- 21  -TRAVIS    Highest level of mobility -- 6 --> Walked 10 steps or more  -TRAVIS      Row Name 07/26/23 1411          Functional Assessment    Outcome Measure Options AM-PAC 6 Clicks Basic Mobility (PT)  -AS               User Key  (r) = Recorded By, (t) = Taken By, (c) = Cosigned By      Initials Name Provider Type    AS Soraida Dorantes PTA Physical Therapist Assistant    TRAVIS  Kymberly Hernadez, RN Registered Nurse                                 Physical Therapy Education       Title: PT OT SLP Therapies (In Progress)       Topic: Physical Therapy (In Progress)       Point: Mobility training (In Progress)       Learning Progress Summary             Patient Acceptance, E, NR by AS at 7/26/2023 1411    Acceptance, E, VU,NR by  at 7/20/2023 1609    Comment: see flowsheet    Acceptance, E, VU,NR by  at 7/18/2023 0933    Comment: see flowsheet    Eager, E,D, NR by DM at 7/15/2023 1642                         Point: Home exercise program (In Progress)       Learning Progress Summary             Patient Acceptance, E, NR by AS at 7/26/2023 1411    Eager, E,D, NR by DM at 7/15/2023 1642                         Point: Body mechanics (In Progress)       Learning Progress Summary             Patient Acceptance, E, NR by AS at 7/26/2023 1411    Acceptance, E, VU,NR by  at 7/20/2023 1609    Comment: see flowsheet    Acceptance, E, VU,NR by  at 7/18/2023 0933    Comment: see flowsheet    Eager, E,D, NR by DM at 7/15/2023 1642                         Point: Precautions (In Progress)       Learning Progress Summary             Patient Acceptance, E, NR by AS at 7/26/2023 1411    Acceptance, E, VU,NR by  at 7/20/2023 1609    Comment: see flowsheet    Acceptance, E, VU,NR by  at 7/18/2023 0933    Comment: see flowsheet    Eager, E,D, NR by  at 7/15/2023 1642                                         User Key       Initials Effective Dates Name Provider Type Discipline    DM 02/03/23 -  Merced Patel, PT Physical Therapist PT    AS 04/28/23 -  Soraida Dorantes, PTA Physical Therapist Assistant PT     05/15/23 -  Lili Crawford, PT Student PT Student PT                  PT Recommendation and Plan     Plan of Care Reviewed With: patient  Progress: improving  Outcome Evaluation: patient ambulated 100' + 100' with CGA x1 and rolling walker for support, verbal cues for walker placement and  improvement in posture. 1 standing rest break due to weakness. Mild instability but no LOB noticed. Patient is below basleine level of function due to weakness, decreased activity tolerance and gait instability. Recommend SNF at D/C.     Time Calculation:         PT Charges       Row Name 07/26/23 1412             Time Calculation    Start Time 1330  -AS      PT Received On 07/26/23  -AS      PT Goal Re-Cert Due Date 08/04/23  -AS         Timed Charges    97216 - PT Therapeutic Exercise Minutes 10  -AS      71232 - Gait Training Minutes  15  -AS         Total Minutes    Timed Charges Total Minutes 25  -AS       Total Minutes 25  -AS                User Key  (r) = Recorded By, (t) = Taken By, (c) = Cosigned By      Initials Name Provider Type    AS Soraida Dorantes PTA Physical Therapist Assistant                  Therapy Charges for Today       Code Description Service Date Service Provider Modifiers Qty    68681428229 HC PT THER PROC EA 15 MIN 7/26/2023 Soraida Dorantes PTA GP 1    18377844273 HC GAIT TRAINING EA 15 MIN 7/26/2023 Soraida Dorantes PTA GP 1            PT G-Codes  Outcome Measure Options: AM-PAC 6 Clicks Basic Mobility (PT)  AM-PAC 6 Clicks Score (PT): 21  AM-PAC 6 Clicks Score (OT): 19       Soraida Dorantes PTA  7/26/2023

## 2023-07-26 NOTE — CASE MANAGEMENT/SOCIAL WORK
Continued Stay Note  [facility]     Patient Name: Vita Miller  MRN: [id number]  Today's Date: 7/26/2023    Admit Date: 7/14/2023    Plan: snf   Discharge Plan       Row Name 07/26/23 1537       Plan    Plan snf    Patient/Family in Agreement with Plan yes    Plan Comments Met with Ms. Miller at bedside to discuss discharge plans. Ms. Miller reported she still wants to go to [facility] for SNF rehab. Informed her that the insurance precert has been started but no answer yet.  will continue to follow plan of care and assist with discharge needs as indicated.    Final Discharge Disposition Code 03 - skilled nursing facility (SNF)                   Discharge Codes    No documentation.                 Expected Discharge Date and Time       Expected Discharge Date Expected Discharge Time    Jul 28, 2023               Loly Moreira RN

## 2023-07-26 NOTE — PROGRESS NOTES
"  [facility] at [facility]  PROGRESS NOTE    Date of Admission: 7/14/2023  Date of Service: 07/26/23    Primary Care Physician: Alfonso Steele MD    Chief Complaint: Follow-up atrial fibrillation, HFpEF, respiratory failure        Subjective      Went back into atrial fibrillation overnight after a bowel movement. No associated symptoms. Rates . No chest pain or shortness of air. Was on supplemental O2 overnight but maintaining normal O2 saturation on room air at the time of my visit.    Objective   Vitals: /79 (BP Location: Left arm, Patient Position: Lying)   Pulse 80   Temp 96.2 øF (35.7 øC) (Oral)   Resp 16   Ht 172.7 cm (68\")   Wt 46.3 kg (102 lb)   SpO2 97%   BMI 15.51 kg/mý     Physical Exam:  GENERAL: Alert, cooperative, in no acute distress.   HEENT: Normocephalic, no jugular venous distention  HEART: Irregular rhythm, normal rate, and no murmurs, gallops, or rubs.   LUNGS: Clear to auscultation bilaterally.  Crackles bilaterally  EXTREMITIES: No clubbing, cyanosis, or edema noted.  Ecchymoses on right lateral foot.    Results:  Results from last 7 days   Lab Units 07/22/23  0434   WBC 10*3/mm3 8.03   HEMOGLOBIN g/dL 13.1   HEMATOCRIT % 42.4   PLATELETS 10*3/mm3 237       Results from last 7 days   Lab Units 07/24/23  0409 07/22/23  0434 07/20/23  0331   SODIUM mmol/L 136 137 136   POTASSIUM mmol/L 4.3 4.4 4.4   CHLORIDE mmol/L 100 97* 94*   CO2 mmol/L 22.0 24.0 40.0*   BUN mg/dL 17 15 20   CREATININE mg/dL 0.94 0.80 0.94   GLUCOSE mg/dL 83 74 98        Lab Results   Component Value Date    AST 15 07/18/2023    ALT 12 07/18/2023                                 Results from last 7 days   Lab Units 07/20/23  0331   PROBNP pg/mL 3,624.0*           Intake/Output Summary (Last 24 hours) at 7/26/2023 0810  Last data filed at 7/25/2023 1720  Gross per 24 hour   Intake 840 ml   Output 1450 ml   Net -610 ml         I personally reviewed the patient's EKG/Telemetry " data    Radiology Data:   Results for orders placed during the hospital encounter of 06/16/23    Adult Transthoracic Echo Complete W/ Cont if Necessary Per Protocol    Interpretation Summary    Left ventricular systolic function is normal. Estimated left ventricular EF = 60%    Left ventricular diastolic function was normal.    Mild aortic insufficiency.    Trace to mild mitral and tricuspid regurgitation.    Calculated right ventricular systolic pressure from tricuspid regurgitation is 20 mmHg.    There is a trivial pericardial effusion.      Current Medications:  acetaZOLAMIDE, 250 mg, Oral, Daily  apixaban, 2.5 mg, Oral, Q12H  ascorbic acid, 250 mg, Oral, Daily  brimonidine, 1 drop, Both Eyes, Q12H   And  timolol, 1 drop, Both Eyes, Q12H  dilTIAZem CD, 180 mg, Oral, Q24H  galantamine, 8 mg, Oral, BID With Meals  ipratropium-albuterol, 3 mL, Nebulization, Q4H - RT  levothyroxine, 88 mcg, Oral, Daily  midodrine, 5 mg, Oral, TID AC  mirtazapine, 15 mg, Oral, Nightly  pantoprazole, 40 mg, Oral, Q AM  PARoxetine, 10 mg, Oral, Nightly  senna-docusate sodium, 2 tablet, Oral, BID  sodium chloride, 10 mL, Intravenous, Q12H  torsemide, 10 mg, Oral, Daily        Assessment:  Paroxysmal atrial fibrillation  Started on Eliquis this admission  Currently rate controlled with digoxin  No amiodarone or flecainide due to concern for pneumonitis  Maintaining normal sinus rhythm  Has upcoming appointment with Dr. Martin at   Heart failure preserved ejection fraction  Bilateral pneumonia, history of MAC  Acute on chronic respiratory failure with hypoxia secondary to #2 and #3  Orthostatic hypotension  On midodrine  Fludrocortisone discontinued    Plan  Patient's son requested repeat BNP. We will order this today.   Continue Diltiazem 180mg daily, can consider increasing dose if necessary. No BB at family's request secondary to low BP.   Keep follow up with Dr. Martin at  EP.   Continue midodrine 5mg TID for BP  support.  Okay to DC at any time from a cardiac standpoint.      Little Kinney PA-C

## 2023-07-26 NOTE — PLAN OF CARE
Goal Outcome Evaluation:  Plan of Care Reviewed With: patient      Progress: improving     Problem: Adult Inpatient Plan of Care  Goal: Absence of Hospital-Acquired Illness or Injury  Intervention: Identify and Manage Fall Risk  Recent Flowsheet Documentation  Taken 7/25/2023 2000 by Giorgio Vásquez RN  Safety Promotion/Fall Prevention: activity supervised  Intervention: Prevent Skin Injury  Recent Flowsheet Documentation  Taken 7/26/2023 0600 by Giorgio Vásquez RN  Body Position: position changed independently  Taken 7/25/2023 2000 by Giorgio Vásquez RN  Body Position: position changed independently  Intervention: Prevent and Manage VTE (Venous Thromboembolism) Risk  Recent Flowsheet Documentation  Taken 7/26/2023 0600 by Giorgio Vásquez RN  Activity Management: activity encouraged  Taken 7/25/2023 2000 by Giorgio Vásquez RN  Activity Management: up in chair     Problem: Adult Inpatient Plan of Care  Goal: Absence of Hospital-Acquired Illness or Injury  Intervention: Prevent Skin Injury  Recent Flowsheet Documentation  Taken 7/26/2023 0600 by Giorgio Vásquez RN  Body Position: position changed independently  Taken 7/25/2023 2000 by Giorgio Vásquez RN  Body Position: position changed independently     Problem: Adult Inpatient Plan of Care  Goal: Absence of Hospital-Acquired Illness or Injury  Intervention: Prevent and Manage VTE (Venous Thromboembolism) Risk  Recent Flowsheet Documentation  Taken 7/26/2023 0600 by Giorgio Vásquez RN  Activity Management: activity encouraged  Taken 7/25/2023 2000 by Giorgio Vásquez RN  Activity Management: up in chair

## 2023-07-27 PROCEDURE — 99232 SBSQ HOSP IP/OBS MODERATE 35: CPT

## 2023-07-27 PROCEDURE — 97116 GAIT TRAINING THERAPY: CPT | Performed by: PHYSICAL THERAPIST

## 2023-07-27 PROCEDURE — 94664 DEMO&/EVAL PT USE INHALER: CPT

## 2023-07-27 PROCEDURE — 94669 MECHANICAL CHEST WALL OSCILL: CPT

## 2023-07-27 PROCEDURE — 97110 THERAPEUTIC EXERCISES: CPT | Performed by: PHYSICAL THERAPIST

## 2023-07-27 PROCEDURE — 99232 SBSQ HOSP IP/OBS MODERATE 35: CPT | Performed by: NURSE PRACTITIONER

## 2023-07-27 PROCEDURE — 94799 UNLISTED PULMONARY SVC/PX: CPT

## 2023-07-27 PROCEDURE — 97530 THERAPEUTIC ACTIVITIES: CPT | Performed by: PHYSICAL THERAPIST

## 2023-07-27 PROCEDURE — 94761 N-INVAS EAR/PLS OXIMETRY MLT: CPT

## 2023-07-27 RX ORDER — MIDODRINE HYDROCHLORIDE 5 MG/1
5 TABLET ORAL ONCE
Status: COMPLETED | OUTPATIENT
Start: 2023-07-27 | End: 2023-07-27

## 2023-07-27 RX ADMIN — IPRATROPIUM BROMIDE AND ALBUTEROL SULFATE 3 ML: 2.5; .5 SOLUTION RESPIRATORY (INHALATION) at 07:42

## 2023-07-27 RX ADMIN — Medication 10 ML: at 20:49

## 2023-07-27 RX ADMIN — SENNOSIDES AND DOCUSATE SODIUM 2 TABLET: 50; 8.6 TABLET ORAL at 08:40

## 2023-07-27 RX ADMIN — Medication 10 ML: at 08:42

## 2023-07-27 RX ADMIN — SENNOSIDES AND DOCUSATE SODIUM 2 TABLET: 50; 8.6 TABLET ORAL at 20:49

## 2023-07-27 RX ADMIN — IPRATROPIUM BROMIDE AND ALBUTEROL SULFATE 3 ML: 2.5; .5 SOLUTION RESPIRATORY (INHALATION) at 12:57

## 2023-07-27 RX ADMIN — GALANTAMINE 8 MG: 4 TABLET, FILM COATED ORAL at 17:19

## 2023-07-27 RX ADMIN — BRIMONIDINE TARTRATE 1 DROP: 2 SOLUTION/ DROPS OPHTHALMIC at 08:42

## 2023-07-27 RX ADMIN — GALANTAMINE 8 MG: 4 TABLET, FILM COATED ORAL at 08:42

## 2023-07-27 RX ADMIN — APIXABAN 2.5 MG: 2.5 TABLET, FILM COATED ORAL at 08:39

## 2023-07-27 RX ADMIN — PANTOPRAZOLE SODIUM 40 MG: 40 TABLET, DELAYED RELEASE ORAL at 05:48

## 2023-07-27 RX ADMIN — ACETAZOLAMIDE 250 MG: 250 TABLET ORAL at 08:41

## 2023-07-27 RX ADMIN — DILTIAZEM HYDROCHLORIDE 180 MG: 180 CAPSULE, EXTENDED RELEASE ORAL at 08:40

## 2023-07-27 RX ADMIN — IPRATROPIUM BROMIDE AND ALBUTEROL SULFATE 3 ML: 2.5; .5 SOLUTION RESPIRATORY (INHALATION) at 20:21

## 2023-07-27 RX ADMIN — MIRTAZAPINE 15 MG: 15 TABLET, FILM COATED ORAL at 20:49

## 2023-07-27 RX ADMIN — Medication 250 MG: at 08:40

## 2023-07-27 RX ADMIN — TIMOLOL MALEATE 1 DROP: 5 SOLUTION/ DROPS OPHTHALMIC at 08:42

## 2023-07-27 RX ADMIN — PAROXETINE HYDROCHLORIDE 10 MG: 10 TABLET, FILM COATED ORAL at 20:49

## 2023-07-27 RX ADMIN — APIXABAN 2.5 MG: 2.5 TABLET, FILM COATED ORAL at 20:49

## 2023-07-27 RX ADMIN — BRIMONIDINE TARTRATE 1 DROP: 2 SOLUTION/ DROPS OPHTHALMIC at 20:49

## 2023-07-27 RX ADMIN — TORSEMIDE 10 MG: 20 TABLET ORAL at 08:41

## 2023-07-27 RX ADMIN — TIMOLOL MALEATE 1 DROP: 5 SOLUTION/ DROPS OPHTHALMIC at 20:49

## 2023-07-27 RX ADMIN — MIDODRINE HYDROCHLORIDE 5 MG: 5 TABLET ORAL at 22:43

## 2023-07-27 RX ADMIN — LEVOTHYROXINE SODIUM 88 MCG: 0.09 TABLET ORAL at 05:48

## 2023-07-27 NOTE — CASE MANAGEMENT/SOCIAL WORK
Continued Stay Note  [facility]     Patient Name: Vita Miller  MRN: [id number]  Today's Date: 7/27/2023    Admit Date: 7/14/2023    Plan: SNF   Discharge Plan       Row Name 07/27/23 1428       Plan    Plan SNF    Patient/Family in Agreement with Plan yes    Plan Comments Met with patient and her son at bedside to discuss discharge plans. Informed them that the precert was denied, and peer to peer was also declined. Patient and son were upset by this decision and plan to start family appeal as soon as they are able.  will follow up tomorrow.  will continue to follow plan of care and assist with discharge planning and needs as indicated.    Final Discharge Disposition Code 03 - skilled nursing facility (SNF)                   Discharge Codes    No documentation.                 Expected Discharge Date and Time       Expected Discharge Date Expected Discharge Time    Jul 28, 2023               Loly Moreira RN

## 2023-07-27 NOTE — PROGRESS NOTES
"  [facility] at [facility]  PROGRESS NOTE    Date of Admission: 7/14/2023  Date of Service: 07/27/23    Primary Care Physician: Alfonso Steele MD    Chief Complaint: Follow-up atrial fibrillation, HFpEF, respiratory failure        Subjective      Converted back to normal sinus rhythm spontaneously yesterday afternoon.  Remains in normal sinus rhythm.  No chest pain or shortness of air.  Has no complaints today.  Is ready to go to rehab.    Objective   Vitals: /73 (BP Location: Right arm, Patient Position: Lying)   Pulse 51   Temp 97.8 øF (36.6 øC) (Oral)   Resp 16   Ht 172.7 cm (68\")   Wt 46.3 kg (102 lb)   SpO2 97%   BMI 15.51 kg/mý     Physical Exam:  GENERAL: Alert, cooperative, in no acute distress.   HEENT: Normocephalic, no jugular venous distention  HEART: Irregular rhythm, normal rate, and no murmurs, gallops, or rubs.   LUNGS: Clear to auscultation bilaterally.  Crackles bilaterally  EXTREMITIES: No clubbing, cyanosis, or edema noted.  Ecchymoses on right lateral foot.    Results:  Results from last 7 days   Lab Units 07/22/23  0434   WBC 10*3/mm3 8.03   HEMOGLOBIN g/dL 13.1   HEMATOCRIT % 42.4   PLATELETS 10*3/mm3 237       Results from last 7 days   Lab Units 07/24/23  0409 07/22/23  0434   SODIUM mmol/L 136 137   POTASSIUM mmol/L 4.3 4.4   CHLORIDE mmol/L 100 97*   CO2 mmol/L 22.0 24.0   BUN mg/dL 17 15   CREATININE mg/dL 0.94 0.80   GLUCOSE mg/dL 83 74        Lab Results   Component Value Date    AST 15 07/18/2023    ALT 12 07/18/2023                                 Results from last 7 days   Lab Units 07/26/23  0858   PROBNP pg/mL 2,917.0*           Intake/Output Summary (Last 24 hours) at 7/27/2023 0809  Last data filed at 7/26/2023 1100  Gross per 24 hour   Intake 480 ml   Output 575 ml   Net -95 ml         I personally reviewed the patient's EKG/Telemetry data    Radiology Data:   Results for orders placed during the hospital encounter of 06/16/23    Adult " Transthoracic Echo Complete W/ Cont if Necessary Per Protocol    Interpretation Summary    Left ventricular systolic function is normal. Estimated left ventricular EF = 60%    Left ventricular diastolic function was normal.    Mild aortic insufficiency.    Trace to mild mitral and tricuspid regurgitation.    Calculated right ventricular systolic pressure from tricuspid regurgitation is 20 mmHg.    There is a trivial pericardial effusion.      Current Medications:  acetaZOLAMIDE, 250 mg, Oral, Daily  apixaban, 2.5 mg, Oral, Q12H  ascorbic acid, 250 mg, Oral, Daily  brimonidine, 1 drop, Both Eyes, Q12H   And  timolol, 1 drop, Both Eyes, Q12H  dilTIAZem CD, 180 mg, Oral, Q24H  galantamine, 8 mg, Oral, BID With Meals  ipratropium-albuterol, 3 mL, Nebulization, Q4H - RT  levothyroxine, 88 mcg, Oral, Daily  midodrine, 5 mg, Oral, TID AC  mirtazapine, 15 mg, Oral, Nightly  pantoprazole, 40 mg, Oral, Q AM  PARoxetine, 10 mg, Oral, Nightly  senna-docusate sodium, 2 tablet, Oral, BID  sodium chloride, 10 mL, Intravenous, Q12H  torsemide, 10 mg, Oral, Daily        Assessment:  Paroxysmal atrial fibrillation  Started on Eliquis this admission  Currently rate controlled with digoxin  No amiodarone or flecainide due to concern for pneumonitis  Maintaining normal sinus rhythm  Has upcoming appointment with Dr. Martin at   Heart failure preserved ejection fraction  Bilateral pneumonia, history of MAC  Acute on chronic respiratory failure with hypoxia secondary to #2 and #3  Orthostatic hypotension  On midodrine  Fludrocortisone discontinued    Plan  Continue diltiazem 180 mg daily for heart rate control.  No beta-blocker at family's request.  Keep follow-up appointment with Dr. Martin at .  Continue midodrine 5 mg 3 times daily for blood pressure support.  Not much more to add from a cardiac standpoint.  We will sign off and be available to see on an as-needed basis.  She can follow-up with our office in 6  huang.    Little Kinney PA-C

## 2023-07-27 NOTE — PAYOR COMM NOTE
"Auth# [id number]   Clinical Update    Utilization Review  Phone [phone]  Fax [phone]    [facility]  [address] [place] [address]     Shane Miller \"Genie\" (83 y.o. Female)       Date of Birth   1940    Social Security Number       Address   [address] [facility]  [address] [place], [place] [address]    Home Phone   [phone]    MRN   [id number]       [place]   Non-Anabaptist    Marital Status                               Admission Date   7/14/23    Admission Type   Emergency    Admitting Provider   Reina Mendosa MD    Attending Provider   Reina Mendosa MD    Department, Room/Bed   [facility] 5G, S564/1       Discharge Date       Discharge Disposition       Discharge Destination                                 Attending Provider: Reina Mendosa MD    Allergies: Sulfa Antibiotics    Isolation: None   Infection: None   Code Status: CPR    Ht: 172.7 cm (68\")   Wt: 46.3 kg (102 lb)    Admission Cmt: None   Principal Problem: Acute respiratory failure with hypoxia [J96.01]                   Active Insurance as of 7/14/2023       Primary Coverage       Payor Plan Insurance Group Employer/Plan Group    [facility] MEDICARE REPLACEMENT [facility] MEDICARE REPLACEMENT [id number]       Payor Plan Address Payor Plan Phone Number Payor Plan Fax Number Effective Dates    [address] [address]   7/1/2020 - None Entered    [facility] [id number]         Subscriber Name Subscriber Birth Date Member ID       SHANE MILLER 1940 [id number]                     Emergency Contacts        (Rel.) Home Phone Work Phone Mobile Phone    EVANGELIST CHOUDHARY (Daughter) [phone] -- [phone]    Arun Miller (Son) -- -- [phone]                 Physician Progress Notes (last 72 hours)        Maria Luisa Morillo APRN at 07/27/23 1535              [facility] Medicine Services  PROGRESS " NOTE    Patient Name: Vita Miller  : 1940  MRN: [id number]    Date of Admission: 2023  Primary Care Physician: Alfonso Steele MD    Subjective   Subjective     CC:  F/u PNA    HPI:  Feels well in bed. Son in room. Denies any soa at rest. No pain currently. Tolerating diet. Son endorses STREETER at times. Continues to go in and out of afib. Rate currently well controlled. Still awaiting insurance/ rehab decision.       ROS:  Gen- No fevers, chills  CV- No chest pain, palpitations  Resp- + non productive cough, no dyspnea  GI- No N/V/D, abd pain      Objective   Objective     Vital Signs:   Temp:  [96 øF (35.6 øC)-97.8 øF (36.6 øC)] 97.6 øF (36.4 øC)  Heart Rate:  [] 64  Resp:  [15-18] 18  BP: (110-138)/(64-73) 121/66     Physical Exam:  Constitutional: No acute distress, awake, alert, frail and thin/ elderly   HENT: NCAT, mucous membranes moist  Respiratory: Bibasilar rales, no wheezing or rhonchi, respiratory effort normal on RA satting 98%  Cardiovascular: Irreg/ reg, no murmurs, rubs, or gallops  Gastrointestinal: Positive bowel sounds, soft, nontender, nondistended  Musculoskeletal: No bilateral ankle edema  Psychiatric: Appropriate affect, cooperative  Neurologic: Oriented x 3, strength symmetric in all extremities, Cranial Nerves grossly intact to confrontation, speech clear  Skin: No rashes     Results Reviewed:  LAB RESULTS:      Lab 23  0434   WBC 8.03   HEMOGLOBIN 13.1   HEMATOCRIT 42.4   PLATELETS 237   NEUTROS ABS 5.07   IMMATURE GRANS (ABS) 0.06*   LYMPHS ABS 1.82   MONOS ABS 0.58   EOS ABS 0.38   .4*         Lab 23  0409 23  0434   SODIUM 136 137   POTASSIUM 4.3 4.4   CHLORIDE 100 97*   CO2 22.0 24.0   ANION GAP 14.0 16.0*   BUN 17 15   CREATININE 0.94 0.80   EGFR 60.3 73.2   GLUCOSE 83 74   CALCIUM 9.1 8.9               Lab 23  0858   PROBNP 2,917.0*                 Brief Urine Lab Results  (Last result in the past 365 days)        Color   Clarity    Blood   Leuk Est   Nitrite   Protein   CREAT   Urine HCG        07/17/23 1645 Yellow   Clear   Negative   Negative   Negative   Negative                   Microbiology Results Abnormal       Procedure Component Value - Date/Time    Blood Culture - Blood, Arm, Left [[id number]]  (Normal) Collected: 07/15/23 0340    Lab Status: Final result Specimen: Blood from Arm, Left Updated: 07/20/23 0730     Blood Culture No growth at 5 days    Blood Culture - Blood, Arm, Right [[id number]]  (Normal) Collected: 07/15/23 0340    Lab Status: Final result Specimen: Blood from Arm, Right Updated: 07/20/23 0730     Blood Culture No growth at 5 days    S. Pneumo Ag Urine or CSF - Urine, Urine, Clean Catch [[id number]]  (Normal) Collected: 07/15/23 0341    Lab Status: Final result Specimen: Urine, Clean Catch Updated: 07/15/23 1356     Strep Pneumo Ag Negative    Legionella Antigen, Urine - Urine, Urine, Clean Catch [[id number]]  (Normal) Collected: 07/15/23 0341    Lab Status: Final result Specimen: Urine, Clean Catch Updated: 07/15/23 1356     LEGIONELLA ANTIGEN, URINE Negative    MRSA Screen, PCR (Inpatient) - Swab, Nares [[id number]]  (Normal) Collected: 07/15/23 0310    Lab Status: Final result Specimen: Swab from Nares Updated: 07/15/23 0846     MRSA PCR Negative    Narrative:      The negative predictive value of this diagnostic test is high and should only be used to consider de-escalating anti-MRSA therapy. A positive result may indicate colonization with MRSA and must be correlated clinically.  MRSA Negative    COVID PRE-OP / PRE-PROCEDURE SCREENING ORDER (NO ISOLATION) - Swab, Nasopharynx [[id number]]  (Normal) Collected: 07/15/23 0310    Lab Status: Final result Specimen: Swab from Nasopharynx Updated: 07/15/23 0410    Narrative:      The following orders were created for panel order COVID PRE-OP / PRE-PROCEDURE SCREENING ORDER (NO ISOLATION) - Swab, Nasopharynx.  Procedure                               Abnormality         Status                      ---------                               -----------         ------                     Respiratory Panel PCR w/...[[id number]]  Normal              Final result                 Please view results for these tests on the individual orders.    Respiratory Panel PCR w/COVID-19(SARS-CoV-2) JENNA/KELSI/NORMAN/PAD/COR/MAD/ARSEN In-House, NP Swab in UTM/VTM, 3-4 HR TAT - Swab, Nasopharynx [[id number]]  (Normal) Collected: 07/15/23 0310    Lab Status: Final result Specimen: Swab from Nasopharynx Updated: 07/15/23 0410     ADENOVIRUS, PCR Not Detected     Coronavirus 229E Not Detected     Coronavirus HKU1 Not Detected     Coronavirus NL63 Not Detected     Coronavirus OC43 Not Detected     COVID19 Not Detected     Human Metapneumovirus Not Detected     Human Rhinovirus/Enterovirus Not Detected     Influenza A PCR Not Detected     Influenza B PCR Not Detected     Parainfluenza Virus 1 Not Detected     Parainfluenza Virus 2 Not Detected     Parainfluenza Virus 3 Not Detected     Parainfluenza Virus 4 Not Detected     RSV, PCR Not Detected     Bordetella pertussis pcr Not Detected     Bordetella parapertussis PCR Not Detected     Chlamydophila pneumoniae PCR Not Detected     Mycoplasma pneumo by PCR Not Detected    Narrative:      In the setting of a positive respiratory panel with a viral infection PLUS a negative procalcitonin without other underlying concern for bacterial infection, consider observing off antibiotics or discontinuation of antibiotics and continue supportive care. If the respiratory panel is positive for atypical bacterial infection (Bordetella pertussis, Chlamydophila pneumoniae, or Mycoplasma pneumoniae), consider antibiotic de-escalation to target atypical bacterial infection.            No radiology results from the last 24 hrs    Results for orders placed during the hospital encounter of 06/16/23    Adult Transthoracic Echo Complete W/ Cont if Necessary Per Protocol    Interpretation Summary     Left ventricular systolic function is normal. Estimated left ventricular EF = 60%    Left ventricular diastolic function was normal.    Mild aortic insufficiency.    Trace to mild mitral and tricuspid regurgitation.    Calculated right ventricular systolic pressure from tricuspid regurgitation is 20 mmHg.    There is a trivial pericardial effusion.      Current medications:  Scheduled Meds:acetaZOLAMIDE, 250 mg, Oral, Daily  apixaban, 2.5 mg, Oral, Q12H  ascorbic acid, 250 mg, Oral, Daily  brimonidine, 1 drop, Both Eyes, Q12H   And  timolol, 1 drop, Both Eyes, Q12H  dilTIAZem CD, 180 mg, Oral, Q24H  galantamine, 8 mg, Oral, BID With Meals  ipratropium-albuterol, 3 mL, Nebulization, Q4H - RT  levothyroxine, 88 mcg, Oral, Daily  midodrine, 5 mg, Oral, TID AC  mirtazapine, 15 mg, Oral, Nightly  pantoprazole, 40 mg, Oral, Q AM  PARoxetine, 10 mg, Oral, Nightly  senna-docusate sodium, 2 tablet, Oral, BID  sodium chloride, 10 mL, Intravenous, Q12H  torsemide, 10 mg, Oral, Daily      Continuous Infusions:   PRN Meds:.  acetaminophen **OR** acetaminophen **OR** acetaminophen    senna-docusate sodium **AND** polyethylene glycol **AND** bisacodyl **AND** bisacodyl    Magnesium Standard Dose Replacement - Follow Nurse / BPA Driven Protocol    melatonin    metoprolol tartrate    ondansetron    Potassium Replacement - Follow Nurse / BPA Driven Protocol    sodium chloride    sodium chloride    sodium chloride    Assessment & Plan   Assessment & Plan     Active Hospital Problems    Diagnosis  POA    **Acute respiratory failure with hypoxia [J96.01]  Yes    Pneumonia of both lungs due to infectious organism [J18.9]  Unknown    Hyponatremia [E87.1]  Unknown    Severe malnutrition [E43]  Yes    Atrial fibrillation with RVR [I48.91]  Yes    Cachexia [R64]  Yes    Senile osteoporosis [M81.0]  Yes    Hashimoto's thyroiditis [E06.3]  Yes    Primary open angle glaucoma of both eyes, severe stage [H40.1133]  Yes    Mixed anxiety and  depressive disorder [F41.8]  Yes    Multiple nodules of lung [R91.8]  Yes    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        Brief Hospital Course to date:  Vita Miller is a 83 y.o. female with history of hypothyroidism, COPD with chronic bronchiectasis, history of chronic MAC with unknown intolerance to prior treatment plan, history of orthostatic hypotension, hyperlipidemia, osteoporosis, bilateral glaucoma, mild memory deficit, who was recently admitted to our facility approximately 1 month ago for new onset atrial fibrillation with RVR and returned to the ED with complaints of palpitations and shortness of breath.  She has been treated for PNA/COPD exacerbation as well as her Afib with RVR.      This patient's problems and plans were partially entered by my partner and updated as appropriate by me 07/27/23.    Copied text in this note has been reviewed and is accurate as of 07/27/23.     Acute respiratory failure with hypoxia  Bilateral pneumonia/infiltrates  History of MAC, intolerant to treatment  COPD with acute exacerbation, chronic bronchiectasis  -- Pulmonology followed earlier on this hospitalization.  Clinically improved.  AI/vasculitis panels are negative.  Recs to continue vest therapy, discussed with Dr. Hook  --Off steroids  --Completed Zosyn 7 days tx, s/p azith  -- Duo nebs  -- Flutter valve, mucinex  --MRSA pcr negative, strep negative, legionella negative  --CT chest showed new GGO, bronchiectasis, scattered nodules.  Needs f/u CT or PET scan  -- torsemide, diamox  --cardiology signed off/ follow up in 6 weeks        Atrial fibrillation with rvr, not in aflutter  --07/21 converted back to Afib RVR  following a BM at 8am. Pt was hypotensive. Family/patient declined lopressor indefinitely  --s/p esmolol drip  --eliquis  --flecainide --> bisoprolol ---> changed to digoxin per cards, no flecainide due to pneumonitis concerns, partner discussed with Dr. Padilla. Now Digoxin has been d/c'd  -- has upcoming appt with Dr. Martin at  (suggested making that telehealth, family very concerned about missing this appt)   --fludrocortisone discontinued 07/21  --cards following.  PO diltiazem- discontinued Digoxin 7/25  -- Midodrine had been increased to 10 mg TID, now back to 5mg TID per Cardiology  --LVEF 60%, normal diastolic on echo from 6/17/2023  --Dilt 180 mg daily for rate control        Altered mental status  --improved  --u/a bland, B12 wnl.   RPR nonreactive     Hyponatremia, resolved  --Na 129 on admission; suspect related to volume overload  --Na 136 on 7/24     Orthostatic hypotension  --fludricortisone discontinued  --midodrine increased on 7/21 to 10 mg TID- now back to 5mg TID     Hypothyroidism  --slightly elevated tsh  --continue synthroid, dose adjusted on 7/16; repeat tsh in 4-6 weeks; careful adjustment given RVR     Other chronic conditions: HLD, malnutrition, anxiety/depression, memory impairment  --continue home meds  --nutrition  --pt/otv       Expected Discharge Location and Transportation: SNF rehab (family appeal)  Expected Discharge   Expected Discharge Date: 7/28/2023; Expected Discharge Time:      DVT prophylaxis:  Medical and mechanical DVT prophylaxis orders are present.     AM-PAC 6 Clicks Score (PT): 18 (07/27/23 8114)    CODE STATUS:   Code Status and Medical Interventions:   Ordered at: 07/15/23 4801     Code Status (Patient has no pulse and is not breathing):    CPR (Attempt to Resuscitate)     Medical Interventions (Patient has pulse or is breathing):    Full Support       SCOTT Cunningham  07/27/23        Electronically signed by Maria Luisa Morillo APRN at 07/27/23 0055    "    Little Kinney PA-C at 07/27/23 0809            [facility]  PROGRESS NOTE    Date of Admission: 7/14/2023  Date of Service: 07/27/23    Primary Care Physician: Alfonso Steele MD    Chief Complaint: Follow-up atrial fibrillation, HFpEF, respiratory failure        Subjective      Converted back to normal sinus rhythm spontaneously yesterday afternoon.  Remains in normal sinus rhythm.  No chest pain or shortness of air.  Has no complaints today.  Is ready to go to rehab.    Objective   Vitals: /73 (BP Location: Right arm, Patient Position: Lying)   Pulse 51   Temp 97.8 øF (36.6 øC) (Oral)   Resp 16   Ht 172.7 cm (68\")   Wt 46.3 kg (102 lb)   SpO2 97%   BMI 15.51 kg/mý     Physical Exam:  GENERAL: Alert, cooperative, in no acute distress.   HEENT: Normocephalic, no jugular venous distention  HEART: Irregular rhythm, normal rate, and no murmurs, gallops, or rubs.   LUNGS: Clear to auscultation bilaterally.  Crackles bilaterally  EXTREMITIES: No clubbing, cyanosis, or edema noted.  Ecchymoses on right lateral foot.    Results:  Results from last 7 days   Lab Units 07/22/23  0434   WBC 10*3/mm3 8.03   HEMOGLOBIN g/dL 13.1   HEMATOCRIT % 42.4   PLATELETS 10*3/mm3 237       Results from last 7 days   Lab Units 07/24/23  0409 07/22/23  0434   SODIUM mmol/L 136 137   POTASSIUM mmol/L 4.3 4.4   CHLORIDE mmol/L 100 97*   CO2 mmol/L 22.0 24.0   BUN mg/dL 17 15   CREATININE mg/dL 0.94 0.80   GLUCOSE mg/dL 83 74        Lab Results   Component Value Date    AST 15 07/18/2023    ALT 12 07/18/2023                                 Results from last 7 days   Lab Units 07/26/23  0858   PROBNP pg/mL 2,917.0*           Intake/Output Summary (Last 24 hours) at 7/27/2023 0809  Last data filed at 7/26/2023 1100  Gross per 24 hour   Intake 480 ml   Output 575 ml   Net -95 ml         I personally reviewed the patient's EKG/Telemetry data    Radiology Data:   Results for orders placed " during the hospital encounter of 06/16/23    Adult Transthoracic Echo Complete W/ Cont if Necessary Per Protocol    Interpretation Summary    Left ventricular systolic function is normal. Estimated left ventricular EF = 60%    Left ventricular diastolic function was normal.    Mild aortic insufficiency.    Trace to mild mitral and tricuspid regurgitation.    Calculated right ventricular systolic pressure from tricuspid regurgitation is 20 mmHg.    There is a trivial pericardial effusion.      Current Medications:  acetaZOLAMIDE, 250 mg, Oral, Daily  apixaban, 2.5 mg, Oral, Q12H  ascorbic acid, 250 mg, Oral, Daily  brimonidine, 1 drop, Both Eyes, Q12H   And  timolol, 1 drop, Both Eyes, Q12H  dilTIAZem CD, 180 mg, Oral, Q24H  galantamine, 8 mg, Oral, BID With Meals  ipratropium-albuterol, 3 mL, Nebulization, Q4H - RT  levothyroxine, 88 mcg, Oral, Daily  midodrine, 5 mg, Oral, TID AC  mirtazapine, 15 mg, Oral, Nightly  pantoprazole, 40 mg, Oral, Q AM  PARoxetine, 10 mg, Oral, Nightly  senna-docusate sodium, 2 tablet, Oral, BID  sodium chloride, 10 mL, Intravenous, Q12H  torsemide, 10 mg, Oral, Daily        Assessment:  Paroxysmal atrial fibrillation  Started on Eliquis this admission  Currently rate controlled with digoxin  No amiodarone or flecainide due to concern for pneumonitis  Maintaining normal sinus rhythm  Has upcoming appointment with Dr. Martin at   Heart failure preserved ejection fraction  Bilateral pneumonia, history of MAC  Acute on chronic respiratory failure with hypoxia secondary to #2 and #3  Orthostatic hypotension  On midodrine  Fludrocortisone discontinued    Plan  Continue diltiazem 180 mg daily for heart rate control.  No beta-blocker at family's request.  Keep follow-up appointment with Dr. Martin at .  Continue midodrine 5 mg 3 times daily for blood pressure support.  Not much more to add from a cardiac standpoint.  We will sign off and be available to see on an as-needed basis.  She  can follow-up with our office in 6 weeks.    Little Kinney PA-C      Electronically signed by Little Kinney PA-C at 23 1012       Reina Mendosa MD at 23 0917              [facility] Medicine Services  PROGRESS NOTE    Patient Name: Vita Miller  : 1940  MRN: [id number]    Date of Admission: 2023  Primary Care Physician: Alfonso Steele MD    Subjective   Subjective     CC:  F/u PNA    HPI:  Hypoxic while sleeping overnight per family. Also in and out of rate controlled Afib- pt asymptomatic      ROS:  Gen- No fevers, chills  CV- No chest pain, palpitations  Resp- No cough, dyspnea  GI- No N/V/D, abd pain      Objective   Objective     Vital Signs:   Temp:  [96.2 øF (35.7 øC)-97.9 øF (36.6 øC)] 96.2 øF (35.7 øC)  Heart Rate:  [] 87  Resp:  [14-18] 16  BP: (115-133)/(73-85) 115/82  Flow (L/min):  [1] 1     Physical Exam:  Constitutional: Awake, alert, NAD  HENT: NCAT, mucous membranes moist  Respiratory: Clear to auscultation bilaterally, nonlabored respirations   Cardiovascular: irregular rhythm, appears to be rate controlled Afib on tele, no murmurs, rubs, or gallop  Gastrointestinal: Positive bowel sounds, soft, nontender, nondistended  Musculoskeletal: No bilateral ankle edema  Psychiatric: Appropriate affect, cooperative  Neurologic: Oriented x 3, PEREZ, non focal, speech clear, some word finding difficulties  Skin: No rashes    Results Reviewed:  LAB RESULTS:      Lab 23  0434   WBC 8.03   HEMOGLOBIN 13.1   HEMATOCRIT 42.4   PLATELETS 237   NEUTROS ABS 5.07   IMMATURE GRANS (ABS) 0.06*   LYMPHS ABS 1.82   MONOS ABS 0.58   EOS ABS 0.38   .4*         Lab 23  0409 23  0434 23  0331   SODIUM 136 137 136   POTASSIUM 4.3 4.4 4.4   CHLORIDE 100 97* 94*   CO2 22.0 24.0 40.0*   ANION GAP 14.0 16.0* 2.0*   BUN 17 15 20   CREATININE 0.94 0.80 0.94   EGFR 60.3 73.2 60.3   GLUCOSE 83 74 98   CALCIUM 9.1 8.9 8.8                Lab 07/26/23  0858 07/20/23  0331   PROBNP 2,917.0* 3,624.0*                 Brief Urine Lab Results  (Last result in the past 365 days)        Color   Clarity   Blood   Leuk Est   Nitrite   Protein   CREAT   Urine HCG        07/17/23 1645 Yellow   Clear   Negative   Negative   Negative   Negative                   Microbiology Results Abnormal       Procedure Component Value - Date/Time    Blood Culture - Blood, Arm, Left [[id number]]  (Normal) Collected: 07/15/23 0340    Lab Status: Final result Specimen: Blood from Arm, Left Updated: 07/20/23 0730     Blood Culture No growth at 5 days    Blood Culture - Blood, Arm, Right [[id number]]  (Normal) Collected: 07/15/23 0340    Lab Status: Final result Specimen: Blood from Arm, Right Updated: 07/20/23 0730     Blood Culture No growth at 5 days    S. Pneumo Ag Urine or CSF - Urine, Urine, Clean Catch [[id number]]  (Normal) Collected: 07/15/23 0341    Lab Status: Final result Specimen: Urine, Clean Catch Updated: 07/15/23 1356     Strep Pneumo Ag Negative    Legionella Antigen, Urine - Urine, Urine, Clean Catch [[id number]]  (Normal) Collected: 07/15/23 0341    Lab Status: Final result Specimen: Urine, Clean Catch Updated: 07/15/23 1356     LEGIONELLA ANTIGEN, URINE Negative    MRSA Screen, PCR (Inpatient) - Swab, Nares [[id number]]  (Normal) Collected: 07/15/23 0310    Lab Status: Final result Specimen: Swab from Nares Updated: 07/15/23 0846     MRSA PCR Negative    Narrative:      The negative predictive value of this diagnostic test is high and should only be used to consider de-escalating anti-MRSA therapy. A positive result may indicate colonization with MRSA and must be correlated clinically.  MRSA Negative    COVID PRE-OP / PRE-PROCEDURE SCREENING ORDER (NO ISOLATION) - Swab, Nasopharynx [[id number]]  (Normal) Collected: 07/15/23 0310    Lab Status: Final result Specimen: Swab from Nasopharynx Updated: 07/15/23 0410    Narrative:      The following orders were  created for panel order COVID PRE-OP / PRE-PROCEDURE SCREENING ORDER (NO ISOLATION) - Swab, Nasopharynx.  Procedure                               Abnormality         Status                     ---------                               -----------         ------                     Respiratory Panel PCR w/...[[id number]]  Normal              Final result                 Please view results for these tests on the individual orders.    Respiratory Panel PCR w/COVID-19(SARS-CoV-2) JENNA/KELSI/NORMAN/PAD/COR/MAD/ARSEN In-House, NP Swab in UTM/VTM, 3-4 HR TAT - Swab, Nasopharynx [[id number]]  (Normal) Collected: 07/15/23 0310    Lab Status: Final result Specimen: Swab from Nasopharynx Updated: 07/15/23 0410     ADENOVIRUS, PCR Not Detected     Coronavirus 229E Not Detected     Coronavirus HKU1 Not Detected     Coronavirus NL63 Not Detected     Coronavirus OC43 Not Detected     COVID19 Not Detected     Human Metapneumovirus Not Detected     Human Rhinovirus/Enterovirus Not Detected     Influenza A PCR Not Detected     Influenza B PCR Not Detected     Parainfluenza Virus 1 Not Detected     Parainfluenza Virus 2 Not Detected     Parainfluenza Virus 3 Not Detected     Parainfluenza Virus 4 Not Detected     RSV, PCR Not Detected     Bordetella pertussis pcr Not Detected     Bordetella parapertussis PCR Not Detected     Chlamydophila pneumoniae PCR Not Detected     Mycoplasma pneumo by PCR Not Detected    Narrative:      In the setting of a positive respiratory panel with a viral infection PLUS a negative procalcitonin without other underlying concern for bacterial infection, consider observing off antibiotics or discontinuation of antibiotics and continue supportive care. If the respiratory panel is positive for atypical bacterial infection (Bordetella pertussis, Chlamydophila pneumoniae, or Mycoplasma pneumoniae), consider antibiotic de-escalation to target atypical bacterial infection.            No radiology results from the last 24  hrs    Results for orders placed during the hospital encounter of 06/16/23    Adult Transthoracic Echo Complete W/ Cont if Necessary Per Protocol    Interpretation Summary    Left ventricular systolic function is normal. Estimated left ventricular EF = 60%    Left ventricular diastolic function was normal.    Mild aortic insufficiency.    Trace to mild mitral and tricuspid regurgitation.    Calculated right ventricular systolic pressure from tricuspid regurgitation is 20 mmHg.    There is a trivial pericardial effusion.      Current medications:  Scheduled Meds:acetaZOLAMIDE, 250 mg, Oral, Daily  apixaban, 2.5 mg, Oral, Q12H  ascorbic acid, 250 mg, Oral, Daily  brimonidine, 1 drop, Both Eyes, Q12H   And  timolol, 1 drop, Both Eyes, Q12H  dilTIAZem CD, 180 mg, Oral, Q24H  galantamine, 8 mg, Oral, BID With Meals  ipratropium-albuterol, 3 mL, Nebulization, Q4H - RT  levothyroxine, 88 mcg, Oral, Daily  midodrine, 5 mg, Oral, TID AC  mirtazapine, 15 mg, Oral, Nightly  pantoprazole, 40 mg, Oral, Q AM  PARoxetine, 10 mg, Oral, Nightly  senna-docusate sodium, 2 tablet, Oral, BID  sodium chloride, 10 mL, Intravenous, Q12H  torsemide, 10 mg, Oral, Daily      Continuous Infusions:   PRN Meds:.  acetaminophen **OR** acetaminophen **OR** acetaminophen    senna-docusate sodium **AND** polyethylene glycol **AND** bisacodyl **AND** bisacodyl    Magnesium Standard Dose Replacement - Follow Nurse / BPA Driven Protocol    melatonin    metoprolol tartrate    ondansetron    Potassium Replacement - Follow Nurse / BPA Driven Protocol    sodium chloride    sodium chloride    sodium chloride    Assessment & Plan   Assessment & Plan     Active Hospital Problems    Diagnosis  POA    **Acute respiratory failure with hypoxia [J96.01]  Yes    Pneumonia of both lungs due to infectious organism [J18.9]  Unknown    Hyponatremia [E87.1]  Unknown    Severe malnutrition [E43]  Yes    Atrial fibrillation with RVR [I48.91]  Yes    Cachexia [R64]  Yes     Senile osteoporosis [M81.0]  Yes    Hashimoto's thyroiditis [E06.3]  Yes    Primary open angle glaucoma of both eyes, severe stage [H40.1133]  Yes    Mixed anxiety and depressive disorder [F41.8]  Yes    Multiple nodules of lung [R91.8]  Yes    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        Brief Hospital Course to date:  Vita Miller is a 83 y.o. female with history of hypothyroidism, COPD with chronic bronchiectasis, history of chronic MAC with unknown intolerance to prior treatment plan, history of orthostatic hypotension, hyperlipidemia, osteoporosis, bilateral glaucoma, mild memory deficit, who was recently admitted to our facility approximately 1 month ago for new onset atrial fibrillation with RVR and returned to the ED with complaints of palpitations and shortness of breath.  She has been treated for PNA/COPD exacerbation as well as her Afib with RVR.      This patient's problems and plans were partially entered by my partner and updated as appropriate by me 07/26/23.    Copied text in this note has been reviewed and is accurate as of 07/26/23.     Acute respiratory failure with hypoxia  Bilateral pneumonia/infiltrates  History of MAC, intolerant to treatment  COPD with acute exacerbation, chronic bronchiectasis  -- Pulmonology followed earlier on this hospitalization.  Clinically improved.  AI/vasculitis panels are negative.  Recs to continue vest therapy, discussed with Dr. Hook  --Off steroids  --Completed Zosyn 7 days tx, s/p azith  -- Duo nebs  -- Flutter valve, mucinex  --MRSA pcr negative, strep negative, legionella negative  --CT chest showed new GGO, bronchiectasis, scattered nodules.  Needs f/u CT or  PET scan  -- torsemide, diamox       Atrial fibrillation with rvr, not in aflutter  --07/21 converted back to Afib RVR following a BM at 8am. Pt was hypotensive. Family/patient declined lopressor indefinitely  --s/p esmolol drip  --eliquis  --flecainide --> bisoprolol ---> changed to digoxin per cards, no flecainide due to pneumonitis concerns, partner discussed with Dr. Padilla. Now Digoxin has been d/c'd  -- has upcoming appt with Dr. Martin at  (suggested making that telehealth, family very concerned about missing this appt)   --fludrocortisone discontinued 07/21  --cards following.  PO diltiazem- discontinued Digoxin 7/25  -- Midodrine had been increased to 10 mg TID, now back to 5mg TID per Cardiology  --LVEF 60%, normal diastolic on echo from 6/17/2023     Altered mental status  --improved  --u/a bland, B12 wnl.   RPR nonreactive     Hyponatremia, resolved  --Na 129 on admission; suspect related to volume overload  --Na 136 on 7/24     Orthostatic hypotension  --fludricortisone discontinued  --midodrine increased on 7/21 to 10 mg TID- now back to 5mg TID     Hypothyroidism  --slightly elevated tsh  --continue synthroid, dose adjusted on 7/16; repeat tsh in 4-6 weeks; careful adjustment given RVR     Other chronic conditions: HLD, malnutrition, anxiety/depression, memory impairment  --continue home meds  --nutrition  --pt/otv       Expected Discharge Location and Transportation: SNF rehab (family appeal)  Expected Discharge   Expected Discharge Date: 7/28/2023; Expected Discharge Time:      DVT prophylaxis:  Medical and mechanical DVT prophylaxis orders are present.     AM-PAC 6 Clicks Score (PT): 21 (07/26/23 0800)    CODE STATUS:   Code Status and Medical Interventions:   Ordered at: 07/15/23 0214     Code Status (Patient has no pulse and is not breathing):    CPR (Attempt to Resuscitate)     Medical Interventions (Patient has pulse or is breathing):    Full Support       Reina Mendosa,  "MD  07/26/23        Electronically signed by Reina Mendosa MD at 07/26/23 1128       Little Kinney PA-C at 07/26/23 0810            [facility]  PROGRESS NOTE    Date of Admission: 7/14/2023  Date of Service: 07/26/23    Primary Care Physician: Alfonso Steele MD    Chief Complaint: Follow-up atrial fibrillation, HFpEF, respiratory failure        Subjective      Went back into atrial fibrillation overnight after a bowel movement. No associated symptoms. Rates . No chest pain or shortness of air. Was on supplemental O2 overnight but maintaining normal O2 saturation on room air at the time of my visit.    Objective   Vitals: /79 (BP Location: Left arm, Patient Position: Lying)   Pulse 80   Temp 96.2 øF (35.7 øC) (Oral)   Resp 16   Ht 172.7 cm (68\")   Wt 46.3 kg (102 lb)   SpO2 97%   BMI 15.51 kg/mý     Physical Exam:  GENERAL: Alert, cooperative, in no acute distress.   HEENT: Normocephalic, no jugular venous distention  HEART: Irregular rhythm, normal rate, and no murmurs, gallops, or rubs.   LUNGS: Clear to auscultation bilaterally.  Crackles bilaterally  EXTREMITIES: No clubbing, cyanosis, or edema noted.  Ecchymoses on right lateral foot.    Results:  Results from last 7 days   Lab Units 07/22/23  0434   WBC 10*3/mm3 8.03   HEMOGLOBIN g/dL 13.1   HEMATOCRIT % 42.4   PLATELETS 10*3/mm3 237       Results from last 7 days   Lab Units 07/24/23  0409 07/22/23  0434 07/20/23  0331   SODIUM mmol/L 136 137 136   POTASSIUM mmol/L 4.3 4.4 4.4   CHLORIDE mmol/L 100 97* 94*   CO2 mmol/L 22.0 24.0 40.0*   BUN mg/dL 17 15 20   CREATININE mg/dL 0.94 0.80 0.94   GLUCOSE mg/dL 83 74 98        Lab Results   Component Value Date    AST 15 07/18/2023    ALT 12 07/18/2023                                 Results from last 7 days   Lab Units 07/20/23  0331   PROBNP pg/mL 3,624.0*           Intake/Output Summary (Last 24 hours) at 7/26/2023 0810  Last data filed at " 7/25/2023 1720  Gross per 24 hour   Intake 840 ml   Output 1450 ml   Net -610 ml         I personally reviewed the patient's EKG/Telemetry data    Radiology Data:   Results for orders placed during the hospital encounter of 06/16/23    Adult Transthoracic Echo Complete W/ Cont if Necessary Per Protocol    Interpretation Summary    Left ventricular systolic function is normal. Estimated left ventricular EF = 60%    Left ventricular diastolic function was normal.    Mild aortic insufficiency.    Trace to mild mitral and tricuspid regurgitation.    Calculated right ventricular systolic pressure from tricuspid regurgitation is 20 mmHg.    There is a trivial pericardial effusion.      Current Medications:  acetaZOLAMIDE, 250 mg, Oral, Daily  apixaban, 2.5 mg, Oral, Q12H  ascorbic acid, 250 mg, Oral, Daily  brimonidine, 1 drop, Both Eyes, Q12H   And  timolol, 1 drop, Both Eyes, Q12H  dilTIAZem CD, 180 mg, Oral, Q24H  galantamine, 8 mg, Oral, BID With Meals  ipratropium-albuterol, 3 mL, Nebulization, Q4H - RT  levothyroxine, 88 mcg, Oral, Daily  midodrine, 5 mg, Oral, TID AC  mirtazapine, 15 mg, Oral, Nightly  pantoprazole, 40 mg, Oral, Q AM  PARoxetine, 10 mg, Oral, Nightly  senna-docusate sodium, 2 tablet, Oral, BID  sodium chloride, 10 mL, Intravenous, Q12H  torsemide, 10 mg, Oral, Daily        Assessment:  Paroxysmal atrial fibrillation  Started on Eliquis this admission  Currently rate controlled with digoxin  No amiodarone or flecainide due to concern for pneumonitis  Maintaining normal sinus rhythm  Has upcoming appointment with Dr. Martin at   Heart failure preserved ejection fraction  Bilateral pneumonia, history of MAC  Acute on chronic respiratory failure with hypoxia secondary to #2 and #3  Orthostatic hypotension  On midodrine  Fludrocortisone discontinued    Plan  Patient's son requested repeat BNP. We will order this today.   Continue Diltiazem 180mg daily, can consider increasing dose if necessary. No  BB at family's request secondary to low BP.   Keep follow up with Dr. Martin at  EP.   Continue midodrine 5mg TID for BP support.  Okay to DC at any time from a cardiac standpoint.      Little Kinney PA-C      Electronically signed by Little Kinney PA-C at [phone]       Reina Mendosa MD at [phone]              [facility] Medicine Services  PROGRESS NOTE    Patient Name: Vita Miller  : 1940  MRN: [id number]    Date of Admission: 2023  Primary Care Physician: Alfonso Steele MD    Subjective   Subjective     CC:  F/u PNA    HPI:  No new issues overnight. Sitting up in chair with daughter-in-law at bedside.       ROS:  Gen- No fevers, chills  CV- No chest pain, palpitations  Resp- No cough, dyspnea  GI- No N/V/D, abd pain      Objective   Objective     Vital Signs:   Temp:  [96.6 øF (35.9 øC)-97.8 øF (36.6 øC)] 96.7 øF (35.9 øC)  Heart Rate:  [52-68] 67  Resp:  [16-17] 16  BP: (121-141)/(65-71) 121/71     Physical Exam:  Constitutional: Awake, alert, NAD  HENT: NCAT, mucous membranes moist  Respiratory: Clear to auscultation bilaterally, nonlabored respirations   Cardiovascular: RRR, no murmurs, rubs, or gallop  Gastrointestinal: Positive bowel sounds, soft, nontender, nondistended  Musculoskeletal: No bilateral ankle edema  Psychiatric: Appropriate affect, cooperative  Neurologic: Oriented x 3, PEREZ, non focal, speech clear, some word finding difficulties  Skin: No rashes    Results Reviewed:  LAB RESULTS:      Lab 23  0434   WBC 8.03   HEMOGLOBIN 13.1   HEMATOCRIT 42.4   PLATELETS 237   NEUTROS ABS 5.07   IMMATURE GRANS (ABS) 0.06*   LYMPHS ABS 1.82   MONOS ABS 0.58   EOS ABS 0.38   .4*         Lab 23  0409 23  0434 23  0331 23  0456   SODIUM 136 137 136 134*   POTASSIUM 4.3 4.4 4.4 3.9   CHLORIDE 100 97* 94* 92*   CO2 22.0 24.0 40.0* 35.0*   ANION GAP 14.0 16.0* 2.0* 7.0   BUN 17 15 20 20   CREATININE  0.94 0.80 0.94 0.96   EGFR 60.3 73.2 60.3 58.8*   GLUCOSE 83 74 98 85   CALCIUM 9.1 8.9 8.8 8.8   MAGNESIUM  --   --   --  2.2   PHOSPHORUS  --   --   --  3.5         Lab 07/19/23  0456   ALBUMIN 3.0*         Lab 07/20/23  0331   PROBNP 3,624.0*                 Brief Urine Lab Results  (Last result in the past 365 days)        Color   Clarity   Blood   Leuk Est   Nitrite   Protein   CREAT   Urine HCG        07/17/23 1645 Yellow   Clear   Negative   Negative   Negative   Negative                   Microbiology Results Abnormal       Procedure Component Value - Date/Time    Blood Culture - Blood, Arm, Left [[id number]]  (Normal) Collected: 07/15/23 0340    Lab Status: Final result Specimen: Blood from Arm, Left Updated: 07/20/23 0730     Blood Culture No growth at 5 days    Blood Culture - Blood, Arm, Right [[id number]]  (Normal) Collected: 07/15/23 0340    Lab Status: Final result Specimen: Blood from Arm, Right Updated: 07/20/23 0730     Blood Culture No growth at 5 days    S. Pneumo Ag Urine or CSF - Urine, Urine, Clean Catch [[id number]]  (Normal) Collected: 07/15/23 0341    Lab Status: Final result Specimen: Urine, Clean Catch Updated: 07/15/23 1356     Strep Pneumo Ag Negative    Legionella Antigen, Urine - Urine, Urine, Clean Catch [[id number]]  (Normal) Collected: 07/15/23 0341    Lab Status: Final result Specimen: Urine, Clean Catch Updated: 07/15/23 1356     LEGIONELLA ANTIGEN, URINE Negative    MRSA Screen, PCR (Inpatient) - Swab, Nares [[id number]]  (Normal) Collected: 07/15/23 0310    Lab Status: Final result Specimen: Swab from Nares Updated: 07/15/23 0846     MRSA PCR Negative    Narrative:      The negative predictive value of this diagnostic test is high and should only be used to consider de-escalating anti-MRSA therapy. A positive result may indicate colonization with MRSA and must be correlated clinically.  MRSA Negative    COVID PRE-OP / PRE-PROCEDURE SCREENING ORDER (NO ISOLATION) - Swab, Nasopharynx  [[id number]]  (Normal) Collected: 07/15/23 0310    Lab Status: Final result Specimen: Swab from Nasopharynx Updated: 07/15/23 0410    Narrative:      The following orders were created for panel order COVID PRE-OP / PRE-PROCEDURE SCREENING ORDER (NO ISOLATION) - Swab, Nasopharynx.  Procedure                               Abnormality         Status                     ---------                               -----------         ------                     Respiratory Panel PCR w/...[[id number]]  Normal              Final result                 Please view results for these tests on the individual orders.    Respiratory Panel PCR w/COVID-19(SARS-CoV-2) JENNA/KELSI/NORMAN/PAD/COR/MAD/ARSEN In-House, NP Swab in UTM/VTM, 3-4 HR TAT - Swab, Nasopharynx [[id number]]  (Normal) Collected: 07/15/23 0310    Lab Status: Final result Specimen: Swab from Nasopharynx Updated: 07/15/23 0410     ADENOVIRUS, PCR Not Detected     Coronavirus 229E Not Detected     Coronavirus HKU1 Not Detected     Coronavirus NL63 Not Detected     Coronavirus OC43 Not Detected     COVID19 Not Detected     Human Metapneumovirus Not Detected     Human Rhinovirus/Enterovirus Not Detected     Influenza A PCR Not Detected     Influenza B PCR Not Detected     Parainfluenza Virus 1 Not Detected     Parainfluenza Virus 2 Not Detected     Parainfluenza Virus 3 Not Detected     Parainfluenza Virus 4 Not Detected     RSV, PCR Not Detected     Bordetella pertussis pcr Not Detected     Bordetella parapertussis PCR Not Detected     Chlamydophila pneumoniae PCR Not Detected     Mycoplasma pneumo by PCR Not Detected    Narrative:      In the setting of a positive respiratory panel with a viral infection PLUS a negative procalcitonin without other underlying concern for bacterial infection, consider observing off antibiotics or discontinuation of antibiotics and continue supportive care. If the respiratory panel is positive for atypical bacterial infection (Bordetella pertussis,  Chlamydophila pneumoniae, or Mycoplasma pneumoniae), consider antibiotic de-escalation to target atypical bacterial infection.            No radiology results from the last 24 hrs    Results for orders placed during the hospital encounter of 06/16/23    Adult Transthoracic Echo Complete W/ Cont if Necessary Per Protocol    Interpretation Summary    Left ventricular systolic function is normal. Estimated left ventricular EF = 60%    Left ventricular diastolic function was normal.    Mild aortic insufficiency.    Trace to mild mitral and tricuspid regurgitation.    Calculated right ventricular systolic pressure from tricuspid regurgitation is 20 mmHg.    There is a trivial pericardial effusion.      Current medications:  Scheduled Meds:acetaZOLAMIDE, 250 mg, Oral, Daily  apixaban, 2.5 mg, Oral, Q12H  ascorbic acid, 250 mg, Oral, Daily  brimonidine, 1 drop, Both Eyes, Q12H   And  timolol, 1 drop, Both Eyes, Q12H  dilTIAZem CD, 180 mg, Oral, Q24H  galantamine, 8 mg, Oral, BID With Meals  ipratropium-albuterol, 3 mL, Nebulization, Q4H - RT  levothyroxine, 88 mcg, Oral, Daily  midodrine, 5 mg, Oral, TID AC  mirtazapine, 15 mg, Oral, Nightly  pantoprazole, 40 mg, Oral, Q AM  PARoxetine, 10 mg, Oral, Nightly  senna-docusate sodium, 2 tablet, Oral, BID  sodium chloride, 10 mL, Intravenous, Q12H  torsemide, 10 mg, Oral, Daily      Continuous Infusions:   PRN Meds:.  acetaminophen **OR** acetaminophen **OR** acetaminophen    senna-docusate sodium **AND** polyethylene glycol **AND** bisacodyl **AND** bisacodyl    Magnesium Standard Dose Replacement - Follow Nurse / BPA Driven Protocol    melatonin    metoprolol tartrate    ondansetron    Potassium Replacement - Follow Nurse / BPA Driven Protocol    sodium chloride    sodium chloride    sodium chloride    Assessment & Plan   Assessment & Plan     Active Hospital Problems    Diagnosis  POA    **Acute respiratory failure with hypoxia [J96.01]  Yes    Pneumonia of both lungs due to  infectious organism [J18.9]  Unknown    Hyponatremia [E87.1]  Unknown    Severe Malnutrition (HCC) [E43]  Yes    Atrial fibrillation with RVR [I48.91]  Yes    Cachexia [R64]  Yes    Senile osteoporosis [M81.0]  Yes    Hashimoto's thyroiditis [E06.3]  Yes    Primary open angle glaucoma of both eyes, severe stage [H40.1133]  Yes    Mixed anxiety and depressive disorder [F41.8]  Yes    Multiple nodules of lung [R91.8]  Yes    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        Brief Hospital Course to date:  Vita Miller is a 83 y.o. female with history of hypothyroidism, COPD with chronic bronchiectasis, history of chronic MAC with unknown intolerance to prior treatment plan, history of orthostatic hypotension, hyperlipidemia, osteoporosis, bilateral glaucoma, mild memory deficit, who was recently admitted to our facility approximately 1 month ago for new onset atrial fibrillation with RVR and returned to the ED with complaints of palpitations and shortness of breath.  She has been treated for PNA/COPD exacerbation as well as her Afib with RVR.      This patient's problems and plans were partially entered by my partner and updated as appropriate by me 07/25/23.    Copied text in this note has been reviewed and is accurate as of 07/25/23.     Acute respiratory failure with hypoxia  Bilateral pneumonia/infiltrates  History of MAC, intolerant to treatment  COPD with acute exacerbation, chronic bronchiectasis  -- Pulmonology followed earlier on this hospitalization.  Clinically improved.  AI/vasculitis panels are negative.  Recs to continue vest therapy, discussed with Dr. Hook  --Off steroids  --Completed Zosyn 7 days tx, s/p  azith  -- Duo nebs  -- Flutter valve, mucinex  --MRSA pcr negative, strep negative, legionella negative  --CT chest showed new GGO, bronchiectasis, scattered nodules.  Needs f/u CT or PET scan  -- bumex, diamox       Atrial fibrillation with rvr, not in aflutter  --07/21 converted back to Afib RVR following a BM at 8am. Pt was hypotensive. Family/patient declined lopressor indefinitely  --s/p esmolol drip  --eliquis  --flecainide --> bisoprolol ---> changed to digoxin per cards, no flecainide due to pneumonitis concerns, partner discussed with Dr. Padilla. Now Digoxin has been d/c'd  -- has upcoming appt with Dr. Martin at  (suggested making that telehealth, family very concerned about missing this appt)   --fludrocortisone discontinued 07/21  --cards following.  PO diltiazem- discontinued Digoxin 7/25  -- Midodrine had been increased to 10 mg TID, now back to 5mg TID per Cardiology  --LVEF 60%, normal diastolic on echo from 6/17/2023     Altered mental status  --improved  --u/a bland, B12 wnl.   RPR nonreactive     Hyponatremia, resolved  --Na 129 on admission; suspect related to volume overload  --Na 136 on 7/24     Orthostatic hypotension  --fludricortisone discontinued  --midodrine increased on 7/21 to 10 mg TID- now back to 5mg TID     Hypothyroidism  --slightly elevated tsh  --continue synthroid, dose adjusted on 7/16; repeat tsh in 4-6 weeks; careful adjustment given RVR     Other chronic conditions: HLD, malnutrition, anxiety/depression, memory impairment  --continue home meds  --nutrition  --pt/otv       Expected Discharge Location and Transportation: SNF rehab (awaiting precert)  Expected Discharge   Expected Discharge Date: 7/26/2023; Expected Discharge Time:      DVT prophylaxis:  Medical and mechanical DVT prophylaxis orders are present.     AM-PAC 6 Clicks Score (PT): 17 (07/21/23 1000)    CODE STATUS:   Code Status and Medical Interventions:   Ordered at: 07/15/23 0214     Code Status (Patient  has no pulse and is not breathing):    CPR (Attempt to Resuscitate)     Medical Interventions (Patient has pulse or is breathing):    Full Support       Reina Mendosa MD  07/25/23        Electronically signed by Reina Mendosa MD at 07/25/23 1145       Kenton Mack MD at 07/25/23 0708          Vita Miller  [id number]  1940   LOS: 10 days   Patient Care Team:  Alfonso Steele MD as PCP - General (Internal Medicine)    Chief Complaint:  PAF / SEVERE LUNG DZ WITH CHRONIC HYPOXIC HYPERCARBIC RESPIRATORY FAILURE / HFpEF / CONFUSION / HYPONATREMIA        Subjective     Comfortable semirecumbent in bed and awakened from light sleep.  No current cardiopulmonary complaints.  No significant cough, sputum production, pleurisy, hemoptysis, anginal type chest discomfort, tachypalpitation, or GI/ difficulty.  Acceptable appetite.  Normal room air oximetry (96-97%).    Objective     Vital Sign Min/Max for last 24 hours  Temp  Min: 96.6 øF (35.9 øC)  Max: 97.8 øF (36.6 øC)   BP  Min: 121/66  Max: 141/70   Pulse  Min: 50  Max: 68   Resp  Min: 16  Max: 17   SpO2  Min: 93 %  Max: 96 %               07/14/23  2224   Weight: 46.3 kg (102 lb)         Intake/Output Summary (Last 24 hours) at 7/25/2023 0708  Last data filed at 7/24/2023 2338  Gross per 24 hour   Intake 840 ml   Output 950 ml   Net -110 ml       Physical Exam:     General Appearance:    Alert, cooperative, in no acute distress   Lungs:   Overall diminished breath sounds with scattered rhonchi and wheezes without crackles/dullness,respirations regular, even and unlabored    Heart:    Regular and normal rate, normal S1 and S2, grade 1/6 systolic murmur, no gallop, no rub, no click   Abdomen:  Extremities:   Soft, nontender, bowel sounds audible x4    No edema, normal range of motion   Pulses:   Pulses palpable and equal bilaterally      Results Review:   Results from last 7 days   Lab Units 07/24/23  0409 07/22/23  0434 07/20/23  1002  "  SODIUM mmol/L 136 137 136   POTASSIUM mmol/L 4.3 4.4 4.4   CHLORIDE mmol/L 100 97* 94*   CO2 mmol/L 22.0 24.0 40.0*   BUN mg/dL 17 15 20   CREATININE mg/dL 0.94 0.80 0.94   GLUCOSE mg/dL 83 74 98   CALCIUM mg/dL 9.1 8.9 8.8     Results from last 7 days   Lab Units 07/22/23  0434   WBC 10*3/mm3 8.03   HEMOGLOBIN g/dL 13.1   HEMATOCRIT % 42.4   PLATELETS 10*3/mm3 237     NO NEW CXR / EKG / LAB RESULTS.    Medication Review: REVIEWED; NO DRIPS.    Assessment & Plan     Acceptable hemodynamics and persistent normal sinus with excellent oxygenation.  She will need \"baseline\" chest x-ray at the time of discharge to inpatient cardiopulmonary rehabilitation.  She will initiate electrophysiology evaluation and care at [facility] post discharge.  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       "

## 2023-07-27 NOTE — PLAN OF CARE
Problem: Adult Inpatient Plan of Care  Goal: Plan of Care Review  Outcome: Ongoing, Progressing  Flowsheets (Taken 7/27/2023 0454)  Progress: improving  Plan of Care Reviewed With: patient  Goal: Patient-Specific Goal (Individualized)  Outcome: Ongoing, Progressing  Goal: Absence of Hospital-Acquired Illness or Injury  Outcome: Ongoing, Progressing  Intervention: Identify and Manage Fall Risk  Recent Flowsheet Documentation  Taken 7/27/2023 0400 by Radha Zepeda RN  Safety Promotion/Fall Prevention:   activity supervised   assistive device/personal items within reach   clutter free environment maintained   fall prevention program maintained   lighting adjusted   nonskid shoes/slippers when out of bed   room organization consistent   safety round/check completed  Taken 7/27/2023 0000 by Radha Zepeda RN  Safety Promotion/Fall Prevention:   activity supervised   assistive device/personal items within reach   clutter free environment maintained   fall prevention program maintained   lighting adjusted   nonskid shoes/slippers when out of bed   safety round/check completed   room organization consistent  Taken 7/26/2023 2000 by Radha Zepeda RN  Safety Promotion/Fall Prevention:   activity supervised   assistive device/personal items within reach   clutter free environment maintained   fall prevention program maintained   lighting adjusted   nonskid shoes/slippers when out of bed   room organization consistent   safety round/check completed  Intervention: Prevent Skin Injury  Recent Flowsheet Documentation  Taken 7/27/2023 0400 by Radha Zepeda RN  Body Position: position changed independently  Skin Protection:   adhesive use limited   tubing/devices free from skin contact   skin-to-skin areas padded   skin-to-device areas padded  Taken 7/27/2023 0000 by Radha Zepeda RN  Body Position: position changed independently  Skin Protection:   adhesive use limited   tubing/devices free from skin contact    skin-to-skin areas padded   skin-to-device areas padded   incontinence pads utilized  Taken 7/26/2023 2000 by Radha Zepeda RN  Body Position:   supine   sitting up in bed  Skin Protection:   adhesive use limited   tubing/devices free from skin contact   skin-to-skin areas padded   skin-to-device areas padded   incontinence pads utilized  Intervention: Prevent and Manage VTE (Venous Thromboembolism) Risk  Recent Flowsheet Documentation  Taken 7/27/2023 0400 by Radha Zepeda RN  Activity Management: activity encouraged  Taken 7/27/2023 0000 by Radha Zepeda RN  Activity Management: activity encouraged  Taken 7/26/2023 2000 by Radha Zepeda RN  Activity Management: activity encouraged  VTE Prevention/Management: (Eliquis) other (see comments)  Intervention: Prevent Infection  Recent Flowsheet Documentation  Taken 7/27/2023 0400 by Radha Zepeda RN  Infection Prevention:   environmental surveillance performed   equipment surfaces disinfected   hand hygiene promoted   rest/sleep promoted   personal protective equipment utilized   single patient room provided   visitors restricted/screened  Taken 7/27/2023 0000 by Radha Zepeda RN  Infection Prevention:   environmental surveillance performed   equipment surfaces disinfected   hand hygiene promoted   personal protective equipment utilized   rest/sleep promoted   single patient room provided   visitors restricted/screened  Taken 7/26/2023 2000 by Radha Zepeda RN  Infection Prevention:   environmental surveillance performed   equipment surfaces disinfected   hand hygiene promoted   personal protective equipment utilized   single patient room provided   rest/sleep promoted   visitors restricted/screened  Goal: Optimal Comfort and Wellbeing  Outcome: Ongoing, Progressing  Intervention: Provide Person-Centered Care  Recent Flowsheet Documentation  Taken 7/26/2023 2000 by Radha Zepeda RN  Trust Relationship/Rapport:   care explained   choices  provided   emotional support provided   thoughts/feelings acknowledged   reassurance provided   questions encouraged  Goal: Readiness for Transition of Care  Outcome: Ongoing, Progressing   Goal Outcome Evaluation:  Plan of Care Reviewed With: patient        Progress: improving

## 2023-07-27 NOTE — THERAPY TREATMENT NOTE
Patient Name: Vita Miller  : 1940    MRN: [id number]                              Today's Date: 2023       Admit Date: 2023    Visit Dx:     ICD-10-CM ICD-9-CM   1. Atrial fibrillation with RVR  I48.91 427.31   2. Acute respiratory failure with hypoxia  J96.01 518.81   3. Pneumonia of both upper lobes due to infectious organism  J18.9 486   4. Pneumonia of both lungs due to infectious organism, unspecified part of lung  J18.9 483.8   5. Severe Malnutrition (HCC)  E43 261   6. 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 Primary open angle glaucoma of both eyes, severe stage  H40.1133 365.11     365.73   8. Multiple nodules of lung  R91.8 793.19   9. H/O orthostatic hypotension  Z86.79 V12.59   10. COPD with acute exacerbation  J44.1 491.21   11. Acquired bronchiectasis  J47.9 494.0   12. 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    Hypothyroidism    Acute respiratory failure with hypoxia    Cachexia    Severe malnutrition    Pneumonia of both lungs due to infectious organism    Hyponatremia     Past Medical History:   Diagnosis Date    A-fib     Disease of thyroid gland     Glaucoma     Hypotension      Past Surgical History:   Procedure Laterality Date    HIP SURGERY      PATELLA SURGERY      SHOULDER SURGERY        General Information       Row Name 23 1459          Physical Therapy Time and Intention    Document Type therapy note (daily note)  -LM     Mode of Treatment individual therapy;physical therapy  -LM       Row Name 23 1459          General  Information    Patient Profile Reviewed yes  -LM     Existing Precautions/Restrictions fall;other (see comments)  Brief with OOB activity  -LM       Row Name 07/27/23 1459          Cognition    Orientation Status (Cognition) oriented to;person;disoriented to;place;time  -LM       Row Name 07/27/23 1459          Safety Issues, Functional Mobility    Safety Issues Affecting Function (Mobility) awareness of need for assistance;insight into deficits/self-awareness;safety precaution awareness;safety precautions follow-through/compliance;sequencing abilities  -LM     Impairments Affecting Function (Mobility) balance;endurance/activity tolerance;strength  -LM               User Key  (r) = Recorded By, (t) = Taken By, (c) = Cosigned By      Initials Name Provider Type    LM January Fitzpatrick PT Physical Therapist                   Mobility       Row Name 07/27/23 1516          Bed Mobility    Supine-Sit [place] (Bed Mobility) standby assist  -LM     Assistive Device (Bed Mobility) bed rails;head of bed elevated  -LM       Row Name 07/27/23 1516          Sit-Stand Transfer    Sit-Stand [place] (Transfers) contact guard;1 person assist;verbal cues  -LM     Assistive Device (Sit-Stand Transfers) walker, front-wheeled  -LM     Comment, (Sit-Stand Transfer) Stood x 3 reps - once from EOB and twice from toilet.  -LM       Row Name 07/27/23 1516          Gait/Stairs (Locomotion)    [place] Level (Gait) contact guard;1 person assist;verbal cues  -LM     Assistive Device (Gait) walker, front-wheeled  -LM     Distance in Feet (Gait) 16 + 60  -LM     Deviations/Abnormal Patterns (Gait) bilateral deviations;base of support, narrow;leanna decreased;stride length decreased;weight shifting decreased;gait speed decreased  -LM     Bilateral Gait Deviations forward flexed posture;heel strike decreased  -LM     Comment, (Gait/Stairs) Vc's for upright posture and forward head posture.  Mild instability, but no overt LOB noted.   -LM               User Key  (r) = Recorded By, (t) = Taken By, (c) = Cosigned By      Initials Name Provider Type    LM January Fitzpatrick, NABIL Physical Therapist                   Obj/Interventions       Row Name 07/27/23 1518          Hip (Therapeutic Exercise)    Hip AROM (Therapeutic Exercise) bilateral;aBduction;aDduction;supine;20 repititions  -LM     Hip Strengthening (Therapeutic Exercise) bilateral;marching while seated;sitting;20 repititions  -LM       Row Name 07/27/23 1518          Knee (Therapeutic Exercise)    Knee AROM (Therapeutic Exercise) bilateral;LAQ (long arc quad);sitting;heel slides;supine;20 repititions  -LM       Row Name 07/27/23 1518          Ankle (Therapeutic Exercise)    Ankle AROM (Therapeutic Exercise) bilateral;dorsiflexion;plantarflexion;supine;20 repititions  -LM       Row Name 07/27/23 1518          Balance    Static Sitting Balance standby assist  -LM     Position, Sitting Balance unsupported;sitting edge of bed  -LM     Static Standing Balance standby assist  -LM     Dynamic Standing Balance contact guard  -LM     Position/Device Used, Standing Balance supported;walker, front-wheeled  -LM               User Key  (r) = Recorded By, (t) = Taken By, (c) = Cosigned By      Initials Name Provider Type    LM January Fitzpatrick, PT Physical Therapist                   Goals/Plan    No documentation.                  Clinical Impression       Row Name 07/27/23 1520          Pain    Pretreatment Pain Rating 0/10 - no pain  -LM     Posttreatment Pain Rating 0/10 - no pain  -LM       Row Name 07/27/23 1520          Plan of Care Review    Plan of Care Reviewed With patient  -LM     Progress improving  -LM     Outcome Evaluation Progressing well.  Pt stood multiple reps with CGA and ambulated 16' + 60' using rw with CGAx1.  Pt continues to present below baseline function d/t weakness, decreased activity tolerance, and gait instability.  Continue to recommend SNF at d/c.  -LM       Row Name 07/27/23  1520          Vital Signs    Pre Systolic BP Rehab 111  -LM     Pre Treatment Diastolic BP 59  -LM     Pretreatment Heart Rate (beats/min) 70  -LM     Posttreatment Heart Rate (beats/min) 72  -LM     Pre SpO2 (%) 96  -LM     O2 Delivery Pre Treatment room air  -LM     Post SpO2 (%) 96  -LM     O2 Delivery Post Treatment room air  -LM     Pre Patient Position Supine  -LM     Post Patient Position Sitting  -LM       Row Name 07/27/23 1520          Positioning and Restraints    Pre-Treatment Position in bed  -LM     Post Treatment Position chair  -LM     In Chair reclined;call light within reach;encouraged to call for assist;exit alarm on;waffle cushion;notified nsg  -LM               User Key  (r) = Recorded By, (t) = Taken By, (c) = Cosigned By      Initials Name Provider Type    January Lui PT Physical Therapist                   Outcome Measures       Row Name 07/27/23 1522 07/27/23 0800       How much help from another person do you currently need...    Turning from your back to your side while in flat bed without using bedrails? 3  -LM 3  -EL    Moving from lying on back to sitting on the side of a flat bed without bedrails? 3  -LM 3  -EL    Moving to and from a bed to a chair (including a wheelchair)? 3  -LM 3  -EL    Standing up from a chair using your arms (e.g., wheelchair, bedside chair)? 3  -LM 2  -EL    Climbing 3-5 steps with a railing? 3  -LM 2  -EL    To walk in hospital room? 3  -LM 3  -EL    AM-PAC 6 Clicks Score (PT) 18  -LM 16  -EL    Highest level of mobility 6 --> Walked 10 steps or more  -LM 5 --> Static standing  -EL      Row Name 07/27/23 1522          Functional Assessment    Outcome Measure Options AM-PAC 6 Clicks Basic Mobility (PT)  -LM               User Key  (r) = Recorded By, (t) = Taken By, (c) = Cosigned By      Initials Name Provider Type    January Lui, NABIL Physical Therapist    Nina Vasquez RN Registered Nurse                                 Physical Therapy  Education       Title: PT OT SLP Therapies (In Progress)       Topic: Physical Therapy (In Progress)       Point: Mobility training (In Progress)       Learning Progress Summary             Patient Acceptance, E, NR by AS at 7/26/2023 1411    Acceptance, E, VU,NR by  at 7/20/2023 1609    Comment: see flowsheet    Acceptance, E, VU,NR by  at 7/18/2023 0933    Comment: see flowsheet    Eager, E,D, NR by DM at 7/15/2023 1642                         Point: Home exercise program (In Progress)       Learning Progress Summary             Patient Acceptance, E, NR by AS at 7/26/2023 1411    Eager, E,D, NR by DM at 7/15/2023 1642                         Point: Body mechanics (In Progress)       Learning Progress Summary             Patient Acceptance, E, NR by AS at 7/26/2023 1411    Acceptance, E, VU,NR by  at 7/20/2023 1609    Comment: see flowsheet    Acceptance, E, VU,NR by  at 7/18/2023 0933    Comment: see flowsheet    Eager, E,D, NR by DM at 7/15/2023 1642                         Point: Precautions (In Progress)       Learning Progress Summary             Patient Acceptance, E, NR by AS at 7/26/2023 1411    Acceptance, E, VU,NR by  at 7/20/2023 1609    Comment: see flowsheet    Acceptance, E, VU,NR by  at 7/18/2023 0933    Comment: see flowsheet    Eager, E,D, NR by DM at 7/15/2023 1642                                         User Key       Initials Effective Dates Name Provider Type Discipline    DM 02/03/23 -  Merced Patel, PT Physical Therapist PT    AS 04/28/23 -  Soraida Dorantes, PTA Physical Therapist Assistant PT     05/15/23 -  Lili Crawford PT Student PT Student PT                  PT Recommendation and Plan  Planned Therapy Interventions (PT): balance training, bed mobility training, gait training, home exercise program, motor coordination training, neuromuscular re-education, patient/family education, postural re-education, ROM (range of motion), stair training, strengthening,  stretching, transfer training  Plan of Care Reviewed With: patient  Progress: improving  Outcome Evaluation: Progressing well.  Pt stood multiple reps with CGA and ambulated 16' + 60' using rw with CGAx1.  Pt continues to present below baseline function d/t weakness, decreased activity tolerance, and gait instability.  Continue to recommend SNF at d/c.     Time Calculation:         PT Charges       Row Name 07/27/23 1522             Time Calculation    Start Time 1415  -LM      PT Received On 07/27/23  -LM      PT Goal Re-Cert Due Date 08/04/23  -LM         Timed Charges    52203 - PT Therapeutic Exercise Minutes 11  -LM      04960 - Gait Training Minutes  15  -LM      36075 - PT Therapeutic Activity Minutes 15  -LM         Total Minutes    Timed Charges Total Minutes 41  -LM       Total Minutes 41  -LM                User Key  (r) = Recorded By, (t) = Taken By, (c) = Cosigned By      Initials Name Provider Type    LM January Fitzpatrick, PT Physical Therapist                  Therapy Charges for Today       Code Description Service Date Service Provider Modifiers Qty    42444911340 HC GAIT TRAINING EA 15 MIN 7/27/2023 January Fitzpatrick, PT GP 1    78540192085 HC PT THERAPEUTIC ACT EA 15 MIN 7/27/2023 January Fitzpatrick, PT GP 1    27136159802 HC PT THER PROC EA 15 MIN 7/27/2023 January Fitzpatrick, PT GP 1            PT G-Codes  Outcome Measure Options: AM-PAC 6 Clicks Basic Mobility (PT)  AM-PAC 6 Clicks Score (PT): 18  AM-PAC 6 Clicks Score (OT): 19  PT Discharge Summary  Anticipated Discharge Disposition (PT): skilled nursing facility    January Fitzpatrick PT  7/27/2023

## 2023-07-27 NOTE — PROGRESS NOTES
[facility] Medicine Services  PROGRESS NOTE    Patient Name: Vita Miller  : 1940  MRN: [id number]    Date of Admission: 2023  Primary Care Physician: Alfonso Steele MD    Subjective   Subjective     CC:  F/u PNA    HPI:  Feels well in bed. Son in room. Denies any soa at rest. No pain currently. Tolerating diet. Son endorses STREETER at times. Continues to go in and out of afib. Rate currently well controlled. Still awaiting insurance/ rehab decision.       ROS:  Gen- No fevers, chills  CV- No chest pain, palpitations  Resp- + non productive cough, no dyspnea  GI- No N/V/D, abd pain      Objective   Objective     Vital Signs:   Temp:  [96 øF (35.6 øC)-97.8 øF (36.6 øC)] 97.6 øF (36.4 øC)  Heart Rate:  [] 64  Resp:  [15-18] 18  BP: (110-138)/(64-73) 121/66     Physical Exam:  Constitutional: No acute distress, awake, alert, frail and thin/ elderly   HENT: NCAT, mucous membranes moist  Respiratory: Bibasilar rales, no wheezing or rhonchi, respiratory effort normal on RA satting 98%  Cardiovascular: Irreg/ reg, no murmurs, rubs, or gallops  Gastrointestinal: Positive bowel sounds, soft, nontender, nondistended  Musculoskeletal: No bilateral ankle edema  Psychiatric: Appropriate affect, cooperative  Neurologic: Oriented x 3, strength symmetric in all extremities, Cranial Nerves grossly intact to confrontation, speech clear  Skin: No rashes     Results Reviewed:  LAB RESULTS:      Lab 23  0434   WBC 8.03   HEMOGLOBIN 13.1   HEMATOCRIT 42.4   PLATELETS 237   NEUTROS ABS 5.07   IMMATURE GRANS (ABS) 0.06*   LYMPHS ABS 1.82   MONOS ABS 0.58   EOS ABS 0.38   .4*         Lab 23  0409 23  0434   SODIUM 136 137   POTASSIUM 4.3 4.4   CHLORIDE 100 97*   CO2 22.0 24.0   ANION GAP 14.0 16.0*   BUN 17 15   CREATININE 0.94 0.80   EGFR 60.3 73.2   GLUCOSE 83 74   CALCIUM 9.1 8.9               Lab 23  0858   PROBNP 2,917.0*                 Brief Urine Lab  Results  (Last result in the past 365 days)        Color   Clarity   Blood   Leuk Est   Nitrite   Protein   CREAT   Urine HCG        07/17/23 1645 Yellow   Clear   Negative   Negative   Negative   Negative                   Microbiology Results Abnormal       Procedure Component Value - Date/Time    Blood Culture - Blood, Arm, Left [[id number]]  (Normal) Collected: 07/15/23 0340    Lab Status: Final result Specimen: Blood from Arm, Left Updated: 07/20/23 0730     Blood Culture No growth at 5 days    Blood Culture - Blood, Arm, Right [[id number]]  (Normal) Collected: 07/15/23 0340    Lab Status: Final result Specimen: Blood from Arm, Right Updated: 07/20/23 0730     Blood Culture No growth at 5 days    S. Pneumo Ag Urine or CSF - Urine, Urine, Clean Catch [[id number]]  (Normal) Collected: 07/15/23 0341    Lab Status: Final result Specimen: Urine, Clean Catch Updated: 07/15/23 1356     Strep Pneumo Ag Negative    Legionella Antigen, Urine - Urine, Urine, Clean Catch [[id number]]  (Normal) Collected: 07/15/23 0341    Lab Status: Final result Specimen: Urine, Clean Catch Updated: 07/15/23 1356     LEGIONELLA ANTIGEN, URINE Negative    MRSA Screen, PCR (Inpatient) - Swab, Nares [[id number]]  (Normal) Collected: 07/15/23 0310    Lab Status: Final result Specimen: Swab from Nares Updated: 07/15/23 0846     MRSA PCR Negative    Narrative:      The negative predictive value of this diagnostic test is high and should only be used to consider de-escalating anti-MRSA therapy. A positive result may indicate colonization with MRSA and must be correlated clinically.  MRSA Negative    COVID PRE-OP / PRE-PROCEDURE SCREENING ORDER (NO ISOLATION) - Swab, Nasopharynx [[id number]]  (Normal) Collected: 07/15/23 0310    Lab Status: Final result Specimen: Swab from Nasopharynx Updated: 07/15/23 0410    Narrative:      The following orders were created for panel order COVID PRE-OP / PRE-PROCEDURE SCREENING ORDER (NO ISOLATION) - Swab,  Nasopharynx.  Procedure                               Abnormality         Status                     ---------                               -----------         ------                     Respiratory Panel PCR w/...[[id number]]  Normal              Final result                 Please view results for these tests on the individual orders.    Respiratory Panel PCR w/COVID-19(SARS-CoV-2) JENNA/KELSI/NORMAN/PAD/COR/MAD/ARSEN In-House, NP Swab in UTM/VTM, 3-4 HR TAT - Swab, Nasopharynx [[id number]]  (Normal) Collected: 07/15/23 0310    Lab Status: Final result Specimen: Swab from Nasopharynx Updated: 07/15/23 0410     ADENOVIRUS, PCR Not Detected     Coronavirus 229E Not Detected     Coronavirus HKU1 Not Detected     Coronavirus NL63 Not Detected     Coronavirus OC43 Not Detected     COVID19 Not Detected     Human Metapneumovirus Not Detected     Human Rhinovirus/Enterovirus Not Detected     Influenza A PCR Not Detected     Influenza B PCR Not Detected     Parainfluenza Virus 1 Not Detected     Parainfluenza Virus 2 Not Detected     Parainfluenza Virus 3 Not Detected     Parainfluenza Virus 4 Not Detected     RSV, PCR Not Detected     Bordetella pertussis pcr Not Detected     Bordetella parapertussis PCR Not Detected     Chlamydophila pneumoniae PCR Not Detected     Mycoplasma pneumo by PCR Not Detected    Narrative:      In the setting of a positive respiratory panel with a viral infection PLUS a negative procalcitonin without other underlying concern for bacterial infection, consider observing off antibiotics or discontinuation of antibiotics and continue supportive care. If the respiratory panel is positive for atypical bacterial infection (Bordetella pertussis, Chlamydophila pneumoniae, or Mycoplasma pneumoniae), consider antibiotic de-escalation to target atypical bacterial infection.            No radiology results from the last 24 hrs    Results for orders placed during the hospital encounter of 06/16/23    Adult  Transthoracic Echo Complete W/ Cont if Necessary Per Protocol    Interpretation Summary    Left ventricular systolic function is normal. Estimated left ventricular EF = 60%    Left ventricular diastolic function was normal.    Mild aortic insufficiency.    Trace to mild mitral and tricuspid regurgitation.    Calculated right ventricular systolic pressure from tricuspid regurgitation is 20 mmHg.    There is a trivial pericardial effusion.      Current medications:  Scheduled Meds:acetaZOLAMIDE, 250 mg, Oral, Daily  apixaban, 2.5 mg, Oral, Q12H  ascorbic acid, 250 mg, Oral, Daily  brimonidine, 1 drop, Both Eyes, Q12H   And  timolol, 1 drop, Both Eyes, Q12H  dilTIAZem CD, 180 mg, Oral, Q24H  galantamine, 8 mg, Oral, BID With Meals  ipratropium-albuterol, 3 mL, Nebulization, Q4H - RT  levothyroxine, 88 mcg, Oral, Daily  midodrine, 5 mg, Oral, TID AC  mirtazapine, 15 mg, Oral, Nightly  pantoprazole, 40 mg, Oral, Q AM  PARoxetine, 10 mg, Oral, Nightly  senna-docusate sodium, 2 tablet, Oral, BID  sodium chloride, 10 mL, Intravenous, Q12H  torsemide, 10 mg, Oral, Daily      Continuous Infusions:   PRN Meds:.  acetaminophen **OR** acetaminophen **OR** acetaminophen    senna-docusate sodium **AND** polyethylene glycol **AND** bisacodyl **AND** bisacodyl    Magnesium Standard Dose Replacement - Follow Nurse / BPA Driven Protocol    melatonin    metoprolol tartrate    ondansetron    Potassium Replacement - Follow Nurse / BPA Driven Protocol    sodium chloride    sodium chloride    sodium chloride    Assessment & Plan   Assessment & Plan     Active Hospital Problems    Diagnosis  POA    **Acute respiratory failure with hypoxia [J96.01]  Yes    Pneumonia of both lungs due to infectious organism [J18.9]  Unknown    Hyponatremia [E87.1]  Unknown    Severe malnutrition [E43]  Yes    Atrial fibrillation with RVR [I48.91]  Yes    Cachexia [R64]  Yes    Senile osteoporosis [M81.0]  Yes    Hashimoto's thyroiditis [E06.3]  Yes    Primary  open angle glaucoma of both eyes, severe stage [H40.1133]  Yes    Mixed anxiety and depressive disorder [F41.8]  Yes    Multiple nodules of lung [R91.8]  Yes    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        Brief Hospital Course to date:  Vita Miller is a 83 y.o. female with history of hypothyroidism, COPD with chronic bronchiectasis, history of chronic MAC with unknown intolerance to prior treatment plan, history of orthostatic hypotension, hyperlipidemia, osteoporosis, bilateral glaucoma, mild memory deficit, who was recently admitted to our facility approximately 1 month ago for new onset atrial fibrillation with RVR and returned to the ED with complaints of palpitations and shortness of breath.  She has been treated for PNA/COPD exacerbation as well as her Afib with RVR.      This patient's problems and plans were partially entered by my partner and updated as appropriate by me 07/27/23.    Copied text in this note has been reviewed and is accurate as of 07/27/23.     Acute respiratory failure with hypoxia  Bilateral pneumonia/infiltrates  History of MAC, intolerant to treatment  COPD with acute exacerbation, chronic bronchiectasis  -- Pulmonology followed earlier on this hospitalization.  Clinically improved.  AI/vasculitis panels are negative.  Recs to continue vest therapy, discussed with Dr. Hook  --Off steroids  --Completed Zosyn 7 days tx, s/p azith  -- Duo nebs  -- Flutter valve, mucinex  --MRSA pcr negative, strep negative, legionella negative  --CT chest showed new GGO, bronchiectasis, scattered nodules.  Needs f/u CT or PET scan  -- torsemide, diamox  --cardiology signed off/ follow up in 6 weeks         Atrial fibrillation with rvr, not in aflutter  --07/21 converted back to Afib RVR following a BM at 8am. Pt was hypotensive. Family/patient declined lopressor indefinitely  --s/p esmolol drip  --eliquis  --flecainide --> bisoprolol ---> changed to digoxin per cards, no flecainide due to pneumonitis concerns, partner discussed with Dr. Padilla. Now Digoxin has been d/c'd  -- has upcoming appt with Dr. Martin at  (suggested making that telehealth, family very concerned about missing this appt)   --fludrocortisone discontinued 07/21  --cards following.  PO diltiazem- discontinued Digoxin 7/25  -- Midodrine had been increased to 10 mg TID, now back to 5mg TID per Cardiology  --LVEF 60%, normal diastolic on echo from 6/17/2023  --Dilt 180 mg daily for rate control        Altered mental status  --improved  --u/a bland, B12 wnl.   RPR nonreactive     Hyponatremia, resolved  --Na 129 on admission; suspect related to volume overload  --Na 136 on 7/24     Orthostatic hypotension  --fludricortisone discontinued  --midodrine increased on 7/21 to 10 mg TID- now back to 5mg TID     Hypothyroidism  --slightly elevated tsh  --continue synthroid, dose adjusted on 7/16; repeat tsh in 4-6 weeks; careful adjustment given RVR     Other chronic conditions: HLD, malnutrition, anxiety/depression, memory impairment  --continue home meds  --nutrition  --pt/otv       Expected Discharge Location and Transportation: SNF rehab (family appeal)  Expected Discharge   Expected Discharge Date: 7/28/2023; Expected Discharge Time:      DVT prophylaxis:  Medical and mechanical DVT prophylaxis orders are present.     AM-PAC 6 Clicks Score (PT): 18 (07/27/23 5126)    CODE STATUS:   Code Status and Medical Interventions:   Ordered at: 07/15/23 3082     Code Status (Patient has no pulse and is not breathing):    CPR (Attempt to Resuscitate)     Medical Interventions (Patient has pulse or is breathing):    Full Support       Maria Luisa Morillo,  APRN  07/27/23

## 2023-07-27 NOTE — PLAN OF CARE
Pt with VSS, RA. NSR, with periods of afib during shift. Up in recliner and denies complaints.       Problem: Adult Inpatient Plan of Care  Goal: Plan of Care Review  Outcome: Ongoing, Progressing  Goal: Patient-Specific Goal (Individualized)  Outcome: Ongoing, Progressing  Goal: Absence of Hospital-Acquired Illness or Injury  Outcome: Ongoing, Progressing  Intervention: Identify and Manage Fall Risk  Recent Flowsheet Documentation  Taken 7/27/2023 1600 by Nina Belcher RN  Safety Promotion/Fall Prevention:   activity supervised   assistive device/personal items within reach   clutter free environment maintained   toileting scheduled   safety round/check completed   room organization consistent  Taken 7/27/2023 1400 by Nina Belcher RN  Safety Promotion/Fall Prevention:   activity supervised   assistive device/personal items within reach   clutter free environment maintained   toileting scheduled   safety round/check completed   room organization consistent  Taken 7/27/2023 1200 by Nina Belcher RN  Safety Promotion/Fall Prevention:   clutter free environment maintained   assistive device/personal items within reach   activity supervised   toileting scheduled   safety round/check completed   room organization consistent  Taken 7/27/2023 1000 by Nina Belcher RN  Safety Promotion/Fall Prevention:   activity supervised   assistive device/personal items within reach   clutter free environment maintained   toileting scheduled   safety round/check completed   room organization consistent  Intervention: Prevent Skin Injury  Recent Flowsheet Documentation  Taken 7/27/2023 1600 by Nina Belcher RN  Body Position: (up in chair) --  Skin Protection:   adhesive use limited   tubing/devices free from skin contact   transparent dressing maintained   skin-to-skin areas padded   skin-to-device areas padded  Taken 7/27/2023 1400 by Nina Belcher RN  Body Position:   turned   left  Skin Protection:   adhesive  use limited   tubing/devices free from skin contact   transparent dressing maintained   skin-to-skin areas padded   skin-to-device areas padded  Taken 7/27/2023 1200 by Nina Belcher RN  Body Position:   turned   left  Skin Protection:   tubing/devices free from skin contact   transparent dressing maintained   skin-to-skin areas padded   skin-to-device areas padded   adhesive use limited  Taken 7/27/2023 1000 by Nina Belcher RN  Body Position:   turned   right  Skin Protection:   adhesive use limited   tubing/devices free from skin contact   transparent dressing maintained   skin-to-skin areas padded   skin-to-device areas padded  Taken 7/27/2023 0800 by Nina Belcher RN  Body Position: supine, legs elevated  Skin Protection:   adhesive use limited   tubing/devices free from skin contact   transparent dressing maintained   skin-to-skin areas padded   skin-to-device areas padded  Intervention: Prevent and Manage VTE (Venous Thromboembolism) Risk  Recent Flowsheet Documentation  Taken 7/27/2023 1600 by Nina Belcher RN  Activity Management: activity encouraged  Taken 7/27/2023 1400 by Nina Belcher RN  Activity Management: activity encouraged  Taken 7/27/2023 1200 by Nina Belcher RN  Activity Management: activity encouraged  Taken 7/27/2023 1000 by Nina Belcher RN  Activity Management: activity encouraged  Taken 7/27/2023 0800 by Nina Belcher RN  VTE Prevention/Management: (eliquis) --  Intervention: Prevent Infection  Recent Flowsheet Documentation  Taken 7/27/2023 1600 by Nina Belcher RN  Infection Prevention:   hand hygiene promoted   personal protective equipment utilized  Taken 7/27/2023 1400 by Nina Belcher RN  Infection Prevention:   personal protective equipment utilized   hand hygiene promoted  Taken 7/27/2023 1200 by Nina Belcher RN  Infection Prevention:   personal protective equipment utilized   hand hygiene promoted  Taken 7/27/2023 1000 by Simi  NELSON Wood  Infection Prevention:   personal protective equipment utilized   hand hygiene promoted  Goal: Optimal Comfort and Wellbeing  Outcome: Ongoing, Progressing  Intervention: Provide Person-Centered Care  Recent Flowsheet Documentation  Taken 7/27/2023 0800 by Nina Belcher RN  Trust Relationship/Rapport:   care explained   choices provided   emotional support provided   empathic listening provided   questions answered   questions encouraged  Goal: Readiness for Transition of Care  Outcome: Ongoing, Progressing     Problem: COPD (Chronic Obstructive Pulmonary Disease) Comorbidity  Goal: Maintenance of COPD Symptom Control  Outcome: Ongoing, Progressing  Intervention: Maintain COPD-Symptom Control  Recent Flowsheet Documentation  Taken 7/27/2023 1600 by Nina Belcher RN  Medication Review/Management: medications reviewed  Taken 7/27/2023 1400 by Nina Belcher RN  Medication Review/Management: medications reviewed  Taken 7/27/2023 1200 by Nina Belcher RN  Medication Review/Management: medications reviewed  Taken 7/27/2023 0800 by Nina Belcher RN  Medication Review/Management: medications reviewed     Problem: Skin Injury Risk Increased  Goal: Skin Health and Integrity  Outcome: Ongoing, Progressing  Intervention: Optimize Skin Protection  Recent Flowsheet Documentation  Taken 7/27/2023 1600 by Nina Belcher RN  Pressure Reduction Techniques: frequent weight shift encouraged  Pressure Reduction Devices: chair cushion utilized  Skin Protection:   adhesive use limited   tubing/devices free from skin contact   transparent dressing maintained   skin-to-skin areas padded   skin-to-device areas padded  Taken 7/27/2023 1400 by Nina Belcher RN  Pressure Reduction Techniques: frequent weight shift encouraged  Head of Bed (HOB) Positioning: HOB at 20-30 degrees  Pressure Reduction Devices: chair cushion utilized  Skin Protection:   adhesive use limited   tubing/devices free from skin  contact   transparent dressing maintained   skin-to-skin areas padded   skin-to-device areas padded  Taken 7/27/2023 1200 by Nina Belcher RN  Pressure Reduction Techniques:   weight shift assistance provided   heels elevated off bed  Head of Bed (HOB) Positioning: [facility] at 20-30 degrees  Pressure Reduction Devices:   pressure-redistributing mattress utilized   heel offloading device utilized  Skin Protection:   tubing/devices free from skin contact   transparent dressing maintained   skin-to-skin areas padded   skin-to-device areas padded   adhesive use limited  Taken 7/27/2023 1000 by Nina Belcher RN  Pressure Reduction Techniques:   weight shift assistance provided   heels elevated off bed  Head of Bed (HOB) Positioning: HOB at 20-30 degrees  Pressure Reduction Devices:   pressure-redistributing mattress utilized   heel offloading device utilized  Skin Protection:   adhesive use limited   tubing/devices free from skin contact   transparent dressing maintained   skin-to-skin areas padded   skin-to-device areas padded  Taken 7/27/2023 0800 by Nina Belcher RN  Pressure Reduction Techniques:   heels elevated off bed   weight shift assistance provided  Pressure Reduction Devices:   pressure-redistributing mattress utilized   positioning supports utilized   heel offloading device utilized  Skin Protection:   adhesive use limited   tubing/devices free from skin contact   transparent dressing maintained   skin-to-skin areas padded   skin-to-device areas padded     Problem: Fall Injury Risk  Goal: Absence of Fall and Fall-Related Injury  Outcome: Ongoing, Progressing  Intervention: Identify and Manage Contributors  Recent Flowsheet Documentation  Taken 7/27/2023 1600 by Nina Belcher RN  Medication Review/Management: medications reviewed  Taken 7/27/2023 1400 by Nina Belcher RN  Medication Review/Management: medications reviewed  Taken 7/27/2023 1200 by Nina Belcher RN  Medication  Review/Management: medications reviewed  Taken 7/27/2023 0800 by Nina Belcher RN  Medication Review/Management: medications reviewed  Intervention: Promote Injury-Free Environment  Recent Flowsheet Documentation  Taken 7/27/2023 1600 by Nina Belcher RN  Safety Promotion/Fall Prevention:   activity supervised   assistive device/personal items within reach   clutter free environment maintained   toileting scheduled   safety round/check completed   room organization consistent  Taken 7/27/2023 1400 by Nina Belcher RN  Safety Promotion/Fall Prevention:   activity supervised   assistive device/personal items within reach   clutter free environment maintained   toileting scheduled   safety round/check completed   room organization consistent  Taken 7/27/2023 1200 by Nina Belcher RN  Safety Promotion/Fall Prevention:   clutter free environment maintained   assistive device/personal items within reach   activity supervised   toileting scheduled   safety round/check completed   room organization consistent  Taken 7/27/2023 1000 by Nina Belcher RN  Safety Promotion/Fall Prevention:   activity supervised   assistive device/personal items within reach   clutter free environment maintained   toileting scheduled   safety round/check completed   room organization consistent     Problem: Adjustment to Illness (Sepsis/Septic Shock)  Goal: Optimal Coping  Outcome: Ongoing, Progressing  Intervention: Optimize Psychosocial Adjustment to Illness  Recent Flowsheet Documentation  Taken 7/27/2023 0800 by Nina Belcher RN  Family/Support System Care: support provided     Problem: Bleeding (Sepsis/Septic Shock)  Goal: Absence of Bleeding  Outcome: Ongoing, Progressing     Problem: Glycemic Control Impaired (Sepsis/Septic Shock)  Goal: Blood Glucose Level Within Desired Range  Outcome: Ongoing, Progressing     Problem: Infection Progression (Sepsis/Septic Shock)  Goal: Absence of Infection Signs and  Symptoms  Outcome: Ongoing, Progressing  Intervention: Initiate Sepsis Management  Recent Flowsheet Documentation  Taken 7/27/2023 1600 by Nina Belcher RN  Infection Prevention:   hand hygiene promoted   personal protective equipment utilized  Taken 7/27/2023 1400 by Nina Belcher RN  Infection Prevention:   personal protective equipment utilized   hand hygiene promoted  Taken 7/27/2023 1200 by Nina Belcher RN  Infection Prevention:   personal protective equipment utilized   hand hygiene promoted  Taken 7/27/2023 1000 by Nina Belcher RN  Infection Prevention:   personal protective equipment utilized   hand hygiene promoted  Intervention: Promote Recovery  Recent Flowsheet Documentation  Taken 7/27/2023 1600 by Nina Belcher RN  Activity Management: activity encouraged  Taken 7/27/2023 1400 by Nina Belcher RN  Activity Management: activity encouraged  Taken 7/27/2023 1200 by Nina Belcher RN  Activity Management: activity encouraged  Taken 7/27/2023 1000 by Nina Belcher RN  Activity Management: activity encouraged     Problem: Nutrition Impaired (Sepsis/Septic Shock)  Goal: Optimal Nutrition Intake  Outcome: Ongoing, Progressing   Goal Outcome Evaluation:

## 2023-07-27 NOTE — PLAN OF CARE
Goal Outcome Evaluation:  Plan of Care Reviewed With: patient        Progress: improving  Outcome Evaluation: Progressing well.  Pt stood multiple reps with CGA and ambulated 16' + 60' using rw with CGAx1.  Pt continues to present below baseline function d/t weakness, decreased activity tolerance, and gait instability.  Continue to recommend SNF at d/c.      Anticipated Discharge Disposition (PT): skilled nursing facility

## 2023-07-28 PROCEDURE — 94664 DEMO&/EVAL PT USE INHALER: CPT

## 2023-07-28 PROCEDURE — 94799 UNLISTED PULMONARY SVC/PX: CPT

## 2023-07-28 PROCEDURE — 94668 MNPJ CHEST WALL SBSQ: CPT

## 2023-07-28 PROCEDURE — 99232 SBSQ HOSP IP/OBS MODERATE 35: CPT | Performed by: NURSE PRACTITIONER

## 2023-07-28 PROCEDURE — 94761 N-INVAS EAR/PLS OXIMETRY MLT: CPT

## 2023-07-28 PROCEDURE — 97110 THERAPEUTIC EXERCISES: CPT

## 2023-07-28 PROCEDURE — 97530 THERAPEUTIC ACTIVITIES: CPT

## 2023-07-28 RX ADMIN — TIMOLOL MALEATE 1 DROP: 5 SOLUTION/ DROPS OPHTHALMIC at 09:18

## 2023-07-28 RX ADMIN — IPRATROPIUM BROMIDE AND ALBUTEROL SULFATE 3 ML: 2.5; .5 SOLUTION RESPIRATORY (INHALATION) at 23:28

## 2023-07-28 RX ADMIN — ACETAZOLAMIDE 250 MG: 250 TABLET ORAL at 09:20

## 2023-07-28 RX ADMIN — Medication 5 MG: at 00:12

## 2023-07-28 RX ADMIN — MIRTAZAPINE 15 MG: 15 TABLET, FILM COATED ORAL at 21:51

## 2023-07-28 RX ADMIN — SENNOSIDES AND DOCUSATE SODIUM 2 TABLET: 50; 8.6 TABLET ORAL at 21:51

## 2023-07-28 RX ADMIN — ACETAMINOPHEN 650 MG: 325 TABLET ORAL at 00:12

## 2023-07-28 RX ADMIN — PAROXETINE HYDROCHLORIDE 10 MG: 10 TABLET, FILM COATED ORAL at 21:51

## 2023-07-28 RX ADMIN — Medication 250 MG: at 09:19

## 2023-07-28 RX ADMIN — LEVOTHYROXINE SODIUM 88 MCG: 0.09 TABLET ORAL at 06:37

## 2023-07-28 RX ADMIN — GALANTAMINE 8 MG: 4 TABLET, FILM COATED ORAL at 09:19

## 2023-07-28 RX ADMIN — MIDODRINE HYDROCHLORIDE 5 MG: 5 TABLET ORAL at 17:17

## 2023-07-28 RX ADMIN — TORSEMIDE 10 MG: 20 TABLET ORAL at 09:19

## 2023-07-28 RX ADMIN — MIDODRINE HYDROCHLORIDE 5 MG: 5 TABLET ORAL at 12:28

## 2023-07-28 RX ADMIN — BRIMONIDINE TARTRATE 1 DROP: 2 SOLUTION/ DROPS OPHTHALMIC at 21:51

## 2023-07-28 RX ADMIN — BRIMONIDINE TARTRATE 1 DROP: 2 SOLUTION/ DROPS OPHTHALMIC at 09:18

## 2023-07-28 RX ADMIN — MIDODRINE HYDROCHLORIDE 5 MG: 5 TABLET ORAL at 09:19

## 2023-07-28 RX ADMIN — TIMOLOL MALEATE 1 DROP: 5 SOLUTION/ DROPS OPHTHALMIC at 21:51

## 2023-07-28 RX ADMIN — APIXABAN 2.5 MG: 2.5 TABLET, FILM COATED ORAL at 21:51

## 2023-07-28 RX ADMIN — PANTOPRAZOLE SODIUM 40 MG: 40 TABLET, DELAYED RELEASE ORAL at 06:37

## 2023-07-28 RX ADMIN — Medication 10 ML: at 21:51

## 2023-07-28 RX ADMIN — IPRATROPIUM BROMIDE AND ALBUTEROL SULFATE 3 ML: 2.5; .5 SOLUTION RESPIRATORY (INHALATION) at 20:33

## 2023-07-28 RX ADMIN — DILTIAZEM HYDROCHLORIDE 180 MG: 180 CAPSULE, EXTENDED RELEASE ORAL at 09:19

## 2023-07-28 RX ADMIN — SENNOSIDES AND DOCUSATE SODIUM 2 TABLET: 50; 8.6 TABLET ORAL at 09:19

## 2023-07-28 RX ADMIN — Medication 10 ML: at 09:26

## 2023-07-28 RX ADMIN — IPRATROPIUM BROMIDE AND ALBUTEROL SULFATE 3 ML: 2.5; .5 SOLUTION RESPIRATORY (INHALATION) at 13:09

## 2023-07-28 RX ADMIN — IPRATROPIUM BROMIDE AND ALBUTEROL SULFATE 3 ML: 2.5; .5 SOLUTION RESPIRATORY (INHALATION) at 16:56

## 2023-07-28 RX ADMIN — APIXABAN 2.5 MG: 2.5 TABLET, FILM COATED ORAL at 09:19

## 2023-07-28 RX ADMIN — GALANTAMINE 8 MG: 4 TABLET, FILM COATED ORAL at 17:16

## 2023-07-28 NOTE — PLAN OF CARE
Goal Outcome Evaluation:  Plan of Care Reviewed With: (P) patient, son        Progress: (P) no change  Outcome Evaluation: (P) Pt continues to present below baseline d/t generalized weakness, decreased balance, and decreased activity tolerance. Pt ambulated 15+20 ft with CGA that regressed to Joseph - pt reported dizziness and was noted to be in Afib. Pt's son reported pt experienced vertigo and was given Meclizine - B Remedios Hallpike performed and no nystagmus seen bilaterally. Pt would continue to benefit from skilled IP PT. Recommend SNF at d/c.      Anticipated Discharge Disposition (PT): (P) skilled nursing facility

## 2023-07-28 NOTE — PLAN OF CARE
Goal Outcome Evaluation:                   Problem: Adult Inpatient Plan of Care  Goal: Plan of Care Review  Outcome: Ongoing, Progressing  Goal: Patient-Specific Goal (Individualized)  Outcome: Ongoing, Progressing  Goal: Absence of Hospital-Acquired Illness or Injury  Outcome: Ongoing, Progressing  Intervention: Identify and Manage Fall Risk  Recent Flowsheet Documentation  Taken 7/28/2023 0000 by Bushra Shi RN  Safety Promotion/Fall Prevention:   activity supervised   assistive device/personal items within reach   clutter free environment maintained   fall prevention program maintained   lighting adjusted   nonskid shoes/slippers when out of bed   room organization consistent   safety round/check completed  Taken 7/27/2023 2200 by Bushra Shi RN  Safety Promotion/Fall Prevention:   activity supervised   assistive device/personal items within reach   clutter free environment maintained   fall prevention program maintained   lighting adjusted   nonskid shoes/slippers when out of bed   room organization consistent   safety round/check completed  Taken 7/27/2023 2000 by Bushra Shi RN  Safety Promotion/Fall Prevention:   activity supervised   assistive device/personal items within reach   clutter free environment maintained   fall prevention program maintained   lighting adjusted   nonskid shoes/slippers when out of bed   room organization consistent   safety round/check completed  Intervention: Prevent Skin Injury  Recent Flowsheet Documentation  Taken 7/28/2023 0000 by Bushra Shi RN  Body Position: position changed independently  Taken 7/27/2023 2100 by Bushra Shi RN  Body Position: position changed independently  Taken 7/27/2023 2000 by Bushra Shi RN  Body Position: position changed independently  Skin Protection:   adhesive use limited   incontinence pads utilized   transparent dressing maintained   tubing/devices free from skin contact  Intervention: Prevent and Manage VTE (Venous  Thromboembolism) Risk  Recent Flowsheet Documentation  Taken 7/28/2023 0000 by Bushra Shi RN  Activity Management: activity encouraged  Range of Motion: active ROM (range of motion) encouraged  Taken 7/27/2023 2200 by Bushra Shi RN  Activity Management: activity encouraged  Taken 7/27/2023 2000 by Bushra Shi RN  Activity Management: activity encouraged  Range of Motion: active ROM (range of motion) encouraged  Intervention: Prevent Infection  Recent Flowsheet Documentation  Taken 7/28/2023 0000 by Bushra Shi RN  Infection Prevention:   environmental surveillance performed   hand hygiene promoted   rest/sleep promoted   single patient room provided  Taken 7/27/2023 2200 by Bushra Shi RN  Infection Prevention:   environmental surveillance performed   hand hygiene promoted   rest/sleep promoted   single patient room provided  Taken 7/27/2023 2000 by Bushra Shi RN  Infection Prevention:   environmental surveillance performed   hand hygiene promoted   rest/sleep promoted   single patient room provided  Goal: Optimal Comfort and Wellbeing  Outcome: Ongoing, Progressing  Intervention: Provide Person-Centered Care  Recent Flowsheet Documentation  Taken 7/27/2023 2000 by Bushra Shi RN  Trust Relationship/Rapport:   care explained   choices provided   emotional support provided   empathic listening provided   questions answered   questions encouraged  Goal: Readiness for Transition of Care  Outcome: Ongoing, Progressing     Problem: COPD (Chronic Obstructive Pulmonary Disease) Comorbidity  Goal: Maintenance of COPD Symptom Control  Outcome: Ongoing, Progressing  Intervention: Maintain COPD-Symptom Control  Recent Flowsheet Documentation  Taken 7/28/2023 0000 by Bushra Shi RN  Supportive Measures: active listening utilized  Taken 7/27/2023 2000 by Bushra Shi RN  Supportive Measures: active listening utilized  Medication Review/Management: medications reviewed     Problem: Skin Injury Risk  Increased  Goal: Skin Health and Integrity  Outcome: Ongoing, Progressing  Intervention: Optimize Skin Protection  Recent Flowsheet Documentation  Taken 7/28/2023 0000 by Bushra Shi RN  Head of Bed (HOB) Positioning: HOB elevated  Taken 7/27/2023 2100 by Bushra Shi RN  Head of Bed (HOB) Positioning: HOB elevated  Taken 7/27/2023 2000 by Bushra Shi RN  Pressure Reduction Techniques:   frequent weight shift encouraged   heels elevated off bed   weight shift assistance provided  Head of Bed (HOB) Positioning: HOB elevated  Pressure Reduction Devices:   positioning supports utilized   pressure-redistributing mattress utilized  Skin Protection:   adhesive use limited   incontinence pads utilized   transparent dressing maintained   tubing/devices free from skin contact     Problem: Fall Injury Risk  Goal: Absence of Fall and Fall-Related Injury  Outcome: Ongoing, Progressing  Intervention: Identify and Manage Contributors  Recent Flowsheet Documentation  Taken 7/27/2023 2000 by Bushra Shi RN  Medication Review/Management: medications reviewed  Self-Care Promotion: independence encouraged  Intervention: Promote Injury-Free Environment  Recent Flowsheet Documentation  Taken 7/28/2023 0000 by Bushra Shi RN  Safety Promotion/Fall Prevention:   activity supervised   assistive device/personal items within reach   clutter free environment maintained   fall prevention program maintained   lighting adjusted   nonskid shoes/slippers when out of bed   room organization consistent   safety round/check completed  Taken 7/27/2023 2200 by Bushra Shi RN  Safety Promotion/Fall Prevention:   activity supervised   assistive device/personal items within reach   clutter free environment maintained   fall prevention program maintained   lighting adjusted   nonskid shoes/slippers when out of bed   room organization consistent   safety round/check completed  Taken 7/27/2023 2000 by Bushra Shi RN  Safety Promotion/Fall  Prevention:   activity supervised   assistive device/personal items within reach   clutter free environment maintained   fall prevention program maintained   lighting adjusted   nonskid shoes/slippers when out of bed   room organization consistent   safety round/check completed     Problem: Adjustment to Illness (Sepsis/Septic Shock)  Goal: Optimal Coping  Outcome: Ongoing, Progressing  Intervention: Optimize Psychosocial Adjustment to Illness  Recent Flowsheet Documentation  Taken 7/28/2023 0000 by Bushra Shi RN  Supportive Measures: active listening utilized  Taken 7/27/2023 2000 by Bushra Shi RN  Supportive Measures: active listening utilized     Problem: Bleeding (Sepsis/Septic Shock)  Goal: Absence of Bleeding  Outcome: Ongoing, Progressing  Intervention: Monitor and Manage Bleeding  Recent Flowsheet Documentation  Taken 7/27/2023 2000 by Bushra Shi RN  Bleeding Precautions: monitored for signs of bleeding  Bleeding Management: dressing monitored     Problem: Glycemic Control Impaired (Sepsis/Septic Shock)  Goal: Blood Glucose Level Within Desired Range  Outcome: Ongoing, Progressing     Problem: Infection Progression (Sepsis/Septic Shock)  Goal: Absence of Infection Signs and Symptoms  Outcome: Ongoing, Progressing  Intervention: Initiate Sepsis Management  Recent Flowsheet Documentation  Taken 7/28/2023 0000 by Bushra Shi RN  Infection Prevention:   environmental surveillance performed   hand hygiene promoted   rest/sleep promoted   single patient room provided  Taken 7/27/2023 2200 by Bushra Shi RN  Infection Prevention:   environmental surveillance performed   hand hygiene promoted   rest/sleep promoted   single patient room provided  Taken 7/27/2023 2000 by Bushra Shi RN  Infection Prevention:   environmental surveillance performed   hand hygiene promoted   rest/sleep promoted   single patient room provided  Intervention: Promote Recovery  Recent Flowsheet Documentation  Taken 7/28/2023  0000 by Bushra Shi, RN  Activity Management: activity encouraged  Taken 7/27/2023 2200 by Bushra Shi, RN  Activity Management: activity encouraged  Taken 7/27/2023 2000 by Bushra Shi, RN  Activity Management: activity encouraged  Sleep/Rest Enhancement: awakenings minimized     Problem: Nutrition Impaired (Sepsis/Septic Shock)  Goal: Optimal Nutrition Intake  Outcome: Ongoing, Progressing

## 2023-07-28 NOTE — PLAN OF CARE
Goal Outcome Evaluation:         Patient has done well today. Patient worked with PTOT and is currently up in a chair. No issues taking her meds. No complaints of pain today. Patient has tolerated her diet and has no s/s of distress today. Will continue to monitor.

## 2023-07-28 NOTE — CASE MANAGEMENT/SOCIAL WORK
Continued Stay Note  [facility]     Patient Name: Vita Miller  MRN: [id number]  Today's Date: 7/28/2023    Admit Date: 7/14/2023    Plan: SNF   Discharge Plan       Row Name 07/28/23 1501       Plan    Plan SNF    Patient/Family in Agreement with Plan yes    Plan Comments Met with Ms. Miller and her son at bedside to discuss discharge plans. Son reported he has started the family appeal for the denial for SNF at [facility]. Ms. Miller nor her son had any new discharge needs at this time.  will continue to follow plan of care and assist with discharge planning and needs as indicated.    16:12 EDT Assisted Ms. Miller's son, Arun, with [employer] family appeal form to [employer] medicare replacement.       Final Discharge Disposition Code 03 - skilled nursing facility (SNF)                   Discharge Codes    No documentation.                 Expected Discharge Date and Time       Expected Discharge Date Expected Discharge Time    Jul 28, 2023               Loly Moreira RN

## 2023-07-28 NOTE — PROGRESS NOTES
[facility] Medicine Services  PROGRESS NOTE    Patient Name: Vita Miller  : 1940  MRN: [id number]    Date of Admission: 2023  Primary Care Physician: Alfonso Steele MD    Subjective   Subjective     CC:  F/u PNA    HPI:  Resting in bed, NAD. No new needs. Awaiting insurance appeal for rehab       ROS:  Gen- No fevers, chills  CV- No chest pain, palpitations  Resp- + non productive cough, no dyspnea  GI- No N/V/D, abd pain      Objective   Objective     Vital Signs:   Temp:  [96.2 øF (35.7 øC)-98 øF (36.7 øC)] 97.7 øF (36.5 øC)  Heart Rate:  [49-76] 67  Resp:  [16-20] 18  BP: (106-142)/(55-76) 142/76     Physical Exam:  Constitutional: No acute distress, awake, alert, frail and thin/ elderly   HENT: NCAT, mucous membranes moist  Respiratory: Bibasilar rales, no wheezing or rhonchi, respiratory effort normal on RA satting 98%  Cardiovascular: Irreg/ reg, no murmurs, rubs, or gallops  Gastrointestinal: Positive bowel sounds, soft, nontender, nondistended  Musculoskeletal: No bilateral ankle edema  Psychiatric: Appropriate affect, cooperative  Neurologic: Oriented x 3, strength symmetric in all extremities, Cranial Nerves grossly intact to confrontation, speech clear  Skin: No rashes     No changes from     Results Reviewed:  LAB RESULTS:      Lab 23  0434   WBC 8.03   HEMOGLOBIN 13.1   HEMATOCRIT 42.4   PLATELETS 237   NEUTROS ABS 5.07   IMMATURE GRANS (ABS) 0.06*   LYMPHS ABS 1.82   MONOS ABS 0.58   EOS ABS 0.38   .4*         Lab 23  0409 23  0434   SODIUM 136 137   POTASSIUM 4.3 4.4   CHLORIDE 100 97*   CO2 22.0 24.0   ANION GAP 14.0 16.0*   BUN 17 15   CREATININE 0.94 0.80   EGFR 60.3 73.2   GLUCOSE 83 74   CALCIUM 9.1 8.9               Lab 23  0858   PROBNP 2,917.0*                 Brief Urine Lab Results  (Last result in the past 365 days)        Color   Clarity   Blood   Leuk Est   Nitrite   Protein   CREAT   Urine HCG         07/17/23 1645 Yellow   Clear   Negative   Negative   Negative   Negative                   Microbiology Results Abnormal       Procedure Component Value - Date/Time    Blood Culture - Blood, Arm, Left [[id number]]  (Normal) Collected: 07/15/23 0340    Lab Status: Final result Specimen: Blood from Arm, Left Updated: 07/20/23 0730     Blood Culture No growth at 5 days    Blood Culture - Blood, Arm, Right [[id number]]  (Normal) Collected: 07/15/23 0340    Lab Status: Final result Specimen: Blood from Arm, Right Updated: 07/20/23 0730     Blood Culture No growth at 5 days    S. Pneumo Ag Urine or CSF - Urine, Urine, Clean Catch [[id number]]  (Normal) Collected: 07/15/23 0341    Lab Status: Final result Specimen: Urine, Clean Catch Updated: 07/15/23 1356     Strep Pneumo Ag Negative    Legionella Antigen, Urine - Urine, Urine, Clean Catch [[id number]]  (Normal) Collected: 07/15/23 0341    Lab Status: Final result Specimen: Urine, Clean Catch Updated: 07/15/23 1356     LEGIONELLA ANTIGEN, URINE Negative    MRSA Screen, PCR (Inpatient) - Swab, Nares [[id number]]  (Normal) Collected: 07/15/23 0310    Lab Status: Final result Specimen: Swab from Nares Updated: 07/15/23 0846     MRSA PCR Negative    Narrative:      The negative predictive value of this diagnostic test is high and should only be used to consider de-escalating anti-MRSA therapy. A positive result may indicate colonization with MRSA and must be correlated clinically.  MRSA Negative    COVID PRE-OP / PRE-PROCEDURE SCREENING ORDER (NO ISOLATION) - Swab, Nasopharynx [[id number]]  (Normal) Collected: 07/15/23 0310    Lab Status: Final result Specimen: Swab from Nasopharynx Updated: 07/15/23 0410    Narrative:      The following orders were created for panel order COVID PRE-OP / PRE-PROCEDURE SCREENING ORDER (NO ISOLATION) - Swab, Nasopharynx.  Procedure                               Abnormality         Status                     ---------                                -----------         ------                     Respiratory Panel PCR w/...[[id number]]  Normal              Final result                 Please view results for these tests on the individual orders.    Respiratory Panel PCR w/COVID-19(SARS-CoV-2) JENNA/KELSI/NORMAN/PAD/COR/MAD/ARSEN In-House, NP Swab in UTM/VTM, 3-4 HR TAT - Swab, Nasopharynx [[id number]]  (Normal) Collected: 07/15/23 0310    Lab Status: Final result Specimen: Swab from Nasopharynx Updated: 07/15/23 0410     ADENOVIRUS, PCR Not Detected     Coronavirus 229E Not Detected     Coronavirus HKU1 Not Detected     Coronavirus NL63 Not Detected     Coronavirus OC43 Not Detected     COVID19 Not Detected     Human Metapneumovirus Not Detected     Human Rhinovirus/Enterovirus Not Detected     Influenza A PCR Not Detected     Influenza B PCR Not Detected     Parainfluenza Virus 1 Not Detected     Parainfluenza Virus 2 Not Detected     Parainfluenza Virus 3 Not Detected     Parainfluenza Virus 4 Not Detected     RSV, PCR Not Detected     Bordetella pertussis pcr Not Detected     Bordetella parapertussis PCR Not Detected     Chlamydophila pneumoniae PCR Not Detected     Mycoplasma pneumo by PCR Not Detected    Narrative:      In the setting of a positive respiratory panel with a viral infection PLUS a negative procalcitonin without other underlying concern for bacterial infection, consider observing off antibiotics or discontinuation of antibiotics and continue supportive care. If the respiratory panel is positive for atypical bacterial infection (Bordetella pertussis, Chlamydophila pneumoniae, or Mycoplasma pneumoniae), consider antibiotic de-escalation to target atypical bacterial infection.            No radiology results from the last 24 hrs    Results for orders placed during the hospital encounter of 06/16/23    Adult Transthoracic Echo Complete W/ Cont if Necessary Per Protocol    Interpretation Summary    Left ventricular systolic function is normal. Estimated left  ventricular EF = 60%    Left ventricular diastolic function was normal.    Mild aortic insufficiency.    Trace to mild mitral and tricuspid regurgitation.    Calculated right ventricular systolic pressure from tricuspid regurgitation is 20 mmHg.    There is a trivial pericardial effusion.      Current medications:  Scheduled Meds:acetaZOLAMIDE, 250 mg, Oral, Daily  apixaban, 2.5 mg, Oral, Q12H  ascorbic acid, 250 mg, Oral, Daily  brimonidine, 1 drop, Both Eyes, Q12H   And  timolol, 1 drop, Both Eyes, Q12H  dilTIAZem CD, 180 mg, Oral, Q24H  galantamine, 8 mg, Oral, BID With Meals  ipratropium-albuterol, 3 mL, Nebulization, Q4H - RT  levothyroxine, 88 mcg, Oral, Daily  midodrine, 5 mg, Oral, TID AC  mirtazapine, 15 mg, Oral, Nightly  pantoprazole, 40 mg, Oral, Q AM  PARoxetine, 10 mg, Oral, Nightly  senna-docusate sodium, 2 tablet, Oral, BID  sodium chloride, 10 mL, Intravenous, Q12H  torsemide, 10 mg, Oral, Daily      Continuous Infusions:   PRN Meds:.  acetaminophen **OR** acetaminophen **OR** acetaminophen    senna-docusate sodium **AND** polyethylene glycol **AND** bisacodyl **AND** bisacodyl    Magnesium Standard Dose Replacement - Follow Nurse / BPA Driven Protocol    melatonin    metoprolol tartrate    ondansetron    Potassium Replacement - Follow Nurse / BPA Driven Protocol    sodium chloride    sodium chloride    sodium chloride    Assessment & Plan   Assessment & Plan     Active Hospital Problems    Diagnosis  POA    **Acute respiratory failure with hypoxia [J96.01]  Yes    Pneumonia of both lungs due to infectious organism [J18.9]  Unknown    Hyponatremia [E87.1]  Unknown    Severe malnutrition [E43]  Yes    Atrial fibrillation with RVR [I48.91]  Yes    Cachexia [R64]  Yes    Senile osteoporosis [M81.0]  Yes    Hashimoto's thyroiditis [E06.3]  Yes    Primary open angle glaucoma of both eyes, severe stage [H40.1133]  Yes    Mixed anxiety and depressive disorder [F41.8]  Yes    Multiple nodules of lung  [R91.8]  Yes    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        Brief Hospital Course to date:  Vita Miller is a 83 y.o. female with history of hypothyroidism, COPD with chronic bronchiectasis, history of chronic MAC with unknown intolerance to prior treatment plan, history of orthostatic hypotension, hyperlipidemia, osteoporosis, bilateral glaucoma, mild memory deficit, who was recently admitted to our facility approximately 1 month ago for new onset atrial fibrillation with RVR and returned to the ED with complaints of palpitations and shortness of breath.  She has been treated for PNA/COPD exacerbation as well as her Afib with RVR.      This patient's problems and plans were partially entered by my partner and updated as appropriate by me 07/28/23.    Copied text in this note has been reviewed and is accurate as of 07/28/23.     Acute respiratory failure with hypoxia  Bilateral pneumonia/infiltrates  History of MAC, intolerant to treatment  COPD with acute exacerbation, chronic bronchiectasis  -- Pulmonology followed earlier on this hospitalization.  Clinically improved.  AI/vasculitis panels are negative.  Recs to continue vest therapy, discussed with Dr. Hook  --Off steroids  --Completed Zosyn 7 days tx, s/p azith  -- Duo nebs  -- Flutter valve, mucinex  --MRSA pcr negative, strep negative, legionella negative  --CT chest showed new GGO, bronchiectasis, scattered nodules.  Needs f/u CT or PET scan  -- torsemide, diamox  --cardiology signed off/ follow up in 6 weeks        Atrial fibrillation with rvr, not in aflutter  --07/21 converted back to Afib RVR following a BM at 8am. Pt was hypotensive. Family/patient  declined lopressor indefinitely  --s/p esmolol drip  --eliquis  --flecainide --> bisoprolol ---> changed to digoxin per cards, no flecainide due to pneumonitis concerns, partner discussed with Dr. Padilla. Now Digoxin has been d/c'd  -- has upcoming appt with Dr. Martin at  (suggested making that telehealth, family very concerned about missing this appt)   --fludrocortisone discontinued 07/21  --cards following.  PO diltiazem- discontinued Digoxin 7/25  -- Midodrine had been increased to 10 mg TID, now back to 5mg TID per Cardiology  --LVEF 60%, normal diastolic on echo from 6/17/2023  --Dilt 180 mg daily for rate control, currently controlled        Altered mental status  --improved  --u/a bland, B12 wnl.   RPR nonreactive     Hyponatremia, resolved  --Na 129 on admission; suspect related to volume overload  --Na 136 on 7/24     Orthostatic hypotension  --fludricortisone discontinued  --midodrine back to 5mg TID- stable pressures      Hypothyroidism  --slightly elevated tsh  --continue synthroid, dose adjusted on 7/16; repeat tsh in 4-6 weeks; careful adjustment given RVR     Other chronic conditions: HLD, malnutrition, anxiety/depression, memory impairment  --continue home meds  --nutrition  --pt/otv       Expected Discharge Location and Transportation: SNF rehab (family appeal)  Expected Discharge   Expected Discharge Date: 7/28/2023; Expected Discharge Time:      DVT prophylaxis:  Medical and mechanical DVT prophylaxis orders are present.     AM-PAC 6 Clicks Score (PT): 18 (07/28/23 0800)    CODE STATUS:   Code Status and Medical Interventions:   Ordered at: 07/15/23 0214     Code Status (Patient has no pulse and is not breathing):    CPR (Attempt to Resuscitate)     Medical Interventions (Patient has pulse or is breathing):    Full Support       Maria Luisa Morillo, APRN  07/28/23

## 2023-07-28 NOTE — THERAPY TREATMENT NOTE
Patient Name: Vita Miller  : 1940    MRN: [id number]                              Today's Date: 2023       Admit Date: 2023    Visit Dx:     ICD-10-CM ICD-9-CM   1. Atrial fibrillation with RVR  I48.91 427.31   2. Acute respiratory failure with hypoxia  J96.01 518.81   3. Pneumonia of both upper lobes due to infectious organism  J18.9 486   4. Pneumonia of both lungs due to infectious organism, unspecified part of lung  J18.9 483.8   5. Severe Malnutrition (HCC)  E43 261   6. 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 Primary open angle glaucoma of both eyes, severe stage  H40.1133 365.11     365.73   8. Multiple nodules of lung  R91.8 793.19   9. H/O orthostatic hypotension  Z86.79 V12.59   10. COPD with acute exacerbation  J44.1 491.21   11. Acquired bronchiectasis  J47.9 494.0   12. 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    Hypothyroidism    Acute respiratory failure with hypoxia    Cachexia    Severe malnutrition    Pneumonia of both lungs due to infectious organism    Hyponatremia     Past Medical History:   Diagnosis Date    A-fib     Disease of thyroid gland     Glaucoma     Hypotension      Past Surgical History:   Procedure Laterality Date    HIP SURGERY      PATELLA SURGERY      SHOULDER SURGERY        General Information       Row Name 23 1541          Physical Therapy Time and Intention    Document Type therapy note (daily note) (P)   -     Mode of Treatment physical therapy (P)   -       Row Name 23 1541          General  Information    Patient Profile Reviewed yes (P)   -     Existing Precautions/Restrictions fall;other (see comments) (P)   Brief with OOB activity  -     Barriers to Rehab medically complex (P)   -       Row Name 07/28/23 1541          Cognition    Orientation Status (Cognition) oriented to;person;verbal cues/prompts needed for orientation;place;time (P)   -       Row Name 07/28/23 1541          Safety Issues, Functional Mobility    Safety Issues Affecting Function (Mobility) awareness of need for assistance;insight into deficits/self-awareness;safety precaution awareness;safety precautions follow-through/compliance;sequencing abilities (P)   -     Impairments Affecting Function (Mobility) balance;endurance/activity tolerance;strength;coordination (P)   -               User Key  (r) = Recorded By, (t) = Taken By, (c) = Cosigned By      Initials Name Provider Type     Lili Crawford, PT Student PT Student                   Mobility       Row Name 07/28/23 1542          Bed Mobility    Bed Mobility supine-sit;sit-supine (P)   -     Supine-Sit [place] (Bed Mobility) standby assist;verbal cues (P)   -     Sit-Supine [place] (Bed Mobility) minimum assist (75% patient effort);verbal cues (P)   -     Assistive Device (Bed Mobility) bed rails;head of bed elevated (P)   -     Comment, (Bed Mobility) Pt reported dizziness in sitting - /93. Pt's son reported she had vertigo last night and was given Meclizine. Pt reports still feeling dizzy. B [place] Hallpike performed - no nystagmus seen bilaterally, indicating negative for BBPV at this time. (P)   -       Row Name 07/28/23 1542          Transfers    Comment, (Transfers) STS x 2 from EOB, x 1 from toilet. t/f bed<>chair (P)   -       Row Name 07/28/23 1542          Bed-Chair Transfer    Bed-Chair [place] (Transfers) contact guard;verbal cues (P)   -     Assistive Device (Bed-Chair Transfers) walker, front-wheeled (P)   -      Comment, (Bed-Chair Transfer) t/f bed<>chair. VCs for hand placement and sequencing (P)   -       Row Name 07/28/23 1542          Sit-Stand Transfer    Sit-Stand [place] (Transfers) contact guard;1 person assist;verbal cues (P)   -     Assistive Device (Sit-Stand Transfers) walker, front-wheeled (P)   -     Comment, (Sit-Stand Transfer) STS x 2 from EOB, x 1 from toilet. VCs for hand placement and sequencing. Pt tends to pull up from walker (P)   -       Row Name 07/28/23 1542          Gait/Stairs (Locomotion)    [place] Level (Gait) minimum assist (75% patient effort);1 person assist;verbal cues (P)   -     Assistive Device (Gait) walker, front-wheeled (P)   -     Distance in Feet (Gait) 15+20 (P)   -     Deviations/Abnormal Patterns (Gait) bilateral deviations;base of support, narrow;le[place] decreased;stride length decreased;weight shifting decreased;gait speed decreased (P)   -     Bilateral Gait Deviations forward flexed posture;heel strike decreased (P)   -     Comment, (Gait/Stairs) Pt ambulated from bed>bathroom with CGA and bathroom>chair with [place]. Pt reported dizziness and was noted to be in Afib when ambulating to chair from bathroom. Nsg notified. Pt moderately unsteady with 1 LOB that she was able to self correct. Further distance deferred d/t dizziness, Afib, and fatigue. (P)   -               User Key  (r) = Recorded By, (t) = Taken By, (c) = Cosigned By      Initials Name Provider Type     Lili Crawford, PT Student PT Student                   Obj/Interventions       Row Name 07/28/23 1549          Motor Skills    Therapeutic Exercise hip;knee;ankle (P)   -       Row Name 07/28/23 1549          Hip (Therapeutic Exercise)    Hip (Therapeutic Exercise) strengthening exercise (P)   -     Hip Strengthening (Therapeutic Exercise) bilateral;heel slides;marching while seated;sitting;15 repititions (P)   -       Row Name 07/28/23 1549          Knee (Therapeutic  Exercise)    Knee (Therapeutic Exercise) strengthening exercise (P)   -     Knee AROM (Therapeutic Exercise) bilateral;SLR (straight leg raise);LAQ (long arc quad);sitting (P)   -[facility] Name 07/28/23 1549          Ankle (Therapeutic Exercise)    Ankle (Therapeutic Exercise) AROM (active range of motion) (P)   -     Ankle AROM (Therapeutic Exercise) bilateral;dorsiflexion;plantarflexion;10 repetitions (P)   -[facility] Name 07/28/23 1549          Balance    Balance Assessment sitting static balance;sitting dynamic balance;sit to stand dynamic balance;standing static balance;standing dynamic balance (P)   -     Static Sitting Balance standby assist (P)   -     Dynamic Sitting Balance contact guard (P)   -     Position, Sitting Balance unsupported;sitting edge of bed (P)   -     Sit to Stand Dynamic Balance contact guard (P)   -     Static Standing Balance contact guard (P)   -     Dynamic Standing Balance minimal assist (P)   -     Position/Device Used, Standing Balance supported;walker, front-wheeled (P)   -     Balance Interventions sitting;standing;sit to stand;supported;static;dynamic (P)   -     Comment, Balance 1 LOB noted with ambulation that pt was able to self correct (P)   -               User Key  (r) = Recorded By, (t) = Taken By, (c) = Cosigned By      Initials Name Provider Type     Lili Crawford, PT Student PT Student                   Goals/Plan    No documentation.                  Clinical Impression       [facility] Name 07/28/23 5154          Pain    Pretreatment Pain Rating 0/10 - no pain (P)   -     Posttreatment Pain Rating 0/10 - no pain (P)   -LH       Row Name 07/28/23 5943          Plan of Care Review    Plan of Care Reviewed With patient;son (P)   -     Progress no change (P)   -     Outcome Evaluation Pt continues to present below baseline d/t generalized weakness, decreased balance, and decreased activity tolerance. Pt ambulated 15+20 ft with CGA that  regressed to Joseph - pt reported dizziness and was noted to be in Afib. Pt's son reported pt experienced vertigo and was given Meclizine - B [place] Hallpike performed and no nystagmus seen bilaterally. Pt would continue to benefit from skilled IP PT. Recommend SNF at d/c. (P)   -[facility] Name 07/28/23 9759          Vital Signs    Pre Systolic BP Rehab 140 (P)   -     Pre Treatment Diastolic BP 93 (P)   -     Post Systolic BP Rehab 132 (P)   -     Post Treatment Diastolic BP 79 (P)   -     Pretreatment Heart Rate (beats/min) 69 (P)   -     Posttreatment Heart Rate (beats/min) 65 (P)   -     Pre SpO2 (%) 93 (P)   -     O2 Delivery Pre Treatment room air (P)   -     Post SpO2 (%) 97 (P)   -     O2 Delivery Post Treatment room air (P)   -     Pre Patient Position Supine (P)   -     Intra Patient Position Standing (P)   -     Post Patient Position Sitting (P)   -[facility] Name 07/28/23 3224          Positioning and Restraints    Pre-Treatment Position in bed (P)   -     Post Treatment Position chair (P)   -     In Chair reclined;sitting;call light within reach;encouraged to call for assist;exit alarm on;with other staff;waffle cushion;legs elevated;with family/caregiver;notified [facility] (P)   -               User Key  (r) = Recorded By, (t) = Taken By, (c) = Cosigned By      Initials Name Provider Type     Lili Crawford, PT Student PT Student                   Outcome Measures       [facility] Name 07/28/23 1554 07/28/23 0800       How much help from another person do you currently need...    Turning from your back to your side while in flat bed without using bedrails? 3 (P)   - 3  -AH    Moving from lying on back to sitting on the side of a flat bed without bedrails? 3 (P)   - 3  -AH    Moving to and from a bed to a chair (including a wheelchair)? 3 (P)   - 3  -AH    Standing up from a chair using your arms (e.g., wheelchair, bedside chair)? 3 (P)   - 3  -AH    Climbing 3-5 steps with a  railing? 3 (P)   - 3  -AH    To walk in hospital room? 3 (P)   - 3  -AH    AM-PAC 6 Clicks Score (PT) 18 (P)   - 18  -    Highest level of mobility 6 --> Walked 10 steps or more (P)   - 6 --> Walked 10 steps or more  -      Row Name 07/28/23 1554          Functional Assessment    Outcome Measure Options AM-PAC 6 Clicks Basic Mobility (PT) (P)   -               User Key  (r) = Recorded By, (t) = Taken By, (c) = Cosigned By      Initials Name Provider Type     Soraida Lucio, RN Registered Nurse     Lili Crawford, PT Student PT Student                                 Physical Therapy Education       Title: PT OT SLP Therapies (In Progress)       Topic: Physical Therapy (In Progress)       Point: Mobility training (Done)       Learning Progress Summary             Patient Acceptance, E, VU,NR by  at 7/28/2023 1554    Comment: see flowsheet    Acceptance, E, NR by AS at 7/26/2023 1411    Acceptance, E, VU,NR by  at 7/20/2023 1609    Comment: see flowsheet    Acceptance, E, VU,NR by  at 7/18/2023 0933    Comment: see flowsheet    Eager, E,D, NR by DM at 7/15/2023 1642   Family Acceptance, E, VU,NR by  at 7/28/2023 1554    Comment: see flowsheet                         Point: Home exercise program (In Progress)       Learning Progress Summary             Patient Acceptance, E, NR by AS at 7/26/2023 1411    Eager, E,D, NR by DM at 7/15/2023 1642                         Point: Body mechanics (Done)       Learning Progress Summary             Patient Acceptance, E, VU,NR by  at 7/28/2023 1554    Comment: see flowsheet    Acceptance, E, NR by AS at 7/26/2023 1411    Acceptance, E, VU,NR by  at 7/20/2023 1609    Comment: see flowsheet    Acceptance, E, VU,NR by  at 7/18/2023 0933    Comment: see flowsheet    Eager, E,D, NR by DM at 7/15/2023 1642   Family Acceptance, E, VU,NR by  at 7/28/2023 1554    Comment: see flowsheet                         Point: Precautions (Done)       Learning  Progress Summary             Patient Acceptance, E, VU,NR by  at 7/28/2023 1554    Comment: see flowsheet    Acceptance, E, NR by AS at 7/26/2023 1411    Acceptance, E, VU,NR by  at 7/20/2023 1609    Comment: see flowsheet    Acceptance, E, VU,NR by  at 7/18/2023 0933    Comment: see flowsheet    Eager, E,D, NR by  at 7/15/2023 1642   Family Acceptance, E, VU,NR by  at 7/28/2023 1554    Comment: see flowsheet                                         User Key       Initials Effective Dates Name Provider Type Discipline    DM 02/03/23 -  Merced Patel, PT Physical Therapist PT    AS 04/28/23 -  Soraida Dorantes, PTA Physical Therapist Assistant PT     05/15/23 -  Lili Crawford, PT Student PT Student PT                  PT Recommendation and Plan     Plan of Care Reviewed With: (P) patient, son  Progress: (P) no change  Outcome Evaluation: (P) Pt continues to present below baseline d/t generalized weakness, decreased balance, and decreased activity tolerance. Pt ambulated 15+20 ft with CGA that regressed to Joseph - pt reported dizziness and was noted to be in Afib. Pt's son reported pt experienced vertigo and was given Meclizine - B [place] Hallpike performed and no nystagmus seen bilaterally. Pt would continue to benefit from skilled IP PT. Recommend SNF at d/c.     Time Calculation:         PT Charges       Row Name 07/28/23 1556             Time Calculation    Start Time 1500 (P)   -      PT Received On 07/28/23 (P)   -      PT Goal Re-Cert Due Date 08/04/23 (P)   -         Timed Charges    60563 - PT Therapeutic Exercise Minutes 10 (P)   -      70265 - PT Therapeutic Activity Minutes 29 (P)   -         Total Minutes    Timed Charges Total Minutes 39 (P)   -       Total Minutes 39 (P)   -                User Key  (r) = Recorded By, (t) = Taken By, (c) = Cosigned By      Initials Name Provider Type     Lili Crawford, PT Student PT Student                  Therapy Charges for Today        Code Description Service Date Service Provider Modifiers Qty    07217176246 HC PT THER PROC EA 15 MIN 7/28/2023 Lili Crawford, PT Student GP 1    26026928853 HC PT THERAPEUTIC ACT EA 15 MIN 7/28/2023 Lili Crawford, PT Student GP 2            PT G-Codes  Outcome Measure Options: (P) AM-PAC 6 Clicks Basic Mobility (PT)  AM-PAC 6 Clicks Score (PT): (P) 18  AM-PAC 6 Clicks Score (OT): 19  PT Discharge Summary  Anticipated Discharge Disposition (PT): (P) skilled nursing facility    Lili Crawford, PT Student  7/28/2023

## 2023-07-29 LAB
ANION GAP SERPL CALCULATED.3IONS-SCNC: 12 MMOL/L (ref 5–15)
BASOPHILS # BLD AUTO: 0.07 10*3/MM3 (ref 0–0.2)
BASOPHILS NFR BLD AUTO: 0.7 % (ref 0–1.5)
BUN SERPL-MCNC: 27 MG/DL (ref 8–23)
BUN/CREAT SERPL: 32.5 (ref 7–25)
CALCIUM SPEC-SCNC: 8.5 MG/DL (ref 8.6–10.5)
CHLORIDE SERPL-SCNC: 97 MMOL/L (ref 98–107)
CO2 SERPL-SCNC: 24 MMOL/L (ref 22–29)
CREAT SERPL-MCNC: 0.83 MG/DL (ref 0.57–1)
DEPRECATED RDW RBC AUTO: 47.8 FL (ref 37–54)
EGFRCR SERPLBLD CKD-EPI 2021: 70 ML/MIN/1.73
EOSINOPHIL # BLD AUTO: 0.25 10*3/MM3 (ref 0–0.4)
EOSINOPHIL NFR BLD AUTO: 2.5 % (ref 0.3–6.2)
ERYTHROCYTE [DISTWIDTH] IN BLOOD BY AUTOMATED COUNT: 13.3 % (ref 12.3–15.4)
GLUCOSE SERPL-MCNC: 106 MG/DL (ref 65–99)
HCT VFR BLD AUTO: 40.4 % (ref 34–46.6)
HGB BLD-MCNC: 13.2 G/DL (ref 12–15.9)
IMM GRANULOCYTES # BLD AUTO: 0.03 10*3/MM3 (ref 0–0.05)
IMM GRANULOCYTES NFR BLD AUTO: 0.3 % (ref 0–0.5)
LYMPHOCYTES # BLD AUTO: 1.54 10*3/MM3 (ref 0.7–3.1)
LYMPHOCYTES NFR BLD AUTO: 15.4 % (ref 19.6–45.3)
MAGNESIUM SERPL-MCNC: 2.8 MG/DL (ref 1.6–2.4)
MCH RBC QN AUTO: 32 PG (ref 26.6–33)
MCHC RBC AUTO-ENTMCNC: 32.7 G/DL (ref 31.5–35.7)
MCV RBC AUTO: 98.1 FL (ref 79–97)
MONOCYTES # BLD AUTO: 0.62 10*3/MM3 (ref 0.1–0.9)
MONOCYTES NFR BLD AUTO: 6.2 % (ref 5–12)
NEUTROPHILS NFR BLD AUTO: 7.49 10*3/MM3 (ref 1.7–7)
NEUTROPHILS NFR BLD AUTO: 74.9 % (ref 42.7–76)
NRBC BLD AUTO-RTO: 0 /100 WBC (ref 0–0.2)
PLATELET # BLD AUTO: 237 10*3/MM3 (ref 140–450)
PMV BLD AUTO: 9.1 FL (ref 6–12)
POTASSIUM SERPL-SCNC: 4.1 MMOL/L (ref 3.5–5.2)
RBC # BLD AUTO: 4.12 10*6/MM3 (ref 3.77–5.28)
SODIUM SERPL-SCNC: 133 MMOL/L (ref 136–145)
WBC NRBC COR # BLD: 10 10*3/MM3 (ref 3.4–10.8)

## 2023-07-29 PROCEDURE — 80048 BASIC METABOLIC PNL TOTAL CA: CPT | Performed by: NURSE PRACTITIONER

## 2023-07-29 PROCEDURE — 94799 UNLISTED PULMONARY SVC/PX: CPT

## 2023-07-29 PROCEDURE — 85025 COMPLETE CBC W/AUTO DIFF WBC: CPT | Performed by: NURSE PRACTITIONER

## 2023-07-29 PROCEDURE — 83735 ASSAY OF MAGNESIUM: CPT | Performed by: NURSE PRACTITIONER

## 2023-07-29 PROCEDURE — 94669 MECHANICAL CHEST WALL OSCILL: CPT

## 2023-07-29 PROCEDURE — 94664 DEMO&/EVAL PT USE INHALER: CPT

## 2023-07-29 PROCEDURE — 99232 SBSQ HOSP IP/OBS MODERATE 35: CPT | Performed by: INTERNAL MEDICINE

## 2023-07-29 RX ADMIN — APIXABAN 2.5 MG: 2.5 TABLET, FILM COATED ORAL at 20:48

## 2023-07-29 RX ADMIN — TIMOLOL MALEATE 1 DROP: 5 SOLUTION/ DROPS OPHTHALMIC at 08:34

## 2023-07-29 RX ADMIN — GALANTAMINE 8 MG: 4 TABLET, FILM COATED ORAL at 08:33

## 2023-07-29 RX ADMIN — MIDODRINE HYDROCHLORIDE 5 MG: 5 TABLET ORAL at 08:33

## 2023-07-29 RX ADMIN — PANTOPRAZOLE SODIUM 40 MG: 40 TABLET, DELAYED RELEASE ORAL at 06:11

## 2023-07-29 RX ADMIN — TIMOLOL MALEATE 1 DROP: 5 SOLUTION/ DROPS OPHTHALMIC at 20:48

## 2023-07-29 RX ADMIN — LEVOTHYROXINE SODIUM 88 MCG: 0.09 TABLET ORAL at 06:11

## 2023-07-29 RX ADMIN — Medication 5 MG: at 23:05

## 2023-07-29 RX ADMIN — MIDODRINE HYDROCHLORIDE 5 MG: 5 TABLET ORAL at 12:20

## 2023-07-29 RX ADMIN — APIXABAN 2.5 MG: 2.5 TABLET, FILM COATED ORAL at 08:33

## 2023-07-29 RX ADMIN — BRIMONIDINE TARTRATE 1 DROP: 2 SOLUTION/ DROPS OPHTHALMIC at 20:48

## 2023-07-29 RX ADMIN — GALANTAMINE 8 MG: 4 TABLET, FILM COATED ORAL at 17:58

## 2023-07-29 RX ADMIN — IPRATROPIUM BROMIDE AND ALBUTEROL SULFATE 3 ML: 2.5; .5 SOLUTION RESPIRATORY (INHALATION) at 12:50

## 2023-07-29 RX ADMIN — IPRATROPIUM BROMIDE AND ALBUTEROL SULFATE 3 ML: 2.5; .5 SOLUTION RESPIRATORY (INHALATION) at 20:32

## 2023-07-29 RX ADMIN — PAROXETINE HYDROCHLORIDE 10 MG: 10 TABLET, FILM COATED ORAL at 20:48

## 2023-07-29 RX ADMIN — SENNOSIDES AND DOCUSATE SODIUM 2 TABLET: 50; 8.6 TABLET ORAL at 08:33

## 2023-07-29 RX ADMIN — METOPROLOL TARTRATE 2.5 MG: 5 INJECTION INTRAVENOUS at 17:58

## 2023-07-29 RX ADMIN — MIRTAZAPINE 15 MG: 15 TABLET, FILM COATED ORAL at 20:48

## 2023-07-29 RX ADMIN — BRIMONIDINE TARTRATE 1 DROP: 2 SOLUTION/ DROPS OPHTHALMIC at 08:34

## 2023-07-29 RX ADMIN — MIDODRINE HYDROCHLORIDE 5 MG: 5 TABLET ORAL at 17:58

## 2023-07-29 RX ADMIN — IPRATROPIUM BROMIDE AND ALBUTEROL SULFATE 3 ML: 2.5; .5 SOLUTION RESPIRATORY (INHALATION) at 15:54

## 2023-07-29 RX ADMIN — ACETAZOLAMIDE 250 MG: 250 TABLET ORAL at 08:33

## 2023-07-29 RX ADMIN — DILTIAZEM HYDROCHLORIDE 180 MG: 180 CAPSULE, EXTENDED RELEASE ORAL at 08:33

## 2023-07-29 RX ADMIN — Medication 10 ML: at 08:34

## 2023-07-29 RX ADMIN — IPRATROPIUM BROMIDE AND ALBUTEROL SULFATE 3 ML: 2.5; .5 SOLUTION RESPIRATORY (INHALATION) at 08:03

## 2023-07-29 RX ADMIN — TORSEMIDE 10 MG: 20 TABLET ORAL at 08:33

## 2023-07-29 RX ADMIN — Medication 250 MG: at 08:33

## 2023-07-29 NOTE — PROGRESS NOTES
[facility] Medicine Services  PROGRESS NOTE    Patient Name: Vita Miller  : 1940  MRN: [id number]    Date of Admission: 2023  Primary Care Physician: Alfonso Steele MD    Subjective   Subjective     CC: f/u PNA    HPI: Up in bed watching TV. Says she feels well. Asking for her watch and iPhone.    ROS:  Gen- No fevers, chills  CV- No chest pain, palpitations  Resp- No cough, dyspnea  GI- No N/V/D, abd pain     Objective   Objective     Vital Signs:   Temp:  [97.4 øF (36.3 øC)-98 øF (36.7 øC)] 97.6 øF (36.4 øC)  Heart Rate:  [54-75] 58  Resp:  [16-18] 18  BP: ()/(67-75) 137/72     Physical Exam:  Constitutional: No acute distress, awake, alert  HENT: NCAT, mucous membranes moist  Respiratory: Clear to auscultation bilaterally, respiratory effort normal   Cardiovascular: RRR, no murmurs, rubs, or gallops  Gastrointestinal: Positive bowel sounds, soft, nontender, nondistended  Musculoskeletal: No bilateral ankle edema  Psychiatric: Appropriate affect, cooperative  Neurologic: Oriented x 3, strength symmetric in all extremities, Cranial Nerves grossly intact to confrontation, speech clear  Skin: No rashes     Results Reviewed:  LAB RESULTS:          Lab 23  0409   SODIUM 136   POTASSIUM 4.3   CHLORIDE 100   CO2 22.0   ANION GAP 14.0   BUN 17   CREATININE 0.94   EGFR 60.3   GLUCOSE 83   CALCIUM 9.1             Lab 23  0858   PROBNP 2,917.0*                 Brief Urine Lab Results  (Last result in the past 365 days)        Color   Clarity   Blood   Leuk Est   Nitrite   Protein   CREAT   Urine HCG        23 1645 Yellow   Clear   Negative   Negative   Negative   Negative                   Microbiology Results Abnormal       Procedure Component Value - Date/Time    Blood Culture - Blood, Arm, Left [[id number]]  (Normal) Collected: 07/15/23 0340    Lab Status: Final result Specimen: Blood from Arm, Left Updated: 23 0730     Blood Culture No growth at  5 days    Blood Culture - Blood, Arm, Right [[id number]]  (Normal) Collected: 07/15/23 0340    Lab Status: Final result Specimen: Blood from Arm, Right Updated: 07/20/23 0730     Blood Culture No growth at 5 days    S. Pneumo Ag Urine or CSF - Urine, Urine, Clean Catch [[id number]]  (Normal) Collected: 07/15/23 0341    Lab Status: Final result Specimen: Urine, Clean Catch Updated: 07/15/23 1356     Strep Pneumo Ag Negative    Legionella Antigen, Urine - Urine, Urine, Clean Catch [[id number]]  (Normal) Collected: 07/15/23 0341    Lab Status: Final result Specimen: Urine, Clean Catch Updated: 07/15/23 1356     LEGIONELLA ANTIGEN, URINE Negative    MRSA Screen, PCR (Inpatient) - Swab, Nares [[id number]]  (Normal) Collected: 07/15/23 0310    Lab Status: Final result Specimen: Swab from Nares Updated: 07/15/23 0846     MRSA PCR Negative    Narrative:      The negative predictive value of this diagnostic test is high and should only be used to consider de-escalating anti-MRSA therapy. A positive result may indicate colonization with MRSA and must be correlated clinically.  MRSA Negative    COVID PRE-OP / PRE-PROCEDURE SCREENING ORDER (NO ISOLATION) - Swab, Nasopharynx [[id number]]  (Normal) Collected: 07/15/23 0310    Lab Status: Final result Specimen: Swab from Nasopharynx Updated: 07/15/23 0410    Narrative:      The following orders were created for panel order COVID PRE-OP / PRE-PROCEDURE SCREENING ORDER (NO ISOLATION) - Swab, Nasopharynx.  Procedure                               Abnormality         Status                     ---------                               -----------         ------                     Respiratory Panel PCR w/...[[id number]]  Normal              Final result                 Please view results for these tests on the individual orders.    Respiratory Panel PCR w/COVID-19(SARS-CoV-2) JENNA/KELSI/NORMAN/PAD/COR/MAD/ARSEN In-House, NP Swab in UTM/VTM, 3-4 HR TAT - Swab, Nasopharynx [[id number]]  (Normal)  Collected: 07/15/23 0310    Lab Status: Final result Specimen: Swab from Nasopharynx Updated: 07/15/23 0410     ADENOVIRUS, PCR Not Detected     Coronavirus 229E Not Detected     Coronavirus HKU1 Not Detected     Coronavirus NL63 Not Detected     Coronavirus OC43 Not Detected     COVID19 Not Detected     Human Metapneumovirus Not Detected     Human Rhinovirus/Enterovirus Not Detected     Influenza A PCR Not Detected     Influenza B PCR Not Detected     Parainfluenza Virus 1 Not Detected     Parainfluenza Virus 2 Not Detected     Parainfluenza Virus 3 Not Detected     Parainfluenza Virus 4 Not Detected     RSV, PCR Not Detected     Bordetella pertussis pcr Not Detected     Bordetella parapertussis PCR Not Detected     Chlamydophila pneumoniae PCR Not Detected     Mycoplasma pneumo by PCR Not Detected    Narrative:      In the setting of a positive respiratory panel with a viral infection PLUS a negative procalcitonin without other underlying concern for bacterial infection, consider observing off antibiotics or discontinuation of antibiotics and continue supportive care. If the respiratory panel is positive for atypical bacterial infection (Bordetella pertussis, Chlamydophila pneumoniae, or Mycoplasma pneumoniae), consider antibiotic de-escalation to target atypical bacterial infection.            No radiology results from the last 24 hrs    Results for orders placed during the hospital encounter of 06/16/23    Adult Transthoracic Echo Complete W/ Cont if Necessary Per Protocol    Interpretation Summary    Left ventricular systolic function is normal. Estimated left ventricular EF = 60%    Left ventricular diastolic function was normal.    Mild aortic insufficiency.    Trace to mild mitral and tricuspid regurgitation.    Calculated right ventricular systolic pressure from tricuspid regurgitation is 20 mmHg.    There is a trivial pericardial effusion.      Current medications:  Scheduled Meds:acetaZOLAMIDE, 250 mg,  Oral, Daily  apixaban, 2.5 mg, Oral, Q12H  ascorbic acid, 250 mg, Oral, Daily  brimonidine, 1 drop, Both Eyes, Q12H   And  timolol, 1 drop, Both Eyes, Q12H  dilTIAZem CD, 180 mg, Oral, Q24H  galantamine, 8 mg, Oral, BID With Meals  ipratropium-albuterol, 3 mL, Nebulization, Q4H - RT  levothyroxine, 88 mcg, Oral, Daily  midodrine, 5 mg, Oral, TID AC  mirtazapine, 15 mg, Oral, Nightly  pantoprazole, 40 mg, Oral, Q AM  PARoxetine, 10 mg, Oral, Nightly  senna-docusate sodium, 2 tablet, Oral, BID  sodium chloride, 10 mL, Intravenous, Q12H  torsemide, 10 mg, Oral, Daily      Continuous Infusions:   PRN Meds:.  acetaminophen **OR** acetaminophen **OR** acetaminophen    senna-docusate sodium **AND** polyethylene glycol **AND** bisacodyl **AND** bisacodyl    Magnesium Standard Dose Replacement - Follow Nurse / BPA Driven Protocol    melatonin    metoprolol tartrate    ondansetron    Potassium Replacement - Follow Nurse / BPA Driven Protocol    sodium chloride    sodium chloride    sodium chloride    Assessment & Plan   Assessment & Plan     Active Hospital Problems    Diagnosis  POA    **Acute respiratory failure with hypoxia [J96.01]  Yes    Pneumonia of both lungs due to infectious organism [J18.9]  Unknown    Hyponatremia [E87.1]  Unknown    Severe malnutrition [E43]  Yes    Atrial fibrillation with RVR [I48.91]  Yes    Cachexia [R64]  Yes    Senile osteoporosis [M81.0]  Yes    Hashimoto's thyroiditis [E06.3]  Yes    Primary open angle glaucoma of both eyes, severe stage [H40.1133]  Yes    Mixed anxiety and depressive disorder [F41.8]  Yes    Multiple nodules of lung [R91.8]  Yes    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         Brief Hospital Course to date:  Vita Miller is a 83 y.o. female with history of hypothyroidism, COPD with chronic bronchiectasis, history of chronic MAC with unknown intolerance to prior treatment plan, history of orthostatic hypotension, hyperlipidemia, osteoporosis, bilateral glaucoma, mild memory deficit, who was recently admitted to our facility approximately 1 month ago for new onset atrial fibrillation with RVR and returned to the ED with complaints of palpitations and shortness of breath.  She has been treated for PNA/COPD exacerbation as well as her Afib with RVR.      Acute respiratory failure with hypoxia  Bilateral pneumonia/infiltrates  History of MAC, intolerant to treatment  COPD with acute exacerbation, chronic bronchiectasis  -- Pulmonology followed earlier on this hospitalization.  Clinically improved.  AI/vasculitis panels are negative.  Recs to continue vest therapy, discussed with Dr. Hook  --Off steroids  --Completed Zosyn 7 days tx, s/p azith  -- Duo nebs  -- Flutter valve, mucinex  --MRSA pcr negative, strep negative, legionella negative  --CT chest showed new GGO, bronchiectasis, scattered nodules.  Needs f/u CT or PET scan  -- torsemide, diamox. Cardiology signed off/ follow up in 6 weeks   -- Family has filed appeal to [employer] - pending.     Atrial fibrillation with rvr, not in aflutter  --07/21 converted back to Afib RVR following a BM at 8am. Pt was hypotensive. Family/patient declined lopressor indefinitely  --s/p esmolol drip  --eliquis  --flecainide --> bisoprolol ---> changed to digoxin per cards, no flecainide due to pneumonitis concerns, partner discussed with Dr. Padilla. Now Digoxin has been d/c'd  -- has upcoming appt with Dr. Martin at [facility] (suggested making that telehealth, family very concerned about missing this appt)   --fludrocortisone discontinued 07/21  --cards following.  PO diltiazem- discontinued Digoxin 7/25  -- Midodrine had been increased to 10 mg TID,  now back to 5mg TID per Cardiology  --LVEF 60%, normal diastolic on echo from 6/17/2023  --Dilt 180 mg daily for rate control, currently controlled     Altered mental status  --improved  --u/a bland, B12 wnl.   RPR nonreactive     Hyponatremia, resolved  --Na 129 on admission; suspect related to volume overload  --Na 136 on 7/24     Orthostatic hypotension  --fludricortisone discontinued  --midodrine back to 5mg TID- stable pressures      Hypothyroidism  --slightly elevated tsh  --continue synthroid, dose adjusted on 7/16; repeat tsh in 4-6 weeks; careful adjustment given RVR     Other chronic conditions: HLD, malnutrition, anxiety/depression, memory impairment  --continue home meds  --nutrition  --pt/otv     Expected Discharge Location and Transportation:   Expected Discharge   Expected Discharge Date: 7/28/2023; Expected Discharge Time:      DVT prophylaxis:  Medical and mechanical DVT prophylaxis orders are present.     AM-PAC 6 Clicks Score (PT): 18 (07/29/23 0800)    CODE STATUS:   Code Status and Medical Interventions:   Ordered at: 07/15/23 0214     Code Status (Patient has no pulse and is not breathing):    CPR (Attempt to Resuscitate)     Medical Interventions (Patient has pulse or is breathing):    Full Support       Sarah Beth Crisostomo II, DO  07/29/23

## 2023-07-29 NOTE — PLAN OF CARE
Goal Outcome Evaluation:      Patient has done great today. Patient had a BM, ted in place. IV was taken out but had to be replaced to give IV lopressor. HR was in the 160s, now down to 89-100s. Patient is tolerating her diet. Son has visited the patient today. Patient is currently watching TV no s/s of distress noted will continue to  monitor.

## 2023-07-29 NOTE — PLAN OF CARE
Goal Outcome Evaluation:                 No Acute events overnight. VSS with RA, Good UOP. No c/o pain or nausea. Slept and rested well throughout shift.

## 2023-07-30 LAB — DIGOXIN SERPL-MCNC: <0.3 NG/ML (ref 0.6–1.2)

## 2023-07-30 PROCEDURE — 94669 MECHANICAL CHEST WALL OSCILL: CPT

## 2023-07-30 PROCEDURE — 94664 DEMO&/EVAL PT USE INHALER: CPT

## 2023-07-30 PROCEDURE — 99232 SBSQ HOSP IP/OBS MODERATE 35: CPT | Performed by: INTERNAL MEDICINE

## 2023-07-30 PROCEDURE — 94799 UNLISTED PULMONARY SVC/PX: CPT

## 2023-07-30 PROCEDURE — 80162 ASSAY OF DIGOXIN TOTAL: CPT | Performed by: NURSE PRACTITIONER

## 2023-07-30 RX ORDER — CHOLECALCIFEROL (VITAMIN D3) 125 MCG
5 CAPSULE ORAL NIGHTLY
Status: DISCONTINUED | OUTPATIENT
Start: 2023-07-30 | End: 2023-08-01 | Stop reason: HOSPADM

## 2023-07-30 RX ADMIN — IPRATROPIUM BROMIDE AND ALBUTEROL SULFATE 3 ML: 2.5; .5 SOLUTION RESPIRATORY (INHALATION) at 15:54

## 2023-07-30 RX ADMIN — BRIMONIDINE TARTRATE 1 DROP: 2 SOLUTION/ DROPS OPHTHALMIC at 20:40

## 2023-07-30 RX ADMIN — TORSEMIDE 10 MG: 20 TABLET ORAL at 08:35

## 2023-07-30 RX ADMIN — TIMOLOL MALEATE 1 DROP: 5 SOLUTION/ DROPS OPHTHALMIC at 08:41

## 2023-07-30 RX ADMIN — MIDODRINE HYDROCHLORIDE 5 MG: 5 TABLET ORAL at 17:09

## 2023-07-30 RX ADMIN — PANTOPRAZOLE SODIUM 40 MG: 40 TABLET, DELAYED RELEASE ORAL at 05:40

## 2023-07-30 RX ADMIN — LEVOTHYROXINE SODIUM 88 MCG: 0.09 TABLET ORAL at 05:40

## 2023-07-30 RX ADMIN — Medication 5 MG: at 20:40

## 2023-07-30 RX ADMIN — APIXABAN 2.5 MG: 2.5 TABLET, FILM COATED ORAL at 20:40

## 2023-07-30 RX ADMIN — IPRATROPIUM BROMIDE AND ALBUTEROL SULFATE 3 ML: 2.5; .5 SOLUTION RESPIRATORY (INHALATION) at 07:36

## 2023-07-30 RX ADMIN — PAROXETINE HYDROCHLORIDE 10 MG: 10 TABLET, FILM COATED ORAL at 20:40

## 2023-07-30 RX ADMIN — SENNOSIDES AND DOCUSATE SODIUM 2 TABLET: 50; 8.6 TABLET ORAL at 08:34

## 2023-07-30 RX ADMIN — TIMOLOL MALEATE 1 DROP: 5 SOLUTION/ DROPS OPHTHALMIC at 20:40

## 2023-07-30 RX ADMIN — MIRTAZAPINE 15 MG: 15 TABLET, FILM COATED ORAL at 20:40

## 2023-07-30 RX ADMIN — IPRATROPIUM BROMIDE AND ALBUTEROL SULFATE 3 ML: 2.5; .5 SOLUTION RESPIRATORY (INHALATION) at 12:11

## 2023-07-30 RX ADMIN — IPRATROPIUM BROMIDE AND ALBUTEROL SULFATE 3 ML: 2.5; .5 SOLUTION RESPIRATORY (INHALATION) at 20:12

## 2023-07-30 RX ADMIN — GALANTAMINE 8 MG: 4 TABLET, FILM COATED ORAL at 08:37

## 2023-07-30 RX ADMIN — APIXABAN 2.5 MG: 2.5 TABLET, FILM COATED ORAL at 08:36

## 2023-07-30 RX ADMIN — GALANTAMINE 8 MG: 4 TABLET, FILM COATED ORAL at 17:09

## 2023-07-30 RX ADMIN — Medication 250 MG: at 08:34

## 2023-07-30 RX ADMIN — MIDODRINE HYDROCHLORIDE 5 MG: 5 TABLET ORAL at 08:34

## 2023-07-30 RX ADMIN — MIDODRINE HYDROCHLORIDE 5 MG: 5 TABLET ORAL at 12:08

## 2023-07-30 RX ADMIN — BRIMONIDINE TARTRATE 1 DROP: 2 SOLUTION/ DROPS OPHTHALMIC at 08:41

## 2023-07-30 NOTE — PROGRESS NOTES
[facility] Medicine Services  PROGRESS NOTE    Patient Name: Vita Miller  : 1940  MRN: [id number]    Date of Admission: 2023  Primary Care Physician: Alfonso Steele MD    Subjective   Subjective     CC: f/u PNA    HPI: Up in bed. Had a good night. Has no complaints.    ROS:  Gen- No fevers, chills  CV- No chest pain, palpitations  Resp- No cough, dyspnea  GI- No N/V/D, abd pain        Objective   Objective     Vital Signs:   Temp:  [97.6 øF (36.4 øC)-98.1 øF (36.7 øC)] 98.1 øF (36.7 øC)  Heart Rate:  [] 92  Resp:  [18] 18  BP: ()/(57-75) 83/57     Physical Exam:  Constitutional: No acute distress, awake, alert  HENT: NCAT, mucous membranes moist  Respiratory: Clear to auscultation bilaterally, respiratory effort normal   Cardiovascular: RRR, no murmurs, rubs, or gallops  Gastrointestinal: Positive bowel sounds, soft, nontender, nondistended  Musculoskeletal: No bilateral ankle edema  Psychiatric: Appropriate affect, cooperative  Neurologic: Oriented x 3, strength symmetric in all extremities, Cranial Nerves grossly intact to confrontation, speech clear  Skin: No rashes     Results Reviewed:  LAB RESULTS:      Lab 23   WBC 10.00   HEMOGLOBIN 13.2   HEMATOCRIT 40.4   PLATELETS 237   NEUTROS ABS 7.49*   IMMATURE GRANS (ABS) 0.03   LYMPHS ABS 1.54   MONOS ABS 0.62   EOS ABS 0.25   MCV 98.1*         Lab 23  2204 23  0409   SODIUM 133* 136   POTASSIUM 4.1 4.3   CHLORIDE 97* 100   CO2 24.0 22.0   ANION GAP 12.0 14.0   BUN 27* 17   CREATININE 0.83 0.94   EGFR 70.0 60.3   GLUCOSE 106* 83   CALCIUM 8.5* 9.1   MAGNESIUM 2.8*  --              Lab 23  0858   PROBNP 2,917.0*                 Brief Urine Lab Results  (Last result in the past 365 days)        Color   Clarity   Blood   Leuk Est   Nitrite   Protein   CREAT   Urine HCG        23 1645 Yellow   Clear   Negative   Negative   Negative   Negative                   Microbiology  Results Abnormal       Procedure Component Value - Date/Time    Blood Culture - Blood, Arm, Left [[id number]]  (Normal) Collected: 07/15/23 0340    Lab Status: Final result Specimen: Blood from Arm, Left Updated: 07/20/23 0730     Blood Culture No growth at 5 days    Blood Culture - Blood, Arm, Right [[id number]]  (Normal) Collected: 07/15/23 0340    Lab Status: Final result Specimen: Blood from Arm, Right Updated: 07/20/23 0730     Blood Culture No growth at 5 days    S. Pneumo Ag Urine or CSF - Urine, Urine, Clean Catch [[id number]]  (Normal) Collected: 07/15/23 0341    Lab Status: Final result Specimen: Urine, Clean Catch Updated: 07/15/23 1356     Strep Pneumo Ag Negative    Legionella Antigen, Urine - Urine, Urine, Clean Catch [[id number]]  (Normal) Collected: 07/15/23 0341    Lab Status: Final result Specimen: Urine, Clean Catch Updated: 07/15/23 1356     LEGIONELLA ANTIGEN, URINE Negative    MRSA Screen, PCR (Inpatient) - Swab, Nares [[id number]]  (Normal) Collected: 07/15/23 0310    Lab Status: Final result Specimen: Swab from Nares Updated: 07/15/23 0846     MRSA PCR Negative    Narrative:      The negative predictive value of this diagnostic test is high and should only be used to consider de-escalating anti-MRSA therapy. A positive result may indicate colonization with MRSA and must be correlated clinically.  MRSA Negative    COVID PRE-OP / PRE-PROCEDURE SCREENING ORDER (NO ISOLATION) - Swab, Nasopharynx [[id number]]  (Normal) Collected: 07/15/23 0310    Lab Status: Final result Specimen: Swab from Nasopharynx Updated: 07/15/23 0410    Narrative:      The following orders were created for panel order COVID PRE-OP / PRE-PROCEDURE SCREENING ORDER (NO ISOLATION) - Swab, Nasopharynx.  Procedure                               Abnormality         Status                     ---------                               -----------         ------                     Respiratory Panel PCR w/...[[id number]]  Normal               Final result                 Please view results for these tests on the individual orders.    Respiratory Panel PCR w/COVID-19(SARS-CoV-2) JENNA/KELSI/NORMAN/PAD/[facility]/MAD/ARSEN In-House, NP Swab in UTM/VTM, 3-4 HR TAT - Swab, Nasopharynx [[id number]]  (Normal) Collected: 07/15/23 0310    Lab Status: Final result Specimen: Swab from Nasopharynx Updated: 07/15/23 0410     ADENOVIRUS, PCR Not Detected     Coronavirus 229E Not Detected     Coronavirus HKU1 Not Detected     Coronavirus NL63 Not Detected     Coronavirus OC43 Not Detected     COVID19 Not Detected     Human Metapneumovirus Not Detected     Human Rhinovirus/Enterovirus Not Detected     Influenza A PCR Not Detected     Influenza B PCR Not Detected     Parainfluenza Virus 1 Not Detected     Parainfluenza Virus 2 Not Detected     Parainfluenza Virus 3 Not Detected     Parainfluenza Virus 4 Not Detected     RSV, PCR Not Detected     Bordetella pertussis pcr Not Detected     Bordetella parapertussis PCR Not Detected     Chlamydophila pneumoniae PCR Not Detected     Mycoplasma pneumo by PCR Not Detected    Narrative:      In the setting of a positive respiratory panel with a viral infection PLUS a negative procalcitonin without other underlying concern for bacterial infection, consider observing off antibiotics or discontinuation of antibiotics and continue supportive care. If the respiratory panel is positive for atypical bacterial infection (Bordetella pertussis, Chlamydophila pneumoniae, or Mycoplasma pneumoniae), consider antibiotic de-escalation to target atypical bacterial infection.            No radiology results from the last 24 hrs    Results for orders placed during the hospital encounter of 06/16/23    Adult Transthoracic Echo Complete W/ Cont if Necessary Per Protocol    Interpretation Summary    Left ventricular systolic function is normal. Estimated left ventricular EF = 60%    Left ventricular diastolic function was normal.    Mild aortic insufficiency.     Trace to mild mitral and tricuspid regurgitation.    Calculated right ventricular systolic pressure from tricuspid regurgitation is 20 mmHg.    There is a trivial pericardial effusion.      Current medications:  Scheduled Meds:acetaZOLAMIDE, 250 mg, Oral, Daily  apixaban, 2.5 mg, Oral, Q12H  ascorbic acid, 250 mg, Oral, Daily  brimonidine, 1 drop, Both Eyes, Q12H   And  timolol, 1 drop, Both Eyes, Q12H  dilTIAZem CD, 180 mg, Oral, Q24H  galantamine, 8 mg, Oral, BID With Meals  ipratropium-albuterol, 3 mL, Nebulization, Q4H - RT  levothyroxine, 88 mcg, Oral, Daily  melatonin, 5 mg, Oral, Nightly  midodrine, 5 mg, Oral, TID AC  mirtazapine, 15 mg, Oral, Nightly  pantoprazole, 40 mg, Oral, Q AM  PARoxetine, 10 mg, Oral, Nightly  senna-docusate sodium, 2 tablet, Oral, BID  torsemide, 10 mg, Oral, Daily      Continuous Infusions:   PRN Meds:.  acetaminophen **OR** acetaminophen **OR** acetaminophen    senna-docusate sodium **AND** polyethylene glycol **AND** bisacodyl **AND** bisacodyl    Magnesium Standard Dose Replacement - Follow Nurse / BPA Driven Protocol    metoprolol tartrate    ondansetron    Potassium Replacement - Follow Nurse / BPA Driven Protocol    Assessment & Plan   Assessment & Plan     Active Hospital Problems    Diagnosis  POA    **Acute respiratory failure with hypoxia [J96.01]  Yes    Pneumonia of both lungs due to infectious organism [J18.9]  Unknown    Hyponatremia [E87.1]  Unknown    Severe malnutrition [E43]  Yes    Atrial fibrillation with RVR [I48.91]  Yes    Cachexia [R64]  Yes    Senile osteoporosis [M81.0]  Yes    Hashimoto's thyroiditis [E06.3]  Yes    Primary open angle glaucoma of both eyes, severe stage [H40.1133]  Yes    Mixed anxiety and depressive disorder [F41.8]  Yes    Multiple nodules of lung [R91.8]  Yes    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        Brief Hospital Course to date:  Vita Miller is a 83 y.o. female with history of hypothyroidism, COPD with chronic bronchiectasis, history of chronic MAC with unknown intolerance to prior treatment plan, history of orthostatic hypotension, hyperlipidemia, osteoporosis, bilateral glaucoma, mild memory deficit, who was recently admitted to our facility approximately 1 month ago for new onset atrial fibrillation with RVR and returned to the ED with complaints of palpitations and shortness of breath.  She has been treated for PNA/COPD exacerbation as well as her Afib with RVR.      Acute respiratory failure with hypoxia  Bilateral pneumonia/infiltrates  History of MAC, intolerant to treatment  COPD with acute exacerbation, chronic bronchiectasis  -- Pulmonology followed earlier on this hospitalization.  Clinically improved.  AI/vasculitis panels are negative.  Recs to continue vest therapy, discussed with Dr. Hook  --Off steroids  --Completed Zosyn 7 days tx, s/p azith  -- Duo nebs  -- Flutter valve, mucinex  --MRSA pcr negative, strep negative, legionella negative  --CT chest showed new GGO, bronchiectasis, scattered nodules.  Needs f/u CT or PET scan  -- torsemide, diamox. Cardiology signed off/ follow up in 6 weeks   -- Family has filed appeal to [employer] - pending.     Atrial fibrillation with rvr, not in aflutter  --07/21 converted back to Afib RVR following a BM at 8am. Pt was hypotensive. Family/patient declined lopressor indefinitely  --s/p esmolol drip  --eliquis  --flecainide --> bisoprolol ---> changed to digoxin per cards, no flecainide due to pneumonitis concerns, partner discussed with Dr. Padilla. Now Digoxin has been d/c'd  -- has upcoming appt with Dr. Martin at  (suggested making that telehealth, family very  concerned about missing this appt)   --fludrocortisone discontinued 07/21  --cards following.  PO diltiazem- discontinued Digoxin 7/25  -- Midodrine had been increased to 10 mg TID, now back to 5mg TID per Cardiology  --LVEF 60%, normal diastolic on echo from 6/17/2023  --Dilt 180 mg daily for rate control, currently controlled      Altered mental status  --improved  --u/a bland, B12 wnl.   RPR nonreactive     Hyponatremia, resolved  --Na 129 on admission; suspect related to volume overload  --Na 136 on 7/24     Orthostatic hypotension  --fludricortisone discontinued  --midodrine back to 5mg TID- stable pressures      Hypothyroidism  --slightly elevated tsh  --continue synthroid, dose adjusted on 7/16; repeat tsh in 4-6 weeks; careful adjustment given RVR     Other chronic conditions: HLD, malnutrition, anxiety/depression, memory impairment  --continue home meds  --nutrition  --pt/otv    Expected Discharge Location and Transportation:   Expected Discharge   Expected Discharge Date: 7/28/2023; Expected Discharge Time:      DVT prophylaxis:  Medical and mechanical DVT prophylaxis orders are present.     AM-PAC 6 Clicks Score (PT): 18 (07/29/23 0800)    CODE STATUS:   Code Status and Medical Interventions:   Ordered at: 07/15/23 0214     Code Status (Patient has no pulse and is not breathing):    CPR (Attempt to Resuscitate)     Medical Interventions (Patient has pulse or is breathing):    Full Support       Sarah Beth Crisostomo II, DO  07/30/23

## 2023-07-30 NOTE — PLAN OF CARE
Goal Outcome Evaluation:                 No Acute events overnight. Denies any pain or nausea. VSS, Good UOP. Slept and rested well throughout shift.

## 2023-07-31 PROCEDURE — 94799 UNLISTED PULMONARY SVC/PX: CPT

## 2023-07-31 PROCEDURE — 99232 SBSQ HOSP IP/OBS MODERATE 35: CPT | Performed by: INTERNAL MEDICINE

## 2023-07-31 PROCEDURE — 94669 MECHANICAL CHEST WALL OSCILL: CPT

## 2023-07-31 PROCEDURE — 94761 N-INVAS EAR/PLS OXIMETRY MLT: CPT

## 2023-07-31 PROCEDURE — 94664 DEMO&/EVAL PT USE INHALER: CPT

## 2023-07-31 RX ADMIN — ACETAZOLAMIDE 250 MG: 250 TABLET ORAL at 08:23

## 2023-07-31 RX ADMIN — MIDODRINE HYDROCHLORIDE 5 MG: 5 TABLET ORAL at 08:23

## 2023-07-31 RX ADMIN — Medication 250 MG: at 08:23

## 2023-07-31 RX ADMIN — BRIMONIDINE TARTRATE 1 DROP: 2 SOLUTION/ DROPS OPHTHALMIC at 21:01

## 2023-07-31 RX ADMIN — TIMOLOL MALEATE 1 DROP: 5 SOLUTION/ DROPS OPHTHALMIC at 21:01

## 2023-07-31 RX ADMIN — IPRATROPIUM BROMIDE AND ALBUTEROL SULFATE 3 ML: 2.5; .5 SOLUTION RESPIRATORY (INHALATION) at 07:34

## 2023-07-31 RX ADMIN — APIXABAN 2.5 MG: 2.5 TABLET, FILM COATED ORAL at 08:23

## 2023-07-31 RX ADMIN — IPRATROPIUM BROMIDE AND ALBUTEROL SULFATE 3 ML: 2.5; .5 SOLUTION RESPIRATORY (INHALATION) at 21:56

## 2023-07-31 RX ADMIN — TORSEMIDE 10 MG: 20 TABLET ORAL at 08:24

## 2023-07-31 RX ADMIN — GALANTAMINE 8 MG: 4 TABLET, FILM COATED ORAL at 08:23

## 2023-07-31 RX ADMIN — IPRATROPIUM BROMIDE AND ALBUTEROL SULFATE 3 ML: 2.5; .5 SOLUTION RESPIRATORY (INHALATION) at 18:47

## 2023-07-31 RX ADMIN — IPRATROPIUM BROMIDE AND ALBUTEROL SULFATE 3 ML: 2.5; .5 SOLUTION RESPIRATORY (INHALATION) at 10:59

## 2023-07-31 RX ADMIN — MIDODRINE HYDROCHLORIDE 5 MG: 5 TABLET ORAL at 12:06

## 2023-07-31 RX ADMIN — DILTIAZEM HYDROCHLORIDE 180 MG: 180 CAPSULE, EXTENDED RELEASE ORAL at 08:23

## 2023-07-31 RX ADMIN — APIXABAN 2.5 MG: 2.5 TABLET, FILM COATED ORAL at 21:01

## 2023-07-31 RX ADMIN — PAROXETINE HYDROCHLORIDE 10 MG: 10 TABLET, FILM COATED ORAL at 21:01

## 2023-07-31 RX ADMIN — GALANTAMINE 8 MG: 4 TABLET, FILM COATED ORAL at 18:35

## 2023-07-31 RX ADMIN — MIRTAZAPINE 15 MG: 15 TABLET, FILM COATED ORAL at 21:01

## 2023-07-31 RX ADMIN — BRIMONIDINE TARTRATE 1 DROP: 2 SOLUTION/ DROPS OPHTHALMIC at 08:23

## 2023-07-31 RX ADMIN — TIMOLOL MALEATE 1 DROP: 5 SOLUTION/ DROPS OPHTHALMIC at 08:23

## 2023-07-31 RX ADMIN — LEVOTHYROXINE SODIUM 88 MCG: 0.09 TABLET ORAL at 05:20

## 2023-07-31 RX ADMIN — IPRATROPIUM BROMIDE AND ALBUTEROL SULFATE 3 ML: 2.5; .5 SOLUTION RESPIRATORY (INHALATION) at 15:21

## 2023-07-31 RX ADMIN — SENNOSIDES AND DOCUSATE SODIUM 2 TABLET: 50; 8.6 TABLET ORAL at 08:23

## 2023-07-31 RX ADMIN — PANTOPRAZOLE SODIUM 40 MG: 40 TABLET, DELAYED RELEASE ORAL at 05:20

## 2023-07-31 RX ADMIN — Medication 5 MG: at 21:01

## 2023-07-31 NOTE — CASE MANAGEMENT/SOCIAL WORK
Continued Stay Note  [facility]     Patient Name: Vita Miller  MRN: [id number]  Today's Date: 7/31/2023    Admit Date: 7/14/2023    Plan: SNF   Discharge Plan       Row Name 07/31/23 1123       Plan    Plan SNF    Patient/Family in Agreement with Plan yes    Plan Comments Met with Ms. Miller and her son, Arun, at bedside to discuss discharge. Family appeal for SNF was started last week. Ms. Miller and son both reported that they have not received any information on the status of appeal. Son, Arun, agreed to contact  with any updates. No other discharge needs have been identified at this point.  will continue to follow plan of care and assist with discharge planning needs as indicated.    Final Discharge Disposition Code 03 - skilled nursing facility (SNF)                   Discharge Codes    No documentation.                 Expected Discharge Date and Time       Expected Discharge Date Expected Discharge Time    Jul 28, 2023               Loly Moreira RN

## 2023-07-31 NOTE — PROGRESS NOTES
[facility] Medicine Services  PROGRESS NOTE    Patient Name: Vita Miller  : 1940  MRN: [id number]    Date of Admission: 2023  Primary Care Physician: Alfonso Steele MD    Subjective   Subjective     CC: f/u PNA    HPI: Up in bed without family in room. Had a good night. No issues this am.    ROS:  Gen- No fevers, chills  CV- No chest pain, palpitations  Resp- No cough, dyspnea  GI- No N/V/D, abd pain     Objective   Objective     Vital Signs:   Temp:  [97.3 øF (36.3 øC)-98 øF (36.7 øC)] 98 øF (36.7 øC)  Heart Rate:  [] 66  Resp:  [16-18] 16  BP: ()/(54-83) 101/58     Physical Exam:  Constitutional: No acute distress, awake, alert  HENT: NCAT, mucous membranes moist  Respiratory: Clear to auscultation bilaterally, respiratory effort normal   Cardiovascular: RRR, no murmurs, rubs, or gallops  Gastrointestinal: Positive bowel sounds, soft, nontender, nondistended  Musculoskeletal: No bilateral ankle edema  Psychiatric: Appropriate affect, cooperative  Neurologic: Oriented x 3, strength symmetric in all extremities, Cranial Nerves grossly intact to confrontation, speech clear  Skin: No rashes     Results Reviewed:  LAB RESULTS:      Lab 233   WBC 10.00   HEMOGLOBIN 13.2   HEMATOCRIT 40.4   PLATELETS 237   NEUTROS ABS 7.49*   IMMATURE GRANS (ABS) 0.03   LYMPHS ABS 1.54   MONOS ABS 0.62   EOS ABS 0.25   MCV 98.1*         Lab 23  2204   SODIUM 133*   POTASSIUM 4.1   CHLORIDE 97*   CO2 24.0   ANION GAP 12.0   BUN 27*   CREATININE 0.83   EGFR 70.0   GLUCOSE 106*   CALCIUM 8.5*   MAGNESIUM 2.8*             Lab 23  0858   PROBNP 2,917.0*                 Brief Urine Lab Results  (Last result in the past 365 days)        Color   Clarity   Blood   Leuk Est   Nitrite   Protein   CREAT   Urine HCG        23 1645 Yellow   Clear   Negative   Negative   Negative   Negative                   Microbiology Results Abnormal       Procedure Component  Value - Date/Time    Blood Culture - Blood, Arm, Left [[id number]]  (Normal) Collected: 07/15/23 0340    Lab Status: Final result Specimen: Blood from Arm, Left Updated: 07/20/23 0730     Blood Culture No growth at 5 days    Blood Culture - Blood, Arm, Right [[id number]]  (Normal) Collected: 07/15/23 0340    Lab Status: Final result Specimen: Blood from Arm, Right Updated: 07/20/23 0730     Blood Culture No growth at 5 days    S. Pneumo Ag Urine or CSF - Urine, Urine, Clean Catch [[id number]]  (Normal) Collected: 07/15/23 0341    Lab Status: Final result Specimen: Urine, Clean Catch Updated: 07/15/23 1356     Strep Pneumo Ag Negative    Legionella Antigen, Urine - Urine, Urine, Clean Catch [[id number]]  (Normal) Collected: 07/15/23 0341    Lab Status: Final result Specimen: Urine, Clean Catch Updated: 07/15/23 1356     LEGIONELLA ANTIGEN, URINE Negative    MRSA Screen, PCR (Inpatient) - Swab, Nares [[id number]]  (Normal) Collected: 07/15/23 0310    Lab Status: Final result Specimen: Swab from Nares Updated: 07/15/23 0846     MRSA PCR Negative    Narrative:      The negative predictive value of this diagnostic test is high and should only be used to consider de-escalating anti-MRSA therapy. A positive result may indicate colonization with MRSA and must be correlated clinically.  MRSA Negative    COVID PRE-OP / PRE-PROCEDURE SCREENING ORDER (NO ISOLATION) - Swab, Nasopharynx [[id number]]  (Normal) Collected: 07/15/23 0310    Lab Status: Final result Specimen: Swab from Nasopharynx Updated: 07/15/23 0410    Narrative:      The following orders were created for panel order COVID PRE-OP / PRE-PROCEDURE SCREENING ORDER (NO ISOLATION) - Swab, Nasopharynx.  Procedure                               Abnormality         Status                     ---------                               -----------         ------                     Respiratory Panel PCR w/...[[id number]]  Normal              Final result                 Please view  results for these tests on the individual orders.    Respiratory Panel PCR w/COVID-19(SARS-CoV-2) JENNA/KELSI/NORMAN/PAD/COR/MAD/ARSEN In-House, NP Swab in UTM/VTM, 3-4 HR TAT - Swab, Nasopharynx [[id number]]  (Normal) Collected: 07/15/23 0310    Lab Status: Final result Specimen: Swab from Nasopharynx Updated: 07/15/23 0410     ADENOVIRUS, PCR Not Detected     Coronavirus 229E Not Detected     Coronavirus HKU1 Not Detected     Coronavirus NL63 Not Detected     Coronavirus OC43 Not Detected     COVID19 Not Detected     Human Metapneumovirus Not Detected     Human Rhinovirus/Enterovirus Not Detected     Influenza A PCR Not Detected     Influenza B PCR Not Detected     Parainfluenza Virus 1 Not Detected     Parainfluenza Virus 2 Not Detected     Parainfluenza Virus 3 Not Detected     Parainfluenza Virus 4 Not Detected     RSV, PCR Not Detected     Bordetella pertussis pcr Not Detected     Bordetella parapertussis PCR Not Detected     Chlamydophila pneumoniae PCR Not Detected     Mycoplasma pneumo by PCR Not Detected    Narrative:      In the setting of a positive respiratory panel with a viral infection PLUS a negative procalcitonin without other underlying concern for bacterial infection, consider observing off antibiotics or discontinuation of antibiotics and continue supportive care. If the respiratory panel is positive for atypical bacterial infection (Bordetella pertussis, Chlamydophila pneumoniae, or Mycoplasma pneumoniae), consider antibiotic de-escalation to target atypical bacterial infection.            No radiology results from the last 24 hrs    Results for orders placed during the hospital encounter of 06/16/23    Adult Transthoracic Echo Complete W/ Cont if Necessary Per Protocol    Interpretation Summary    Left ventricular systolic function is normal. Estimated left ventricular EF = 60%    Left ventricular diastolic function was normal.    Mild aortic insufficiency.    Trace to mild mitral and tricuspid  regurgitation.    Calculated right ventricular systolic pressure from tricuspid regurgitation is 20 mmHg.    There is a trivial pericardial effusion.      Current medications:  Scheduled Meds:acetaZOLAMIDE, 250 mg, Oral, Daily  apixaban, 2.5 mg, Oral, Q12H  ascorbic acid, 250 mg, Oral, Daily  brimonidine, 1 drop, Both Eyes, Q12H   And  timolol, 1 drop, Both Eyes, Q12H  dilTIAZem CD, 180 mg, Oral, Q24H  galantamine, 8 mg, Oral, BID With Meals  ipratropium-albuterol, 3 mL, Nebulization, Q4H - RT  levothyroxine, 88 mcg, Oral, Daily  melatonin, 5 mg, Oral, Nightly  midodrine, 5 mg, Oral, TID AC  mirtazapine, 15 mg, Oral, Nightly  pantoprazole, 40 mg, Oral, Q AM  PARoxetine, 10 mg, Oral, Nightly  senna-docusate sodium, 2 tablet, Oral, BID  torsemide, 10 mg, Oral, Daily      Continuous Infusions:   PRN Meds:.  acetaminophen **OR** acetaminophen **OR** acetaminophen    senna-docusate sodium **AND** polyethylene glycol **AND** bisacodyl **AND** bisacodyl    Magnesium Standard Dose Replacement - Follow Nurse / BPA Driven Protocol    metoprolol tartrate    ondansetron    Potassium Replacement - Follow Nurse / BPA Driven Protocol    Assessment & Plan   Assessment & Plan     Active Hospital Problems    Diagnosis  POA    **Acute respiratory failure with hypoxia [J96.01]  Yes    Pneumonia of both lungs due to infectious organism [J18.9]  Unknown    Hyponatremia [E87.1]  Unknown    Severe malnutrition [E43]  Yes    Atrial fibrillation with RVR [I48.91]  Yes    Cachexia [R64]  Yes    Senile osteoporosis [M81.0]  Yes    Hashimoto's thyroiditis [E06.3]  Yes    Primary open angle glaucoma of both eyes, severe stage [H40.1133]  Yes    Mixed anxiety and depressive disorder [F41.8]  Yes    Multiple nodules of lung [R91.8]  Yes    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        Brief Hospital Course to date:  Vita Miller is a 83 y.o. female with history of hypothyroidism, COPD with chronic bronchiectasis, history of chronic MAC with unknown intolerance to prior treatment plan, history of orthostatic hypotension, hyperlipidemia, osteoporosis, bilateral glaucoma, mild memory deficit, who was recently admitted to our facility approximately 1 month ago for new onset atrial fibrillation with RVR and returned to the ED with complaints of palpitations and shortness of breath.  She has been treated for PNA/COPD exacerbation as well as her Afib with RVR.      Acute respiratory failure with hypoxia  Bilateral pneumonia/infiltrates  History of MAC, intolerant to treatment  COPD with acute exacerbation, chronic bronchiectasis  -- Pulmonology followed earlier on this hospitalization.  Clinically improved.  AI/vasculitis panels are negative.  Recs to continue vest therapy, discussed with Dr. Hook  --Off steroids  --Completed Zosyn 7 days tx, s/p azith  -- Duo nebs  -- Flutter valve, mucinex  --MRSA pcr negative, strep negative, legionella negative  --CT chest showed new GGO, bronchiectasis, scattered nodules.  Needs f/u CT or PET scan  -- torsemide, diamox. Cardiology signed off/ follow up in 6 weeks   -- Family has filed appeal to [facility] - pending.     Atrial fibrillation with rvr, not in aflutter  --07/21 converted back to Afib RVR following a BM at 8am. Pt was hypotensive. Family/patient declined lopressor indefinitely  --s/p esmolol drip  --eliquis  --flecainide --> bisoprolol ---> changed to digoxin per cards, no flecainide due to pneumonitis concerns, partner discussed with Dr. Padilla. Now Digoxin has been d/c'd  -- has upcoming appt with Dr. Martin at [facility] (suggested making that telehealth, family very concerned about missing this appt)    --fludrocortisone discontinued 07/21  --cards following.  PO diltiazem- discontinued Digoxin 7/25  -- Midodrine had been increased to 10 mg TID, now back to 5mg TID per Cardiology  --LVEF 60%, normal diastolic on echo from 6/17/2023  --Dilt 180 mg daily for rate control, currently controlled      Altered mental status  --improved  --u/a bland, B12 wnl.   RPR nonreactive     Hyponatremia, resolved  --Na 129 on admission; suspect related to volume overload  --Na 136 on 7/24     Orthostatic hypotension  --fludricortisone discontinued  --midodrine back to 5mg TID- stable pressures      Hypothyroidism  --slightly elevated tsh  --continue synthroid, dose adjusted on 7/16; repeat tsh in 4-6 weeks; careful adjustment given RVR     Other chronic conditions: HLD, malnutrition, anxiety/depression, memory impairment  --continue home meds  --nutrition  --pt/otv    Expected Discharge Location and Transportation:   Expected Discharge   Expected Discharge Date: 7/28/2023; Expected Discharge Time:      DVT prophylaxis:  Medical and mechanical DVT prophylaxis orders are present.     AM-PAC 6 Clicks Score (PT): 20 (07/30/23 2035)    CODE STATUS:   Code Status and Medical Interventions:   Ordered at: 07/15/23 0214     Code Status (Patient has no pulse and is not breathing):    CPR (Attempt to Resuscitate)     Medical Interventions (Patient has pulse or is breathing):    Full Support       Sarah Beth Crisostomo II, DO  07/31/23

## 2023-07-31 NOTE — CONSULTS
Referring Provider: MD Andressa  Reason for Consultation: ARF    Subjective .   Education: NN spoke with pt at BS.  Pt alert and able to answer most questions appropriately.  Pt O2 sat  97% on  RA currently, no home O2 use.  Pt reports the ability to ambulate  at baseline before experiencing SOB.  She is not prescribed a rescue medication.  Patient is up to date on COVID and flu vaccines, but not PNA per medical record available.  She reports that she smoked for about a year in her 20s.  Pt reports no issues at this time with medications or transportation for appointments.  Pt reports no previous hx of formal COPD diagnosis or teaching, no understanding of action plan, or NC.  Stop light report, instructions for accessing [employer] and list of educational videos given to pt.  1800QUITNOW reference sheet discussed and given to patient at BS.    COPD education began in the form of explanation, handouts, and videos.  No new concerns or questions voiced at this time.  NN will continue to follow as needed.     Age: 83 y.o.  Sex: female  Smoker Status: former, pack years unknown  Pulmonologist: DMITRIY Burgos MD  FEV1 (PFT): NA  Home O2: RA    Objective     SpO2 SpO2: 95 % (07/31/23 1130)  Device Device (Oxygen Therapy): room air (07/31/23 1100)  Flow Flow (L/min): 1 (07/26/23 0800)  Incentive Spirometer    IS Predicted Level (mL) Incentive Spirometer Predicted Level (mL): 1500 (07/29/23 0800)   Number of Repetitions Number of Repetitions (IS): 5 (07/29/23 0800)   Level Incentive Spirometer (mL) Level Incentive Spirometer (mL): 1000 (07/29/23 0800)  Patient Tolerance Patient Tolerance (IS): fair (07/29/23 0800)   Inhaler Treatment Status    Treatment Route        Home Medications:  Medications Prior to Admission   Medication Sig Dispense Refill Last Dose    apixaban (ELIQUIS) 2.5 MG tablet tablet Take 1 tablet by mouth Every 12 (Twelve) Hours. Indications: Atrial Fibrillation 60 tablet 0 7/14/2023    brimonidine-timolol  (COMBIGAN) 0.2-0.5 % ophthalmic solution Every 12 (Twelve) Hours.   7/14/2023    Cholecalciferol (Vitamin D3) 1.25 MG (17766 UT) capsule Vitamin D3   7/14/2023    fludrocortisone 0.1 MG tablet Take 1 tablet by mouth Daily.   7/14/2023    galantamine (RAZADYNE) 4 MG tablet Take 2 tablets by mouth 2 (Two) Times a Day.   7/14/2023    levothyroxine (SYNTHROID, LEVOTHROID) 75 MCG tablet Take 1 tablet by mouth Daily.   7/14/2023    mirtazapine (REMERON) 15 MG tablet Take 1 tablet by mouth Every Night.   7/14/2023    PARoxetine (PAXIL) 10 MG tablet Take 1 tablet by mouth Every Night.   7/14/2023    Ascorbic Acid (Vitamin C) 100 MG chewable tablet Vitamin C   More than a month    flecainide (TAMBOCOR) 50 MG tablet Take 1 tablet by mouth Every 12 (Twelve) Hours for 30 days. 60 tablet 0 More than a month       Discussion: Per current GOLD Standards, please consider: No Rescue in place, No LAMA/LABA/ICS in place, Outpatient PFT, Rehab as appropriate, Palliative Care consult          Neva Mcfadden RN

## 2023-08-01 VITALS
WEIGHT: 102 LBS | SYSTOLIC BLOOD PRESSURE: 142 MMHG | OXYGEN SATURATION: 97 % | BODY MASS INDEX: 15.46 KG/M2 | TEMPERATURE: 97.8 F | RESPIRATION RATE: 18 BRPM | DIASTOLIC BLOOD PRESSURE: 80 MMHG | HEIGHT: 68 IN | HEART RATE: 58 BPM

## 2023-08-01 PROBLEM — J18.9 PNEUMONIA OF BOTH LUNGS DUE TO INFECTIOUS ORGANISM: Status: RESOLVED | Noted: 2023-07-15 | Resolved: 2023-08-01

## 2023-08-01 PROBLEM — E87.1 HYPONATREMIA: Status: RESOLVED | Noted: 2023-07-15 | Resolved: 2023-08-01

## 2023-08-01 PROBLEM — J96.01 ACUTE RESPIRATORY FAILURE WITH HYPOXIA: Status: RESOLVED | Noted: 2023-06-17 | Resolved: 2023-08-01

## 2023-08-01 PROBLEM — I48.91 ATRIAL FIBRILLATION WITH RVR: Status: RESOLVED | Noted: 2023-06-17 | Resolved: 2023-08-01

## 2023-08-01 PROCEDURE — 94664 DEMO&/EVAL PT USE INHALER: CPT

## 2023-08-01 PROCEDURE — 94799 UNLISTED PULMONARY SVC/PX: CPT

## 2023-08-01 PROCEDURE — 99239 HOSP IP/OBS DSCHRG MGMT >30: CPT | Performed by: INTERNAL MEDICINE

## 2023-08-01 PROCEDURE — 94669 MECHANICAL CHEST WALL OSCILL: CPT

## 2023-08-01 RX ORDER — TORSEMIDE 10 MG/1
10 TABLET ORAL DAILY
Start: 2023-08-02

## 2023-08-01 RX ORDER — CHOLECALCIFEROL (VITAMIN D3) 125 MCG
5 CAPSULE ORAL NIGHTLY
Start: 2023-08-01

## 2023-08-01 RX ORDER — MIDODRINE HYDROCHLORIDE 5 MG/1
5 TABLET ORAL
Start: 2023-08-01

## 2023-08-01 RX ORDER — PANTOPRAZOLE SODIUM 40 MG/1
40 TABLET, DELAYED RELEASE ORAL
Start: 2023-08-02

## 2023-08-01 RX ORDER — DILTIAZEM HYDROCHLORIDE 180 MG/1
180 CAPSULE, COATED, EXTENDED RELEASE ORAL
Start: 2023-08-02

## 2023-08-01 RX ORDER — LEVOTHYROXINE SODIUM 88 UG/1
88 TABLET ORAL DAILY
Start: 2023-08-02

## 2023-08-01 RX ADMIN — DILTIAZEM HYDROCHLORIDE 180 MG: 180 CAPSULE, EXTENDED RELEASE ORAL at 08:02

## 2023-08-01 RX ADMIN — MIDODRINE HYDROCHLORIDE 5 MG: 5 TABLET ORAL at 08:03

## 2023-08-01 RX ADMIN — IPRATROPIUM BROMIDE AND ALBUTEROL SULFATE 3 ML: 2.5; .5 SOLUTION RESPIRATORY (INHALATION) at 10:46

## 2023-08-01 RX ADMIN — LEVOTHYROXINE SODIUM 88 MCG: 0.09 TABLET ORAL at 05:03

## 2023-08-01 RX ADMIN — TORSEMIDE 10 MG: 20 TABLET ORAL at 08:02

## 2023-08-01 RX ADMIN — GALANTAMINE 8 MG: 4 TABLET, FILM COATED ORAL at 08:02

## 2023-08-01 RX ADMIN — PANTOPRAZOLE SODIUM 40 MG: 40 TABLET, DELAYED RELEASE ORAL at 05:03

## 2023-08-01 RX ADMIN — TIMOLOL MALEATE 1 DROP: 5 SOLUTION/ DROPS OPHTHALMIC at 08:02

## 2023-08-01 RX ADMIN — ACETAZOLAMIDE 250 MG: 250 TABLET ORAL at 08:03

## 2023-08-01 RX ADMIN — IPRATROPIUM BROMIDE AND ALBUTEROL SULFATE 3 ML: 2.5; .5 SOLUTION RESPIRATORY (INHALATION) at 06:56

## 2023-08-01 RX ADMIN — APIXABAN 2.5 MG: 2.5 TABLET, FILM COATED ORAL at 08:02

## 2023-08-01 RX ADMIN — SENNOSIDES AND DOCUSATE SODIUM 2 TABLET: 50; 8.6 TABLET ORAL at 08:02

## 2023-08-01 RX ADMIN — BRIMONIDINE TARTRATE 1 DROP: 2 SOLUTION/ DROPS OPHTHALMIC at 08:01

## 2023-08-01 RX ADMIN — Medication 250 MG: at 08:02

## 2023-08-01 NOTE — PLAN OF CARE
Goal Outcome Evaluation:           Progress: no change            Patient resting well throughout night. Pills whole. VSS on room air. Awaiting SNF placement appeal.

## 2023-08-01 NOTE — CASE MANAGEMENT/SOCIAL WORK
Case Management Discharge Note      Final Note: Spoke with Ms. Miller and her son Arun regarding discharge plan.  .Ms. Miller is being discharged to [facility] today with her son transporting at 1330.  spoke with Stephanie at [facility], and she is in agreement with this plan. Discharge summary was faxed to [phone]. Pharmacy information has been updated in Epic. Nursing, please call report to [facility] at [phone].         Selected Continued Care - Admitted Since 7/14/2023       Destination    No services have been selected for the patient.                Durable Medical Equipment Coordination complete.      Service Provider Selected Services Address Phone Fax Patient Preferred    [facility] [address], [facility] [address] [phone] --              Dialysis/Infusion    No services have been selected for the patient.                Home Medical Care       Service Provider Selected Services Address Phone Fax Patient Preferred    [facility] ,  Home Rehabilitation ,  Home Health Services [address] [place] [address] [address], [facility] [address] [phone] -- --              Therapy    No services have been selected for the patient.                Community Resources    No services have been selected for the patient.                Community & DME    No services have been selected for the patient.                         Final Discharge Disposition Code: 03 - skilled nursing facility (SNF)

## 2023-08-01 NOTE — DISCHARGE SUMMARY
[facility] Medicine Services  DISCHARGE SUMMARY    Patient Name: Vita Miller  : 1940  MRN: [id number]    Date of Admission: 2023 10:42 PM  Date of Discharge:    Primary Care Physician: Alfonso Steele MD    Consults       Date and Time Order Name Status Description    7/15/2023  2:05 AM Inpatient Pulmonology Consult Completed     7/15/2023  1:54 AM Inpatient Cardiology Consult Completed     2023 12:17 AM Inpatient Cardiology Consult Completed             Hospital Course     Presenting Problem: PNA    Active Hospital Problems    Diagnosis  POA    Severe malnutrition [E43]  Yes    Cachexia [R64]  Yes    Senile osteoporosis [M81.0]  Yes    Hashimoto's thyroiditis [E06.3]  Yes    Primary open angle glaucoma of both eyes, severe stage [H40.1133]  Yes    Mixed anxiety and depressive disorder [F41.8]  Yes    Multiple nodules of lung [R91.8]  Yes    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 female with history of hypothyroidism, COPD with chronic bronchiectasis, history of chronic MAC with unknown intolerance to prior treatment plan, history of orthostatic hypotension, hyperlipidemia, osteoporosis, bilateral glaucoma, mild memory deficit, who was recently admitted to our facility approximately 1 month ago for new onset atrial fibrillation with RVR and returned to the ED with  complaints of palpitations and shortness of breath.  She has been treated for PNA/COPD exacerbation as well as her Afib with RVR.      Acute respiratory failure with hypoxia  Bilateral pneumonia/infiltrates  History of MAC, intolerant to treatment  COPD with acute exacerbation, chronic bronchiectasis  -- Upon arrival pulmonology was consulted and followed throughout hospitalization. AI/vasculitis panels were performed and negative. She completed a course of steroids and antibiotics in hospital w/ improvement. Pulmonary recs to continue vest therapy as tolerated.  -- CT chest showed new GGO, bronchiectasis, scattered nodules.  Needs f/u CT or PET scan. Can be coordinated as outpatient either with pulmonary clinic or w/ Dr. Steele.     Atrial fibrillation with rvr, not in aflutter  --Occurring in setting of above. She had issues with recurrent a fib throughout stay. She required IV esmolol briefly. She had been treated on flecainide however this was d/c'd due to pneumonitis concerns. She will continue oral diltiazem along with midodrine at d/c. Continue eliquis.  --She has upcoming appt with Dr. Martin at  (suggested making that telehealth, family very concerned about missing this appt)   --F/U [facility] Cards in 4-6 weeks     Hypothyroidism  --She had slightly elevated tsh on arrival. Synthroid dose adjusted on 7/16; repeat tsh in 4-6 weeks    Discharge Follow Up Recommendations for outpatient labs/diagnostics:   PCP in 1-2 weeks   Dr. Martin at  as directed   [facility] 4-6 weeks   [facility] in 4-6 weeks    Day of Discharge     HPI: Up in bed with son in room. Overall feels fair. He has a lot of questions about her atrial fibrillation which I attempted to answer to best of my ability.    Review of Systems  Gen- No fevers, chills  CV- No chest pain, palpitations  Resp- No cough, dyspnea  GI- No N/V/D, abd pain    Vital Signs:   Temp:  [97.5 øF (36.4 øC)-97.8 øF (36.6 øC)] 97.8 øF (36.6 øC)  Heart Rate:   [58-93] 93  Resp:  [16-18] 18  BP: (101-146)/(58-80) 142/80      Physical Exam:  Constitutional: No acute distress, awake, alert  HENT: NCAT, mucous membranes moist  Respiratory: Clear to auscultation bilaterally, respiratory effort normal   Cardiovascular: RRR, no murmurs, rubs, or gallops  Gastrointestinal: Positive bowel sounds, soft, nontender, nondistended  Musculoskeletal: No bilateral ankle edema  Psychiatric: Appropriate affect, cooperative  Neurologic: Oriented x 3, strength symmetric in all extremities, Cranial Nerves grossly intact to confrontation, speech clear  Skin: No rashes     Pertinent  and/or Most Recent Results     LAB RESULTS:      Lab 07/29/23  2203   WBC 10.00   HEMOGLOBIN 13.2   HEMATOCRIT 40.4   PLATELETS 237   NEUTROS ABS 7.49*   IMMATURE GRANS (ABS) 0.03   LYMPHS ABS 1.54   MONOS ABS 0.62   EOS ABS 0.25   MCV 98.1*         Lab 07/29/23  2204   SODIUM 133*   POTASSIUM 4.1   CHLORIDE 97*   CO2 24.0   ANION GAP 12.0   BUN 27*   CREATININE 0.83   EGFR 70.0   GLUCOSE 106*   CALCIUM 8.5*   MAGNESIUM 2.8*             Lab 07/26/23  0858   PROBNP 2,917.0*                 Brief Urine Lab Results  (Last result in the past 365 days)        Color   Clarity   Blood   Leuk Est   Nitrite   Protein   CREAT   Urine HCG        07/17/23 1645 Yellow   Clear   Negative   Negative   Negative   Negative                 Microbiology Results (last 10 days)       ** No results found for the last 240 hours. **            No radiology results for the last 10 days            Results for orders placed during the hospital encounter of 06/16/23    Adult Transthoracic Echo Complete W/ Cont if Necessary Per Protocol    Interpretation Summary    Left ventricular systolic function is normal. Estimated left ventricular EF = 60%    Left ventricular diastolic function was normal.    Mild aortic insufficiency.    Trace to mild mitral and tricuspid regurgitation.    Calculated right ventricular systolic pressure from tricuspid  regurgitation is 20 mmHg.    There is a trivial pericardial effusion.      Plan for Follow-up of Pending Labs/Results:     Discharge Details        Discharge Medications        New Medications        Instructions Start Date   dilTIAZem  MG 24 hr capsule  Commonly known as: CARDIZEM CD   180 mg, Oral, Every 24 Hours Scheduled   Start Date: August 2, 2023     melatonin 5 MG tablet tablet   5 mg, Oral, Nightly      midodrine 5 MG tablet  Commonly known as: PROAMATINE   5 mg, Oral, 3 Times Daily Before Meals      pantoprazole 40 MG EC tablet  Commonly known as: PROTONIX   40 mg, Oral, Every Early Morning   Start Date: August 2, 2023     torsemide 10 MG tablet  Commonly known as: DEMADEX   10 mg, Oral, Daily   Start Date: August 2, 2023            Changes to Medications        Instructions Start Date   levothyroxine 88 MCG tablet  Commonly known as: SYNTHROID, LEVOTHROID  What changed:   medication strength  how much to take   88 mcg, Oral, Daily   Start Date: August 2, 2023            Continue These Medications        Instructions Start Date   apixaban 2.5 MG tablet tablet  Commonly known as: ELIQUIS   2.5 mg, Oral, Every 12 Hours Scheduled      brimonidine-timolol 0.2-0.5 % ophthalmic solution  Commonly known as: COMBIGAN   Every 12 Hours      galantamine 4 MG tablet  Commonly known as: RAZADYNE   8 mg, Oral, 2 Times Daily      mirtazapine 15 MG tablet  Commonly known as: REMERON   15 mg, Oral, Nightly      PARoxetine 10 MG tablet  Commonly known as: PAXIL   10 mg, Oral, Nightly      Vitamin C 100 MG chewable tablet   Vitamin C      Vitamin D3 1.25 MG (23703 UT) capsule   Vitamin D3             Stop These Medications      flecainide 50 MG tablet  Commonly known as: TAMBOCOR     fludrocortisone 0.1 MG tablet              Allergies   Allergen Reactions    Sulfa Antibiotics Other (See Comments)     Pt unsure of type of reaction          Discharge Disposition:  Skilled Nursing Facility (DC -  External)    Diet:  Hospital:  Diet Order   Procedures    Diet: Cardiac Diets, Gastrointestinal Diets; Healthy Heart (2-3 Na+); Gluten-Sensitive, Lactose-Controlled; Texture: Regular Texture (IDDSI 7); Fluid Consistency: Thin (IDDSI 0)       Activity:      Restrictions or Other Recommendations:  -Take all medications as directed  -Follow a low salt diet, goal = 2g or less daily  -Weigh yourself every morning before breakfast, write it down and compare to yesterday's weight. If you have weight gain greater than 3lbs in 1 day or 5 lbs or more in 1 week , call your PCP or Heart Failure Clinic.  -If you experience any of the following symptoms please contact your PCP or Heart Failure Clinic:    Increased shortness of breath   Increased swelling of feet or stomach   Dry hacking cough   Shortness of breath when laying down flat    - [facility]:  [phone]       CODE STATUS:    Code Status and Medical Interventions:   Ordered at: 07/15/23 0214     Code Status (Patient has no pulse and is not breathing):    CPR (Attempt to Resuscitate)     Medical Interventions (Patient has pulse or is breathing):    Full Support       Future Appointments   Date Time Provider Department Center   8/14/2023  1:30 PM  [facility]  [facility]       Additional Instructions for the Follow-ups that You Need to Schedule       Ambulatory Referral to Home Health   As directed      Face to Face Visit Date: 7/19/2023   Follow-up provider for Plan of Care?: I treated the patient in an acute care facility and will not continue treatment after discharge.   Follow-up provider: JENI SCALES [[id number]]   Reason/Clinical Findings: Impaired functional mobility, balance, gait, endurance, debility   Describe mobility limitations that make leaving home difficult: Impaired functional mobility, balance, gait, endurance, debility   Nursing/Therapeutic Services Requested: Skilled Nursing Physical Therapy Occupational Therapy    Skilled nursing orders: Medication education Cardiopulmonary assessments   PT orders: Therapeutic exercise Gait Training Transfer training Strengthening Home safety assessment   Weight Bearing Status: As Tolerated   Occupational orders: Activities of daily living Strengthening Home safety assessment   Frequency: 1 Week 1        Discharge Follow-up with PCP   As directed       Currently Documented PCP:    Alfonso Steele MD    PCP Phone Number:    [phone]     Follow Up Details: 1-2 week hospital follow up        Discharge Follow-up with Specified Provider: [facility]; 1 Month   As directed      To: [facility]   Follow Up: 1 Month                      Sarah Beth Crisostomo II, DO  08/01/23      Time Spent on Discharge:  I spent  36  minutes on this discharge activity which included: face-to-face encounter with the patient, reviewing the data in the system, coordination of the care with the nursing staff as well as consultants, documentation, and entering orders.

## 2023-08-01 NOTE — DISCHARGE PLACEMENT REQUEST
"[facility] [place] [facility] [employer]   [phone]  Loly Moreira,    [phone]    Shane Miller \"Genie\" (83 y.o. Female)       Date of Birth   1940    Social Security Number       Address   [address] [place] [address]    Home Phone   [phone]    MRN   [id number]       Restorationist   Non-Temple    Marital Status                               Admission Date   23    Admission Type   Emergency    Admitting Provider   Sarah Beth Crisostomo II, DO    Attending Provider   Sarah Beth Crisostomo II, DO    Department, Room/Bed   [facility] 5G, S564/1       Discharge Date       Discharge Disposition   Skilled Nursing Facility (DC - External)    Discharge Destination                                 Attending Provider: Sarah Beth Crisostomo II, DO    Allergies: Sulfa Antibiotics    Isolation: None   Infection: None   Code Status: CPR    Ht: 172.7 cm (68\")   Wt: 46.3 kg (102 lb)    Admission Cmt: None   Principal Problem: None                  Active Insurance as of 2023       Primary Coverage       Payor Plan Insurance Group Employer/Plan Group    [facility] MEDICARE REPLACEMENT [facility] MEDICARE REPLACEMENT [id number]       Payor Plan Address Payor Plan Phone Number Payor Plan Fax Number Effective Dates    [address] [address]   2020 - None Entered    [facility] [id number]         Subscriber Name Subscriber Birth Date Member ID       SHANE MILLER 1940 [id number]                     Emergency Contacts        (Rel.) Home Phone Work Phone Mobile Phone    EVANGELIST CHOUDHARY (Daughter) [phone] -- [phone]    Arun Miller (Son) -- -- [phone]                 Discharge Summary        Sarah Beth Crisostomo II, DO at 23 0805              [facility] Medicine Services  DISCHARGE SUMMARY    Patient Name: Shane Miller  : 1940  MRN: [id number]    Date of Admission: 2023 10:42 PM  Date of " Discharge:  78/1/2023  Primary Care Physician: Alfonso Steele MD    Consults       Date and Time Order Name Status Description    7/15/2023  2:05 AM Inpatient Pulmonology Consult Completed     7/15/2023  1:54 AM Inpatient Cardiology Consult Completed     6/17/2023 12:17 AM Inpatient Cardiology Consult Completed             Hospital Course     Presenting Problem: PNA    Active Hospital Problems    Diagnosis  POA    Severe malnutrition [E43]  Yes    Cachexia [R64]  Yes    Senile osteoporosis [M81.0]  Yes    Hashimoto's thyroiditis [E06.3]  Yes    Primary open angle glaucoma of both eyes, severe stage [H40.1133]  Yes    Mixed anxiety and depressive disorder [F41.8]  Yes    Multiple nodules of lung [R91.8]  Yes    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 female with history of hypothyroidism, COPD with chronic bronchiectasis, history of chronic MAC with unknown intolerance to prior treatment plan, history of orthostatic hypotension, hyperlipidemia, osteoporosis, bilateral glaucoma, mild memory deficit, who was recently admitted to our facility approximately 1 month ago for new onset atrial fibrillation with RVR and returned to the ED with complaints of palpitations and shortness of breath.  She has been treated for PNA/COPD exacerbation as well as her Afib with RVR.      Acute respiratory failure with hypoxia  Bilateral  pneumonia/infiltrates  History of MAC, intolerant to treatment  COPD with acute exacerbation, chronic bronchiectasis  -- Upon arrival pulmonology was consulted and followed throughout hospitalization. AI/vasculitis panels were performed and negative. She completed a course of steroids and antibiotics in hospital w/ improvement. Pulmonary recs to continue vest therapy as tolerated.  -- CT chest showed new GGO, bronchiectasis, scattered nodules.  Needs f/u CT or PET scan. Can be coordinated as outpatient either with pulmonary clinic or w/ Dr. Steele.     Atrial fibrillation with rvr, not in aflutter  --Occurring in setting of above. She had issues with recurrent a fib throughout stay. She required IV esmolol briefly. She had been treated on flecainide however this was d/c'd due to pneumonitis concerns. She will continue oral diltiazem along with midodrine at d/c. Continue eliquis.  --She has upcoming appt with Dr. Martin at  (suggested making that telehealth, family very concerned about missing this appt)   --F/U [facility] Cards in 4-6 weeks     Hypothyroidism  --She had slightly elevated tsh on arrival. Synthroid dose adjusted on 7/16; repeat tsh in 4-6 weeks    Discharge Follow Up Recommendations for outpatient labs/diagnostics:   PCP in 1-2 weeks   Dr. Martin at  as directed   [facility] [place] [facility] 4-6 weeks   [facility] [place] [facility] in 4-6 weeks    Day of Discharge     HPI: Up in bed with son in room. Overall feels fair. He has a lot of questions about her atrial fibrillation which I attempted to answer to best of my ability.    Review of Systems  Gen- No fevers, chills  CV- No chest pain, palpitations  Resp- No cough, dyspnea  GI- No N/V/D, abd pain    Vital Signs:   Temp:  [97.5 øF (36.4 øC)-97.8 øF (36.6 øC)] 97.8 øF (36.6 øC)  Heart Rate:  [58-93] 93  Resp:  [16-18] 18  BP: (101-146)/(58-80) 142/80      Physical Exam:  Constitutional: No acute distress, awake, alert  HENT: NCAT, mucous membranes moist  Respiratory: Clear  to auscultation bilaterally, respiratory effort normal   Cardiovascular: RRR, no murmurs, rubs, or gallops  Gastrointestinal: Positive bowel sounds, soft, nontender, nondistended  Musculoskeletal: No bilateral ankle edema  Psychiatric: Appropriate affect, cooperative  Neurologic: Oriented x 3, strength symmetric in all extremities, Cranial Nerves grossly intact to confrontation, speech clear  Skin: No rashes     Pertinent  and/or Most Recent Results     LAB RESULTS:      Lab 07/29/23  2203   WBC 10.00   HEMOGLOBIN 13.2   HEMATOCRIT 40.4   PLATELETS 237   NEUTROS ABS 7.49*   IMMATURE GRANS (ABS) 0.03   LYMPHS ABS 1.54   MONOS ABS 0.62   EOS ABS 0.25   MCV 98.1*         Lab 07/29/23  2204   SODIUM 133*   POTASSIUM 4.1   CHLORIDE 97*   CO2 24.0   ANION GAP 12.0   BUN 27*   CREATININE 0.83   EGFR 70.0   GLUCOSE 106*   CALCIUM 8.5*   MAGNESIUM 2.8*             Lab 07/26/23  0858   PROBNP 2,917.0*                 Brief Urine Lab Results  (Last result in the past 365 days)        Color   Clarity   Blood   Leuk Est   Nitrite   Protein   CREAT   Urine HCG        07/17/23 1645 Yellow   Clear   Negative   Negative   Negative   Negative                 Microbiology Results (last 10 days)       ** No results found for the last 240 hours. **            No radiology results for the last 10 days            Results for orders placed during the hospital encounter of 06/16/23    Adult Transthoracic Echo Complete W/ Cont if Necessary Per Protocol    Interpretation Summary    Left ventricular systolic function is normal. Estimated left ventricular EF = 60%    Left ventricular diastolic function was normal.    Mild aortic insufficiency.    Trace to mild mitral and tricuspid regurgitation.    Calculated right ventricular systolic pressure from tricuspid regurgitation is 20 mmHg.    There is a trivial pericardial effusion.      Plan for Follow-up of Pending Labs/Results:     Discharge Details        Discharge Medications        New  Medications        Instructions Start Date   dilTIAZem  MG 24 hr capsule  Commonly known as: CARDIZEM CD   180 mg, Oral, Every 24 Hours Scheduled   Start Date: August 2, 2023     melatonin 5 MG tablet tablet   5 mg, Oral, Nightly      midodrine 5 MG tablet  Commonly known as: PROAMATINE   5 mg, Oral, 3 Times Daily Before Meals      pantoprazole 40 MG EC tablet  Commonly known as: PROTONIX   40 mg, Oral, Every Early Morning   Start Date: August 2, 2023     torsemide 10 MG tablet  Commonly known as: DEMADEX   10 mg, Oral, Daily   Start Date: August 2, 2023            Changes to Medications        Instructions Start Date   levothyroxine 88 MCG tablet  Commonly known as: SYNTHROID, LEVOTHROID  What changed:   medication strength  how much to take   88 mcg, Oral, Daily   Start Date: August 2, 2023            Continue These Medications        Instructions Start Date   apixaban 2.5 MG tablet tablet  Commonly known as: ELIQUIS   2.5 mg, Oral, Every 12 Hours Scheduled      brimonidine-timolol 0.2-0.5 % ophthalmic solution  Commonly known as: COMBIGAN   Every 12 Hours      galantamine 4 MG tablet  Commonly known as: RAZADYNE   8 mg, Oral, 2 Times Daily      mirtazapine 15 MG tablet  Commonly known as: REMERON   15 mg, Oral, Nightly      PARoxetine 10 MG tablet  Commonly known as: PAXIL   10 mg, Oral, Nightly      Vitamin C 100 MG chewable tablet   Vitamin C      Vitamin D3 1.25 MG (17208 UT) capsule   Vitamin D3             Stop These Medications      flecainide 50 MG tablet  Commonly known as: TAMBOCOR     fludrocortisone 0.1 MG tablet              Allergies   Allergen Reactions    Sulfa Antibiotics Other (See Comments)     Pt unsure of type of reaction          Discharge Disposition:  Skilled Nursing Facility (DC - External)    Diet:  Hospital:  Diet Order   Procedures    Diet: Cardiac Diets, Gastrointestinal Diets; Healthy Heart (2-3 Na+); Gluten-Sensitive, Lactose-Controlled; Texture: Regular Texture (IDDSI 7);  Fluid Consistency: Thin (IDDSI 0)       Activity:      Restrictions or Other Recommendations:  -Take all medications as directed  -Follow a low salt diet, goal = 2g or less daily  -Weigh yourself every morning before breakfast, write it down and compare to yesterday's weight. If you have weight gain greater than 3lbs in 1 day or 5 lbs or more in 1 week , call your PCP or Heart Failure Clinic.  -If you experience any of the following symptoms please contact your PCP or Heart Failure Clinic:    Increased shortness of breath   Increased swelling of feet or stomach   Dry hacking cough   Shortness of breath when laying down flat    - [facility]:  [phone]       CODE STATUS:    Code Status and Medical Interventions:   Ordered at: 07/15/23 0214     Code Status (Patient has no pulse and is not breathing):    CPR (Attempt to Resuscitate)     Medical Interventions (Patient has pulse or is breathing):    Full Support       Future Appointments   Date Time Provider Department Center   8/14/2023  1:30 PM  [facility] 7  [facility]       Additional Instructions for the Follow-ups that You Need to Schedule       Ambulatory Referral to Home Health   As directed      Face to Face Visit Date: 7/19/2023   Follow-up provider for Plan of Care?: I treated the patient in an acute care facility and will not continue treatment after discharge.   Follow-up provider: JENI SCALES [[id number]]   Reason/Clinical Findings: Impaired functional mobility, balance, gait, endurance, debility   Describe mobility limitations that make leaving home difficult: Impaired functional mobility, balance, gait, endurance, debility   Nursing/Therapeutic Services Requested: Skilled Nursing Physical Therapy Occupational Therapy   Skilled nursing orders: Medication education Cardiopulmonary assessments   PT orders: Therapeutic exercise Gait Training Transfer training Strengthening Home safety assessment   Weight Bearing Status:  As Tolerated   Occupational orders: Activities of daily living Strengthening Home safety assessment   Frequency: 1 Week 1        Discharge Follow-up with PCP   As directed       Currently Documented PCP:    Alfonso Steele MD    PCP Phone Number:    [phone]     Follow Up Details: 1-2 week hospital follow up        Discharge Follow-up with Specified Provider: [facility]; 1 Month   As directed      To: [facility]   Follow Up: 1 Month                      Sarah Beth Crisostomo II, DO  08/01/23      Time Spent on Discharge:  I spent  36  minutes on this discharge activity which included: face-to-face encounter with the patient, reviewing the data in the system, coordination of the care with the nursing staff as well as consultants, documentation, and entering orders.           Electronically signed by Sarah Beth Crisostomo II, DO at 08/01/23 4234

## 2023-09-01 ENCOUNTER — INFUSION (OUTPATIENT)
Dept: ONCOLOGY | Facility: HOSPITAL | Age: 83
End: 2023-09-01
Payer: MEDICARE

## 2023-09-01 VITALS
RESPIRATION RATE: 16 BRPM | HEART RATE: 82 BPM | SYSTOLIC BLOOD PRESSURE: 135 MMHG | TEMPERATURE: 97.4 F | DIASTOLIC BLOOD PRESSURE: 72 MMHG

## 2023-09-01 DIAGNOSIS — M81.0 SENILE OSTEOPOROSIS: Primary | ICD-10-CM

## 2023-09-01 PROCEDURE — 25010000002 DENOSUMAB 60 MG/ML SOLUTION PREFILLED SYRINGE: Performed by: INTERNAL MEDICINE

## 2023-09-01 PROCEDURE — 96372 THER/PROPH/DIAG INJ SC/IM: CPT

## 2023-09-01 RX ADMIN — DENOSUMAB 60 MG: 60 INJECTION SUBCUTANEOUS at 14:42

## 2023-09-21 ENCOUNTER — OFFICE VISIT (OUTPATIENT)
Dept: PULMONOLOGY | Facility: CLINIC | Age: 83
End: 2023-09-21
Payer: MEDICARE

## 2023-09-21 VITALS
HEIGHT: 68 IN | TEMPERATURE: 97.8 F | WEIGHT: 110.2 LBS | SYSTOLIC BLOOD PRESSURE: 124 MMHG | DIASTOLIC BLOOD PRESSURE: 78 MMHG | BODY MASS INDEX: 16.7 KG/M2

## 2023-09-21 DIAGNOSIS — J47.9 ACQUIRED BRONCHIECTASIS: ICD-10-CM

## 2023-09-21 DIAGNOSIS — R91.8 GROUND GLASS OPACITY PRESENT ON IMAGING OF LUNG: Primary | ICD-10-CM

## 2023-09-21 DIAGNOSIS — A31.0 PULMONARY INFECTION DUE TO MYCOBACTERIUM AVIUM COMPLEX: ICD-10-CM

## 2023-09-21 NOTE — PROGRESS NOTES
"[facility] Pulmonary Follow up      Chief Complaint  No chief complaint on file.    Subjective          Vita Miller presents to [facility] PULMONARY & [facility]IT[facility]ARE MEDICINE for hospital follow-up.  She was seen in the hospital by Dr. Hook in July.  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  She did have a CT scan of the chest while in the hospital with some significant groundglass opacities bilaterally, she did have a work-up for vasculitis was seen by rheumatology there which was negative.  Due to her A-fib and continuous status which she was in the hospital they deferred any further work-up including procedures.  She was fairly asymptomatic.  Was recommended she continue with pulmonary toileting including nebulizers and vest therapy.  She was discharged home on August 1.      Since she has been home she says she is doing better.  She is trying to walk with therapy she is having less shortness of breath with activity.  She is no longer using any oxygen.  She does have a bit of a chronic cough with chronic sputum production.  She has seen Dr. Burgos in the past and is on a percussion vest.  She says she has tried multiple inhalers and nebulizers in the past without much benefit.        Objective     Vital Signs:   /78 (BP Location: Right arm, Patient Position: Sitting, Cuff Size: Adult)   Temp 97.8 °F (36.6 °C) (Infrared)   Ht 172.7 cm (68\")   Wt 50 kg (110 lb 3.2 oz)   BMI 16.76 kg/m²       Immunization History   Administered Date(s) Administered    COVID-19 (MODERNA) 1st,2nd,3rd Dose Monovalent 01/15/2021, 02/19/2021, 09/02/2021       Physical Exam  Vitals reviewed.   Constitutional:       General: She is not in acute distress.     Appearance: She is well-developed.   HENT:      Head: Normocephalic and atraumatic.   Eyes:      Pupils: Pupils are equal, round, and reactive to light.   Cardiovascular:      Rate and Rhythm: Normal rate and regular " rhythm.      Heart sounds: No murmur heard.  Pulmonary:      Effort: Pulmonary effort is normal. No respiratory distress.      Breath sounds: Wheezing and rales present.   Abdominal:      General: Bowel sounds are normal. There is no distension.      Palpations: Abdomen is soft.   Musculoskeletal:         General: Normal range of motion.      Cervical back: Normal range of motion and neck supple.   Skin:     General: Skin is warm and dry.      Findings: No erythema.   Neurological:      Mental Status: She is alert and oriented to person, place, and time.   Psychiatric:         Behavior: Behavior normal.        Result Review :       Data reviewed : Radiologic studies 7/16/2023      IMPRESSION:  1.There groundglass opacity central predominant with septal thickening new from June study.  2.There is bronchiectasis throughout the lungs most pronounced in the lower lungs with consolidation left greater right lower lobe, moderate size left small size right pleural effusion.  3.There are scattered noncalcified pulmonary nodules which appear stable since 6/17/2023.                    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  She is showing no signs of hypoxemia with activity, her cough is back to baseline and she continues to use her percussion vest.  She seems to be back to her chronic state.    She will follow back up with Dr. Burgos her primary pulmonologist.    Follow Up     No follow-ups on file.  Patient was given instructions and counseling regarding her condition or for health maintenance advice. Please see specific information pulled into the AVS if appropriate.       Raegan Mendez APRN, ACNP  [facility] Pulmonary Critical Care  Medicine  Electronically signed

## 2023-09-21 NOTE — LETTER
"September 21, 2023       No Recipients    Patient: Vita Miller   YOB: 1940   Date of Visit: 9/21/2023     Dear Dr. Ernst Recipients:    Thank you for referring Vita Millre to me for evaluation. Below are the relevant portions of my assessment and plan of care.    If you have questions, please do not hesitate to call me. I look forward to following Vita along with you.         Sincerely,        SCOTT Charles        CC:   No Recipients      Progress Notes:  [facility] Pulmonary Follow up      Chief Complaint  No chief complaint on file.    Subjective          Vita Miller presents to [facility]UL[facility] MEDICINE for hospital follow-up.  She was seen in the hospital by Dr. Hook in July.  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  She did have a CT scan of the chest while in the hospital with some significant groundglass opacities bilaterally, she did have a work-up for vasculitis was seen by rheumatology there which was negative.  Due to her A-fib and continuous status which she was in the hospital they deferred any further work-up including procedures.  She was fairly asymptomatic.  Was recommended she continue with pulmonary toileting including nebulizers and vest therapy.  She was discharged home on August 1.      She has a chronic occasional cough   Does not complain of any shortness of breath   Walking with therapy     Has seen Dr Burgos   Percussion vest at least once a day     Has oxygen at home, but not using any longer     Objective     Vital Signs:   /78 (BP Location: Right arm, Patient Position: Sitting, Cuff Size: Adult)   Temp 97.8 °F (36.6 °C) (Infrared)   Ht 172.7 cm (68\")   Wt 50 kg (110 lb 3.2 oz)   BMI 16.76 kg/m²       Immunization History   Administered Date(s) Administered   • COVID-19 (MODERNA) 1st,2nd,3rd Dose Monovalent 01/15/2021, 02/19/2021, 09/02/2021       Physical Exam "     Result Review :       Data reviewed : Radiologic studies 7/16/2023      IMPRESSION:  1.There groundglass opacity central predominant with septal thickening new from June study.  2.There is bronchiectasis throughout the lungs most pronounced in the lower lungs with consolidation left greater right lower lobe, moderate size left small size right pleural effusion.  3.There are scattered noncalcified pulmonary nodules which appear stable since 6/17/2023.     Short-term follow-up CT chest and/or PET/CT recommended for better evaluation. The groundglass findings are likely due to superimposed infection. Organizing pneumonia or pulmonary edema or pulmonary hemorrhage thought to be less likely.                 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  Patient was given instructions and counseling regarding her condition or for health maintenance advice. Please see specific information pulled into the AVS if appropriate.         Excluding time spent on other separate services such as performing procedures or test interpretation, if applicable    Moderate level of Medical Decision Making complexity based on 2 or more chronic stable illnesses and prescription drug management.    SCOTT Carrington, ACNP  [facility] Pulmonary Critical Care Medicine  Electronically signed

## 2023-09-22 ENCOUNTER — TELEPHONE (OUTPATIENT)
Dept: CARDIOLOGY | Facility: HOSPITAL | Age: 83
End: 2023-09-22
Payer: MEDICARE

## 2023-09-22 NOTE — TELEPHONE ENCOUNTER
----- Message from Fabiola Duncan MA sent at 9/22/2023  8:24 AM EDT -----  I just spoke wit the patient. She refuses to make an appointment. She said she wants all of her care to be with Dr. Steele who is her PCP. She said she will call him and get his opinion on if she really needs to be seen. She will call us if she wants an appointment at that point.  ----- Message -----  From: Sarah Beth Srinivasan APRN  Sent: 9/22/2023   8:09 AM EDT  To: KAYLI Hernández, can you make an appt with me In office or telehealth? It looks like she didn't see cardiology (refused the appt when they called).  It is for afib. Thanks

## 2023-11-06 NOTE — PROGRESS NOTES
DOS: 10/3/23  Pt was discussed with resident at the time of visit. Agree with above.    Malnutrition Severity Assessment    Patient Name:  Vita Miller  YOB: 1940  MRN: [id number]  Admit Date:  7/14/2023    Patient meets criteria for : Severe Malnutrition    Malnutrition Severity Assessment  Malnutrition Type: Chronic Disease - Related Malnutrition  Malnutrition Type (last 8 hours)       Malnutrition Severity Assessment       Row Name 07/15/23 1847       Malnutrition Severity Assessment    Malnutrition Type Chronic Disease - Related Malnutrition      Row Name 07/15/23 1847       Muscle Loss    Loss of Muscle Mass Findings Severe    [place] Region Severe - deep hollowing/scooping, lack of muscle to touch, facial bones well defined    Clavicle Bone Region Severe - protruding prominent bone    Dorsal Hand Region Severe - prominent depression      Row Name 07/15/23 1847       Fat Loss    Subcutaneous Fat Loss Findings Severe    Orbital Region  Severe - pronounced hollowness/depression, dark circles, loose saggy skin    Upper Arm Region Severe - mostly skin, very little space between folds, fingers touch      Row Name 07/15/23 1847       Fluid Accumulation (Edema)    Fluid Acumulation Findings --  BLE edema      Row Name 07/15/23 1847       Criteria Met (Must meet criteria for severity in at least 2 of these categories: M Wasting, Fat Loss, Fluid, Secondary Signs, Wt. Status, Intake)    Patient meets criteria for  Severe Malnutrition                    Electronically signed by:  Renay Cummings RD  07/15/23 18:50 EDT

## 2023-11-27 ENCOUNTER — TRANSCRIBE ORDERS (OUTPATIENT)
Dept: ADMINISTRATIVE | Facility: HOSPITAL | Age: 83
End: 2023-11-27
Payer: MEDICARE

## 2023-11-27 DIAGNOSIS — G45.4 TRANSIENT GLOBAL AMNESIA: ICD-10-CM

## 2023-11-27 DIAGNOSIS — R41.3 OTHER AMNESIA: Primary | ICD-10-CM

## 2023-12-04 ENCOUNTER — HOSPITAL ENCOUNTER (OUTPATIENT)
Dept: NEUROLOGY | Facility: HOSPITAL | Age: 83
Discharge: HOME | End: 2023-12-04
Admitting: INTERNAL MEDICINE
Payer: MEDICARE

## 2023-12-04 DIAGNOSIS — G45.4 TRANSIENT GLOBAL AMNESIA: ICD-10-CM

## 2023-12-04 PROCEDURE — 95816 EEG AWAKE AND DROWSY: CPT

## 2023-12-04 PROCEDURE — 95816 EEG AWAKE AND DROWSY: CPT | Performed by: PSYCHIATRY & NEUROLOGY

## 2023-12-28 ENCOUNTER — HOSPITAL ENCOUNTER (OUTPATIENT)
Dept: CT IMAGING | Facility: HOSPITAL | Age: 83
Discharge: HOME | End: 2023-12-28
Admitting: INTERNAL MEDICINE
Payer: MEDICARE

## 2023-12-28 DIAGNOSIS — R41.3 OTHER AMNESIA: ICD-10-CM

## 2023-12-28 PROCEDURE — 70450 CT HEAD/BRAIN W/O DYE: CPT

## 2024-01-25 ENCOUNTER — TRANSCRIBE ORDERS (OUTPATIENT)
Dept: ADMINISTRATIVE | Facility: HOSPITAL | Age: 84
End: 2024-01-25
Payer: MEDICARE

## 2024-01-25 DIAGNOSIS — R05.9 COUGH, UNSPECIFIED TYPE: Primary | ICD-10-CM

## 2024-03-01 ENCOUNTER — INFUSION (OUTPATIENT)
Dept: ONCOLOGY | Facility: HOSPITAL | Age: 84
End: 2024-03-01
Payer: MEDICARE

## 2024-03-01 VITALS
SYSTOLIC BLOOD PRESSURE: 143 MMHG | HEART RATE: 75 BPM | RESPIRATION RATE: 26 BRPM | TEMPERATURE: 97.9 F | DIASTOLIC BLOOD PRESSURE: 66 MMHG

## 2024-03-01 DIAGNOSIS — M81.0 SENILE OSTEOPOROSIS: Primary | ICD-10-CM

## 2024-03-01 PROCEDURE — 25010000002 DENOSUMAB 60 MG/ML SOLUTION PREFILLED SYRINGE: Performed by: INTERNAL MEDICINE

## 2024-03-01 PROCEDURE — 96372 THER/PROPH/DIAG INJ SC/IM: CPT

## 2024-03-01 RX ADMIN — DENOSUMAB 60 MG: 60 INJECTION SUBCUTANEOUS at 13:24

## 2024-09-04 ENCOUNTER — HOSPITAL ENCOUNTER (OUTPATIENT)
Dept: GENERAL RADIOLOGY | Facility: HOSPITAL | Age: 84
Discharge: HOME | End: 2024-09-04
Admitting: INTERNAL MEDICINE
Payer: MEDICARE

## 2024-09-04 ENCOUNTER — TRANSCRIBE ORDERS (OUTPATIENT)
Dept: ADMINISTRATIVE | Facility: HOSPITAL | Age: 84
End: 2024-09-04
Payer: MEDICARE

## 2024-09-04 DIAGNOSIS — M54.50 LOW BACK PAIN, UNSPECIFIED BACK PAIN LATERALITY, UNSPECIFIED CHRONICITY, UNSPECIFIED WHETHER SCIATICA PRESENT: Primary | ICD-10-CM

## 2024-09-04 PROCEDURE — 72110 X-RAY EXAM L-2 SPINE 4/>VWS: CPT

## 2024-10-10 ENCOUNTER — INFUSION (OUTPATIENT)
Dept: ONCOLOGY | Facility: HOSPITAL | Age: 84
End: 2024-10-10
Payer: MEDICARE

## 2024-10-10 VITALS
SYSTOLIC BLOOD PRESSURE: 111 MMHG | HEART RATE: 77 BPM | RESPIRATION RATE: 26 BRPM | DIASTOLIC BLOOD PRESSURE: 68 MMHG | TEMPERATURE: 98.3 F

## 2024-10-10 DIAGNOSIS — M81.0 SENILE OSTEOPOROSIS: Primary | ICD-10-CM

## 2024-10-10 PROCEDURE — 25010000002 DENOSUMAB 60 MG/ML SOLUTION PREFILLED SYRINGE: Performed by: INTERNAL MEDICINE

## 2024-10-10 PROCEDURE — 96372 THER/PROPH/DIAG INJ SC/IM: CPT

## 2024-10-10 RX ADMIN — DENOSUMAB 60 MG: 60 INJECTION SUBCUTANEOUS at 14:55

## 2024-10-14 ENCOUNTER — HOSPITAL ENCOUNTER (EMERGENCY)
Facility: HOSPITAL | Age: 84
Discharge: HOME | End: 2024-10-14
Attending: EMERGENCY MEDICINE | Admitting: EMERGENCY MEDICINE
Payer: MEDICARE

## 2024-10-14 VITALS
HEART RATE: 61 BPM | RESPIRATION RATE: 16 BRPM | TEMPERATURE: 97.5 F | HEIGHT: 68 IN | DIASTOLIC BLOOD PRESSURE: 43 MMHG | OXYGEN SATURATION: 94 % | BODY MASS INDEX: 14.25 KG/M2 | WEIGHT: 94 LBS | SYSTOLIC BLOOD PRESSURE: 95 MMHG

## 2024-10-14 DIAGNOSIS — N30.01 ACUTE CYSTITIS WITH HEMATURIA: Primary | ICD-10-CM

## 2024-10-14 LAB
ANION GAP SERPL CALCULATED.3IONS-SCNC: 6 MMOL/L (ref 5–15)
BACTERIA UR QL AUTO: ABNORMAL /HPF
BASOPHILS # BLD AUTO: 0.05 10*3/MM3 (ref 0–0.2)
BASOPHILS NFR BLD AUTO: 0.6 % (ref 0–1.5)
BILIRUB UR QL STRIP: ABNORMAL
BUN SERPL-MCNC: 16 MG/DL (ref 8–23)
BUN/CREAT SERPL: 14.3 (ref 7–25)
CALCIUM SPEC-SCNC: 8.9 MG/DL (ref 8.6–10.5)
CHLORIDE SERPL-SCNC: 98 MMOL/L (ref 98–107)
CLARITY UR: ABNORMAL
CO2 SERPL-SCNC: 35 MMOL/L (ref 22–29)
COLOR UR: ABNORMAL
CREAT SERPL-MCNC: 1.12 MG/DL (ref 0.57–1)
DEPRECATED RDW RBC AUTO: 50 FL (ref 37–54)
EGFRCR SERPLBLD CKD-EPI 2021: 48.6 ML/MIN/1.73
EOSINOPHIL # BLD AUTO: 0.07 10*3/MM3 (ref 0–0.4)
EOSINOPHIL NFR BLD AUTO: 0.8 % (ref 0.3–6.2)
ERYTHROCYTE [DISTWIDTH] IN BLOOD BY AUTOMATED COUNT: 13.2 % (ref 12.3–15.4)
GLUCOSE SERPL-MCNC: 98 MG/DL (ref 65–99)
GLUCOSE UR STRIP-MCNC: NEGATIVE MG/DL
HCT VFR BLD AUTO: 32.7 % (ref 34–46.6)
HGB BLD-MCNC: 10.6 G/DL (ref 12–15.9)
HGB UR QL STRIP.AUTO: ABNORMAL
HYALINE CASTS UR QL AUTO: ABNORMAL /LPF
IMM GRANULOCYTES # BLD AUTO: 0.05 10*3/MM3 (ref 0–0.05)
IMM GRANULOCYTES NFR BLD AUTO: 0.6 % (ref 0–0.5)
KETONES UR QL STRIP: NEGATIVE
LEUKOCYTE ESTERASE UR QL STRIP.AUTO: ABNORMAL
LYMPHOCYTES # BLD AUTO: 1.34 10*3/MM3 (ref 0.7–3.1)
LYMPHOCYTES NFR BLD AUTO: 15.6 % (ref 19.6–45.3)
MCH RBC QN AUTO: 33.4 PG (ref 26.6–33)
MCHC RBC AUTO-ENTMCNC: 32.4 G/DL (ref 31.5–35.7)
MCV RBC AUTO: 103.2 FL (ref 79–97)
MONOCYTES # BLD AUTO: 0.72 10*3/MM3 (ref 0.1–0.9)
MONOCYTES NFR BLD AUTO: 8.4 % (ref 5–12)
NEUTROPHILS NFR BLD AUTO: 6.35 10*3/MM3 (ref 1.7–7)
NEUTROPHILS NFR BLD AUTO: 74 % (ref 42.7–76)
NITRITE UR QL STRIP: POSITIVE
NRBC BLD AUTO-RTO: 0 /100 WBC (ref 0–0.2)
PH UR STRIP.AUTO: 5.5 [PH] (ref 5–8)
PLATELET # BLD AUTO: 204 10*3/MM3 (ref 140–450)
PMV BLD AUTO: 9.5 FL (ref 6–12)
POTASSIUM SERPL-SCNC: 4.2 MMOL/L (ref 3.5–5.2)
PROT UR QL STRIP: ABNORMAL
RBC # BLD AUTO: 3.17 10*6/MM3 (ref 3.77–5.28)
RBC # UR STRIP: ABNORMAL /HPF
REF LAB TEST METHOD: ABNORMAL
SODIUM SERPL-SCNC: 139 MMOL/L (ref 136–145)
SP GR UR STRIP: 1.01 (ref 1–1.03)
SQUAMOUS #/AREA URNS HPF: ABNORMAL /HPF
UROBILINOGEN UR QL STRIP: ABNORMAL
WBC # UR STRIP: ABNORMAL /HPF
WBC NRBC COR # BLD AUTO: 8.58 10*3/MM3 (ref 3.4–10.8)

## 2024-10-14 PROCEDURE — 36415 COLL VENOUS BLD VENIPUNCTURE: CPT

## 2024-10-14 PROCEDURE — 81001 URINALYSIS AUTO W/SCOPE: CPT | Performed by: EMERGENCY MEDICINE

## 2024-10-14 PROCEDURE — 80048 BASIC METABOLIC PNL TOTAL CA: CPT | Performed by: EMERGENCY MEDICINE

## 2024-10-14 PROCEDURE — 99283 EMERGENCY DEPT VISIT LOW MDM: CPT

## 2024-10-14 PROCEDURE — 85025 COMPLETE CBC W/AUTO DIFF WBC: CPT | Performed by: EMERGENCY MEDICINE

## 2024-10-14 RX ORDER — CEFDINIR 300 MG/1
300 CAPSULE ORAL ONCE
Status: COMPLETED | OUTPATIENT
Start: 2024-10-14 | End: 2024-10-14

## 2024-10-14 RX ORDER — CEFDINIR 300 MG/1
300 CAPSULE ORAL DAILY
Qty: 5 CAPSULE | Refills: 0 | Status: SHIPPED | OUTPATIENT
Start: 2024-10-15

## 2024-10-14 RX ORDER — CEFDINIR 300 MG/1
300 CAPSULE ORAL DAILY
Qty: 5 CAPSULE | Refills: 0 | Status: SHIPPED | OUTPATIENT
Start: 2024-10-15 | End: 2024-10-14

## 2024-10-14 RX ADMIN — CEFDINIR 300 MG: 300 CAPSULE ORAL at 13:33

## 2024-10-14 NOTE — ED PROVIDER NOTES
Subjective   History of Present Illness    Pt presents with hematuria and dysuria that began this morning. No abdominal pain, fever or vomiting. She is on Eliquis for AF.    History provided by:  Patient      Review of Systems    Past Medical History:   Diagnosis Date    A-fib     Disease of thyroid gland     Glaucoma     Hypotension        Allergies   Allergen Reactions    Sulfa Antibiotics Other (See Comments)     Pt unsure of type of reaction        Past Surgical History:   Procedure Laterality Date    HIP SURGERY      PATELLA SURGERY      SHOULDER SURGERY         No family history on file.    Social History     Socioeconomic History    Marital status:    Tobacco Use    Smoking status: Never    Smokeless tobacco: Never   Vaping Use    Vaping status: Never Used   Substance and Sexual Activity    Alcohol use: Never    Drug use: Never    Sexual activity: Defer           Objective   Physical Exam  Vitals and nursing note reviewed.   Constitutional:       General: She is not in acute distress.     Appearance: Normal appearance. She is not ill-appearing.   HENT:      Head: Normocephalic and atraumatic.      Mouth/Throat:      Mouth: Mucous membranes are moist.   Eyes:      General: No scleral icterus.        Right eye: No discharge.         Left eye: No discharge.      Conjunctiva/sclera: Conjunctivae normal.   Cardiovascular:      Rate and Rhythm: Normal rate and regular rhythm.      Heart sounds: No murmur heard.  Pulmonary:      Effort: Pulmonary effort is normal. No respiratory distress.      Breath sounds: Normal breath sounds. No wheezing.   Abdominal:      General: Bowel sounds are normal. There is no distension.      Palpations: Abdomen is soft.      Tenderness: There is no abdominal tenderness. There is no guarding or rebound.   Musculoskeletal:         General: No swelling. Normal range of motion.      Cervical back: Normal range of motion and neck supple.   Skin:     General: Skin is warm and dry.       Findings: No rash.   Neurological:      General: No focal deficit present.      Mental Status: She is alert. Mental status is at baseline.   Psychiatric:         Mood and Affect: Mood normal.         Behavior: Behavior normal.         Thought Content: Thought content normal.         Procedures           ED Course        UA positive for WBCs and RBCs and she is symptomatic.  Labs benign.    Patient stable on serial rechecks.  Discussed findings, concerns, plan of care, expected course, reasons to return and followup.  Provided the opportunity to ask questions.                                           Medical Decision Making  Problems Addressed:  Acute cystitis with hematuria: complicated acute illness or injury    Amount and/or Complexity of Data Reviewed  Labs: ordered. Decision-making details documented in ED Course.    Risk  Prescription drug management.        Final diagnoses:   Acute cystitis with hematuria       ED Disposition  ED Disposition       ED Disposition   Discharge    Condition   Stable    Comment   --               Alfonso Steele MD  [address] [id number] [phone]    In 1 week           Medication List        New Prescriptions      cefdinir 300 MG capsule  Commonly known as: OMNICEF  Take 1 capsule by mouth Daily.  Start taking on: October 15, 2024               Where to Get Your Medications        These medications were sent to [facility] - [place], [place] - [address] [place] [address] [address] - [phone]  - [phone]   [address] [place], [facility] [address]      Phone: [phone]   cefdinir 300 MG capsule            Romel Boyd MD  10/14/24 6656

## 2025-05-11 ENCOUNTER — APPOINTMENT (OUTPATIENT)
Dept: GENERAL RADIOLOGY | Facility: HOSPITAL | Age: 85
End: 2025-05-11
Payer: MEDICARE

## 2025-05-11 ENCOUNTER — APPOINTMENT (OUTPATIENT)
Dept: CT IMAGING | Facility: HOSPITAL | Age: 85
End: 2025-05-11
Payer: MEDICARE

## 2025-05-11 ENCOUNTER — HOSPITAL ENCOUNTER (EMERGENCY)
Facility: HOSPITAL | Age: 85
Discharge: HOME | End: 2025-05-12
Attending: EMERGENCY MEDICINE | Admitting: EMERGENCY MEDICINE
Payer: MEDICARE

## 2025-05-11 DIAGNOSIS — K62.89 RECTAL PAIN: ICD-10-CM

## 2025-05-11 DIAGNOSIS — K56.41 FECAL IMPACTION: Primary | ICD-10-CM

## 2025-05-11 DIAGNOSIS — E86.0 ACUTE DEHYDRATION: ICD-10-CM

## 2025-05-11 DIAGNOSIS — N28.9 ACUTE RENAL INSUFFICIENCY: ICD-10-CM

## 2025-05-11 LAB
ALBUMIN SERPL-MCNC: 3.4 G/DL (ref 3.5–5.2)
ALBUMIN/GLOB SERPL: 1.1 G/DL
ALP SERPL-CCNC: 51 U/L (ref 39–117)
ALT SERPL W P-5'-P-CCNC: 13 U/L (ref 1–33)
ANION GAP SERPL CALCULATED.3IONS-SCNC: 11 MMOL/L (ref 5–15)
AST SERPL-CCNC: 18 U/L (ref 1–32)
B PARAPERT DNA SPEC QL NAA+PROBE: NOT DETECTED
B PERT DNA SPEC QL NAA+PROBE: NOT DETECTED
BASOPHILS # BLD AUTO: 0.02 10*3/MM3 (ref 0–0.2)
BASOPHILS NFR BLD AUTO: 0.2 % (ref 0–1.5)
BILIRUB SERPL-MCNC: 0.5 MG/DL (ref 0–1.2)
BILIRUB UR QL STRIP: NEGATIVE
BUN SERPL-MCNC: 41 MG/DL (ref 8–23)
BUN/CREAT SERPL: 27.2 (ref 7–25)
C PNEUM DNA NPH QL NAA+NON-PROBE: NOT DETECTED
CALCIUM SPEC-SCNC: 9.2 MG/DL (ref 8.6–10.5)
CHLORIDE SERPL-SCNC: 96 MMOL/L (ref 98–107)
CLARITY UR: CLEAR
CO2 SERPL-SCNC: 34 MMOL/L (ref 22–29)
COLOR UR: ABNORMAL
CREAT SERPL-MCNC: 1.51 MG/DL (ref 0.57–1)
D-LACTATE SERPL-SCNC: 2.4 MMOL/L (ref 0.5–2)
D-LACTATE SERPL-SCNC: 2.5 MMOL/L (ref 0.5–2)
DEPRECATED RDW RBC AUTO: 54.1 FL (ref 37–54)
EGFRCR SERPLBLD CKD-EPI 2021: 33.7 ML/MIN/1.73
EOSINOPHIL # BLD AUTO: 0.04 10*3/MM3 (ref 0–0.4)
EOSINOPHIL NFR BLD AUTO: 0.3 % (ref 0.3–6.2)
ERYTHROCYTE [DISTWIDTH] IN BLOOD BY AUTOMATED COUNT: 14.3 % (ref 12.3–15.4)
FLUAV SUBTYP SPEC NAA+PROBE: NOT DETECTED
FLUBV RNA ISLT QL NAA+PROBE: NOT DETECTED
GLOBULIN UR ELPH-MCNC: 3 GM/DL
GLUCOSE SERPL-MCNC: 99 MG/DL (ref 65–99)
GLUCOSE UR STRIP-MCNC: NEGATIVE MG/DL
HADV DNA SPEC NAA+PROBE: NOT DETECTED
HCOV 229E RNA SPEC QL NAA+PROBE: NOT DETECTED
HCOV HKU1 RNA SPEC QL NAA+PROBE: NOT DETECTED
HCOV NL63 RNA SPEC QL NAA+PROBE: NOT DETECTED
HCOV OC43 RNA SPEC QL NAA+PROBE: NOT DETECTED
HCT VFR BLD AUTO: 35.1 % (ref 34–46.6)
HGB BLD-MCNC: 11.3 G/DL (ref 12–15.9)
HGB UR QL STRIP.AUTO: NEGATIVE
HMPV RNA NPH QL NAA+NON-PROBE: NOT DETECTED
HPIV1 RNA ISLT QL NAA+PROBE: NOT DETECTED
HPIV2 RNA SPEC QL NAA+PROBE: NOT DETECTED
HPIV3 RNA NPH QL NAA+PROBE: NOT DETECTED
HPIV4 P GENE NPH QL NAA+PROBE: NOT DETECTED
IMM GRANULOCYTES # BLD AUTO: 0.18 10*3/MM3 (ref 0–0.05)
IMM GRANULOCYTES NFR BLD AUTO: 1.5 % (ref 0–0.5)
KETONES UR QL STRIP: NEGATIVE
LEUKOCYTE ESTERASE UR QL STRIP.AUTO: NEGATIVE
LYMPHOCYTES # BLD AUTO: 0.72 10*3/MM3 (ref 0.7–3.1)
LYMPHOCYTES NFR BLD AUTO: 6.1 % (ref 19.6–45.3)
M PNEUMO IGG SER IA-ACNC: NOT DETECTED
MCH RBC QN AUTO: 33.4 PG (ref 26.6–33)
MCHC RBC AUTO-ENTMCNC: 32.2 G/DL (ref 31.5–35.7)
MCV RBC AUTO: 103.8 FL (ref 79–97)
MONOCYTES # BLD AUTO: 0.37 10*3/MM3 (ref 0.1–0.9)
MONOCYTES NFR BLD AUTO: 3.1 % (ref 5–12)
NEUTROPHILS NFR BLD AUTO: 10.5 10*3/MM3 (ref 1.7–7)
NEUTROPHILS NFR BLD AUTO: 88.8 % (ref 42.7–76)
NITRITE UR QL STRIP: NEGATIVE
NRBC BLD AUTO-RTO: 0 /100 WBC (ref 0–0.2)
PH UR STRIP.AUTO: 5.5 [PH] (ref 5–8)
PLATELET # BLD AUTO: 216 10*3/MM3 (ref 140–450)
PMV BLD AUTO: 9.4 FL (ref 6–12)
POTASSIUM SERPL-SCNC: 4.8 MMOL/L (ref 3.5–5.2)
PROT SERPL-MCNC: 6.4 G/DL (ref 6–8.5)
PROT UR QL STRIP: NEGATIVE
RBC # BLD AUTO: 3.38 10*6/MM3 (ref 3.77–5.28)
RHINOVIRUS RNA SPEC NAA+PROBE: NOT DETECTED
RSV RNA NPH QL NAA+NON-PROBE: NOT DETECTED
SARS-COV-2 RNA NPH QL NAA+NON-PROBE: NOT DETECTED
SODIUM SERPL-SCNC: 141 MMOL/L (ref 136–145)
SP GR UR STRIP: 1.01 (ref 1–1.03)
UROBILINOGEN UR QL STRIP: ABNORMAL
WBC NRBC COR # BLD AUTO: 11.83 10*3/MM3 (ref 3.4–10.8)

## 2025-05-11 PROCEDURE — 99284 EMERGENCY DEPT VISIT MOD MDM: CPT

## 2025-05-11 PROCEDURE — 74176 CT ABD & PELVIS W/O CONTRAST: CPT

## 2025-05-11 PROCEDURE — 0202U NFCT DS 22 TRGT SARS-COV-2: CPT | Performed by: EMERGENCY MEDICINE

## 2025-05-11 PROCEDURE — 25810000003 SODIUM CHLORIDE 0.9 % SOLUTION: Performed by: EMERGENCY MEDICINE

## 2025-05-11 PROCEDURE — 85025 COMPLETE CBC W/AUTO DIFF WBC: CPT | Performed by: EMERGENCY MEDICINE

## 2025-05-11 PROCEDURE — 87040 BLOOD CULTURE FOR BACTERIA: CPT | Performed by: EMERGENCY MEDICINE

## 2025-05-11 PROCEDURE — 81003 URINALYSIS AUTO W/O SCOPE: CPT | Performed by: EMERGENCY MEDICINE

## 2025-05-11 PROCEDURE — 80053 COMPREHEN METABOLIC PANEL: CPT | Performed by: EMERGENCY MEDICINE

## 2025-05-11 PROCEDURE — 36415 COLL VENOUS BLD VENIPUNCTURE: CPT

## 2025-05-11 PROCEDURE — 83605 ASSAY OF LACTIC ACID: CPT | Performed by: EMERGENCY MEDICINE

## 2025-05-11 PROCEDURE — 93005 ELECTROCARDIOGRAM TRACING: CPT | Performed by: EMERGENCY MEDICINE

## 2025-05-11 PROCEDURE — 71045 X-RAY EXAM CHEST 1 VIEW: CPT

## 2025-05-11 PROCEDURE — P9612 CATHETERIZE FOR URINE SPEC: HCPCS

## 2025-05-11 RX ORDER — SODIUM PHOSPHATE,MONO-DIBASIC 19G-7G/118
1 ENEMA (ML) RECTAL ONCE
Status: COMPLETED | OUTPATIENT
Start: 2025-05-11 | End: 2025-05-11

## 2025-05-11 RX ORDER — SODIUM CHLORIDE 0.9 % (FLUSH) 0.9 %
10 SYRINGE (ML) INJECTION AS NEEDED
Status: DISCONTINUED | OUTPATIENT
Start: 2025-05-11 | End: 2025-05-12 | Stop reason: HOSPADM

## 2025-05-11 RX ADMIN — SODIUM CHLORIDE 1000 ML: 9 INJECTION, SOLUTION INTRAVENOUS at 23:36

## 2025-05-11 RX ADMIN — SODIUM CHLORIDE 1000 ML: 9 INJECTION, SOLUTION INTRAVENOUS at 19:23

## 2025-05-11 RX ADMIN — SODIUM PHOSPHATE 1 ENEMA: 7; 19 ENEMA RECTAL at 22:30

## 2025-05-11 NOTE — Clinical Note
[facility] EMERGENCY DEPARTMENT  [address]  [facility] [address]  Phone: [phone]    Vita Miller was seen and treated in our emergency department on 5/11/2025.  She may return to work on 05/14/2025.         Thank you for choosing [facility].    Meena Hernandez MD

## 2025-05-11 NOTE — ED PROVIDER NOTES
Subjective   History of Present Illness  85-year-old female who presents for evaluation of low blood pressure in combination with vaginal itching, vaginal discharge, and rectal pain.  The patient for greater than 1 week has had some vaginal discharge and some rectal pain.  The patient was actually evaluated at her house by Dr. Steele, who is her primary care physician, 2 days ago.  He prescribed a vaginal suppository for yeast infection and a rectal suppository for possible hemorrhoid.  She has continued to have scratching and pain.  Her blood press was recorded to be in the 80s systolic.  Because of this she was advised by her primary care physician to present here to the ER.  She does have a history of developing low blood pressure in the past.  She has been advised to drink fluids and consuming salt whenever that happens.  No fever reported recently.  The patient exhibits normal mentation.  No complaints of chest pain or abdominal pain.  However on my exam the patient did report some suprapubic pain to palpation.  No other acute complaints.  Record review shows Eliquis use.      Review of Systems   Constitutional:  Negative for chills, fatigue and fever.   HENT:  Negative for congestion, ear pain, postnasal drip, sinus pressure and sore throat.    Eyes:  Negative for pain, redness and visual disturbance.   Respiratory:  Negative for cough, chest tightness and shortness of breath.    Cardiovascular:  Negative for chest pain, palpitations and leg swelling.   Gastrointestinal:  Positive for abdominal pain and rectal pain. Negative for anal bleeding, blood in stool, diarrhea, nausea and vomiting.   Endocrine: Negative for polydipsia and polyuria.   Genitourinary:  Positive for vaginal discharge. Negative for difficulty urinating, dysuria, frequency and urgency.   Musculoskeletal:  Negative for arthralgias, back pain and neck pain.   Skin:  Negative for pallor and rash.   Allergic/Immunologic: Negative for environmental  allergies and immunocompromised state.   Neurological:  Negative for dizziness, weakness and headaches.   Hematological:  Negative for adenopathy.   Psychiatric/Behavioral:  Negative for confusion, self-injury and suicidal ideas. The patient is not nervous/anxious.    All other systems reviewed and are negative.      Past Medical History:   Diagnosis Date    A-fib     Disease of thyroid gland     Glaucoma     Hypotension        Allergies   Allergen Reactions    Sulfa Antibiotics Other (See Comments)     Pt unsure of type of reaction        Past Surgical History:   Procedure Laterality Date    HIP SURGERY      PATELLA SURGERY      SHOULDER SURGERY         No family history on file.    Social History     Socioeconomic History    Marital status:    Tobacco Use    Smoking status: Never    Smokeless tobacco: Never   Vaping Use    Vaping status: Never Used   Substance and Sexual Activity    Alcohol use: Never    Drug use: Never    Sexual activity: Defer           Objective   Physical Exam  Vitals and nursing note reviewed.   Constitutional:       General: She is not in acute distress.     Appearance: Normal appearance. She is well-developed. She is not toxic-appearing or diaphoretic.   HENT:      Head: Normocephalic and atraumatic.      Right Ear: External ear normal.      Left Ear: External ear normal.      Nose: Nose normal.   Eyes:      General: Lids are normal.      Pupils: Pupils are equal, round, and reactive to light.   Neck:      Trachea: No tracheal deviation.   Cardiovascular:      Rate and Rhythm: Normal rate and regular rhythm.      Pulses: No decreased pulses.      Heart sounds: Normal heart sounds. No murmur heard.     No friction rub. No gallop.   Pulmonary:      Effort: Pulmonary effort is normal. No respiratory distress.      Breath sounds: Normal breath sounds. No decreased breath sounds, wheezing, rhonchi or rales.   Abdominal:      General: Bowel sounds are normal.      Palpations: Abdomen is  soft.      Tenderness: There is abdominal tenderness in the suprapubic area. There is no guarding or rebound.   Musculoskeletal:         General: No deformity. Normal range of motion.      Cervical back: Normal range of motion and neck supple.   Lymphadenopathy:      Cervical: No cervical adenopathy.   Skin:     General: Skin is warm and dry.      Findings: No rash.   Neurological:      Mental Status: She is alert and oriented to person, place, and time.      Cranial Nerves: No cranial nerve deficit.      Sensory: No sensory deficit.   Psychiatric:         Speech: Speech normal.         Behavior: Behavior normal.         Thought Content: Thought content normal.         Judgment: Judgment normal.         Procedures           ED Course                                                       Medical Decision Making  Differential includes rectal bleeding, perirectal abscess, constipation, dehydration, renal sufficiency, electrolyte abnormality, other unspecified etiology.    Labs show elevated creatinine consistent with dehydration.  Mild leukocytosis.  Mildly elevated lactic acid level, but this essentially stayed stable.  The patient did receive 1 L IV fluids.  Viral respiratory panel was negative for infection.  Urine does not show infection.    Chest x-ray interpreted by myself shows normal heart size and clear lungs.    CT scan of abdomen pelvis without contrast interpreted by me shows significant rectal impaction.    Enema was attempted without significant passage of stool.  Manual disimpaction was performed and a significant mount of stool was removed and the patient does report feeling better.    The patient will be discharged with advised to drink plenty of fluid, take a fiber supplement daily, and engage in regular activity to help facilitate passage of stool.    Caretaker was present at bedside throughout the ER course and understands the plan.    The patient should return to the ER with any further concern and  follow-up with primary care physician for recheck within the next week.    Problems Addressed:  Acute dehydration: complicated acute illness or injury with systemic symptoms  Acute renal insufficiency: complicated acute illness or injury with systemic symptoms  Fecal impaction: complicated acute illness or injury with systemic symptoms  Rectal pain: complicated acute illness or injury with systemic symptoms    Amount and/or Complexity of Data Reviewed  External Data Reviewed: labs and radiology.  Labs: ordered. Decision-making details documented in ED Course.  Radiology: ordered and independent interpretation performed. Decision-making details documented in ED Course.    Risk  OTC drugs.  Prescription drug management.        Final diagnoses:   Fecal impaction   Rectal pain   Acute dehydration   Acute renal insufficiency       ED Disposition  ED Disposition       ED Disposition   Discharge    Condition   Stable    Comment   --               Alfonso Steele MD  [address] [address]  [phone]    In 1 week           Medication List      No changes were made to your prescriptions during this visit.            Meena Hernandez MD  05/12/25 0046

## 2025-05-12 VITALS
BODY MASS INDEX: 14.73 KG/M2 | RESPIRATION RATE: 18 BRPM | SYSTOLIC BLOOD PRESSURE: 114 MMHG | HEART RATE: 51 BPM | HEIGHT: 68 IN | DIASTOLIC BLOOD PRESSURE: 77 MMHG | TEMPERATURE: 97.5 F | OXYGEN SATURATION: 93 % | WEIGHT: 97.2 LBS

## 2025-05-12 LAB
QT INTERVAL: 360 MS
QTC INTERVAL: 293 MS

## 2025-05-12 NOTE — DISCHARGE INSTRUCTIONS
Drink plenty of fluids.      Take a fiber supplement daily.      Engage in regular activities to help facilitate passage of stool.    Follow-up with primary care physician for recheck within the next week.

## 2025-05-16 LAB
BACTERIA SPEC AEROBE CULT: NORMAL
BACTERIA SPEC AEROBE CULT: NORMAL

## 2025-05-28 LAB
QT INTERVAL: 360 MS
QTC INTERVAL: 293 MS